# Patient Record
Sex: MALE | Race: WHITE | NOT HISPANIC OR LATINO | Employment: UNEMPLOYED | URBAN - NONMETROPOLITAN AREA
[De-identification: names, ages, dates, MRNs, and addresses within clinical notes are randomized per-mention and may not be internally consistent; named-entity substitution may affect disease eponyms.]

---

## 2017-03-16 ENCOUNTER — TRANSCRIBE ORDERS (OUTPATIENT)
Dept: ADMINISTRATIVE | Facility: HOSPITAL | Age: 62
End: 2017-03-16

## 2017-03-16 DIAGNOSIS — S46.002A INJURY OF LEFT ROTATOR CUFF, INITIAL ENCOUNTER: Primary | ICD-10-CM

## 2017-03-18 ENCOUNTER — HOSPITAL ENCOUNTER (OUTPATIENT)
Dept: MRI IMAGING | Facility: HOSPITAL | Age: 62
Discharge: HOME | End: 2017-03-18
Attending: FAMILY MEDICINE | Admitting: FAMILY MEDICINE

## 2017-03-18 DIAGNOSIS — S46.002A INJURY OF LEFT ROTATOR CUFF, INITIAL ENCOUNTER: ICD-10-CM

## 2017-03-18 PROCEDURE — 73221 MRI JOINT UPR EXTREM W/O DYE: CPT | Performed by: RADIOLOGY

## 2017-03-18 PROCEDURE — 73221 MRI JOINT UPR EXTREM W/O DYE: CPT

## 2017-04-20 ENCOUNTER — HOSPITAL ENCOUNTER (OUTPATIENT)
Dept: GENERAL RADIOLOGY | Facility: HOSPITAL | Age: 62
Discharge: HOME | End: 2017-04-20
Attending: ORTHOPAEDIC SURGERY | Admitting: ORTHOPAEDIC SURGERY

## 2017-04-20 ENCOUNTER — OFFICE VISIT (OUTPATIENT)
Dept: ORTHOPEDIC SURGERY | Facility: CLINIC | Age: 62
End: 2017-04-20

## 2017-04-20 VITALS
HEART RATE: 68 BPM | SYSTOLIC BLOOD PRESSURE: 155 MMHG | WEIGHT: 183 LBS | BODY MASS INDEX: 28.72 KG/M2 | HEIGHT: 67 IN | DIASTOLIC BLOOD PRESSURE: 93 MMHG

## 2017-04-20 DIAGNOSIS — M25.512 LEFT SHOULDER PAIN, UNSPECIFIED CHRONICITY: Primary | ICD-10-CM

## 2017-04-20 DIAGNOSIS — M75.122 COMPLETE TEAR OF LEFT ROTATOR CUFF: Primary | ICD-10-CM

## 2017-04-20 PROBLEM — S46.219A BICEPS TENDON RUPTURE: Status: ACTIVE | Noted: 2017-04-20

## 2017-04-20 PROBLEM — M10.9 GOUT: Status: ACTIVE | Noted: 2017-04-20

## 2017-04-20 PROBLEM — J45.909 AIRWAY HYPERREACTIVITY: Status: ACTIVE | Noted: 2017-04-20

## 2017-04-20 PROBLEM — M19.90 ARTHRITIS: Status: ACTIVE | Noted: 2017-04-20

## 2017-04-20 PROBLEM — K21.9 ACID REFLUX: Status: ACTIVE | Noted: 2017-04-20

## 2017-04-20 PROCEDURE — 20610 DRAIN/INJ JOINT/BURSA W/O US: CPT | Performed by: ORTHOPAEDIC SURGERY

## 2017-04-20 PROCEDURE — 73030 X-RAY EXAM OF SHOULDER: CPT

## 2017-04-20 PROCEDURE — 73030 X-RAY EXAM OF SHOULDER: CPT | Performed by: RADIOLOGY

## 2017-04-20 PROCEDURE — 99213 OFFICE O/P EST LOW 20 MIN: CPT | Performed by: ORTHOPAEDIC SURGERY

## 2017-04-20 RX ORDER — CETIRIZINE HYDROCHLORIDE 10 MG/1
TABLET ORAL
Refills: 3 | Status: ON HOLD | COMMUNITY
Start: 2017-03-13 | End: 2019-11-24

## 2017-04-20 RX ORDER — AMLODIPINE BESYLATE 5 MG/1
TABLET ORAL
Status: ON HOLD | COMMUNITY
Start: 2014-09-02 | End: 2019-11-24

## 2017-04-20 RX ORDER — LOSARTAN POTASSIUM AND HYDROCHLOROTHIAZIDE 25; 100 MG/1; MG/1
TABLET ORAL
Status: ON HOLD | COMMUNITY
Start: 2017-04-17 | End: 2019-11-24

## 2017-04-20 RX ORDER — ATENOLOL 25 MG/1
TABLET ORAL
Refills: 5 | Status: ON HOLD | COMMUNITY
Start: 2017-03-13 | End: 2019-11-24

## 2017-04-20 RX ORDER — ONDANSETRON 4 MG/1
TABLET, FILM COATED ORAL
Refills: 0 | Status: ON HOLD | COMMUNITY
Start: 2017-01-12 | End: 2019-11-24

## 2017-04-20 RX ORDER — HYDROCODONE BITARTRATE AND ACETAMINOPHEN 10; 325 MG/1; MG/1
TABLET ORAL
Refills: 0 | Status: ON HOLD | COMMUNITY
Start: 2017-04-04 | End: 2019-11-24

## 2017-04-20 RX ORDER — METHYLPREDNISOLONE ACETATE 40 MG/ML
80 INJECTION, SUSPENSION INTRA-ARTICULAR; INTRALESIONAL; INTRAMUSCULAR; SOFT TISSUE
Status: COMPLETED | OUTPATIENT
Start: 2017-04-20 | End: 2017-04-20

## 2017-04-20 RX ORDER — IBUPROFEN 800 MG/1
TABLET ORAL
Status: ON HOLD | COMMUNITY
Start: 2017-04-17 | End: 2019-11-24

## 2017-04-20 RX ORDER — GABAPENTIN 300 MG/1
CAPSULE ORAL
Refills: 5 | Status: ON HOLD | COMMUNITY
Start: 2017-04-13 | End: 2019-11-24

## 2017-04-20 RX ORDER — CARISOPRODOL 350 MG/1
TABLET ORAL
Refills: 1 | Status: ON HOLD | COMMUNITY
Start: 2017-04-03 | End: 2019-11-24

## 2017-04-20 RX ORDER — ALPRAZOLAM 1 MG/1
TABLET ORAL
Refills: 1 | Status: ON HOLD | COMMUNITY
Start: 2017-04-03 | End: 2019-11-24 | Stop reason: DRUGHIGH

## 2017-04-20 RX ORDER — AMLODIPINE BESYLATE 10 MG/1
TABLET ORAL
Refills: 5 | Status: ON HOLD | COMMUNITY
Start: 2017-02-10 | End: 2019-11-24

## 2017-04-20 RX ORDER — ZOLPIDEM TARTRATE 10 MG/1
TABLET ORAL
Refills: 1 | Status: ON HOLD | COMMUNITY
Start: 2017-04-13 | End: 2019-11-24

## 2017-04-20 RX ORDER — BUPIVACAINE HYDROCHLORIDE 5 MG/ML
5 INJECTION, SOLUTION EPIDURAL; INTRACAUDAL
Status: COMPLETED | OUTPATIENT
Start: 2017-04-20 | End: 2017-04-20

## 2017-04-20 RX ORDER — ATORVASTATIN CALCIUM 10 MG/1
TABLET, FILM COATED ORAL
Refills: 5 | Status: ON HOLD | COMMUNITY
Start: 2017-04-13 | End: 2019-11-24

## 2017-04-20 RX ADMIN — BUPIVACAINE HYDROCHLORIDE 5 ML: 5 INJECTION, SOLUTION EPIDURAL; INTRACAUDAL at 10:43

## 2017-04-20 RX ADMIN — METHYLPREDNISOLONE ACETATE 80 MG: 40 INJECTION, SUSPENSION INTRA-ARTICULAR; INTRALESIONAL; INTRAMUSCULAR; SOFT TISSUE at 10:43

## 2017-04-20 NOTE — PROGRESS NOTES
Patient: Valerie Moore  YOB: 1955  Date of Encounter: 04/20/2017        History of Present Illness:     62-year-old white male seen today for follow-up with new complaint three-month history left shoulder pain gradual onset with no notable injury. Patient reports progressive worsening anterior lateral shoulder pain with weakness upon attempted overhead range of motion and lifting. Patient has no prior history of an acute injury. Denies any paresthesias. He reports that he is beginning to lose function in regards to his activities of daily living secondary to the weakness. No prior medication or treatment thus far.    Physical Exam: 62 y.o. male .  General Appearance:    Well appearing, cooperative, in no acute distress.       Patient is alert and oriented x 3.          Musculoskeletal: Left Shoulder Exam     Tenderness   The patient is experiencing tenderness in the AC joint.    Range of Motion   Internal Rotation 0 degrees:      Lumbar    Muscle Strength   Abduction:            4/5  Internal Rotation:  5/5  External Rotation: 4/5  Supraspinatus:     3/5  Subscapularis:     4/5  Biceps:                 5/5    Tests   Impingement:   Positive  [place]:          Negative  Cross Arm:      Negative  Drop Arm:        Positive  Apprehension: Negative  Sulcus:            Negative      Radiology:       EXAMINATION: MRI SHOULDER LEFT WITHOUT CONTRAST-         FINDINGS: Today's study shows a large amount of joint fluid.      There is a tear of the supraspinatus tendon with retraction of the  tendon.      Tear of the superior labrum as well.      There are no adjacent soft tissue masses.      IMPRESSION:  1. Supraspinatus tendon tear with retraction of the tendon.  2. Large amount of joint fluid.  3. Labral tear.       This report was finalized on 3/19/2017 10:52 AM by Dr. Marlo Kang MD.        X-rays: AP, lateral and scapular Y view reveals patient has mild degenerative changes with early glenohumeral joint  and acromioclavicular degeneration. No acute fractures or dislocations.      Procedure:  Left subacromial depo-medrol injection  Date/Time: 4/20/2017 10:43 AM  Consent given by: patient  Site marked: site marked  Supporting Documentation  Indications: pain and diagnostic evaluation   Procedure Details  Location: shoulder - L subacromial bursa  Needle size: 25 G  Approach: lateral  Medications administered: 80 mg methylPREDNISolone acetate 40 MG/ML; 5 mL bupivacaine (PF) 0.5 %  Patient tolerance: patient tolerated the procedure well with no immediate complications          Assessment:    ICD-10-CM ICD-9-CM   1. Complete tear of left rotator cuff M75.122 727.61       Plan:    Patient has a notable MRI confirmed rotator cuff tear with retraction as well as chronic fatty infiltration. At this point given the patient's relative nontreatment he received and tolerated a subacromial left shoulder Depo-Medrol injection well. He is also given a formal referral physical therapy begin working on stabilization as well as generalized strengthening over the next 6 weeks. He reported that time for further evaluation. Being on his response may discuss repeat injection versus discussion of surgical options.        Discussion and Summary:    Written by Ramón Mo PA-C, acting as scribe for Dr. Bobby GUILLAUME, Moncho Rosales MD, personally performed the services described in this documentation as scribed by the above named individual in my presence, and it is both accurate and complete.  4/20/2017  10:56 AM    This document was signed by Ramón Mo PA-C April 20, 2017

## 2019-11-23 ENCOUNTER — HOSPITAL ENCOUNTER (EMERGENCY)
Facility: HOSPITAL | Age: 64
Discharge: HOME | End: 2019-11-23
Attending: EMERGENCY MEDICINE | Admitting: EMERGENCY MEDICINE

## 2019-11-23 ENCOUNTER — APPOINTMENT (OUTPATIENT)
Dept: GENERAL RADIOLOGY | Facility: HOSPITAL | Age: 64
End: 2019-11-23

## 2019-11-23 ENCOUNTER — APPOINTMENT (OUTPATIENT)
Dept: CT IMAGING | Facility: HOSPITAL | Age: 64
End: 2019-11-23

## 2019-11-23 ENCOUNTER — HOSPITAL ENCOUNTER (OUTPATIENT)
Facility: HOSPITAL | Age: 64
Setting detail: OBSERVATION
Discharge: HOME | End: 2019-11-25
Attending: EMERGENCY MEDICINE | Admitting: INTERNAL MEDICINE

## 2019-11-23 VITALS
HEIGHT: 67 IN | HEART RATE: 107 BPM | SYSTOLIC BLOOD PRESSURE: 177 MMHG | BODY MASS INDEX: 27.15 KG/M2 | RESPIRATION RATE: 18 BRPM | WEIGHT: 173 LBS | OXYGEN SATURATION: 96 % | DIASTOLIC BLOOD PRESSURE: 103 MMHG | TEMPERATURE: 98.7 F

## 2019-11-23 DIAGNOSIS — R53.83 FATIGUE, UNSPECIFIED TYPE: ICD-10-CM

## 2019-11-23 DIAGNOSIS — J18.9 COMMUNITY ACQUIRED PNEUMONIA OF LEFT LOWER LOBE OF LUNG: ICD-10-CM

## 2019-11-23 DIAGNOSIS — J18.9 PNEUMONIA OF LEFT LOWER LOBE DUE TO INFECTIOUS ORGANISM: Primary | ICD-10-CM

## 2019-11-23 DIAGNOSIS — R53.1 WEAKNESS: Primary | ICD-10-CM

## 2019-11-23 LAB
ALBUMIN SERPL-MCNC: 4.35 G/DL (ref 3.5–5.2)
ALBUMIN/GLOB SERPL: 1.2 G/DL
ALP SERPL-CCNC: 102 U/L (ref 39–117)
ALT SERPL W P-5'-P-CCNC: 16 U/L (ref 1–41)
ANION GAP SERPL CALCULATED.3IONS-SCNC: 12.3 MMOL/L (ref 5–15)
APTT PPP: 31.8 SECONDS (ref 23.8–36.1)
AST SERPL-CCNC: 22 U/L (ref 1–40)
BASOPHILS # BLD AUTO: 0 10*3/MM3 (ref 0–0.2)
BASOPHILS # BLD AUTO: 0.03 10*3/MM3 (ref 0–0.2)
BASOPHILS NFR BLD AUTO: 0 % (ref 0–1.5)
BASOPHILS NFR BLD AUTO: 0.3 % (ref 0–1.5)
BILIRUB SERPL-MCNC: 0.2 MG/DL (ref 0.2–1.2)
BILIRUB UR QL STRIP: NEGATIVE
BUN BLD-MCNC: 18 MG/DL (ref 8–23)
BUN/CREAT SERPL: 15.8 (ref 7–25)
CALCIUM SPEC-SCNC: 9.1 MG/DL (ref 8.6–10.5)
CHLORIDE SERPL-SCNC: 100 MMOL/L (ref 98–107)
CLARITY UR: CLEAR
CO2 SERPL-SCNC: 24.7 MMOL/L (ref 22–29)
COLOR UR: YELLOW
CREAT BLD-MCNC: 1.14 MG/DL (ref 0.76–1.27)
D-LACTATE SERPL-SCNC: 1.1 MMOL/L (ref 0.5–2)
DEPRECATED RDW RBC AUTO: 47 FL (ref 37–54)
DEPRECATED RDW RBC AUTO: 47.4 FL (ref 37–54)
EOSINOPHIL # BLD AUTO: 0 10*3/MM3 (ref 0–0.4)
EOSINOPHIL # BLD AUTO: 0.12 10*3/MM3 (ref 0–0.4)
EOSINOPHIL NFR BLD AUTO: 0 % (ref 0.3–6.2)
EOSINOPHIL NFR BLD AUTO: 1.2 % (ref 0.3–6.2)
ERYTHROCYTE [DISTWIDTH] IN BLOOD BY AUTOMATED COUNT: 15 % (ref 12.3–15.4)
ERYTHROCYTE [DISTWIDTH] IN BLOOD BY AUTOMATED COUNT: 15.1 % (ref 12.3–15.4)
FLUAV AG NPH QL: NEGATIVE
FLUBV AG NPH QL IA: NEGATIVE
GFR SERPL CREATININE-BSD FRML MDRD: 64 ML/MIN/1.73
GLOBULIN UR ELPH-MCNC: 3.6 GM/DL
GLUCOSE BLD-MCNC: 116 MG/DL (ref 65–99)
GLUCOSE UR STRIP-MCNC: NEGATIVE MG/DL
HCT VFR BLD AUTO: 32.2 % (ref 37.5–51)
HCT VFR BLD AUTO: 36.2 % (ref 37.5–51)
HGB BLD-MCNC: 11.1 G/DL (ref 13–17.7)
HGB BLD-MCNC: 12.1 G/DL (ref 13–17.7)
HGB UR QL STRIP.AUTO: NEGATIVE
IMM GRANULOCYTES # BLD AUTO: 0.04 10*3/MM3 (ref 0–0.05)
IMM GRANULOCYTES # BLD AUTO: 0.06 10*3/MM3 (ref 0–0.05)
IMM GRANULOCYTES NFR BLD AUTO: 0.4 % (ref 0–0.5)
IMM GRANULOCYTES NFR BLD AUTO: 0.6 % (ref 0–0.5)
INR PPP: 0.94 (ref 0.9–1.1)
KETONES UR QL STRIP: NEGATIVE
LEUKOCYTE ESTERASE UR QL STRIP.AUTO: NEGATIVE
LYMPHOCYTES # BLD AUTO: 0.58 10*3/MM3 (ref 0.7–3.1)
LYMPHOCYTES # BLD AUTO: 1.1 10*3/MM3 (ref 0.7–3.1)
LYMPHOCYTES NFR BLD AUTO: 10.6 % (ref 19.6–45.3)
LYMPHOCYTES NFR BLD AUTO: 5.8 % (ref 19.6–45.3)
MCH RBC QN AUTO: 28.8 PG (ref 26.6–33)
MCH RBC QN AUTO: 29.4 PG (ref 26.6–33)
MCHC RBC AUTO-ENTMCNC: 33.4 G/DL (ref 31.5–35.7)
MCHC RBC AUTO-ENTMCNC: 34.5 G/DL (ref 31.5–35.7)
MCV RBC AUTO: 85.2 FL (ref 79–97)
MCV RBC AUTO: 86.2 FL (ref 79–97)
MONOCYTES # BLD AUTO: 0.24 10*3/MM3 (ref 0.1–0.9)
MONOCYTES # BLD AUTO: 0.59 10*3/MM3 (ref 0.1–0.9)
MONOCYTES NFR BLD AUTO: 2.4 % (ref 5–12)
MONOCYTES NFR BLD AUTO: 5.7 % (ref 5–12)
NEUTROPHILS # BLD AUTO: 8.45 10*3/MM3 (ref 1.7–7)
NEUTROPHILS # BLD AUTO: 9.1 10*3/MM3 (ref 1.7–7)
NEUTROPHILS NFR BLD AUTO: 81.8 % (ref 42.7–76)
NEUTROPHILS NFR BLD AUTO: 91.2 % (ref 42.7–76)
NITRITE UR QL STRIP: NEGATIVE
NRBC BLD AUTO-RTO: 0 /100 WBC (ref 0–0.2)
NRBC BLD AUTO-RTO: 0 /100 WBC (ref 0–0.2)
NT-PROBNP SERPL-MCNC: 298.4 PG/ML (ref 5–900)
PH UR STRIP.AUTO: 7 [PH] (ref 5–8)
PLATELET # BLD AUTO: 203 10*3/MM3 (ref 140–450)
PLATELET # BLD AUTO: 216 10*3/MM3 (ref 140–450)
PMV BLD AUTO: 8.4 FL (ref 6–12)
PMV BLD AUTO: 8.7 FL (ref 6–12)
POTASSIUM BLD-SCNC: 3.6 MMOL/L (ref 3.5–5.2)
PROT SERPL-MCNC: 7.9 G/DL (ref 6–8.5)
PROT UR QL STRIP: NEGATIVE
PROTHROMBIN TIME: 13.1 SECONDS (ref 11–15.4)
RBC # BLD AUTO: 3.78 10*6/MM3 (ref 4.14–5.8)
RBC # BLD AUTO: 4.2 10*6/MM3 (ref 4.14–5.8)
SODIUM BLD-SCNC: 137 MMOL/L (ref 136–145)
SP GR UR STRIP: 1.02 (ref 1–1.03)
TROPONIN T SERPL-MCNC: <0.01 NG/ML (ref 0–0.03)
UROBILINOGEN UR QL STRIP: NORMAL
WBC NRBC COR # BLD: 10.33 10*3/MM3 (ref 3.4–10.8)
WBC NRBC COR # BLD: 9.98 10*3/MM3 (ref 3.4–10.8)

## 2019-11-23 PROCEDURE — 85610 PROTHROMBIN TIME: CPT | Performed by: EMERGENCY MEDICINE

## 2019-11-23 PROCEDURE — 83880 ASSAY OF NATRIURETIC PEPTIDE: CPT | Performed by: EMERGENCY MEDICINE

## 2019-11-23 PROCEDURE — 81003 URINALYSIS AUTO W/O SCOPE: CPT | Performed by: EMERGENCY MEDICINE

## 2019-11-23 PROCEDURE — 71275 CT ANGIOGRAPHY CHEST: CPT

## 2019-11-23 PROCEDURE — 25010000002 METHYLPREDNISOLONE PER 125 MG: Performed by: EMERGENCY MEDICINE

## 2019-11-23 PROCEDURE — 85025 COMPLETE CBC W/AUTO DIFF WBC: CPT | Performed by: EMERGENCY MEDICINE

## 2019-11-23 PROCEDURE — 71046 X-RAY EXAM CHEST 2 VIEWS: CPT

## 2019-11-23 PROCEDURE — 84484 ASSAY OF TROPONIN QUANT: CPT | Performed by: EMERGENCY MEDICINE

## 2019-11-23 PROCEDURE — 83605 ASSAY OF LACTIC ACID: CPT | Performed by: EMERGENCY MEDICINE

## 2019-11-23 PROCEDURE — 93010 ELECTROCARDIOGRAM REPORT: CPT | Performed by: INTERNAL MEDICINE

## 2019-11-23 PROCEDURE — 99284 EMERGENCY DEPT VISIT MOD MDM: CPT

## 2019-11-23 PROCEDURE — 94640 AIRWAY INHALATION TREATMENT: CPT

## 2019-11-23 PROCEDURE — 25010000002 CEFTRIAXONE: Performed by: EMERGENCY MEDICINE

## 2019-11-23 PROCEDURE — 93005 ELECTROCARDIOGRAM TRACING: CPT | Performed by: EMERGENCY MEDICINE

## 2019-11-23 PROCEDURE — 0 IOVERSOL 74 % SOLUTION: Performed by: EMERGENCY MEDICINE

## 2019-11-23 PROCEDURE — 96376 TX/PRO/DX INJ SAME DRUG ADON: CPT

## 2019-11-23 PROCEDURE — 96375 TX/PRO/DX INJ NEW DRUG ADDON: CPT

## 2019-11-23 PROCEDURE — 25010000002 ONDANSETRON PER 1 MG: Performed by: EMERGENCY MEDICINE

## 2019-11-23 PROCEDURE — 87040 BLOOD CULTURE FOR BACTERIA: CPT | Performed by: EMERGENCY MEDICINE

## 2019-11-23 PROCEDURE — 96365 THER/PROPH/DIAG IV INF INIT: CPT

## 2019-11-23 PROCEDURE — 87804 INFLUENZA ASSAY W/OPTIC: CPT | Performed by: EMERGENCY MEDICINE

## 2019-11-23 PROCEDURE — 80053 COMPREHEN METABOLIC PANEL: CPT | Performed by: EMERGENCY MEDICINE

## 2019-11-23 PROCEDURE — 86738 MYCOPLASMA ANTIBODY: CPT | Performed by: PHYSICIAN ASSISTANT

## 2019-11-23 PROCEDURE — 71045 X-RAY EXAM CHEST 1 VIEW: CPT

## 2019-11-23 PROCEDURE — 25010000002 MORPHINE PER 10 MG: Performed by: EMERGENCY MEDICINE

## 2019-11-23 PROCEDURE — 36415 COLL VENOUS BLD VENIPUNCTURE: CPT

## 2019-11-23 PROCEDURE — 85730 THROMBOPLASTIN TIME PARTIAL: CPT | Performed by: EMERGENCY MEDICINE

## 2019-11-23 RX ORDER — SODIUM CHLORIDE 0.9 % (FLUSH) 0.9 %
10 SYRINGE (ML) INJECTION AS NEEDED
Status: DISCONTINUED | OUTPATIENT
Start: 2019-11-23 | End: 2019-11-23 | Stop reason: HOSPADM

## 2019-11-23 RX ORDER — IPRATROPIUM BROMIDE AND ALBUTEROL SULFATE 2.5; .5 MG/3ML; MG/3ML
3 SOLUTION RESPIRATORY (INHALATION) ONCE
Status: COMPLETED | OUTPATIENT
Start: 2019-11-23 | End: 2019-11-23

## 2019-11-23 RX ORDER — ASPIRIN 325 MG
325 TABLET ORAL ONCE
Status: COMPLETED | OUTPATIENT
Start: 2019-11-23 | End: 2019-11-23

## 2019-11-23 RX ORDER — ONDANSETRON 2 MG/ML
4 INJECTION INTRAMUSCULAR; INTRAVENOUS ONCE
Status: COMPLETED | OUTPATIENT
Start: 2019-11-23 | End: 2019-11-23

## 2019-11-23 RX ORDER — LEVOFLOXACIN 750 MG/1
750 TABLET ORAL DAILY
Qty: 10 TABLET | Refills: 0 | Status: ON HOLD | OUTPATIENT
Start: 2019-11-23 | End: 2019-11-24 | Stop reason: DRUGHIGH

## 2019-11-23 RX ORDER — SODIUM CHLORIDE 0.9 % (FLUSH) 0.9 %
10 SYRINGE (ML) INJECTION AS NEEDED
Status: DISCONTINUED | OUTPATIENT
Start: 2019-11-23 | End: 2019-11-25 | Stop reason: HOSPADM

## 2019-11-23 RX ORDER — PREDNISONE 20 MG/1
20 TABLET ORAL
Qty: 15 TABLET | Refills: 0 | Status: ON HOLD | OUTPATIENT
Start: 2019-11-23 | End: 2019-11-24 | Stop reason: DRUGHIGH

## 2019-11-23 RX ORDER — METHYLPREDNISOLONE SODIUM SUCCINATE 125 MG/2ML
125 INJECTION, POWDER, LYOPHILIZED, FOR SOLUTION INTRAMUSCULAR; INTRAVENOUS ONCE
Status: COMPLETED | OUTPATIENT
Start: 2019-11-23 | End: 2019-11-23

## 2019-11-23 RX ADMIN — ASPIRIN 325 MG: 325 TABLET ORAL at 23:45

## 2019-11-23 RX ADMIN — SODIUM CHLORIDE 1000 ML: 9 INJECTION, SOLUTION INTRAVENOUS at 03:15

## 2019-11-23 RX ADMIN — IPRATROPIUM BROMIDE AND ALBUTEROL SULFATE 3 ML: .5; 3 SOLUTION RESPIRATORY (INHALATION) at 01:28

## 2019-11-23 RX ADMIN — MORPHINE SULFATE 4 MG: 4 INJECTION, SOLUTION INTRAMUSCULAR; INTRAVENOUS at 03:35

## 2019-11-23 RX ADMIN — ONDANSETRON 4 MG: 2 INJECTION, SOLUTION INTRAMUSCULAR; INTRAVENOUS at 23:45

## 2019-11-23 RX ADMIN — CEFTRIAXONE 1 G: 1 INJECTION, POWDER, FOR SOLUTION INTRAMUSCULAR; INTRAVENOUS at 03:16

## 2019-11-23 RX ADMIN — MORPHINE SULFATE 4 MG: 4 INJECTION, SOLUTION INTRAMUSCULAR; INTRAVENOUS at 05:07

## 2019-11-23 RX ADMIN — METHYLPREDNISOLONE SODIUM SUCCINATE 125 MG: 125 INJECTION, POWDER, FOR SOLUTION INTRAMUSCULAR; INTRAVENOUS at 01:14

## 2019-11-23 RX ADMIN — IOVERSOL 70 ML: 741 INJECTION INTRA-ARTERIAL; INTRAVENOUS at 04:16

## 2019-11-24 PROBLEM — M10.9 GOUT: Chronic | Status: ACTIVE | Noted: 2017-04-20

## 2019-11-24 PROBLEM — J18.9 CAP (COMMUNITY ACQUIRED PNEUMONIA): Status: ACTIVE | Noted: 2019-11-24

## 2019-11-24 PROBLEM — E87.6 HYPOKALEMIA: Status: ACTIVE | Noted: 2019-11-24

## 2019-11-24 PROBLEM — K21.9 ACID REFLUX: Chronic | Status: ACTIVE | Noted: 2017-04-20

## 2019-11-24 PROBLEM — D64.9 NORMOCYTIC ANEMIA: Status: ACTIVE | Noted: 2019-11-24

## 2019-11-24 PROBLEM — I71.40 ABDOMINAL AORTIC ANEURYSM (AAA) 3.0 CM TO 5.5 CM IN DIAMETER IN MALE (HCC): Status: ACTIVE | Noted: 2019-11-24

## 2019-11-24 PROBLEM — R53.1 WEAKNESS: Status: ACTIVE | Noted: 2019-11-24

## 2019-11-24 PROBLEM — E87.1 HYPONATREMIA: Status: ACTIVE | Noted: 2019-11-24

## 2019-11-24 PROBLEM — R73.9 HYPERGLYCEMIA: Status: ACTIVE | Noted: 2019-11-24

## 2019-11-24 PROBLEM — S46.219A BICEPS TENDON RUPTURE: Status: RESOLVED | Noted: 2017-04-20 | Resolved: 2019-11-24

## 2019-11-24 PROBLEM — R07.89 CHEST PAIN, ATYPICAL: Status: ACTIVE | Noted: 2019-11-24

## 2019-11-24 PROBLEM — Z72.0 TOBACCO ABUSE: Status: ACTIVE | Noted: 2019-11-24

## 2019-11-24 LAB
25(OH)D3 SERPL-MCNC: 40.2 NG/ML (ref 30–100)
6-ACETYL MORPHINE: NEGATIVE
ALBUMIN SERPL-MCNC: 4.11 G/DL (ref 3.5–5.2)
ALBUMIN SERPL-MCNC: 4.19 G/DL (ref 3.5–5.2)
ALBUMIN/GLOB SERPL: 1.1 G/DL
ALBUMIN/GLOB SERPL: 1.2 G/DL
ALP SERPL-CCNC: 88 U/L (ref 39–117)
ALP SERPL-CCNC: 89 U/L (ref 39–117)
ALT SERPL W P-5'-P-CCNC: 16 U/L (ref 1–41)
ALT SERPL W P-5'-P-CCNC: 17 U/L (ref 1–41)
AMPHET+METHAMPHET UR QL: NEGATIVE
ANION GAP SERPL CALCULATED.3IONS-SCNC: 15.6 MMOL/L (ref 5–15)
ANION GAP SERPL CALCULATED.3IONS-SCNC: 16.7 MMOL/L (ref 5–15)
AST SERPL-CCNC: 22 U/L (ref 1–40)
AST SERPL-CCNC: 22 U/L (ref 1–40)
B PERT DNA SPEC QL NAA+PROBE: NOT DETECTED
BARBITURATES UR QL SCN: NEGATIVE
BASOPHILS # BLD AUTO: 0.01 10*3/MM3 (ref 0–0.2)
BASOPHILS NFR BLD AUTO: 0.1 % (ref 0–1.5)
BENZODIAZ UR QL SCN: NEGATIVE
BILIRUB SERPL-MCNC: 0.2 MG/DL (ref 0.2–1.2)
BILIRUB SERPL-MCNC: 0.2 MG/DL (ref 0.2–1.2)
BUN BLD-MCNC: 20 MG/DL (ref 8–23)
BUN BLD-MCNC: 21 MG/DL (ref 8–23)
BUN/CREAT SERPL: 19 (ref 7–25)
BUN/CREAT SERPL: 21 (ref 7–25)
BUPRENORPHINE SERPL-MCNC: NEGATIVE NG/ML
C PNEUM DNA NPH QL NAA+NON-PROBE: NOT DETECTED
CALCIUM SPEC-SCNC: 9.4 MG/DL (ref 8.6–10.5)
CALCIUM SPEC-SCNC: 9.5 MG/DL (ref 8.6–10.5)
CANNABINOIDS SERPL QL: NEGATIVE
CHLORIDE SERPL-SCNC: 98 MMOL/L (ref 98–107)
CHLORIDE SERPL-SCNC: 98 MMOL/L (ref 98–107)
CO2 SERPL-SCNC: 18.3 MMOL/L (ref 22–29)
CO2 SERPL-SCNC: 19.4 MMOL/L (ref 22–29)
COCAINE UR QL: NEGATIVE
CREAT BLD-MCNC: 1 MG/DL (ref 0.76–1.27)
CREAT BLD-MCNC: 1.05 MG/DL (ref 0.76–1.27)
DEPRECATED RDW RBC AUTO: 47.4 FL (ref 37–54)
EOSINOPHIL # BLD AUTO: 0 10*3/MM3 (ref 0–0.4)
EOSINOPHIL NFR BLD AUTO: 0 % (ref 0.3–6.2)
ERYTHROCYTE [DISTWIDTH] IN BLOOD BY AUTOMATED COUNT: 15.2 % (ref 12.3–15.4)
FERRITIN SERPL-MCNC: 52.08 NG/ML (ref 30–400)
FLUAV H1 2009 PAND RNA NPH QL NAA+PROBE: NOT DETECTED
FLUAV H1 HA GENE NPH QL NAA+PROBE: NOT DETECTED
FLUAV H3 RNA NPH QL NAA+PROBE: NOT DETECTED
FLUAV SUBTYP SPEC NAA+PROBE: NOT DETECTED
FLUBV RNA ISLT QL NAA+PROBE: NOT DETECTED
GFR SERPL CREATININE-BSD FRML MDRD: 71 ML/MIN/1.73
GFR SERPL CREATININE-BSD FRML MDRD: 75 ML/MIN/1.73
GLOBULIN UR ELPH-MCNC: 3.6 GM/DL
GLOBULIN UR ELPH-MCNC: 3.6 GM/DL
GLUCOSE BLD-MCNC: 133 MG/DL (ref 65–99)
GLUCOSE BLD-MCNC: 162 MG/DL (ref 65–99)
HADV DNA SPEC NAA+PROBE: NOT DETECTED
HBA1C MFR BLD: 5.4 % (ref 4.8–5.6)
HCOV 229E RNA SPEC QL NAA+PROBE: NOT DETECTED
HCOV HKU1 RNA SPEC QL NAA+PROBE: NOT DETECTED
HCOV NL63 RNA SPEC QL NAA+PROBE: NOT DETECTED
HCOV OC43 RNA SPEC QL NAA+PROBE: NOT DETECTED
HCT VFR BLD AUTO: 33 % (ref 37.5–51)
HGB BLD-MCNC: 11.1 G/DL (ref 13–17.7)
HMPV RNA NPH QL NAA+NON-PROBE: NOT DETECTED
HOLD SPECIMEN: NORMAL
HOLD SPECIMEN: NORMAL
HPIV1 RNA SPEC QL NAA+PROBE: DETECTED
HPIV2 RNA SPEC QL NAA+PROBE: NOT DETECTED
HPIV3 RNA NPH QL NAA+PROBE: NOT DETECTED
HPIV4 P GENE NPH QL NAA+PROBE: NOT DETECTED
IMM GRANULOCYTES # BLD AUTO: 0.08 10*3/MM3 (ref 0–0.05)
IMM GRANULOCYTES NFR BLD AUTO: 0.8 % (ref 0–0.5)
IRON 24H UR-MRATE: 21 MCG/DL (ref 59–158)
IRON SATN MFR SERPL: 5 % (ref 20–50)
L PNEUMO1 AG UR QL IA: NEGATIVE
LYMPHOCYTES # BLD AUTO: 0.77 10*3/MM3 (ref 0.7–3.1)
LYMPHOCYTES NFR BLD AUTO: 7.5 % (ref 19.6–45.3)
M PNEUMO IGG SER IA-ACNC: NOT DETECTED
M PNEUMO IGM SER QL: NEGATIVE
MAGNESIUM SERPL-MCNC: 2.1 MG/DL (ref 1.6–2.4)
MCH RBC QN AUTO: 28.8 PG (ref 26.6–33)
MCHC RBC AUTO-ENTMCNC: 33.6 G/DL (ref 31.5–35.7)
MCV RBC AUTO: 85.5 FL (ref 79–97)
METHADONE UR QL SCN: NEGATIVE
MONOCYTES # BLD AUTO: 0.4 10*3/MM3 (ref 0.1–0.9)
MONOCYTES NFR BLD AUTO: 3.9 % (ref 5–12)
NEUTROPHILS # BLD AUTO: 9 10*3/MM3 (ref 1.7–7)
NEUTROPHILS NFR BLD AUTO: 87.7 % (ref 42.7–76)
NRBC BLD AUTO-RTO: 0 /100 WBC (ref 0–0.2)
OPIATES UR QL: POSITIVE
OXYCODONE UR QL SCN: NEGATIVE
PCP UR QL SCN: NEGATIVE
PLATELET # BLD AUTO: 205 10*3/MM3 (ref 140–450)
PMV BLD AUTO: 8.8 FL (ref 6–12)
POTASSIUM BLD-SCNC: 3 MMOL/L (ref 3.5–5.2)
POTASSIUM BLD-SCNC: 3.1 MMOL/L (ref 3.5–5.2)
POTASSIUM BLD-SCNC: 4.3 MMOL/L (ref 3.5–5.2)
PROCALCITONIN SERPL-MCNC: 0.04 NG/ML (ref 0.1–0.25)
PROT SERPL-MCNC: 7.7 G/DL (ref 6–8.5)
PROT SERPL-MCNC: 7.8 G/DL (ref 6–8.5)
RBC # BLD AUTO: 3.86 10*6/MM3 (ref 4.14–5.8)
RHINOVIRUS RNA SPEC NAA+PROBE: NOT DETECTED
RSV RNA NPH QL NAA+NON-PROBE: NOT DETECTED
SODIUM BLD-SCNC: 133 MMOL/L (ref 136–145)
SODIUM BLD-SCNC: 133 MMOL/L (ref 136–145)
TIBC SERPL-MCNC: 407 MCG/DL (ref 298–536)
TRANSFERRIN SERPL-MCNC: 273 MG/DL (ref 200–360)
TROPONIN T SERPL-MCNC: <0.01 NG/ML (ref 0–0.03)
TSH SERPL DL<=0.05 MIU/L-ACNC: 0.34 UIU/ML (ref 0.27–4.2)
WBC NRBC COR # BLD: 10.26 10*3/MM3 (ref 3.4–10.8)
WHOLE BLOOD HOLD SPECIMEN: NORMAL
WHOLE BLOOD HOLD SPECIMEN: NORMAL

## 2019-11-24 PROCEDURE — 99406 BEHAV CHNG SMOKING 3-10 MIN: CPT | Performed by: PHYSICIAN ASSISTANT

## 2019-11-24 PROCEDURE — 94640 AIRWAY INHALATION TREATMENT: CPT

## 2019-11-24 PROCEDURE — 84145 PROCALCITONIN (PCT): CPT | Performed by: INTERNAL MEDICINE

## 2019-11-24 PROCEDURE — 80307 DRUG TEST PRSMV CHEM ANLYZR: CPT | Performed by: INTERNAL MEDICINE

## 2019-11-24 PROCEDURE — 25010000002 HEPARIN (PORCINE) PER 1000 UNITS: Performed by: INTERNAL MEDICINE

## 2019-11-24 PROCEDURE — 87070 CULTURE OTHR SPECIMN AEROBIC: CPT | Performed by: PHYSICIAN ASSISTANT

## 2019-11-24 PROCEDURE — G0378 HOSPITAL OBSERVATION PER HR: HCPCS

## 2019-11-24 PROCEDURE — 96365 THER/PROPH/DIAG IV INF INIT: CPT

## 2019-11-24 PROCEDURE — 93010 ELECTROCARDIOGRAM REPORT: CPT | Performed by: INTERNAL MEDICINE

## 2019-11-24 PROCEDURE — 63710000001 PREDNISONE PER 5 MG: Performed by: PHYSICIAN ASSISTANT

## 2019-11-24 PROCEDURE — 87899 AGENT NOS ASSAY W/OPTIC: CPT | Performed by: PHYSICIAN ASSISTANT

## 2019-11-24 PROCEDURE — 84484 ASSAY OF TROPONIN QUANT: CPT | Performed by: EMERGENCY MEDICINE

## 2019-11-24 PROCEDURE — 83735 ASSAY OF MAGNESIUM: CPT | Performed by: INTERNAL MEDICINE

## 2019-11-24 PROCEDURE — 83036 HEMOGLOBIN GLYCOSYLATED A1C: CPT | Performed by: INTERNAL MEDICINE

## 2019-11-24 PROCEDURE — 83540 ASSAY OF IRON: CPT | Performed by: PHYSICIAN ASSISTANT

## 2019-11-24 PROCEDURE — 84484 ASSAY OF TROPONIN QUANT: CPT | Performed by: INTERNAL MEDICINE

## 2019-11-24 PROCEDURE — 25010000002 CEFTRIAXONE: Performed by: PHYSICIAN ASSISTANT

## 2019-11-24 PROCEDURE — 96361 HYDRATE IV INFUSION ADD-ON: CPT

## 2019-11-24 PROCEDURE — 85025 COMPLETE CBC W/AUTO DIFF WBC: CPT | Performed by: INTERNAL MEDICINE

## 2019-11-24 PROCEDURE — 96372 THER/PROPH/DIAG INJ SC/IM: CPT

## 2019-11-24 PROCEDURE — 87899 AGENT NOS ASSAY W/OPTIC: CPT | Performed by: INTERNAL MEDICINE

## 2019-11-24 PROCEDURE — 84132 ASSAY OF SERUM POTASSIUM: CPT | Performed by: INTERNAL MEDICINE

## 2019-11-24 PROCEDURE — 87040 BLOOD CULTURE FOR BACTERIA: CPT | Performed by: PHYSICIAN ASSISTANT

## 2019-11-24 PROCEDURE — 99220 PR INITIAL OBSERVATION CARE/DAY 70 MINUTES: CPT | Performed by: PHYSICIAN ASSISTANT

## 2019-11-24 PROCEDURE — 25010000002 MORPHINE PER 10 MG: Performed by: EMERGENCY MEDICINE

## 2019-11-24 PROCEDURE — 96375 TX/PRO/DX INJ NEW DRUG ADDON: CPT

## 2019-11-24 PROCEDURE — 87205 SMEAR GRAM STAIN: CPT | Performed by: PHYSICIAN ASSISTANT

## 2019-11-24 PROCEDURE — 94799 UNLISTED PULMONARY SVC/PX: CPT

## 2019-11-24 PROCEDURE — 82306 VITAMIN D 25 HYDROXY: CPT | Performed by: PHYSICIAN ASSISTANT

## 2019-11-24 PROCEDURE — 93005 ELECTROCARDIOGRAM TRACING: CPT | Performed by: INTERNAL MEDICINE

## 2019-11-24 PROCEDURE — 0099U HC BIOFIRE FILMARRAY RESP PANEL 1: CPT | Performed by: PHYSICIAN ASSISTANT

## 2019-11-24 PROCEDURE — 84443 ASSAY THYROID STIM HORMONE: CPT | Performed by: INTERNAL MEDICINE

## 2019-11-24 PROCEDURE — 80053 COMPREHEN METABOLIC PANEL: CPT | Performed by: INTERNAL MEDICINE

## 2019-11-24 PROCEDURE — 82728 ASSAY OF FERRITIN: CPT | Performed by: INTERNAL MEDICINE

## 2019-11-24 PROCEDURE — 84466 ASSAY OF TRANSFERRIN: CPT | Performed by: PHYSICIAN ASSISTANT

## 2019-11-24 RX ORDER — HYDROCHLOROTHIAZIDE 12.5 MG/1
12.5 TABLET ORAL DAILY
Status: ON HOLD | COMMUNITY
End: 2021-02-17

## 2019-11-24 RX ORDER — HEPARIN SODIUM 5000 [USP'U]/ML
5000 INJECTION, SOLUTION INTRAVENOUS; SUBCUTANEOUS EVERY 12 HOURS SCHEDULED
Status: DISCONTINUED | OUTPATIENT
Start: 2019-11-24 | End: 2019-11-25 | Stop reason: HOSPADM

## 2019-11-24 RX ORDER — CARISOPRODOL 350 MG/1
350 TABLET ORAL 3 TIMES DAILY PRN
Status: DISCONTINUED | OUTPATIENT
Start: 2019-11-24 | End: 2019-11-25 | Stop reason: HOSPADM

## 2019-11-24 RX ORDER — KETOROLAC TROMETHAMINE 30 MG/ML
15 INJECTION, SOLUTION INTRAMUSCULAR; INTRAVENOUS EVERY 6 HOURS PRN
Status: DISCONTINUED | OUTPATIENT
Start: 2019-11-24 | End: 2019-11-25 | Stop reason: HOSPADM

## 2019-11-24 RX ORDER — CETIRIZINE HYDROCHLORIDE 10 MG/1
5 TABLET ORAL DAILY
Status: DISCONTINUED | OUTPATIENT
Start: 2019-11-24 | End: 2019-11-25 | Stop reason: HOSPADM

## 2019-11-24 RX ORDER — NITROGLYCERIN 0.4 MG/1
0.4 TABLET SUBLINGUAL
Status: DISCONTINUED | OUTPATIENT
Start: 2019-11-24 | End: 2019-11-25 | Stop reason: HOSPADM

## 2019-11-24 RX ORDER — ZOLPIDEM TARTRATE 10 MG/1
10 TABLET ORAL NIGHTLY PRN
Status: ON HOLD | COMMUNITY
End: 2021-02-22

## 2019-11-24 RX ORDER — IPRATROPIUM BROMIDE AND ALBUTEROL SULFATE 2.5; .5 MG/3ML; MG/3ML
3 SOLUTION RESPIRATORY (INHALATION)
Status: DISCONTINUED | OUTPATIENT
Start: 2019-11-24 | End: 2019-11-25 | Stop reason: HOSPADM

## 2019-11-24 RX ORDER — HYDROCODONE BITARTRATE AND ACETAMINOPHEN 10; 325 MG/1; MG/1
1 TABLET ORAL EVERY 8 HOURS PRN
Status: DISCONTINUED | OUTPATIENT
Start: 2019-11-24 | End: 2019-11-25 | Stop reason: HOSPADM

## 2019-11-24 RX ORDER — LEVOFLOXACIN 750 MG/1
750 TABLET ORAL DAILY
COMMUNITY
Start: 2019-11-23 | End: 2019-11-25 | Stop reason: HOSPADM

## 2019-11-24 RX ORDER — CETIRIZINE HYDROCHLORIDE 10 MG/1
10 TABLET ORAL DAILY
Status: ON HOLD | COMMUNITY
End: 2021-02-17

## 2019-11-24 RX ORDER — PREDNISONE 20 MG/1
20 TABLET ORAL 3 TIMES DAILY
COMMUNITY
Start: 2019-11-23 | End: 2019-11-28

## 2019-11-24 RX ORDER — ALPRAZOLAM 1 MG/1
1 TABLET ORAL 3 TIMES DAILY PRN
Status: DISCONTINUED | OUTPATIENT
Start: 2019-11-24 | End: 2019-11-25 | Stop reason: HOSPADM

## 2019-11-24 RX ORDER — ACETAMINOPHEN 325 MG/1
650 TABLET ORAL EVERY 6 HOURS PRN
Status: DISCONTINUED | OUTPATIENT
Start: 2019-11-24 | End: 2019-11-25 | Stop reason: HOSPADM

## 2019-11-24 RX ORDER — HYDROCODONE BITARTRATE AND ACETAMINOPHEN 10; 325 MG/1; MG/1
1 TABLET ORAL EVERY 8 HOURS PRN
Status: ON HOLD | COMMUNITY
End: 2021-02-22

## 2019-11-24 RX ORDER — DOXYCYCLINE 100 MG/1
100 CAPSULE ORAL EVERY 12 HOURS SCHEDULED
Status: DISCONTINUED | OUTPATIENT
Start: 2019-11-24 | End: 2019-11-24

## 2019-11-24 RX ORDER — LEVOFLOXACIN 750 MG/1
750 TABLET ORAL DAILY
Status: CANCELLED | OUTPATIENT
Start: 2019-11-24 | End: 2019-12-02

## 2019-11-24 RX ORDER — POTASSIUM CHLORIDE 20 MEQ/1
40 TABLET, EXTENDED RELEASE ORAL EVERY 4 HOURS
Status: COMPLETED | OUTPATIENT
Start: 2019-11-24 | End: 2019-11-24

## 2019-11-24 RX ORDER — CARISOPRODOL 350 MG/1
350 TABLET ORAL 2 TIMES DAILY PRN
Status: ON HOLD | COMMUNITY
End: 2021-02-22

## 2019-11-24 RX ORDER — POTASSIUM CHLORIDE 20 MEQ/1
40 TABLET, EXTENDED RELEASE ORAL AS NEEDED
Status: DISCONTINUED | OUTPATIENT
Start: 2019-11-24 | End: 2019-11-25 | Stop reason: HOSPADM

## 2019-11-24 RX ORDER — ATENOLOL 25 MG/1
25 TABLET ORAL 2 TIMES DAILY
Status: DISCONTINUED | OUTPATIENT
Start: 2019-11-24 | End: 2019-11-25 | Stop reason: HOSPADM

## 2019-11-24 RX ORDER — AMLODIPINE BESYLATE 10 MG/1
10 TABLET ORAL DAILY
Status: DISCONTINUED | OUTPATIENT
Start: 2019-11-24 | End: 2019-11-25 | Stop reason: HOSPADM

## 2019-11-24 RX ORDER — SODIUM CHLORIDE 9 MG/ML
75 INJECTION, SOLUTION INTRAVENOUS CONTINUOUS
Status: DISCONTINUED | OUTPATIENT
Start: 2019-11-24 | End: 2019-11-25

## 2019-11-24 RX ORDER — ATENOLOL 25 MG/1
25 TABLET ORAL 2 TIMES DAILY
Status: ON HOLD | COMMUNITY
End: 2021-02-22

## 2019-11-24 RX ORDER — PREDNISONE 10 MG/1
20 TABLET ORAL 3 TIMES DAILY
Status: DISCONTINUED | OUTPATIENT
Start: 2019-11-24 | End: 2019-11-25 | Stop reason: HOSPADM

## 2019-11-24 RX ORDER — LOSARTAN POTASSIUM 50 MG/1
100 TABLET ORAL DAILY
Status: DISCONTINUED | OUTPATIENT
Start: 2019-11-24 | End: 2019-11-25 | Stop reason: HOSPADM

## 2019-11-24 RX ORDER — AMLODIPINE BESYLATE 10 MG/1
10 TABLET ORAL DAILY
Status: ON HOLD | COMMUNITY
End: 2021-02-22

## 2019-11-24 RX ORDER — SODIUM CHLORIDE 0.9 % (FLUSH) 0.9 %
10 SYRINGE (ML) INJECTION AS NEEDED
Status: DISCONTINUED | OUTPATIENT
Start: 2019-11-24 | End: 2019-11-25 | Stop reason: HOSPADM

## 2019-11-24 RX ORDER — ONDANSETRON 2 MG/ML
4 INJECTION INTRAMUSCULAR; INTRAVENOUS EVERY 6 HOURS PRN
Status: DISCONTINUED | OUTPATIENT
Start: 2019-11-24 | End: 2019-11-24

## 2019-11-24 RX ORDER — MULTIVITAMIN
1 TABLET ORAL DAILY
Status: DISCONTINUED | OUTPATIENT
Start: 2019-11-24 | End: 2019-11-25 | Stop reason: HOSPADM

## 2019-11-24 RX ORDER — HYDROCHLOROTHIAZIDE 12.5 MG/1
12.5 TABLET ORAL DAILY
Status: DISCONTINUED | OUTPATIENT
Start: 2019-11-24 | End: 2019-11-25 | Stop reason: HOSPADM

## 2019-11-24 RX ORDER — ALPRAZOLAM 1 MG/1
1 TABLET ORAL 3 TIMES DAILY PRN
Status: ON HOLD | COMMUNITY
End: 2021-02-22

## 2019-11-24 RX ORDER — POTASSIUM CHLORIDE 1.5 G/1.77G
40 POWDER, FOR SOLUTION ORAL AS NEEDED
Status: DISCONTINUED | OUTPATIENT
Start: 2019-11-24 | End: 2019-11-25 | Stop reason: HOSPADM

## 2019-11-24 RX ORDER — LOSARTAN POTASSIUM 100 MG/1
100 TABLET ORAL DAILY
Status: ON HOLD | COMMUNITY
End: 2021-02-17

## 2019-11-24 RX ORDER — ASPIRIN 81 MG/1
81 TABLET, CHEWABLE ORAL DAILY
Status: DISCONTINUED | OUTPATIENT
Start: 2019-11-24 | End: 2019-11-25 | Stop reason: HOSPADM

## 2019-11-24 RX ORDER — ZOLPIDEM TARTRATE 5 MG/1
10 TABLET ORAL NIGHTLY PRN
Status: DISCONTINUED | OUTPATIENT
Start: 2019-11-24 | End: 2019-11-25 | Stop reason: HOSPADM

## 2019-11-24 RX ORDER — SODIUM CHLORIDE 0.9 % (FLUSH) 0.9 %
10 SYRINGE (ML) INJECTION EVERY 12 HOURS SCHEDULED
Status: DISCONTINUED | OUTPATIENT
Start: 2019-11-24 | End: 2019-11-25 | Stop reason: HOSPADM

## 2019-11-24 RX ORDER — PANTOPRAZOLE SODIUM 40 MG/1
40 TABLET, DELAYED RELEASE ORAL
Status: DISCONTINUED | OUTPATIENT
Start: 2019-11-24 | End: 2019-11-25 | Stop reason: HOSPADM

## 2019-11-24 RX ORDER — CEFDINIR 300 MG/1
300 CAPSULE ORAL EVERY 12 HOURS SCHEDULED
Status: DISCONTINUED | OUTPATIENT
Start: 2019-11-24 | End: 2019-11-24

## 2019-11-24 RX ADMIN — HEPARIN SODIUM 5000 UNITS: 5000 INJECTION INTRAVENOUS; SUBCUTANEOUS at 15:58

## 2019-11-24 RX ADMIN — SODIUM CHLORIDE, PRESERVATIVE FREE 10 ML: 5 INJECTION INTRAVENOUS at 02:48

## 2019-11-24 RX ADMIN — ATENOLOL 25 MG: 25 TABLET ORAL at 08:27

## 2019-11-24 RX ADMIN — Medication 1 TABLET: at 08:27

## 2019-11-24 RX ADMIN — HEPARIN SODIUM 5000 UNITS: 5000 INJECTION INTRAVENOUS; SUBCUTANEOUS at 22:11

## 2019-11-24 RX ADMIN — HEPARIN SODIUM 5000 UNITS: 5000 INJECTION INTRAVENOUS; SUBCUTANEOUS at 02:48

## 2019-11-24 RX ADMIN — POTASSIUM CHLORIDE 40 MEQ: 1500 TABLET, EXTENDED RELEASE ORAL at 05:50

## 2019-11-24 RX ADMIN — CETIRIZINE HYDROCHLORIDE 5 MG: 10 TABLET, FILM COATED ORAL at 08:27

## 2019-11-24 RX ADMIN — HYDROCODONE BITARTRATE AND ACETAMINOPHEN 1 TABLET: 10; 325 TABLET ORAL at 17:07

## 2019-11-24 RX ADMIN — DOXYCYCLINE 100 MG: 100 CAPSULE ORAL at 02:47

## 2019-11-24 RX ADMIN — LOSARTAN POTASSIUM 100 MG: 50 TABLET, FILM COATED ORAL at 10:44

## 2019-11-24 RX ADMIN — SODIUM CHLORIDE 75 ML/HR: 9 INJECTION, SOLUTION INTRAVENOUS at 02:48

## 2019-11-24 RX ADMIN — PREDNISONE 20 MG: 10 TABLET ORAL at 22:12

## 2019-11-24 RX ADMIN — AMLODIPINE BESYLATE 10 MG: 10 TABLET ORAL at 10:44

## 2019-11-24 RX ADMIN — ATENOLOL 25 MG: 25 TABLET ORAL at 22:08

## 2019-11-24 RX ADMIN — CEFDINIR 300 MG: 300 CAPSULE ORAL at 02:47

## 2019-11-24 RX ADMIN — SODIUM CHLORIDE, PRESERVATIVE FREE 10 ML: 5 INJECTION INTRAVENOUS at 08:28

## 2019-11-24 RX ADMIN — DOXYCYCLINE 100 MG: 100 INJECTION, POWDER, LYOPHILIZED, FOR SOLUTION INTRAVENOUS at 17:07

## 2019-11-24 RX ADMIN — IPRATROPIUM BROMIDE AND ALBUTEROL SULFATE 3 ML: .5; 3 SOLUTION RESPIRATORY (INHALATION) at 07:33

## 2019-11-24 RX ADMIN — IPRATROPIUM BROMIDE AND ALBUTEROL SULFATE 3 ML: .5; 3 SOLUTION RESPIRATORY (INHALATION) at 13:22

## 2019-11-24 RX ADMIN — POTASSIUM CHLORIDE 40 MEQ: 1500 TABLET, EXTENDED RELEASE ORAL at 10:44

## 2019-11-24 RX ADMIN — ALPRAZOLAM 1 MG: 1 TABLET ORAL at 08:36

## 2019-11-24 RX ADMIN — HYDROCHLOROTHIAZIDE 12.5 MG: 12.5 CAPSULE, GELATIN COATED ORAL at 10:44

## 2019-11-24 RX ADMIN — DOXYCYCLINE 100 MG: 100 INJECTION, POWDER, LYOPHILIZED, FOR SOLUTION INTRAVENOUS at 05:49

## 2019-11-24 RX ADMIN — ALPRAZOLAM 1 MG: 1 TABLET ORAL at 17:07

## 2019-11-24 RX ADMIN — IPRATROPIUM BROMIDE AND ALBUTEROL SULFATE 3 ML: .5; 3 SOLUTION RESPIRATORY (INHALATION) at 19:12

## 2019-11-24 RX ADMIN — PANTOPRAZOLE SODIUM 40 MG: 40 TABLET, DELAYED RELEASE ORAL at 05:50

## 2019-11-24 RX ADMIN — ZOLPIDEM TARTRATE 10 MG: 5 TABLET ORAL at 22:08

## 2019-11-24 RX ADMIN — POTASSIUM CHLORIDE 40 MEQ: 1500 TABLET, EXTENDED RELEASE ORAL at 02:47

## 2019-11-24 RX ADMIN — CEFTRIAXONE 1 G: 1 INJECTION, POWDER, FOR SOLUTION INTRAMUSCULAR; INTRAVENOUS at 05:49

## 2019-11-24 RX ADMIN — HYDROCODONE BITARTRATE AND ACETAMINOPHEN 1 TABLET: 10; 325 TABLET ORAL at 08:36

## 2019-11-24 RX ADMIN — SODIUM CHLORIDE 75 ML/HR: 9 INJECTION, SOLUTION INTRAVENOUS at 17:07

## 2019-11-24 RX ADMIN — CARISOPRODOL 350 MG: 350 TABLET ORAL at 15:58

## 2019-11-24 RX ADMIN — PREDNISONE 20 MG: 10 TABLET ORAL at 08:27

## 2019-11-24 RX ADMIN — ASPIRIN 81 MG: 81 TABLET, CHEWABLE ORAL at 08:37

## 2019-11-24 RX ADMIN — MORPHINE SULFATE 4 MG: 4 INJECTION, SOLUTION INTRAMUSCULAR; INTRAVENOUS at 00:25

## 2019-11-24 RX ADMIN — PREDNISONE 20 MG: 10 TABLET ORAL at 15:58

## 2019-11-25 VITALS
WEIGHT: 181.31 LBS | TEMPERATURE: 97.4 F | OXYGEN SATURATION: 96 % | SYSTOLIC BLOOD PRESSURE: 172 MMHG | HEIGHT: 67 IN | HEART RATE: 66 BPM | BODY MASS INDEX: 28.46 KG/M2 | DIASTOLIC BLOOD PRESSURE: 75 MMHG | RESPIRATION RATE: 18 BRPM

## 2019-11-25 LAB
ANION GAP SERPL CALCULATED.3IONS-SCNC: 11.7 MMOL/L (ref 5–15)
BASOPHILS # BLD AUTO: 0.01 10*3/MM3 (ref 0–0.2)
BASOPHILS NFR BLD AUTO: 0.1 % (ref 0–1.5)
BUN BLD-MCNC: 19 MG/DL (ref 8–23)
BUN/CREAT SERPL: 22.1 (ref 7–25)
CALCIUM SPEC-SCNC: 9.1 MG/DL (ref 8.6–10.5)
CHLORIDE SERPL-SCNC: 104 MMOL/L (ref 98–107)
CO2 SERPL-SCNC: 19.3 MMOL/L (ref 22–29)
CREAT BLD-MCNC: 0.86 MG/DL (ref 0.76–1.27)
DEPRECATED RDW RBC AUTO: 50.4 FL (ref 37–54)
EOSINOPHIL # BLD AUTO: 0 10*3/MM3 (ref 0–0.4)
EOSINOPHIL NFR BLD AUTO: 0 % (ref 0.3–6.2)
ERYTHROCYTE [DISTWIDTH] IN BLOOD BY AUTOMATED COUNT: 15.7 % (ref 12.3–15.4)
GFR SERPL CREATININE-BSD FRML MDRD: 89 ML/MIN/1.73
GLUCOSE BLD-MCNC: 159 MG/DL (ref 65–99)
HCT VFR BLD AUTO: 33.3 % (ref 37.5–51)
HGB BLD-MCNC: 10.9 G/DL (ref 13–17.7)
IMM GRANULOCYTES # BLD AUTO: 0.08 10*3/MM3 (ref 0–0.05)
IMM GRANULOCYTES NFR BLD AUTO: 0.9 % (ref 0–0.5)
LYMPHOCYTES # BLD AUTO: 0.98 10*3/MM3 (ref 0.7–3.1)
LYMPHOCYTES NFR BLD AUTO: 10.8 % (ref 19.6–45.3)
MCH RBC QN AUTO: 28.8 PG (ref 26.6–33)
MCHC RBC AUTO-ENTMCNC: 32.7 G/DL (ref 31.5–35.7)
MCV RBC AUTO: 87.9 FL (ref 79–97)
MONOCYTES # BLD AUTO: 0.44 10*3/MM3 (ref 0.1–0.9)
MONOCYTES NFR BLD AUTO: 4.9 % (ref 5–12)
NEUTROPHILS # BLD AUTO: 7.56 10*3/MM3 (ref 1.7–7)
NEUTROPHILS NFR BLD AUTO: 83.3 % (ref 42.7–76)
NRBC BLD AUTO-RTO: 0 /100 WBC (ref 0–0.2)
PLATELET # BLD AUTO: 237 10*3/MM3 (ref 140–450)
PMV BLD AUTO: 9.4 FL (ref 6–12)
POTASSIUM BLD-SCNC: 3.9 MMOL/L (ref 3.5–5.2)
RBC # BLD AUTO: 3.79 10*6/MM3 (ref 4.14–5.8)
S PNEUM AG SPEC QL LA: NEGATIVE
SODIUM BLD-SCNC: 135 MMOL/L (ref 136–145)
WBC NRBC COR # BLD: 9.07 10*3/MM3 (ref 3.4–10.8)

## 2019-11-25 PROCEDURE — 80048 BASIC METABOLIC PNL TOTAL CA: CPT | Performed by: INTERNAL MEDICINE

## 2019-11-25 PROCEDURE — 96361 HYDRATE IV INFUSION ADD-ON: CPT

## 2019-11-25 PROCEDURE — 94799 UNLISTED PULMONARY SVC/PX: CPT

## 2019-11-25 PROCEDURE — 96366 THER/PROPH/DIAG IV INF ADDON: CPT

## 2019-11-25 PROCEDURE — G0378 HOSPITAL OBSERVATION PER HR: HCPCS

## 2019-11-25 PROCEDURE — 63710000001 PREDNISONE PER 5 MG: Performed by: PHYSICIAN ASSISTANT

## 2019-11-25 PROCEDURE — 85025 COMPLETE CBC W/AUTO DIFF WBC: CPT | Performed by: INTERNAL MEDICINE

## 2019-11-25 PROCEDURE — 99217 PR OBSERVATION CARE DISCHARGE MANAGEMENT: CPT | Performed by: PHYSICIAN ASSISTANT

## 2019-11-25 PROCEDURE — 96367 TX/PROPH/DG ADDL SEQ IV INF: CPT

## 2019-11-25 PROCEDURE — 25010000002 CEFTRIAXONE: Performed by: PHYSICIAN ASSISTANT

## 2019-11-25 RX ORDER — CEFDINIR 300 MG/1
300 CAPSULE ORAL 2 TIMES DAILY
Qty: 10 CAPSULE | Refills: 0 | Status: SHIPPED | OUTPATIENT
Start: 2019-11-26 | End: 2019-12-01

## 2019-11-25 RX ORDER — HYDRALAZINE HYDROCHLORIDE 20 MG/ML
10 INJECTION INTRAMUSCULAR; INTRAVENOUS EVERY 6 HOURS PRN
Status: DISCONTINUED | OUTPATIENT
Start: 2019-11-25 | End: 2019-11-25

## 2019-11-25 RX ORDER — HYDRALAZINE HYDROCHLORIDE 25 MG/1
25 TABLET, FILM COATED ORAL EVERY 8 HOURS PRN
Status: DISCONTINUED | OUTPATIENT
Start: 2019-11-25 | End: 2019-11-25 | Stop reason: HOSPADM

## 2019-11-25 RX ORDER — ADHESIVE BANDAGE 3/4"
BANDAGE TOPICAL
Qty: 1 EACH | Refills: 0 | Status: ON HOLD | OUTPATIENT
Start: 2019-11-25 | End: 2021-02-17

## 2019-11-25 RX ORDER — MULTIVITAMIN
1 TABLET ORAL DAILY
Qty: 30 TABLET | Refills: 0 | Status: SHIPPED | OUTPATIENT
Start: 2019-11-26 | End: 2019-12-26

## 2019-11-25 RX ORDER — HYDRALAZINE HYDROCHLORIDE 25 MG/1
25 TABLET, FILM COATED ORAL 3 TIMES DAILY PRN
Qty: 90 TABLET | Refills: 0 | Status: SHIPPED | OUTPATIENT
Start: 2019-11-25 | End: 2019-12-25

## 2019-11-25 RX ORDER — FERROUS SULFATE 325(65) MG
325 TABLET ORAL
Qty: 30 TABLET | Refills: 0 | Status: SHIPPED | OUTPATIENT
Start: 2019-11-25 | End: 2019-12-25

## 2019-11-25 RX ORDER — FERROUS SULFATE 325(65) MG
325 TABLET ORAL
Qty: 30 TABLET | Refills: 0 | Status: SHIPPED | OUTPATIENT
Start: 2019-11-25 | End: 2019-11-25

## 2019-11-25 RX ADMIN — PREDNISONE 20 MG: 10 TABLET ORAL at 08:47

## 2019-11-25 RX ADMIN — ALPRAZOLAM 1 MG: 1 TABLET ORAL at 01:50

## 2019-11-25 RX ADMIN — CETIRIZINE HYDROCHLORIDE 5 MG: 10 TABLET, FILM COATED ORAL at 08:47

## 2019-11-25 RX ADMIN — CEFTRIAXONE 1 G: 1 INJECTION, POWDER, FOR SOLUTION INTRAMUSCULAR; INTRAVENOUS at 06:08

## 2019-11-25 RX ADMIN — Medication 1 TABLET: at 08:47

## 2019-11-25 RX ADMIN — HYDROCHLOROTHIAZIDE 12.5 MG: 12.5 CAPSULE, GELATIN COATED ORAL at 11:18

## 2019-11-25 RX ADMIN — AMLODIPINE BESYLATE 10 MG: 10 TABLET ORAL at 08:47

## 2019-11-25 RX ADMIN — CARISOPRODOL 350 MG: 350 TABLET ORAL at 00:26

## 2019-11-25 RX ADMIN — IPRATROPIUM BROMIDE AND ALBUTEROL SULFATE 3 ML: .5; 3 SOLUTION RESPIRATORY (INHALATION) at 01:07

## 2019-11-25 RX ADMIN — DOXYCYCLINE 100 MG: 100 INJECTION, POWDER, LYOPHILIZED, FOR SOLUTION INTRAVENOUS at 08:47

## 2019-11-25 RX ADMIN — SODIUM CHLORIDE, PRESERVATIVE FREE 10 ML: 5 INJECTION INTRAVENOUS at 08:48

## 2019-11-25 RX ADMIN — HYDROCODONE BITARTRATE AND ACETAMINOPHEN 1 TABLET: 10; 325 TABLET ORAL at 01:50

## 2019-11-25 RX ADMIN — HYDROCODONE BITARTRATE AND ACETAMINOPHEN 1 TABLET: 10; 325 TABLET ORAL at 12:27

## 2019-11-25 RX ADMIN — CARISOPRODOL 350 MG: 350 TABLET ORAL at 08:49

## 2019-11-25 RX ADMIN — IPRATROPIUM BROMIDE AND ALBUTEROL SULFATE 3 ML: .5; 3 SOLUTION RESPIRATORY (INHALATION) at 07:51

## 2019-11-25 RX ADMIN — LOSARTAN POTASSIUM 100 MG: 50 TABLET, FILM COATED ORAL at 08:47

## 2019-11-25 RX ADMIN — ASPIRIN 81 MG: 81 TABLET, CHEWABLE ORAL at 08:47

## 2019-11-25 RX ADMIN — HYDRALAZINE HYDROCHLORIDE 25 MG: 25 TABLET ORAL at 13:41

## 2019-11-25 RX ADMIN — ATENOLOL 25 MG: 25 TABLET ORAL at 08:47

## 2019-11-26 LAB
BACTERIA SPEC RESP CULT: NORMAL
GRAM STN SPEC: NORMAL

## 2019-11-28 LAB
BACTERIA SPEC AEROBE CULT: NORMAL
BACTERIA SPEC AEROBE CULT: NORMAL

## 2019-11-29 LAB — BACTERIA SPEC AEROBE CULT: NORMAL

## 2021-02-16 ENCOUNTER — APPOINTMENT (OUTPATIENT)
Dept: MRI IMAGING | Facility: HOSPITAL | Age: 66
End: 2021-02-16

## 2021-02-16 ENCOUNTER — APPOINTMENT (OUTPATIENT)
Dept: GENERAL RADIOLOGY | Facility: HOSPITAL | Age: 66
End: 2021-02-16

## 2021-02-16 ENCOUNTER — APPOINTMENT (OUTPATIENT)
Dept: CT IMAGING | Facility: HOSPITAL | Age: 66
End: 2021-02-16

## 2021-02-16 ENCOUNTER — HOSPITAL ENCOUNTER (INPATIENT)
Facility: HOSPITAL | Age: 66
LOS: 6 days | Discharge: NURSING, REHAB OR LONG-TERM CARE | End: 2021-02-22
Attending: FAMILY MEDICINE | Admitting: HOSPITALIST

## 2021-02-16 DIAGNOSIS — I63.9 CEREBROVASCULAR ACCIDENT (CVA), UNSPECIFIED MECHANISM (HCC): Primary | ICD-10-CM

## 2021-02-16 LAB
6-ACETYL MORPHINE: NEGATIVE
ALBUMIN SERPL-MCNC: 4.35 G/DL (ref 3.5–5.2)
ALBUMIN/GLOB SERPL: 1.1 G/DL
ALP SERPL-CCNC: 112 U/L (ref 39–117)
ALT SERPL W P-5'-P-CCNC: 20 U/L (ref 1–41)
AMPHET+METHAMPHET UR QL: NEGATIVE
ANION GAP SERPL CALCULATED.3IONS-SCNC: 13.4 MMOL/L (ref 5–15)
AST SERPL-CCNC: 39 U/L (ref 1–40)
BARBITURATES UR QL SCN: NEGATIVE
BASOPHILS # BLD AUTO: 0.03 10*3/MM3 (ref 0–0.2)
BASOPHILS NFR BLD AUTO: 0.4 % (ref 0–1.5)
BENZODIAZ UR QL SCN: NEGATIVE
BILIRUB SERPL-MCNC: 0.3 MG/DL (ref 0–1.2)
BILIRUB UR QL STRIP: NEGATIVE
BUN SERPL-MCNC: 12 MG/DL (ref 8–23)
BUN/CREAT SERPL: 13.6 (ref 7–25)
BUPRENORPHINE SERPL-MCNC: NEGATIVE NG/ML
CALCIUM SPEC-SCNC: 10 MG/DL (ref 8.6–10.5)
CANNABINOIDS SERPL QL: NEGATIVE
CHLORIDE SERPL-SCNC: 98 MMOL/L (ref 98–107)
CK SERPL-CCNC: 648 U/L (ref 20–200)
CLARITY UR: CLEAR
CO2 SERPL-SCNC: 20.6 MMOL/L (ref 22–29)
COCAINE UR QL: NEGATIVE
COLOR UR: YELLOW
CREAT BLDA-MCNC: 0.9 MG/DL (ref 0.6–1.3)
CREAT SERPL-MCNC: 0.88 MG/DL (ref 0.76–1.27)
CRP SERPL-MCNC: 1.93 MG/DL (ref 0–0.5)
DEPRECATED RDW RBC AUTO: 49 FL (ref 37–54)
EOSINOPHIL # BLD AUTO: 0.11 10*3/MM3 (ref 0–0.4)
EOSINOPHIL NFR BLD AUTO: 1.4 % (ref 0.3–6.2)
ERYTHROCYTE [DISTWIDTH] IN BLOOD BY AUTOMATED COUNT: 15.4 % (ref 12.3–15.4)
ETHANOL BLD-MCNC: <10 MG/DL (ref 0–10)
ETHANOL UR QL: <0.01 %
FLUAV RNA RESP QL NAA+PROBE: NOT DETECTED
FLUBV RNA RESP QL NAA+PROBE: NOT DETECTED
GFR SERPL CREATININE-BSD FRML MDRD: 86 ML/MIN/1.73
GLOBULIN UR ELPH-MCNC: 4 GM/DL
GLUCOSE BLDC GLUCOMTR-MCNC: 102 MG/DL (ref 70–130)
GLUCOSE SERPL-MCNC: 101 MG/DL (ref 65–99)
GLUCOSE UR STRIP-MCNC: NEGATIVE MG/DL
HCT VFR BLD AUTO: 42.5 % (ref 37.5–51)
HGB BLD-MCNC: 14 G/DL (ref 13–17.7)
HGB UR QL STRIP.AUTO: NEGATIVE
HOLD SPECIMEN: NORMAL
HOLD SPECIMEN: NORMAL
IMM GRANULOCYTES # BLD AUTO: 0.02 10*3/MM3 (ref 0–0.05)
IMM GRANULOCYTES NFR BLD AUTO: 0.3 % (ref 0–0.5)
KETONES UR QL STRIP: ABNORMAL
LEUKOCYTE ESTERASE UR QL STRIP.AUTO: NEGATIVE
LYMPHOCYTES # BLD AUTO: 1.35 10*3/MM3 (ref 0.7–3.1)
LYMPHOCYTES NFR BLD AUTO: 17.8 % (ref 19.6–45.3)
MAGNESIUM SERPL-MCNC: 2 MG/DL (ref 1.6–2.4)
MCH RBC QN AUTO: 28.7 PG (ref 26.6–33)
MCHC RBC AUTO-ENTMCNC: 32.9 G/DL (ref 31.5–35.7)
MCV RBC AUTO: 87.1 FL (ref 79–97)
METHADONE UR QL SCN: NEGATIVE
MONOCYTES # BLD AUTO: 0.32 10*3/MM3 (ref 0.1–0.9)
MONOCYTES NFR BLD AUTO: 4.2 % (ref 5–12)
NEUTROPHILS NFR BLD AUTO: 5.76 10*3/MM3 (ref 1.7–7)
NEUTROPHILS NFR BLD AUTO: 75.9 % (ref 42.7–76)
NITRITE UR QL STRIP: NEGATIVE
NRBC BLD AUTO-RTO: 0 /100 WBC (ref 0–0.2)
NT-PROBNP SERPL-MCNC: 175.8 PG/ML (ref 0–900)
OPIATES UR QL: NEGATIVE
OXYCODONE UR QL SCN: NEGATIVE
PCP UR QL SCN: NEGATIVE
PH UR STRIP.AUTO: 6.5 [PH] (ref 5–8)
PLATELET # BLD AUTO: 328 10*3/MM3 (ref 140–450)
PMV BLD AUTO: 8.2 FL (ref 6–12)
POTASSIUM SERPL-SCNC: 4 MMOL/L (ref 3.5–5.2)
PROT SERPL-MCNC: 8.3 G/DL (ref 6–8.5)
PROT UR QL STRIP: NEGATIVE
RBC # BLD AUTO: 4.88 10*6/MM3 (ref 4.14–5.8)
SARS-COV-2 RNA RESP QL NAA+PROBE: NOT DETECTED
SODIUM SERPL-SCNC: 132 MMOL/L (ref 136–145)
SP GR UR STRIP: >1.03 (ref 1–1.03)
T4 FREE SERPL-MCNC: 1.4 NG/DL (ref 0.93–1.7)
TROPONIN T SERPL-MCNC: <0.01 NG/ML (ref 0–0.03)
TSH SERPL DL<=0.05 MIU/L-ACNC: 0.7 UIU/ML (ref 0.27–4.2)
UROBILINOGEN UR QL STRIP: ABNORMAL
WBC # BLD AUTO: 7.59 10*3/MM3 (ref 3.4–10.8)
WHOLE BLOOD HOLD SPECIMEN: NORMAL
WHOLE BLOOD HOLD SPECIMEN: NORMAL

## 2021-02-16 PROCEDURE — 80307 DRUG TEST PRSMV CHEM ANLYZR: CPT | Performed by: FAMILY MEDICINE

## 2021-02-16 PROCEDURE — 82962 GLUCOSE BLOOD TEST: CPT

## 2021-02-16 PROCEDURE — 71045 X-RAY EXAM CHEST 1 VIEW: CPT

## 2021-02-16 PROCEDURE — 0 IOPAMIDOL PER 1 ML: Performed by: FAMILY MEDICINE

## 2021-02-16 PROCEDURE — 87040 BLOOD CULTURE FOR BACTERIA: CPT | Performed by: FAMILY MEDICINE

## 2021-02-16 PROCEDURE — 87636 SARSCOV2 & INF A&B AMP PRB: CPT | Performed by: FAMILY MEDICINE

## 2021-02-16 PROCEDURE — 70551 MRI BRAIN STEM W/O DYE: CPT

## 2021-02-16 PROCEDURE — 72141 MRI NECK SPINE W/O DYE: CPT | Performed by: RADIOLOGY

## 2021-02-16 PROCEDURE — 93005 ELECTROCARDIOGRAM TRACING: CPT | Performed by: FAMILY MEDICINE

## 2021-02-16 PROCEDURE — 0042T HC CT CEREBRAL PERFUSION W/WO CONTRAST: CPT

## 2021-02-16 PROCEDURE — 81003 URINALYSIS AUTO W/O SCOPE: CPT | Performed by: FAMILY MEDICINE

## 2021-02-16 PROCEDURE — 84439 ASSAY OF FREE THYROXINE: CPT | Performed by: FAMILY MEDICINE

## 2021-02-16 PROCEDURE — 80053 COMPREHEN METABOLIC PANEL: CPT | Performed by: FAMILY MEDICINE

## 2021-02-16 PROCEDURE — 86140 C-REACTIVE PROTEIN: CPT | Performed by: FAMILY MEDICINE

## 2021-02-16 PROCEDURE — 84484 ASSAY OF TROPONIN QUANT: CPT | Performed by: FAMILY MEDICINE

## 2021-02-16 PROCEDURE — 83880 ASSAY OF NATRIURETIC PEPTIDE: CPT | Performed by: FAMILY MEDICINE

## 2021-02-16 PROCEDURE — G0427 INPT/ED TELECONSULT70: HCPCS | Performed by: PSYCHIATRY & NEUROLOGY

## 2021-02-16 PROCEDURE — 72125 CT NECK SPINE W/O DYE: CPT | Performed by: RADIOLOGY

## 2021-02-16 PROCEDURE — 99285 EMERGENCY DEPT VISIT HI MDM: CPT

## 2021-02-16 PROCEDURE — 85025 COMPLETE CBC W/AUTO DIFF WBC: CPT | Performed by: FAMILY MEDICINE

## 2021-02-16 PROCEDURE — 70498 CT ANGIOGRAPHY NECK: CPT

## 2021-02-16 PROCEDURE — 83735 ASSAY OF MAGNESIUM: CPT | Performed by: FAMILY MEDICINE

## 2021-02-16 PROCEDURE — 70450 CT HEAD/BRAIN W/O DYE: CPT

## 2021-02-16 PROCEDURE — 99223 1ST HOSP IP/OBS HIGH 75: CPT | Performed by: HOSPITALIST

## 2021-02-16 PROCEDURE — 70496 CT ANGIOGRAPHY HEAD: CPT

## 2021-02-16 PROCEDURE — 82565 ASSAY OF CREATININE: CPT

## 2021-02-16 PROCEDURE — 70450 CT HEAD/BRAIN W/O DYE: CPT | Performed by: RADIOLOGY

## 2021-02-16 PROCEDURE — 70551 MRI BRAIN STEM W/O DYE: CPT | Performed by: RADIOLOGY

## 2021-02-16 PROCEDURE — 0042T CT CEREBRAL PERFUSION W WO CONTRAST W AI ANALYSIS OF LVO: CPT | Performed by: RADIOLOGY

## 2021-02-16 PROCEDURE — 70498 CT ANGIOGRAPHY NECK: CPT | Performed by: RADIOLOGY

## 2021-02-16 PROCEDURE — 70496 CT ANGIOGRAPHY HEAD: CPT | Performed by: RADIOLOGY

## 2021-02-16 PROCEDURE — 71045 X-RAY EXAM CHEST 1 VIEW: CPT | Performed by: RADIOLOGY

## 2021-02-16 PROCEDURE — 82550 ASSAY OF CK (CPK): CPT | Performed by: FAMILY MEDICINE

## 2021-02-16 PROCEDURE — 84443 ASSAY THYROID STIM HORMONE: CPT | Performed by: FAMILY MEDICINE

## 2021-02-16 PROCEDURE — 72125 CT NECK SPINE W/O DYE: CPT

## 2021-02-16 PROCEDURE — 25010000002 DEXAMETHASONE PER 1 MG: Performed by: EMERGENCY MEDICINE

## 2021-02-16 PROCEDURE — 93010 ELECTROCARDIOGRAM REPORT: CPT | Performed by: INTERNAL MEDICINE

## 2021-02-16 PROCEDURE — 72141 MRI NECK SPINE W/O DYE: CPT

## 2021-02-16 PROCEDURE — 82077 ASSAY SPEC XCP UR&BREATH IA: CPT | Performed by: FAMILY MEDICINE

## 2021-02-16 RX ORDER — CLOPIDOGREL BISULFATE 75 MG/1
300 TABLET ORAL ONCE
Status: COMPLETED | OUTPATIENT
Start: 2021-02-16 | End: 2021-02-16

## 2021-02-16 RX ORDER — ASPIRIN 81 MG/1
81 TABLET ORAL DAILY
Status: DISCONTINUED | OUTPATIENT
Start: 2021-02-17 | End: 2021-02-18

## 2021-02-16 RX ORDER — NITROGLYCERIN 0.4 MG/1
0.4 TABLET SUBLINGUAL
Status: DISCONTINUED | OUTPATIENT
Start: 2021-02-16 | End: 2021-02-22 | Stop reason: HOSPADM

## 2021-02-16 RX ORDER — CLOPIDOGREL BISULFATE 75 MG/1
75 TABLET ORAL DAILY
Status: DISCONTINUED | OUTPATIENT
Start: 2021-02-17 | End: 2021-02-22 | Stop reason: HOSPADM

## 2021-02-16 RX ORDER — ALPRAZOLAM 0.5 MG/1
1 TABLET ORAL ONCE
Status: COMPLETED | OUTPATIENT
Start: 2021-02-16 | End: 2021-02-16

## 2021-02-16 RX ORDER — HYDROCODONE BITARTRATE AND ACETAMINOPHEN 10; 325 MG/1; MG/1
1 TABLET ORAL ONCE
Status: COMPLETED | OUTPATIENT
Start: 2021-02-16 | End: 2021-02-16

## 2021-02-16 RX ORDER — SODIUM CHLORIDE 9 MG/ML
100 INJECTION, SOLUTION INTRAVENOUS CONTINUOUS
Status: DISCONTINUED | OUTPATIENT
Start: 2021-02-16 | End: 2021-02-16

## 2021-02-16 RX ORDER — SODIUM CHLORIDE 9 MG/ML
125 INJECTION, SOLUTION INTRAVENOUS CONTINUOUS
Status: DISCONTINUED | OUTPATIENT
Start: 2021-02-16 | End: 2021-02-16

## 2021-02-16 RX ORDER — DEXAMETHASONE SODIUM PHOSPHATE 10 MG/ML
10 INJECTION INTRAMUSCULAR; INTRAVENOUS ONCE
Status: COMPLETED | OUTPATIENT
Start: 2021-02-16 | End: 2021-02-16

## 2021-02-16 RX ORDER — ASPIRIN 81 MG/1
324 TABLET, CHEWABLE ORAL ONCE
Status: COMPLETED | OUTPATIENT
Start: 2021-02-16 | End: 2021-02-16

## 2021-02-16 RX ORDER — HEPARIN SODIUM 5000 [USP'U]/ML
5000 INJECTION, SOLUTION INTRAVENOUS; SUBCUTANEOUS EVERY 12 HOURS SCHEDULED
Status: DISCONTINUED | OUTPATIENT
Start: 2021-02-17 | End: 2021-02-22 | Stop reason: HOSPADM

## 2021-02-16 RX ORDER — SODIUM CHLORIDE 0.9 % (FLUSH) 0.9 %
10 SYRINGE (ML) INJECTION EVERY 12 HOURS SCHEDULED
Status: DISCONTINUED | OUTPATIENT
Start: 2021-02-17 | End: 2021-02-22 | Stop reason: HOSPADM

## 2021-02-16 RX ORDER — SODIUM CHLORIDE 9 MG/ML
75 INJECTION, SOLUTION INTRAVENOUS CONTINUOUS
Status: DISCONTINUED | OUTPATIENT
Start: 2021-02-17 | End: 2021-02-18

## 2021-02-16 RX ORDER — SODIUM CHLORIDE 0.9 % (FLUSH) 0.9 %
10 SYRINGE (ML) INJECTION AS NEEDED
Status: DISCONTINUED | OUTPATIENT
Start: 2021-02-16 | End: 2021-02-22 | Stop reason: HOSPADM

## 2021-02-16 RX ORDER — ATORVASTATIN CALCIUM 40 MG/1
80 TABLET, FILM COATED ORAL NIGHTLY
Status: DISCONTINUED | OUTPATIENT
Start: 2021-02-16 | End: 2021-02-22 | Stop reason: HOSPADM

## 2021-02-16 RX ADMIN — DEXAMETHASONE SODIUM PHOSPHATE 10 MG: 10 INJECTION INTRAMUSCULAR; INTRAVENOUS at 19:19

## 2021-02-16 RX ADMIN — ALPRAZOLAM 1 MG: 0.5 TABLET ORAL at 22:01

## 2021-02-16 RX ADMIN — SODIUM CHLORIDE 100 ML/HR: 9 INJECTION, SOLUTION INTRAVENOUS at 22:02

## 2021-02-16 RX ADMIN — IOPAMIDOL 70 ML: 755 INJECTION, SOLUTION INTRAVENOUS at 18:24

## 2021-02-16 RX ADMIN — ATORVASTATIN CALCIUM 80 MG: 40 TABLET, FILM COATED ORAL at 22:29

## 2021-02-16 RX ADMIN — ASPIRIN 324 MG: 81 TABLET, CHEWABLE ORAL at 21:58

## 2021-02-16 RX ADMIN — CLOPIDOGREL 300 MG: 75 TABLET, FILM COATED ORAL at 21:59

## 2021-02-16 RX ADMIN — HYDROCODONE BITARTRATE AND ACETAMINOPHEN 1 TABLET: 10; 325 TABLET ORAL at 22:00

## 2021-02-16 NOTE — CONSULTS
Stroke Consult Note    Patient Name: Valerie Moore   MRN: [id number]  Age: 65 y.o.  Sex: male  : 1955    Primary Care Physician: Darwin Alvarez MD  Referring Physician:  No ref. provider found    TIME STROKE TEAM CALLED: 5:48 PM EST     TIME PATIENT SEEN: 6 PM EST    Handedness: Right  Race: White    Chief Complaint/Reason for Consultation: Right upper and lower extremity weakness    Subjective .  HPI: 65-year-old right-handed white male with known diagnosis of hypertension, right hip injury status post replacement, smoking greater than 30 pack years, arthritis, cervical degenerative disc disease who had a fall on 2021, when he fell on his hip with no neck/head injury, following which he was doing okay, but again he had another fall yesterday 2/15/2021, again without any head or neck injury, but since he has been having right upper and lower extremity weakness, for which he came to the hospital today.  Patient denied having any trouble with vision, trouble talking, drooling, any headache, any neck pain.  I asked him repeatedly about any pain, to which patient denies any kind of pain.  Denies any history of stroke/MI.    Last Known Normal Date/Time: 2/15/2021, approximately 6 PM EST     Review of Systems   HENT: Negative.    Eyes: Negative.    Respiratory: Negative.    Cardiovascular: Negative.    Gastrointestinal: Negative.    Endocrine: Negative.    Genitourinary: Negative.    Musculoskeletal: Negative.    Skin: Negative.    Allergic/Immunologic: Negative.    Neurological: Positive for weakness.   Hematological: Negative.    Psychiatric/Behavioral: Negative.       Past Medical History:   Diagnosis Date   • Anxiety    • GERD (gastroesophageal reflux disease) 2017   • Hypercholesteremia    • Hypertension    • Seasonal allergies    • Sleep disorder      Past Surgical History:   Procedure Laterality Date   • HERNIA REPAIR     • HIP SURGERY Right     plate and 8 screws in right hip      Family  History   Problem Relation Age of Onset   • Cancer Mother    • Heart failure Father    • No Known Problems Sister    • No Known Problems Brother    • No Known Problems Son    • No Known Problems Daughter    • No Known Problems Maternal Grandmother    • No Known Problems Maternal Grandfather    • No Known Problems Paternal Grandmother    • No Known Problems Paternal Grandfather    • No Known Problems Cousin    • No Known Problems Other    • Rheum arthritis Neg Hx    • Osteoarthritis Neg Hx    • Asthma Neg Hx    • Diabetes Neg Hx    • Hyperlipidemia Neg Hx    • Hypertension Neg Hx    • Migraines Neg Hx    • Rashes / Skin problems Neg Hx    • Seizures Neg Hx    • Stroke Neg Hx    • Thyroid disease Neg Hx      Social History     Socioeconomic History   • Marital status:      Spouse name: Not on file   • Number of children: Not on file   • Years of education: Not on file   • Highest education level: Not on file   Tobacco Use   • Smoking status: Current Every Day Smoker     Packs/day: 0.50     Years: 20.00     Pack years: 10.00     Types: Cigarettes   • Smokeless tobacco: Former User     Types: Chew   Substance and Sexual Activity   • Alcohol use: No   • Drug use: No   • Sexual activity: Defer     No Known Allergies  Prior to Admission medications    Medication Sig Start Date End Date Taking? Authorizing Provider   ALPRAZolam (XANAX) 1 MG tablet Take 1 mg by mouth 3 (Three) Times a Day As Needed for Anxiety or Sleep.    Provider, MD Antoni   amLODIPine (NORVASC) 10 MG tablet Take 10 mg by mouth Daily.    ProviderAntoni MD   atenolol (TENORMIN) 25 MG tablet Take 25 mg by mouth 2 (Two) Times a Day.    Provider, MD Antoni   Blood Pressure Monitoring (BLOOD PRESSURE CUFF) misc Monitor BP twice daily 11/25/19   Barbara Orona PA-C   carisoprodol (SOMA) 350 MG tablet Take 350 mg by mouth 3 (Three) Times a Day As Needed for Muscle Spasms.    ProviderAntoni MD   cetirizine (zyrTEC) 10 MG  tablet Take 10 mg by mouth Daily.    Antoni Alexander MD   hydroCHLOROthiazide (HYDRODIURIL) 12.5 MG tablet Take 12.5 mg by mouth Daily.    Antoni Alexander MD   HYDROcodone-acetaminophen (NORCO)  MG per tablet Take 1 tablet by mouth Every 8 (Eight) Hours As Needed for Moderate Pain .    Antoni Alexander MD   losartan (COZAAR) 100 MG tablet Take 100 mg by mouth Daily.    Antoni Alexander MD   zolpidem (AMBIEN) 10 MG tablet Take 10 mg by mouth At Night As Needed for Sleep.    Antoni Alexander MD             Objective     Temp:  [98 °F (36.7 °C)] 98 °F (36.7 °C)  Heart Rate:  [56] 56  Resp:  [16] 16  BP: (190)/(87) 190/87  Neurological Exam  Mental Status  Awake, alert and oriented to person, place and time.Alert. Speech is normal. Language is fluent with no aphasia. Attention and concentration are normal. Fund of knowledge is appropriate for level of education.    Cranial Nerves  CN II: Visual fields full to confrontation.  CN III, IV, VI: Extraocular movements intact bilaterally. Pupils equal round and reactive to light bilaterally.  CN V: Facial sensation is normal.  CN VII: Full and symmetric facial movement. Questionable very subtle right facial asymmetry.  CN VIII: Equal hearing bilaterally.  CN IX, X: Palate elevates symmetrically  CN XI: Shoulder shrug strength is normal.  CN XII: Tongue midline without atrophy or fasciculations.    Motor  Normal muscle bulk throughout. No fasciculations present.  Right upper extremity is 3/5, slight strength against gravity.  Right lower extremity no strength against gravity 2/5   left upper and lower extremities moving spontaneously  .    Sensory  Light touch is normal in upper and lower extremities.     Reflexes  Not assessed.    Coordination  No dysmetria on left side.    Gait  Not assessed.      Physical Exam  Vitals signs and nursing note reviewed.   Constitutional:       Appearance: Normal appearance.   HENT:      Head: Normocephalic and  atraumatic.   Eyes:      Extraocular Movements: Extraocular movements intact.      Conjunctiva/sclera: Conjunctivae normal.      Pupils: Pupils are equal, round, and reactive to light.   Neck:      Musculoskeletal: Normal range of motion and neck supple. No neck rigidity or muscular tenderness.      Comments: Patient denies any neck tenderness, his neck movement seems supple  Cardiovascular:      Rate and Rhythm: Normal rate and regular rhythm.   Pulmonary:      Effort: Pulmonary effort is normal. No respiratory distress.   Neurological:      Mental Status: He is alert and oriented to person, place, and time.      Cranial Nerves: No cranial nerve deficit.      Sensory: No sensory deficit.      Motor: Weakness present.      Coordination: Coordination normal.   Psychiatric:         Mood and Affect: Mood normal.         Speech: Speech normal.         Behavior: Behavior normal.         Acute Stroke Data    Alteplase (tPA) Inclusion / Exclusion Criteria    Time: 18:19 EST  Person Administering Scale: Saul Loza MD    Inclusion Criteria  [x]   18 years of age or greater   []   Onset of symptoms < 4.5 hours before beginning treatment (stroke onset = time patient was last seen well or without symptoms).   []   Diagnosis of acute ischemic stroke causing measurable disabling deficit (Complete Hemianopia, Any Aphasia, Visual or Sensory Extinction, Any weakness limiting sustained effort against gravity)   []   Any remaining deficit considered potentially disabling in view of patient and practitioner   Exclusion criteria (Do not proceed with Alteplase if any are checked under exclusion criteria)  [x]   Onset unknown or GREATER than 4.5 hours   []   ICH on CT/MRI   []   CT demonstrates hypodensity representing acute or subacute infarct   []   Significant head trauma or prior stroke in the previous 3 months   []   Symptoms suggestive of subarachnoid hemorrhage   []   History of un-ruptured intracranial aneurysm GREATER than  10 mm   []   Recent intracranial or intraspinal surgery within the last 3 months   []   Arterial puncture at a non-compressible site in the previous 7 days   []   Active internal bleeding   []   Acute bleeding tendency   []   Platelet count LESS than 100,000 for known hematological diseases such as leukemia, thrombocytopenia or chronic cirrhosis   []   Current use of anticoagulant with INR GREATER than 1.7 or PT GREATER than 15 seconds, aPTT GREATER than 40 seconds   []   Heparin received within 48 hours, resulting in abnormally elevated aPTT GREATER than upper limit of normal   []   Current use of direct thrombin inhibitors or direct factor Xa inhibitors in the past 48 hours   []   Elevated blood pressure refractory to treatment (systolic GREATER than 185 mm/Hg or diastolic  GREATER than 110 mm/Hg   []   Suspected infective endocarditis and aortic arch dissection   []   Current use of therapeutic treatment dose of low-molecular-weight heparin (LMWH) within the previous 24 hours   []   Structural GI malignancy or bleed   Relative exclusion for all patients  []   Only minor non-disabling symptoms   []   Pregnancy   []   Seizure at onset with postictal residual neurological impairments   []   Major surgery or previous trauma within past 14 days   []   History of previous spontaneous ICH, intracranial neoplasm, or AV malformation   []   Postpartum (within previous 14 days)   []   Recent GI or urinary tract hemorrhage (within previous 21 days)   []   Recent acute MI (within previous 3 months)   []   History of un-ruptured intracranial aneurysm LESS than 10 mm   []   History of ruptured intracranial aneurysm   []   Blood glucose LESS than 50 mg/dL (2.7 mmol/L)   []   Dural puncture within the last 7 days   []   Known GREATER than 10 cerebral microbleeds   Additional exclusions for patients with symptoms onset between 3 and 4.5 hours.  []   Age > 80.   []   On any anticoagulants regardless of INR  >>> Warfarin (Coumadin),  Heparin, Enoxaparin (Lovenox), fondaparinux (Arixtra), bivalirudin (Angiomax), Argatroban, dabigatran (Pradaxa), rivaroxaban (Xarelto), or apixaban (Eliquis)   []   Severe stroke (NIHSS > 25).   []   History of BOTH diabetes and previous ischemic stroke.   []   The risks and benefits have been discussed with the patient or family related to the administration of IV Alteplase for stroke symptoms.   []   I have discussed and reviewed the patient's case and imaging with the attending prior to IV Alteplase.    Time Alteplase administered       Hospital Meds:  Scheduled-   Infusions- No current facility-administered medications for this encounter.      PRNs-     Functional Status Prior to Current Stroke/[place] Score: 0    NIH Stroke Scale  Time: 18:19 EST  Person Administering Scale: Saul Loza MD    1a  Level of consciousness: 0=alert; keenly responsive   1b. LOC questions:  0=Performs both tasks correctly   1c. LOC commands: 0=Performs both tasks correctly   2.  Best Gaze: 0=normal   3.  Visual: 0=No visual loss   4. Facial Palsy: 0=Normal symmetric movement   5a.  Motor left arm: 0=No drift, limb holds 90 (or 45) degrees for full 10 seconds   5b.  Motor right arm: 2=Some effort against gravity, limb cannot get to or maintain (if cured) 90 (or 45) degrees, drifts down to bed, but has some effort against gravity   6a. motor left le=No drift, limb holds 90 (or 45) degrees for full 10 seconds   6b  Motor right leg:  3=No effort against gravity, limb falls   7. Limb Ataxia: 0=Absent   8.  Sensory: 0=Normal; no sensory loss   9. Best Language:  0=No aphasia, normal   10. Dysarthria: 0=Normal   11. Extinction and Inattention: 0=No abnormality    Total:   5       Results Reviewed:  I have personally reviewed current lab, radiology, and data  CT head shows no acute changes, chronic white matter disease, which seems stable compared to MRI brain done few years ago.  At the time patient was noted to have significant  white matter disease.  No hemorrhage.  CT angiogram of head and neck shows aortic arch atherosclerosis, bilateral ICA atherosclerosis, but no significant stenosis/occlusion.  Absent right vertebral artery.  CT perfusion shows no obvious perfusion deficit.  CT cervical spine shows no obvious fracture, does show degenerative changes.  Awaiting official results.  His labs were reviewed as well.             Assessment/Plan:  65-year-old right-handed white male with known diagnosis of hypertension, smoking greater than 30 pack years, cervical degenerative disc disease, who comes in with 2 falls, followed by right upper and lower extremity weakness, questionable very subtle right facial asymmetry, no obvious language deficits or speech trouble.  CT head negative for any acute findings.      1. Right-sided weakness.  This could be vascular versus spinal cord injury.  Patient could have a small subcortical stroke in the left basal ganglia region or left pontine region, but given the sequence of events, we need to look at his cervical spine.  Recommend to get MRI brain without contrast and MRI C-spine without contrast to look for any spinal cord injury.  If he does have any spinal cord compression, he will need to be transferred to a tertiary care center.  2. Essential hypertension.  His blood pressure is running in 190s.  Goal systolic blood pressure of 120s to 160s.  Can start him on low-dose blood pressure medications.  3. Cervical degenerative disc disease.  He does have history of cervical disease, which could have been exacerbated with secondary to fall.  We will follow-up with MRI C-spine.  4. Smoking greater than 30-pack-year history.  Encourage patient to quit smoking.  5. Keep him bedrest for now, recommend putting him on cervical collar.  6. Okay to do a bedside swallow evaluation.  No obvious cranial nerve deficits noted.    Patient was evaluated on an emergent basis, and further plan has been discussed with the  ER physician and the patient.  I spent about 35 minutes evaluating the patient and discussing the case with the team.  Thank you for the consult.    Addendum-9:15 PM  MRI brain and MRI C-spine reviewed.  MRI brain shows him to have a left anterior choroidal stroke, in the posterior limb of internal capsule in the basal ganglia region, patient also noted to have extensive white matter disease, which is much worse than his MRI few years ago.  MRI C-spine shows degenerative changes, but no significant spinal cord compression.    Recommend patient to get aspirin 325 mg, Plavix 300 mg (loading dose today, followed by 75 mg daily), Lipitor 80 mg daily for now.  Keep him bedrest for today  IV fluids normal saline at 100 cc an hour  2D echocardiogram    Case was discussed with ER physician Dr. Negro, and about recommendations were discussed.  Thank you for the consult.      Saul Loza MD  February 16, 2021  18:19 EST    Verbal consent taken.  Patient agreeable to be seen via telemedicine.    This was an audio and video enabled telemedicine encounter.

## 2021-02-16 NOTE — ED PROVIDER NOTES
Subjective   Patient is a 65-year-old male who presents emergency department for right-sided weakness in his upper and lower extremity that started 2 days ago, became much worse last night.  The patient states that 2 nights ago he started having some weakness in the right upper and lower extremity, but last night he became completely paralyzed on his right side.  He has never had a stroke and has never experienced symptoms like this in the past.  The patient states that he cannot move his right arm or his right leg whatsoever.  He denies any slurred speech, facial droop or changes in vision.  He also denies a headache.  The patient called EMS after the symptoms were not getting any better.  He denies any chest pain, shortness of breath or additional complaints at this time.  He arrived in the ER via EMS and code stroke was called upon arrival.          Review of Systems   Constitutional: Negative for activity change, appetite change, chills, diaphoresis, fatigue and fever.   HENT: Negative for congestion, postnasal drip, rhinorrhea, sinus pressure, sinus pain, sneezing, sore throat and tinnitus.    Eyes: Negative for discharge and redness.   Respiratory: Negative for apnea, cough, choking, chest tightness, shortness of breath and wheezing.    Cardiovascular: Negative for chest pain, palpitations and leg swelling.   Gastrointestinal: Negative for abdominal distention, abdominal pain, constipation, diarrhea and nausea.   Genitourinary: Negative for difficulty urinating and flank pain.   Musculoskeletal: Negative for arthralgias and back pain.   Neurological: Positive for weakness. Negative for dizziness, tremors, seizures, syncope, facial asymmetry, speech difficulty, light-headedness, numbness and headaches.   Psychiatric/Behavioral: Negative for agitation and confusion.       Past Medical History:   Diagnosis Date   • Anxiety    • GERD (gastroesophageal reflux disease) 4/20/2017   • Hypercholesteremia    •  Hypertension    • Seasonal allergies    • Sleep disorder        No Known Allergies    Past Surgical History:   Procedure Laterality Date   • HERNIA REPAIR     • HIP SURGERY Right     plate and 8 screws in right hip        Family History   Problem Relation Age of Onset   • Cancer Mother    • Heart failure Father    • No Known Problems Sister    • No Known Problems Brother    • No Known Problems Son    • No Known Problems Daughter    • No Known Problems Maternal Grandmother    • No Known Problems Maternal Grandfather    • No Known Problems Paternal Grandmother    • No Known Problems Paternal Grandfather    • No Known Problems Cousin    • No Known Problems Other    • Rheum arthritis Neg Hx    • Osteoarthritis Neg Hx    • Asthma Neg Hx    • Diabetes Neg Hx    • Hyperlipidemia Neg Hx    • Hypertension Neg Hx    • Migraines Neg Hx    • Rashes / Skin problems Neg Hx    • Seizures Neg Hx    • Stroke Neg Hx    • Thyroid disease Neg Hx        Social History     Socioeconomic History   • Marital status:      Spouse name: Not on file   • Number of children: Not on file   • Years of education: Not on file   • Highest education level: Not on file   Tobacco Use   • Smoking status: Current Every Day Smoker     Packs/day: 0.50     Years: 20.00     Pack years: 10.00     Types: Cigarettes   • Smokeless tobacco: Former User     Types: Chew   Substance and Sexual Activity   • Alcohol use: No   • Drug use: No   • Sexual activity: Defer           Objective   Physical Exam  Vitals signs and nursing note reviewed.   Constitutional:       General: He is not in acute distress.     Appearance: Normal appearance. He is normal weight. He is not ill-appearing, toxic-appearing or diaphoretic.   HENT:      Head: Normocephalic.      Right Ear: External ear normal. There is no impacted cerumen.      Left Ear: There is no impacted cerumen.      Nose: Nose normal. No congestion or rhinorrhea.      Mouth/Throat:      Mouth: Mucous membranes are  moist.      Pharynx: No oropharyngeal exudate or posterior oropharyngeal erythema.   Eyes:      General: No scleral icterus.        Right eye: No discharge.         Left eye: No discharge.      Pupils: Pupils are equal, round, and reactive to light.   Neck:      Musculoskeletal: Normal range of motion and neck supple. No neck rigidity or muscular tenderness.      Vascular: No carotid bruit.   Cardiovascular:      Rate and Rhythm: Normal rate and regular rhythm.      Pulses: Normal pulses.      Heart sounds: Normal heart sounds. No murmur. No friction rub. No gallop.    Pulmonary:      Effort: Pulmonary effort is normal. No respiratory distress.      Breath sounds: Normal breath sounds. No stridor. No wheezing, rhonchi or rales.   Chest:      Chest wall: No tenderness.   Abdominal:      General: Abdomen is flat. There is no distension.      Palpations: There is no mass.      Tenderness: There is no abdominal tenderness. There is no guarding or rebound.      Hernia: No hernia is present.   Musculoskeletal: Normal range of motion.         General: No swelling, tenderness, deformity or signs of injury.   Lymphadenopathy:      Cervical: No cervical adenopathy.   Skin:     General: Skin is warm and dry.      Capillary Refill: Capillary refill takes less than 2 seconds.   Neurological:      Mental Status: He is alert and oriented to person, place, and time.      Cranial Nerves: No cranial nerve deficit.      Sensory: No sensory deficit.      Motor: No weakness.      Coordination: Coordination normal.      Comments: Patient strength in his right upper and lower extremity is 0 out of 5.  Muscle strength in left upper and lower extremity is 4 out of 5.  The patient exhibits no cranial nerve deficits.  He does not have decreased sensation.  Heel-to-shin is normal from left to right and normal finger-to-nose from left to right but unable to do heel-to-shin or finger-to-nose on the right side at all.  Visual fields intact at 70  degrees, finger count is normal bilaterally.  Speech is clear.  Patient is able to follow directions.   Psychiatric:         Mood and Affect: Mood normal.         Behavior: Behavior normal.         Procedures           ED Course  ED Course as of Feb 16 2228 Tue Feb 16, 2021 2113 Dr krishnan consulted  Re acute stroke noted on MRI    [PAUL]   2139 Dr Krishnan consulted times two. Recommends admission here at [facility], with daily ASA, Plavix, Lipitor, and maintain systolic pressure between 140-200. States nothing to transfer to [facility]    [PAUL]      ED Course User Index  [PAUL] Duncan Negro MD                                           [facility]    Final diagnoses:   Cerebrovascular accident (CVA), unspecified mechanism (CMS/HCC)            Duncan Negro MD  02/16/21 2228

## 2021-02-17 ENCOUNTER — APPOINTMENT (OUTPATIENT)
Dept: CARDIOLOGY | Facility: HOSPITAL | Age: 66
End: 2021-02-17

## 2021-02-17 LAB
ALBUMIN SERPL-MCNC: 4.25 G/DL (ref 3.5–5.2)
ALBUMIN/GLOB SERPL: 1.1 G/DL
ALP SERPL-CCNC: 109 U/L (ref 39–117)
ALT SERPL W P-5'-P-CCNC: 20 U/L (ref 1–41)
ANION GAP SERPL CALCULATED.3IONS-SCNC: 15.3 MMOL/L (ref 5–15)
AST SERPL-CCNC: 35 U/L (ref 1–40)
BASOPHILS # BLD AUTO: 0.01 10*3/MM3 (ref 0–0.2)
BASOPHILS NFR BLD AUTO: 0.2 % (ref 0–1.5)
BH CV ECHO MEAS - % IVS THICK: -1.9 %
BH CV ECHO MEAS - % LVPW THICK: 18.7 %
BH CV ECHO MEAS - ACS: 2.2 CM
BH CV ECHO MEAS - AI DEC SLOPE: 208 CM/SEC^2
BH CV ECHO MEAS - AI MAX PG: 57.5 MMHG
BH CV ECHO MEAS - AI MAX VEL: 379 CM/SEC
BH CV ECHO MEAS - AI P1/2T: 533.7 MSEC
BH CV ECHO MEAS - AO MAX PG (FULL): 14.9 MMHG
BH CV ECHO MEAS - AO MAX PG: 19.2 MMHG
BH CV ECHO MEAS - AO MEAN PG (FULL): 7 MMHG
BH CV ECHO MEAS - AO MEAN PG: 9 MMHG
BH CV ECHO MEAS - AO ROOT AREA (BSA CORRECTED): 1.8
BH CV ECHO MEAS - AO ROOT AREA: 10.2 CM^2
BH CV ECHO MEAS - AO ROOT DIAM: 3.6 CM
BH CV ECHO MEAS - AO V2 MAX: 219 CM/SEC
BH CV ECHO MEAS - AO V2 MEAN: 141 CM/SEC
BH CV ECHO MEAS - AO V2 VTI: 51.6 CM
BH CV ECHO MEAS - AVA(I,A): 1.7 CM^2
BH CV ECHO MEAS - AVA(I,D): 1.7 CM^2
BH CV ECHO MEAS - AVA(V,A): 1.6 CM^2
BH CV ECHO MEAS - AVA(V,D): 1.6 CM^2
BH CV ECHO MEAS - BSA(HAYCOCK): 2 M^2
BH CV ECHO MEAS - BSA: 2 M^2
BH CV ECHO MEAS - BZI_BMI: 29.3 KILOGRAMS/M^2
BH CV ECHO MEAS - BZI_METRIC_HEIGHT: 170.2 CM
BH CV ECHO MEAS - BZI_METRIC_WEIGHT: 84.8 KG
BH CV ECHO MEAS - EDV(CUBED): 110.9 ML
BH CV ECHO MEAS - EDV(MOD-SP4): 114 ML
BH CV ECHO MEAS - EDV(TEICH): 107.8 ML
BH CV ECHO MEAS - EF(CUBED): 66.7 %
BH CV ECHO MEAS - EF(MOD-SP4): 60.9 %
BH CV ECHO MEAS - EF(TEICH): 58.1 %
BH CV ECHO MEAS - ESV(CUBED): 36.9 ML
BH CV ECHO MEAS - ESV(MOD-SP4): 44.6 ML
BH CV ECHO MEAS - ESV(TEICH): 45.1 ML
BH CV ECHO MEAS - FS: 30.7 %
BH CV ECHO MEAS - IVS/LVPW: 0.99
BH CV ECHO MEAS - IVSD: 1.1 CM
BH CV ECHO MEAS - IVSS: 1 CM
BH CV ECHO MEAS - LA DIMENSION: 3.2 CM
BH CV ECHO MEAS - LA/AO: 0.89
BH CV ECHO MEAS - LV DIASTOLIC VOL/BSA (35-75): 58 ML/M^2
BH CV ECHO MEAS - LV MASS(C)D: 185.8 GRAMS
BH CV ECHO MEAS - LV MASS(C)DI: 94.5 GRAMS/M^2
BH CV ECHO MEAS - LV MASS(C)S: 119.1 GRAMS
BH CV ECHO MEAS - LV MASS(C)SI: 60.6 GRAMS/M^2
BH CV ECHO MEAS - LV MAX PG: 4.2 MMHG
BH CV ECHO MEAS - LV MEAN PG: 2 MMHG
BH CV ECHO MEAS - LV SYSTOLIC VOL/BSA (12-30): 22.7 ML/M^2
BH CV ECHO MEAS - LV V1 MAX: 103 CM/SEC
BH CV ECHO MEAS - LV V1 MEAN: 66.3 CM/SEC
BH CV ECHO MEAS - LV V1 VTI: 25.7 CM
BH CV ECHO MEAS - LVIDD: 4.8 CM
BH CV ECHO MEAS - LVIDS: 3.3 CM
BH CV ECHO MEAS - LVLD AP4: 8 CM
BH CV ECHO MEAS - LVLS AP4: 6.7 CM
BH CV ECHO MEAS - LVOT AREA (M): 3.5 CM^2
BH CV ECHO MEAS - LVOT AREA: 3.5 CM^2
BH CV ECHO MEAS - LVOT DIAM: 2.1 CM
BH CV ECHO MEAS - LVPWD: 1.1 CM
BH CV ECHO MEAS - LVPWS: 1.3 CM
BH CV ECHO MEAS - MV A MAX VEL: 87.8 CM/SEC
BH CV ECHO MEAS - MV E MAX VEL: 74.1 CM/SEC
BH CV ECHO MEAS - MV E/A: 0.84
BH CV ECHO MEAS - PA ACC TIME: 0.07 SEC
BH CV ECHO MEAS - PA PR(ACCEL): 48.9 MMHG
BH CV ECHO MEAS - RAP SYSTOLE: 10 MMHG
BH CV ECHO MEAS - RVSP: 24.9 MMHG
BH CV ECHO MEAS - SI(AO): 267.3 ML/M^2
BH CV ECHO MEAS - SI(CUBED): 37.7 ML/M^2
BH CV ECHO MEAS - SI(LVOT): 45.3 ML/M^2
BH CV ECHO MEAS - SI(MOD-SP4): 35.3 ML/M^2
BH CV ECHO MEAS - SI(TEICH): 31.9 ML/M^2
BH CV ECHO MEAS - SV(AO): 525.2 ML
BH CV ECHO MEAS - SV(CUBED): 74 ML
BH CV ECHO MEAS - SV(LVOT): 89 ML
BH CV ECHO MEAS - SV(MOD-SP4): 69.4 ML
BH CV ECHO MEAS - SV(TEICH): 62.7 ML
BH CV ECHO MEAS - TR MAX VEL: 193 CM/SEC
BILIRUB SERPL-MCNC: 0.2 MG/DL (ref 0–1.2)
BUN SERPL-MCNC: 15 MG/DL (ref 8–23)
BUN/CREAT SERPL: 16.1 (ref 7–25)
CALCIUM SPEC-SCNC: 9.9 MG/DL (ref 8.6–10.5)
CHLORIDE SERPL-SCNC: 101 MMOL/L (ref 98–107)
CHOLEST SERPL-MCNC: 165 MG/DL (ref 0–200)
CO2 SERPL-SCNC: 18.7 MMOL/L (ref 22–29)
CREAT SERPL-MCNC: 0.93 MG/DL (ref 0.76–1.27)
DEPRECATED RDW RBC AUTO: 48.1 FL (ref 37–54)
EOSINOPHIL # BLD AUTO: 0 10*3/MM3 (ref 0–0.4)
EOSINOPHIL NFR BLD AUTO: 0 % (ref 0.3–6.2)
ERYTHROCYTE [DISTWIDTH] IN BLOOD BY AUTOMATED COUNT: 15.2 % (ref 12.3–15.4)
GFR SERPL CREATININE-BSD FRML MDRD: 81 ML/MIN/1.73
GLOBULIN UR ELPH-MCNC: 3.9 GM/DL
GLUCOSE SERPL-MCNC: 169 MG/DL (ref 65–99)
HCT VFR BLD AUTO: 43.5 % (ref 37.5–51)
HDLC SERPL-MCNC: 53 MG/DL (ref 40–60)
HGB BLD-MCNC: 14.2 G/DL (ref 13–17.7)
IMM GRANULOCYTES # BLD AUTO: 0.01 10*3/MM3 (ref 0–0.05)
IMM GRANULOCYTES NFR BLD AUTO: 0.2 % (ref 0–0.5)
LDLC SERPL CALC-MCNC: 100 MG/DL (ref 0–100)
LDLC/HDLC SERPL: 1.89 {RATIO}
LV EF 2D ECHO EST: 65 %
LYMPHOCYTES # BLD AUTO: 0.75 10*3/MM3 (ref 0.7–3.1)
LYMPHOCYTES NFR BLD AUTO: 12.8 % (ref 19.6–45.3)
MAXIMAL PREDICTED HEART RATE: 153 BPM
MCH RBC QN AUTO: 28.4 PG (ref 26.6–33)
MCHC RBC AUTO-ENTMCNC: 32.6 G/DL (ref 31.5–35.7)
MCV RBC AUTO: 87 FL (ref 79–97)
MONOCYTES # BLD AUTO: 0.04 10*3/MM3 (ref 0.1–0.9)
MONOCYTES NFR BLD AUTO: 0.7 % (ref 5–12)
NEUTROPHILS NFR BLD AUTO: 5.06 10*3/MM3 (ref 1.7–7)
NEUTROPHILS NFR BLD AUTO: 86.1 % (ref 42.7–76)
NRBC BLD AUTO-RTO: 0 /100 WBC (ref 0–0.2)
PLATELET # BLD AUTO: 247 10*3/MM3 (ref 140–450)
PMV BLD AUTO: 8.8 FL (ref 6–12)
POTASSIUM SERPL-SCNC: 3.9 MMOL/L (ref 3.5–5.2)
PROT SERPL-MCNC: 8.1 G/DL (ref 6–8.5)
QT INTERVAL: 448 MS
QTC INTERVAL: 424 MS
RBC # BLD AUTO: 5 10*6/MM3 (ref 4.14–5.8)
RBC MORPH BLD: NORMAL
SMALL PLATELETS BLD QL SMEAR: ADEQUATE
SODIUM SERPL-SCNC: 135 MMOL/L (ref 136–145)
STRESS TARGET HR: 130 BPM
TRIGL SERPL-MCNC: 58 MG/DL (ref 0–150)
VLDLC SERPL-MCNC: 12 MG/DL (ref 5–40)
WBC # BLD AUTO: 5.87 10*3/MM3 (ref 3.4–10.8)

## 2021-02-17 PROCEDURE — 85025 COMPLETE CBC W/AUTO DIFF WBC: CPT | Performed by: HOSPITALIST

## 2021-02-17 PROCEDURE — G0408 INPT/TELE FOLLOW UP 35: HCPCS | Performed by: PSYCHIATRY & NEUROLOGY

## 2021-02-17 PROCEDURE — 25010000002 HEPARIN (PORCINE) PER 1000 UNITS: Performed by: HOSPITALIST

## 2021-02-17 PROCEDURE — 92610 EVALUATE SWALLOWING FUNCTION: CPT

## 2021-02-17 PROCEDURE — 92523 SPEECH SOUND LANG COMPREHEN: CPT

## 2021-02-17 PROCEDURE — 80053 COMPREHEN METABOLIC PANEL: CPT | Performed by: HOSPITALIST

## 2021-02-17 PROCEDURE — 94799 UNLISTED PULMONARY SVC/PX: CPT

## 2021-02-17 PROCEDURE — 97162 PT EVAL MOD COMPLEX 30 MIN: CPT

## 2021-02-17 PROCEDURE — 93306 TTE W/DOPPLER COMPLETE: CPT | Performed by: INTERNAL MEDICINE

## 2021-02-17 PROCEDURE — 94640 AIRWAY INHALATION TREATMENT: CPT

## 2021-02-17 PROCEDURE — 80061 LIPID PANEL: CPT | Performed by: HOSPITALIST

## 2021-02-17 PROCEDURE — 97166 OT EVAL MOD COMPLEX 45 MIN: CPT

## 2021-02-17 PROCEDURE — 85007 BL SMEAR W/DIFF WBC COUNT: CPT | Performed by: HOSPITALIST

## 2021-02-17 PROCEDURE — 93306 TTE W/DOPPLER COMPLETE: CPT

## 2021-02-17 RX ORDER — LOSARTAN POTASSIUM 50 MG/1
100 TABLET ORAL
Status: DISCONTINUED | OUTPATIENT
Start: 2021-02-17 | End: 2021-02-22 | Stop reason: HOSPADM

## 2021-02-17 RX ORDER — IPRATROPIUM BROMIDE AND ALBUTEROL SULFATE 2.5; .5 MG/3ML; MG/3ML
3 SOLUTION RESPIRATORY (INHALATION) EVERY 8 HOURS PRN
Status: DISCONTINUED | OUTPATIENT
Start: 2021-02-17 | End: 2021-02-22 | Stop reason: HOSPADM

## 2021-02-17 RX ORDER — DIPHENOXYLATE HYDROCHLORIDE AND ATROPINE SULFATE 2.5; .025 MG/1; MG/1
1 TABLET ORAL DAILY
Status: ON HOLD | COMMUNITY
End: 2021-02-22

## 2021-02-17 RX ORDER — CARISOPRODOL 350 MG/1
350 TABLET ORAL 2 TIMES DAILY PRN
Status: DISCONTINUED | OUTPATIENT
Start: 2021-02-17 | End: 2021-02-22 | Stop reason: HOSPADM

## 2021-02-17 RX ORDER — ATENOLOL 25 MG/1
25 TABLET ORAL 2 TIMES DAILY
Status: CANCELLED | OUTPATIENT
Start: 2021-02-17

## 2021-02-17 RX ORDER — NICOTINE 21 MG/24HR
1 PATCH, TRANSDERMAL 24 HOURS TRANSDERMAL
Status: DISCONTINUED | OUTPATIENT
Start: 2021-02-17 | End: 2021-02-22 | Stop reason: HOSPADM

## 2021-02-17 RX ORDER — ALPRAZOLAM 0.5 MG/1
0.5 TABLET ORAL 2 TIMES DAILY PRN
Status: DISCONTINUED | OUTPATIENT
Start: 2021-02-17 | End: 2021-02-22 | Stop reason: HOSPADM

## 2021-02-17 RX ORDER — AMLODIPINE BESYLATE 10 MG/1
10 TABLET ORAL
Status: DISCONTINUED | OUTPATIENT
Start: 2021-02-17 | End: 2021-02-22 | Stop reason: HOSPADM

## 2021-02-17 RX ORDER — LOSARTAN POTASSIUM AND HYDROCHLOROTHIAZIDE 25; 100 MG/1; MG/1
1 TABLET ORAL DAILY
Status: ON HOLD | COMMUNITY
End: 2021-02-22

## 2021-02-17 RX ORDER — HYDROCODONE BITARTRATE AND ACETAMINOPHEN 10; 325 MG/1; MG/1
1 TABLET ORAL EVERY 8 HOURS PRN
Status: DISCONTINUED | OUTPATIENT
Start: 2021-02-17 | End: 2021-02-17 | Stop reason: SDUPTHER

## 2021-02-17 RX ORDER — GABAPENTIN 100 MG/1
200 CAPSULE ORAL 3 TIMES DAILY PRN
Status: DISCONTINUED | OUTPATIENT
Start: 2021-02-17 | End: 2021-02-22 | Stop reason: HOSPADM

## 2021-02-17 RX ORDER — CARISOPRODOL 350 MG/1
350 TABLET ORAL 2 TIMES DAILY PRN
Status: CANCELLED | OUTPATIENT
Start: 2021-02-17

## 2021-02-17 RX ORDER — AMLODIPINE BESYLATE 10 MG/1
10 TABLET ORAL DAILY
Status: CANCELLED | OUTPATIENT
Start: 2021-02-17

## 2021-02-17 RX ORDER — ATORVASTATIN CALCIUM 10 MG/1
10 TABLET, FILM COATED ORAL DAILY
Status: ON HOLD | COMMUNITY
End: 2021-02-22

## 2021-02-17 RX ORDER — ALPRAZOLAM 1 MG/1
1 TABLET ORAL 3 TIMES DAILY PRN
Status: CANCELLED | OUTPATIENT
Start: 2021-02-17

## 2021-02-17 RX ORDER — HYDROCODONE BITARTRATE AND ACETAMINOPHEN 7.5; 325 MG/1; MG/1
1 TABLET ORAL EVERY 8 HOURS PRN
Status: DISCONTINUED | OUTPATIENT
Start: 2021-02-17 | End: 2021-02-22 | Stop reason: HOSPADM

## 2021-02-17 RX ORDER — IBUPROFEN 800 MG/1
800 TABLET ORAL EVERY 8 HOURS PRN
Status: ON HOLD | COMMUNITY
End: 2021-02-22

## 2021-02-17 RX ORDER — HYDROCODONE BITARTRATE AND ACETAMINOPHEN 10; 325 MG/1; MG/1
1 TABLET ORAL EVERY 8 HOURS PRN
Status: CANCELLED | OUTPATIENT
Start: 2021-02-17

## 2021-02-17 RX ORDER — ZOLPIDEM TARTRATE 5 MG/1
10 TABLET ORAL NIGHTLY PRN
Status: CANCELLED | OUTPATIENT
Start: 2021-02-17

## 2021-02-17 RX ORDER — DIPHENOXYLATE HYDROCHLORIDE AND ATROPINE SULFATE 2.5; .025 MG/1; MG/1
1 TABLET ORAL DAILY
Status: DISCONTINUED | OUTPATIENT
Start: 2021-02-17 | End: 2021-02-22 | Stop reason: HOSPADM

## 2021-02-17 RX ORDER — ALPRAZOLAM 1 MG/1
1 TABLET ORAL 3 TIMES DAILY PRN
Status: DISCONTINUED | OUTPATIENT
Start: 2021-02-17 | End: 2021-02-17 | Stop reason: ALTCHOICE

## 2021-02-17 RX ORDER — ATORVASTATIN CALCIUM 10 MG/1
10 TABLET, FILM COATED ORAL DAILY
Status: CANCELLED | OUTPATIENT
Start: 2021-02-17

## 2021-02-17 RX ORDER — GABAPENTIN 300 MG/1
300 CAPSULE ORAL 4 TIMES DAILY PRN
Status: ON HOLD | COMMUNITY
End: 2021-02-22

## 2021-02-17 RX ORDER — IBUPROFEN 800 MG/1
800 TABLET ORAL EVERY 8 HOURS PRN
Status: CANCELLED | OUTPATIENT
Start: 2021-02-17

## 2021-02-17 RX ORDER — HYDRALAZINE HYDROCHLORIDE 20 MG/ML
10 INJECTION INTRAMUSCULAR; INTRAVENOUS EVERY 6 HOURS PRN
Status: DISCONTINUED | OUTPATIENT
Start: 2021-02-17 | End: 2021-02-22 | Stop reason: HOSPADM

## 2021-02-17 RX ADMIN — ASPIRIN 81 MG: 81 TABLET, COATED ORAL at 08:26

## 2021-02-17 RX ADMIN — SODIUM CHLORIDE 75 ML/HR: 9 INJECTION, SOLUTION INTRAVENOUS at 12:56

## 2021-02-17 RX ADMIN — HYDROCODONE BITARTRATE AND ACETAMINOPHEN 1 TABLET: 7.5; 325 TABLET ORAL at 23:57

## 2021-02-17 RX ADMIN — GABAPENTIN 200 MG: 100 CAPSULE ORAL at 12:54

## 2021-02-17 RX ADMIN — CARISOPRODOL 350 MG: 350 TABLET ORAL at 12:54

## 2021-02-17 RX ADMIN — ALPRAZOLAM 1 MG: 1 TABLET ORAL at 08:29

## 2021-02-17 RX ADMIN — HYDROCODONE BITARTRATE AND ACETAMINOPHEN 1 TABLET: 10; 325 TABLET ORAL at 07:25

## 2021-02-17 RX ADMIN — Medication 1 TABLET: at 15:19

## 2021-02-17 RX ADMIN — HEPARIN SODIUM 5000 UNITS: 5000 INJECTION INTRAVENOUS; SUBCUTANEOUS at 08:25

## 2021-02-17 RX ADMIN — SODIUM CHLORIDE, PRESERVATIVE FREE 10 ML: 5 INJECTION INTRAVENOUS at 08:27

## 2021-02-17 RX ADMIN — HEPARIN SODIUM 5000 UNITS: 5000 INJECTION INTRAVENOUS; SUBCUTANEOUS at 21:03

## 2021-02-17 RX ADMIN — CLOPIDOGREL 75 MG: 75 TABLET, FILM COATED ORAL at 08:25

## 2021-02-17 RX ADMIN — SODIUM CHLORIDE, PRESERVATIVE FREE 10 ML: 5 INJECTION INTRAVENOUS at 21:05

## 2021-02-17 RX ADMIN — ALPRAZOLAM 0.5 MG: 0.5 TABLET ORAL at 18:52

## 2021-02-17 RX ADMIN — ATORVASTATIN CALCIUM 80 MG: 40 TABLET, FILM COATED ORAL at 21:03

## 2021-02-17 RX ADMIN — AMLODIPINE BESYLATE 10 MG: 10 TABLET ORAL at 08:26

## 2021-02-17 RX ADMIN — LOSARTAN POTASSIUM 100 MG: 50 TABLET, FILM COATED ORAL at 08:26

## 2021-02-17 RX ADMIN — IPRATROPIUM BROMIDE AND ALBUTEROL SULFATE 3 ML: .5; 3 SOLUTION RESPIRATORY (INHALATION) at 18:14

## 2021-02-17 RX ADMIN — NICOTINE 1 PATCH: 14 PATCH, EXTENDED RELEASE TRANSDERMAL at 01:05

## 2021-02-17 RX ADMIN — CARISOPRODOL 350 MG: 350 TABLET ORAL at 21:18

## 2021-02-17 RX ADMIN — SODIUM CHLORIDE, PRESERVATIVE FREE 10 ML: 5 INJECTION INTRAVENOUS at 00:22

## 2021-02-17 RX ADMIN — HYDROCODONE BITARTRATE AND ACETAMINOPHEN 1 TABLET: 7.5; 325 TABLET ORAL at 15:18

## 2021-02-17 RX ADMIN — HEPARIN SODIUM 5000 UNITS: 5000 INJECTION INTRAVENOUS; SUBCUTANEOUS at 00:23

## 2021-02-17 NOTE — PROGRESS NOTES
Patient seen and examined, MRI, CT angio of the brain, carotids angiogram, CT scan of the cervical spine as well as MRI noted.    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  To prevent withdrawal, we will restart his chronic benzodiazepines and opiates medication at a lower dose.

## 2021-02-17 NOTE — PLAN OF CARE
Problem: Adult Inpatient Plan of Care  Goal: Plan of Care Review  Outcome: Ongoing, Progressing  Flowsheets (Taken 2/17/2021 1412)  Progress: improving  Plan of Care Reviewed With: patient  Goal: Patient-Specific Goal (Individualized)  Outcome: Ongoing, Progressing  Goal: Absence of Hospital-Acquired Illness or Injury  Outcome: Ongoing, Progressing  Intervention: Identify and Manage Fall Risk  Recent Flowsheet Documentation  Taken 2/17/2021 1400 by Richmond Scott RN  Safety Promotion/Fall Prevention: safety round/check completed  Taken 2/17/2021 1300 by Richmond Scott RN  Safety Promotion/Fall Prevention: safety round/check completed  Taken 2/17/2021 1200 by Richmond Scott RN  Safety Promotion/Fall Prevention: safety round/check completed  Taken 2/17/2021 1100 by Richmond Scott RN  Safety Promotion/Fall Prevention: safety round/check completed  Taken 2/17/2021 1000 by Richmond Scott RN  Safety Promotion/Fall Prevention: safety round/check completed  Taken 2/17/2021 0900 by Richmond Scott RN  Safety Promotion/Fall Prevention: safety round/check completed  Taken 2/17/2021 0800 by Richmond Scott RN  Safety Promotion/Fall Prevention: safety round/check completed  Taken 2/17/2021 0700 by Richmond Scott RN  Safety Promotion/Fall Prevention: safety round/check completed  Intervention: Prevent Skin Injury  Recent Flowsheet Documentation  Taken 2/17/2021 1400 by Richmond Scott RN  Body Position: side-lying, left  Taken 2/17/2021 1200 by Richmond Scott RN  Body Position: side-lying, right  Taken 2/17/2021 1000 by Richmond Scott RN  Body Position: side-lying, left  Taken 2/17/2021 0800 by Richmond Scott RN  Body Position: side-lying, right  Intervention: Prevent and Manage VTE (venous thromboembolism) Risk  Recent Flowsheet Documentation  Taken 2/17/2021 0800 by Richmond Scott RN  VTE Prevention/Management: (see mar) other (see  comments)  Intervention: Prevent Infection  Recent Flowsheet Documentation  Taken 2/17/2021 1400 by Richmond Scott RN  Infection Prevention: environmental surveillance performed  Taken 2/17/2021 0800 by Richmond Scott RN  Infection Prevention: environmental surveillance performed  Goal: Optimal Comfort and Wellbeing  Outcome: Ongoing, Progressing  Intervention: Provide Person-Centered Care  Recent Flowsheet Documentation  Taken 2/17/2021 1400 by Richmond Scott RN  Trust Relationship/Rapport: care explained  Taken 2/17/2021 0800 by Richmond Scott RN  Trust Relationship/Rapport:   care explained   questions answered   thoughts/feelings acknowledged  Goal: Readiness for Transition of Care  Outcome: Ongoing, Progressing   Goal Outcome Evaluation:  Plan of Care Reviewed With: patient  Progress: improving

## 2021-02-17 NOTE — PLAN OF CARE
Problem: Adult Inpatient Plan of Care  Goal: Absence of Hospital-Acquired Illness or Injury  Intervention: Identify and Manage Fall Risk  Recent Flowsheet Documentation  Taken 2/17/2021 0300 by Teresa Worley RN  Safety Promotion/Fall Prevention:   safety round/check completed   fall prevention program maintained  Taken 2/17/2021 0100 by Teresa Worley RN  Safety Promotion/Fall Prevention:   safety round/check completed   fall prevention program maintained  Taken 2/17/2021 0000 by Teresa Worley RN  Safety Promotion/Fall Prevention:   safety round/check completed   fall prevention program maintained  Taken 2/16/2021 2330 by Teresa Worley RN  Safety Promotion/Fall Prevention:   safety round/check completed   fall prevention program maintained     Problem: Adult Inpatient Plan of Care  Goal: Absence of Hospital-Acquired Illness or Injury  Intervention: Prevent Skin Injury  Recent Flowsheet Documentation  Taken 2/17/2021 0200 by Teresa Worley RN  Body Position:   side-lying, left   turned  Taken 2/17/2021 0000 by Teresa Worley RN  Body Position:   turned   side-lying, right  Taken 2/16/2021 2330 by Teresa Worley RN  Body Position: turned     Problem: Adult Inpatient Plan of Care  Goal: Absence of Hospital-Acquired Illness or Injury  Intervention: Prevent and Manage VTE (venous thromboembolism) Risk  Recent Flowsheet Documentation  Taken 2/17/2021 0200 by Teresa Worley RN  VTE Prevention/Management: (heparin subcu) other (see comments)  Taken 2/16/2021 2330 by Teresa Worley RN  VTE Prevention/Management: (heparin ) other (see comments)     Problem: Adult Inpatient Plan of Care  Goal: Absence of Hospital-Acquired Illness or Injury  Intervention: Prevent Infection  Recent Flowsheet Documentation  Taken 2/17/2021 0200 by Teresa Worley RN  Infection Prevention:   visitors restricted/screened   single patient room provided   rest/sleep promoted   environmental surveillance  performed  Taken 2/17/2021 0000 by Teresa Worley RN  Infection Prevention:   visitors restricted/screened   single patient room provided   rest/sleep promoted   environmental surveillance performed  Taken 2/16/2021 2330 by Teresa Worley RN  Infection Prevention:   visitors restricted/screened   single patient room provided   rest/sleep promoted   environmental surveillance performed     Problem: Adult Inpatient Plan of Care  Goal: Optimal Comfort and Wellbeing  Intervention: Provide Person-Centered Care  Recent Flowsheet Documentation  Taken 2/17/2021 0200 by Teresa Worley RN  Trust Relationship/Rapport:   care explained   thoughts/feelings acknowledged   reassurance provided   questions encouraged   questions answered   emotional support provided   choices provided  Taken 2/16/2021 2330 by Teresa Worley RN  Trust Relationship/Rapport:   care explained   thoughts/feelings acknowledged   questions encouraged   reassurance provided   questions answered   emotional support provided   choices provided     Problem: Fall Injury Risk  Goal: Absence of Fall and Fall-Related Injury  Intervention: Identify and Manage Contributors to Fall Injury Risk  Recent Flowsheet Documentation  Taken 2/17/2021 0200 by Teresa Worley RN  Self-Care Promotion:   independence encouraged   BADL personal objects within reach  Taken 2/17/2021 0000 by Teresa Worley RN  Medication Review/Management: medications reviewed  Taken 2/16/2021 2330 by Teresa Worley RN  Medication Review/Management: medications reviewed  Self-Care Promotion:   independence encouraged   BADL personal objects within reach   Goal Outcome Evaluation:  Plan of Care Reviewed With: patient  Progress: no change  Outcome Summary: Patient admitted from ED Dx CVA. Patient deficit on right upper and lower extremities, and slight mouth droop on right side as well. Patient recently had 2 falls at home. Passed swallow evaluation early on, and was given regular  cardiac diet. Nicotine patch applied to left arm. UOP adequate. Blood pressure elevated at this time with goal parameters of 140-200 SBP. WCTM for changes in NIHSS scale.

## 2021-02-17 NOTE — PAYOR COMM NOTE
"[facility]  NPI:[id number]    Utilization Review  Contact: Breanna Hankins RN  Phone: [phone]  Fax:[phone]    CLINICAL UPDATE       ADDITIONAL CLINICAL FOR RECONSIDERATION OF DENIAL    Valerie Ott (65 y.o. Male)     Date of Birth Social Security Number Address Home Phone MRN    1955  [address]   [facility] [address] [phone] [id number]    [employer] Marital Status          None        Admission Date Admission Type Admitting Provider Attending Provider Department, Room/Bed    2/16/21 Emergency James Bailey MD [place], Claire Chin, MD [facility] CRITICAL CARE, [facility]/    Discharge Date Discharge Disposition Discharge Destination                       Attending Provider: Karely Edwards MD    Allergies: No Known Allergies    Isolation: None   Infection: COVID (rule out) (02/16/21)   Code Status: CPR    Ht: 170.2 cm (67\")   Wt: 84.8 kg (187 lb)    Admission Cmt: None   Principal Problem: None                Active Insurance as of 2/16/2021     Primary Coverage     Payor Plan Insurance Group Employer/Plan Group    [facility] MEDICARE REPLACEMENT [employer] MEDICARE REPLACEMENT      Payor Plan Address Payor Plan Phone Number Payor Plan Fax Number Effective Dates    [address] [address] [phone]  2/16/2021 - None Entered    [facility] [id number]       Subscriber Name Subscriber Birth Date Member ID       VALERIE OTT 1955 [id number]                 Emergency Contacts      (Rel.) Home Phone Work Phone Mobile Phone    Jerry Leos (Significant Other) -- -- [phone]        Karely Edwards MD   Physician   Medicine   Progress Notes   Signed   Date of Service:  02/17/21 1152   Creation Time:  02/17/21 1152            Signed            Show:Clear all  [x]Manual[]Template[]Copied    Added by:  [x]Karely Edwards MD    []Marlon for details  Patient seen and examined, MRI, CT angio of the brain, carotids angiogram, CT scan " of the cervical spine as well as MRI noted.     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  To prevent withdrawal, we will restart his chronic benzodiazepines and opiates medication at a lower dose.              Saul Loza MD   Physician   Neurology   Progress Notes   Addendum   Date of Service:  02/17/21 0936   Creation Time:  02/17/21 0936            Expand All Collapse All []Expand All by Default    Show:Clear all  [x]Manual[x]Template[x]Copied    Added by:  [x]Saul Loza MD    []Hover for details  Stroke Progress Note         Chief Complaint: Right sided weakness        Subjective       Subjective      Subjective:  No acute issues overnight.  Patient continues to have right-sided weakness, no significant improvement or worsening.  Denies any new symptoms.     Review of Systems   HENT: Negative.    Eyes: Negative.    Respiratory: Negative.    Cardiovascular: Negative.    Gastrointestinal: Negative.    Endocrine: Negative.    Genitourinary: Negative.    Musculoskeletal: Negative.    Skin: Negative.    Allergic/Immunologic: Negative.    Neurological: Positive for weakness.   Psychiatric/Behavioral: Negative.                Objective       Objective          Temp:  [98 °F (36.7 °C)-98.4 °F (36.9 °C)] 98.2 °F (36.8 °C)  Heart Rate:  [56-86] 60  Resp:  [14-20] 14  BP: (150-198)/() 169/99           Neurological Exam   Mental Status  Awake, alert and oriented to person,  place and time.Alert. Speech is normal. Language is fluent with no aphasia. Attention and concentration are normal. Fund of knowledge is appropriate for level of education.     Cranial Nerves  CN II: Visual fields full to confrontation.  CN III, IV, VI: Extraocular movements intact bilaterally. Pupils equal round and reactive to light bilaterally.  CN V: Facial sensation is normal.  CN VII:  Subtle right facial asymmetry.  CN VIII: Equal hearing bilaterally.  CN IX, X: Palate elevates symmetrically  CN XI: Shoulder shrug strength is normal.  CN XII: Tongue midline without atrophy or fasciculations.     Motor  Normal muscle bulk throughout. No fasciculations present.  Right upper extremity is 3/5, slight strength against gravity.  Right lower extremity proximal 4/5, distal 2/5  left upper and lower extremities moving spontaneously     Sensory  Light touch is normal in upper and lower extremities.      Reflexes  Not assessed.     Coordination  No dysmetria on left side.     Gait  Not assessed.        Physical Exam  Vitals signs and nursing note reviewed.   Constitutional:       Appearance: Normal appearance.   HENT:      Head: Normocephalic and atraumatic.   Eyes:      Extraocular Movements: Extraocular movements intact.      Conjunctiva/sclera: Conjunctivae normal.      Pupils: Pupils are equal, round, and reactive to light.   Neck:      Musculoskeletal: Normal range of motion and neck supple. No neck rigidity or muscular tenderness.      Comments: Patient denies any neck tenderness, his neck movement seems supple  Cardiovascular:      Rate and Rhythm: Normal rate and regular rhythm.   Pulmonary:      Effort: Pulmonary effort is normal. No respiratory distress.   Neurological:      Mental Status: He is alert and oriented to person, place, and time.      Cranial Nerves: No cranial nerve deficit.      Sensory: No sensory deficit.      Motor: Weakness present.      Coordination: Coordination normal.   Psychiatric:          Mood and Affect: Mood normal.         Speech: Speech normal.         Behavior: Behavior normal.      Results Review:    I reviewed the patient's new clinical results.  MRI brain shows left anterior choroidal stroke, moderate to severe white matter disease, no hemorrhage  MRI C-spine shows degenerative changes at multiple levels, but no spinal cord injury  His LDL was 100, total cholesterol of 165 and triglyceride of 58, his CBC and BMP were okay, awaiting A1c.                 Assessment/Plan      Assessment/Plan:  65-year-old right-handed white male with known diagnosis of hypertension, smoking greater than 30 pack years, cervical degenerative disc disease, who comes in with 2 falls, followed by right upper and lower extremity weakness, questionable very subtle right facial asymmetry, no obvious language deficits or speech trouble.  CT head negative for any acute findings.  RI shows him to have a left anterior choroidal stroke.      1.   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  Pure motor hemiparesis.  Patient has been started on aspirin 325 mg, Plavix 75 mg and Lipitor 80 mg daily for secondary stroke prevention.  Start Protonix 40 mg once daily while on dual antiplatelets.  Continue the same, probably will need lifelong.  2D echocardiogram today.   His MRI C-spine was also done, which showed degenerative changes, but no spinal cord injury.  2. Essential hypertension.  His blood pressure has been 160s to 200s.  Goal systolic blood pressure of 140s to 180s for today.    He has been started on amlodipine 10 mg and Cozaar 100 mg.  If blood pressure drops less than 140s, recommend holding one of the blood pressure medication.  3. White matter disease.  Patient has significant white matter disease, which increases his long-term chances of vascular dementia.  He will need good close follow-up with PCP, and continued aggressive vascular risk factor control.  4. Cervical degenerative disc disease.     MRI C-spine shows degenerative changes, but no spinal cord injury.  Recommend physical therapy evaluation.  5. Mixed hyperlipidemia.  His LDL is 100, total cholesterol 165 and triglyceride of 58.  Recommend full dose of statins for secondary stroke prevention.  6. Smoking greater than 30-pack-year history.  Encouraged patient to quit smoking.  7. PT/OT can start working with him today.  He will need acute rehabilitation.  8. Swallow evaluation if not done, healthy heart diet once cleared.     Case was discussed with patient, nursing and will call the hospitalist.  Thank you for the consult.                          Saul Loza MD  02/17/21  09:37 EST     This was an audio and video enabled telemedicine encounter.         Revision History

## 2021-02-17 NOTE — H&P
Hospitalist History and Physical        Patient Identification  Name: Valerie Moore  Age/Sex: 65 y.o. male  :  1955        MRN: [id number]  Visit Number: [id number]  Admit Date: 2021   PCP: Darwin Alvarez MD          Chief complaint recent falls, right sided weakness    History of Present Illness:  Patient is a 65 y.o. male with history of HTN, HLD, anxiety, seasonal allergies, sleep disorder and longstanding tobacco abuse, who presents with complaints of falls on both  and 2/15, with residual right upper and lower extremity weakness. He says on both occasions, his right leg gave out on him, but he never noticed any actual weakness on his right side until after the fall on 2/15. The weakness has persisted since onset, which ultimately prompted him to come to the ED this evening. He denies any headache, asphasia, dysphagia or numbness. He denies any history of strokes. He does smoke nearly a pack of cigarettes per day and has done so for many years.     In the ED, vitals showed significant hypertension with /87 that has since improved to 161/97. He has been mildly bradycardic (HR 56-61) but other vitals have been unremarkable per documentation. Labs showed elevated , mildly low sodium 132, bicarb 20, CRP marginally elevated at 1.93 but with normal WBC and neutrophils. UA and UDS were unremarkable. Chest XR was clear. CT head was unremarkable; CT angiogram head and neck showed occluded right vertebral artery with collaterals, along with mild-moderate plaque right > left carotid systems without hemodynamically significant stenosis. Neurology recommended MRI brain along with MRI cervical spine to rule out spinal cord injury that could be contributing to his symptoms. MRI cervical spine showed no acute injury, only chronic changes, while MRI brain showed focal acute infarcts in the left hemisphere as detailed in the radiology section below. Patient has been admitted to the hospitalist  service for further workup and management.     Review of Systems  Review of Systems   Constitutional: Negative for activity change, appetite change, chills, diaphoresis, fatigue, fever and unexpected weight change.   HENT: Negative for congestion, postnasal drip, rhinorrhea, sinus pressure, sinus pain and sore throat.    Eyes: Negative for photophobia, pain, discharge, redness, itching and visual disturbance.   Respiratory: Negative for cough, shortness of breath and wheezing.    Cardiovascular: Negative for chest pain, palpitations and leg swelling.   Gastrointestinal: Negative for abdominal distention, abdominal pain, constipation, diarrhea, nausea and vomiting.   Endocrine: Negative for cold intolerance, heat intolerance, polydipsia, polyphagia and polyuria.   Genitourinary: Negative for difficulty urinating, dysuria, flank pain, frequency and hematuria.   Musculoskeletal: Positive for arthralgias (chronic) and back pain (chronic). Negative for joint swelling, myalgias, neck pain and neck stiffness.   Skin: Negative for color change, pallor, rash and wound.   Neurological: Positive for weakness (right sided). Negative for dizziness, tremors, seizures, syncope, light-headedness, numbness and headaches.   Hematological: Negative for adenopathy. Does not bruise/bleed easily.   Psychiatric/Behavioral: Negative for agitation, behavioral problems and confusion.       History  Past Medical History:   Diagnosis Date   • Anxiety    • GERD (gastroesophageal reflux disease) 4/20/2017   • Hypercholesteremia    • Hypertension    • Seasonal allergies    • Sleep disorder      Past Surgical History:   Procedure Laterality Date   • HERNIA REPAIR     • HIP SURGERY Right     plate and 8 screws in right hip      Family History   Problem Relation Age of Onset   • Cancer Mother    • Heart failure Father    • No Known Problems Sister    • No Known Problems Brother    • No Known Problems Son    • No Known Problems Daughter    • No Known  Problems Maternal Grandmother    • No Known Problems Maternal Grandfather    • No Known Problems Paternal Grandmother    • No Known Problems Paternal Grandfather    • No Known Problems Cousin    • No Known Problems Other    • Rheum arthritis Neg Hx    • Osteoarthritis Neg Hx    • Asthma Neg Hx    • Diabetes Neg Hx    • Hyperlipidemia Neg Hx    • Hypertension Neg Hx    • Migraines Neg Hx    • Rashes / Skin problems Neg Hx    • Seizures Neg Hx    • Stroke Neg Hx    • Thyroid disease Neg Hx      Social History     Tobacco Use   • Smoking status: Current Every Day Smoker     Packs/day: 0.75 to 1 pack/day     Years: 20.00     Pack years: 10.00     Types: Cigarettes   • Smokeless tobacco: Former User     Types: Chew   Substance Use Topics   • Alcohol use: No   • Drug use: No     (Not in a hospital admission)    Allergies:  Patient has no known allergies.    Objective     Vital Signs  Temp:  [98 °F (36.7 °C)] 98 °F (36.7 °C)  Heart Rate:  [56-61] 61  Resp:  [16] 16  BP: (161-190)/(87-97) 161/97  Body mass index is 28.19 kg/m².    Physical Exam:  Physical Exam  Constitutional:       General: He is not in acute distress.     Appearance: Normal appearance. He is normal weight. He is not ill-appearing or toxic-appearing.   HENT:      Head: Normocephalic and atraumatic.      Right Ear: External ear normal.      Left Ear: External ear normal.      Nose: Nose normal.      Mouth/Throat:      Mouth: Mucous membranes are moist.      Pharynx: Oropharynx is clear.      Comments: Right facial droop noted  Eyes:      Extraocular Movements: Extraocular movements intact.      Conjunctiva/sclera: Conjunctivae normal.      Pupils: Pupils are equal, round, and reactive to light.   Neck:      Musculoskeletal: Normal range of motion and neck supple. No neck rigidity or muscular tenderness.   Cardiovascular:      Rate and Rhythm: Normal rate and regular rhythm.      Pulses: Normal pulses.      Heart sounds: Normal heart sounds. No murmur.    Pulmonary:      Effort: Pulmonary effort is normal. No respiratory distress.      Breath sounds: Normal breath sounds. No wheezing or rales.   Abdominal:      General: Abdomen is flat. Bowel sounds are normal. There is no distension.      Palpations: Abdomen is soft.      Tenderness: There is no abdominal tenderness.   Musculoskeletal: Normal range of motion.         General: No swelling.      Right lower leg: No edema.      Left lower leg: No edema.   Lymphadenopathy:      Cervical: No cervical adenopathy.   Skin:     General: Skin is warm and dry.      Capillary Refill: Capillary refill takes less than 2 seconds.      Coloration: Skin is not jaundiced or pale.      Findings: No bruising or erythema.   Neurological:      Mental Status: He is alert and oriented to person, place, and time.      Comments: Mild right facial droop. Significant right upper and lower extremity weakness, 0-1/5. 5/5 strength left upper and lower extremities. No aphasia or dysarthria appreciated. Toes down-going on babinski maneuver on left, equivocal on right   Psychiatric:         Mood and Affect: Mood normal.         Behavior: Behavior normal.         Thought Content: Thought content normal.         Judgment: Judgment normal.           Results Review:       Lab Results:  Results from last 7 days   Lab Units 02/16/21  1721   WBC 10*3/mm3 7.59   HEMOGLOBIN g/dL 14.0   PLATELETS 10*3/mm3 328     Results from last 7 days   Lab Units 02/16/21  1721   CRP mg/dL 1.93*     Results from last 7 days   Lab Units 02/16/21  1723 02/16/21  1721   SODIUM mmol/L  --  132*   POTASSIUM mmol/L  --  4.0   CHLORIDE mmol/L  --  98   CO2 mmol/L  --  20.6*   BUN mg/dL  --  12   CREATININE mg/dL 0.90 0.88   CALCIUM mg/dL  --  10.0   GLUCOSE mg/dL  --  101*     Results from last 7 days   Lab Units 02/16/21  1721   MAGNESIUM mg/dL 2.0     No results found for: HGBA1C  Results from last 7 days   Lab Units 02/16/21  1721   BILIRUBIN mg/dL 0.3   ALK PHOS U/L 112    AST (SGOT) U/L 39   ALT (SGPT) U/L 20     Results from last 7 days   Lab Units 02/16/21  1721   CK TOTAL U/L 648*   TROPONIN T ng/mL <0.010                   I have reviewed the patient's laboratory results.    Imaging:  Imaging Results (Last 72 Hours)     Procedure Component Value Units Date/Time    MRI Cervical Spine Without Contrast [[id number]] Collected: 02/16/21 2056     Updated: 02/16/21 2101    Narrative:      EXAMINATION: MRI CERVICAL SPINE WO CONTRAST-      CLINICAL INDICATION:  Poss spinal cord injury      TECHNIQUE: Sagittal spin echo scans were obtained from the posterior  fossa through the upper thoracic spine and axial scans were performed  through selected cervical disc space levels, utilizing both spin echo  and gradient echo scans technique without contrast administration.      COMPARISON: CT same day         FINDINGS:     HARDWARE:  No hardware.     ALIGNMENT:  Degenerative listhesis posteriorly of C3 on C4 in the setting of facet  arthropathy. Remaining vertebral bodies and a straight preserved  alignment.     SPINAL CORD:?   Motion artifact limits assessment of the cord but no obvious cord  lesions identified.      BONES:   No fracture. No marrow signal abnormality.     POSTERIOR ELEMENTS:  The posterior elements appear intact. No spondylolysis or congenital  fusion identified.     POSTERIOR FOSSA:  The partially imaged posterior fossa is unremarkable.     SOFT TISSUES:   The visualized paraspinal soft tissues are unremarkable.       C2/3 level:  No disk bulge or protrusion.  No central canal or neuroforaminal  stenosis.     C3/4 level:  Broad-based posterior disc osteophyte complex. Bilateral facet  arthropathy. Mild central canal stenosis. Severe left greater than right  neural foraminal stenosis.     C4/5 level:  Diffuse disc bulge and small superimposed central posterior disc  protrusion. Mild central canal stenosis. Bilateral facet arthropathy.  There is moderate bilateral neural foraminal  stenosis.     C5/6 level:  Broad-based posterior disc osteophyte complex. Left greater than right  hypertrophic facet arthropathy. Moderate central canal stenosis.  Moderate left greater than right neural foraminal stenosis.     C6/7 level:  Broad-based posterior disc osteophyte complex. Left greater than right  hypertrophic facet arthropathy. Mild central canal stenosis. Moderate  left greater than right neural foraminal stenosis.     C7/T1 level:  Diffuse disc bulge. Mild facet arthropathy. No significant stenosis.     OTHER:   No additional remarkable findings.       Impression:      1. No acute fracture or traumatic malalignment.  2. Advanced multilevel degenerative disc disease with facet arthropathy  with variable central canal and neural foraminal stenosis detailed  above. Stenosis most severe at the C3/4 level.  3. Degenerative listhesis posteriorly of C3 on C4.     This report was finalized on 2/16/2021 8:59 PM by Dr. Johnathon Clark MD.       MRI Brain Without Contrast [[id number]] Collected: 02/16/21 2049     Updated: 02/16/21 2059    Narrative:      EXAM:    MR Head Without Intravenous Contrast     EXAM DATE:    2/16/2021 8:07 PM     CLINICAL HISTORY:    Poss CVA     TECHNIQUE:    Magnetic resonance images of the head/brain without intravenous  contrast in multiple planes.     COMPARISON:    No relevant prior studies available.     FINDINGS:    Brain:  Focal acute infarct involves the posterior margin of the left  putamen nucleus with infarction extending to involve portions of the  left pericallosal white matter and external capsule.  Advanced chronic  small vessel ischemic disease is noted.  Diffuse cerebral atrophy.  No  additional acute infarcts identified.  No hemorrhage.    Ventricles:  Unremarkable.  No ventriculomegaly.    Bones/joints:  Unremarkable.    Sinuses:  Unremarkable as visualized.  No acute sinusitis.    Mastoid air cells:  Unremarkable as visualized.  No mastoid effusion.    Orbits:   Unremarkable as visualized.    Other findings:  No MRI evidence of hemorrhage.       Impression:      1.  Focal acute infarcts in the left basal ganglionic, left external  capsule, and left pericallosal regions which follow along the  distribution of the lenticulostriate perforating arteries of the left  MCA and the anterior choroidal artery distribution.  2.  No MRI evidence of hemorrhage.  3.  No midline shift, focal mass effect, or hydrocephalus.  4.  Advanced cerebral atrophy and chronic small vessel ischemic disease.     This report was finalized on 2/16/2021 8:56 PM by Dr. Johnathon Clark MD.       XR Chest 1 View [[id number]] Collected: 02/16/21 1835     Updated: 02/16/21 1838    Narrative:      EXAM:    XR Chest, 1 View     EXAM DATE:    2/16/2021 5:50 PM     CLINICAL HISTORY:    cva     TECHNIQUE:    Frontal view of the chest.     COMPARISON:    11/23/2019     FINDINGS:    Lungs:  Unremarkable.  No consolidation.    Pleural space:  Unremarkable.  No pneumothorax.    Heart:  Stable cardiomegaly.    Mediastinum:  Unremarkable.    Bones/joints:  Unremarkable.       Impression:        Stable chest. No acute changes identified.     This report was finalized on 2/16/2021 6:36 PM by Dr. Johnathon Clark MD.       CT CEREBRAL PERFUSION W WO CONTRAST W AI ANALYSIS OF LVO [[id number]] Collected: 02/16/21 1814     Updated: 02/16/21 1824    Narrative:      EXAM:    CT Brain Perfusion Without and With Intravenous Contrast, [email or web address]  Protocol     EXAM DATE:    2/16/2021 5:19 PM     CLINICAL HISTORY:    cva     TECHNIQUE:    Axial computed tomography images of the brain without and with  intravenous contrast using cerebral perfusion protocol.  Post-processing  parametric maps were created using [email or web address] software and reviewed.   Sagittal and coronal reformatted images were created and reviewed.  This  CT exam was performed using one or more of the following dose reduction  techniques:  automated exposure control, adjustment of the  mA and/or kV  according to patient size, and/or use of iterative reconstruction  technique.     COMPARISON:  CT same day     FINDINGS:    Arterial input function:  Optimal.    Estimated infarct core, CBF < 30%: 0 cc    Perfusion, Tmax > 6s: 5 cc    Mismatch volume: 5 cc    Mismatch ratio: N/A    Perfusion, Tmax > 10s: 1 cc       Brain: Delayed transit and diminished perfusion in the left frontal  lobe region may represent area of ischemia without evidence of focal  infarct. This may represent area of chronic ischemia as well.       Impression:         1. No core infarct identified.  2. Some diminished perfusion of the left frontal region compatible with  variable ischemia which may be chronic in nature.     This report was finalized on 2/16/2021 6:17 PM by Dr. Johnathon Clark MD.       CT Angiogram Head [[id number]] Collected: 02/16/21 1810     Updated: 02/16/21 1823    Narrative:      EXAM:    CT Angiography Head With Intravenous Contrast     EXAM DATE:    2/16/2021 5:20 PM     CLINICAL HISTORY:    cva     TECHNIQUE:    Axial computed tomographic angiography images of the head with  intravenous contrast.  This CT exam was performed using one or more of  the following dose reduction techniques:  automated exposure control,  adjustment of the mA and/or kV according to patient size, and/or use of  iterative reconstruction technique.    MIP reconstructed images were created and reviewed.     COMPARISON:    CT same day     FINDINGS:    Right internal carotid artery:  The right ICA origin is unremarkable.   There is mild calcified plaque of the clinoid segments of the right ICA  and of the cavernous segments of the right ICA.  Intracranial segment is  patent with no significant stenosis.  No aneurysm.    Right anterior cerebral artery:  The right A1 and A2 segments are  within normal limits.  No occlusion or significant stenosis.  No  aneurysm.    Right middle cerebral artery:  The right MCA M1 and portions of M2  segments  appear within normal limits.  No occlusion or significant  stenosis.  No aneurysm.    Right posterior cerebral artery:  Visualized right P1 and P2 segments  are within normal limits.  No occlusion or significant stenosis.  No  aneurysm.    Right vertebral artery:  Occlusion of the right vertebral artery with  reconstitution of flow prior to the basilar artery confluence.       Left internal carotid artery:  The left ICA origin is unremarkable.   There is mild to moderate calcified plaque involving the cavernous  segment of the left ICA.  Intracranial segment is patent with no  significant stenosis.  No aneurysm.    Left anterior cerebral artery:  The left A1 and A2 segments are within  normal limits.  No occlusion or significant stenosis.  No aneurysm.    Left middle cerebral artery:  The visualized left MCA M1 and portions  of the M2 segments appear within normal limits.  No occlusion or  significant stenosis.  No aneurysm.    Left posterior cerebral artery:  The visualized left P1 and P2  segments are unremarkable.  No occlusion or significant stenosis.  No  aneurysm.    Left vertebral artery:  Unremarkable as visualized.       Basilar artery:  The basilar artery appears within normal limits with  no significant stenosis or occlusion.    Other vasculature:  The anterior to indicating artery appears patent.    Brain:  No enhancing brain parenchymal lesions identified.  Diffuse  cerebral atrophy and chronic small vessel ischemic disease is noted.    Ventricles:  No midline shift or mass effect. No hydrocephalus.    Other findings:  The basilar apex is unremarkable with no evidence of  aneurysm.       Impression:      1.  No large vessel occlusion identified involving the major  intracranial arterial segments.  2.  Occlusion of the right vertebral artery with reconstitution of flow  just prior to basilar artery confluence likely due to collateralization.  3.  Diffuse cerebral atrophy and advanced chronic small vessel  ischemic  disease.  4.  Other nonacute findings above.     This report was finalized on 2/16/2021 6:14 PM by Dr. Johnathon Clark MD.       CT Angiogram Neck [[id number]] Collected: 02/16/21 1807     Updated: 02/16/21 1822    Narrative:      EXAM:    CT Angiography Neck With Intravenous Contrast     EXAM DATE:    2/16/2021 5:20 PM     CLINICAL HISTORY:    cva     TECHNIQUE:    Axial computed tomographic angiography images of the neck with  intravenous contrast.  This CT exam was performed using one or more of  the following dose reduction techniques:  automated exposure control,  adjustment of the mA and/or kV according to patient size, and/or use of  iterative reconstruction technique.    MIP reconstructed images were created and reviewed.     COMPARISON:    CT same day     FINDINGS:      VASCULATURE:    Right common carotid artery:  Unremarkable.  No significant stenosis.   No dissection or occlusion.    Right internal carotid artery:  There is moderate calcified and  noncalcified plaque of the right carotid bulb.  There is mild to  moderate calcified and noncalcified plaque of the proximal right ICA  without evidence of occlusion or hemodynamically significant stenosis.   Tortuosity of the right ICA noted.    Right external carotid artery:  Unremarkable.  No occlusion.    Right vertebral artery:  There is complete occlusion of the right  vertebral artery with reconstitution of flow just proximal to the  basilar artery confluence likely due to collateralization through the  anterior inferior cerebellar artery vascular system.  No significant  stenosis.       Left common carotid artery:  Unremarkable.  No significant stenosis.   No dissection or occlusion.    Left internal carotid artery:  There is marked tortuosity of the left  ICA.  There is mild calcified and noncalcified plaque left carotid bulb.   Mild calcified plaque proximal left ICA without evidence of occlusion  or hemodynamically significant stenosis.     Left external carotid artery:  Unremarkable.  No occlusion.    Left vertebral artery:  Unremarkable.  No significant stenosis.  No  dissection or occlusion.    Other vasculature:  Three-vessel arch configuration.  Moderate  atherosclerosis with calcified and noncalcified plaque of the partially  imaged aortic arch.  Tortuosity of the right subclavian artery.      NECK:    Bones/joints:  Degenerative changes cervical spine noted with  multilevel stenosis.  No acute fracture.  No dislocation.    Soft tissues:  Unremarkable as visualized.  No mass.    Lung apices:  Emphysema noted of the partially imaged upper lungs.      CAROTID STENOSIS REFERENCE USING NASCET CRITERIA:    % ICA stenosis = (1 - narrowest ICA diameter/diameter of distal  cervical ICA) x 100.    Mild - <50% stenosis.    Moderate - 50-69% stenosis.    Severe - 70-94% stenosis.    Near occlusion - 95-99% stenosis.    Occluded - 100% stenosis.       Impression:      1.  Occlusion of the right vertebral artery.  2.  Mild to moderate plaque right greater than left carotid systems but  without hemodynamically significant stenosis or occlusion identified.  3.  Tortuosity of the bilateral extracranial ICA segments.  4.  No evidence of dissection.  5.  Degenerative changes cervical spine.  6.  Emphysema of the partially imaged upper lungs.  7.  Other nonacute findings above.     This report was finalized on 2/16/2021 6:10 PM by Dr. Johnathon Clark MD.       CT Cervical Spine Without Contrast [[id number]] Collected: 02/16/21 1759     Updated: 02/16/21 1817    Narrative:      EXAM:    CT Cervical Spine Without Intravenous Contrast     EXAM DATE:    2/16/2021 5:35 PM     CLINICAL HISTORY:    right sided weakness     TECHNIQUE:    Axial computed tomography images of the cervical spine without  intravenous contrast.  Sagittal and coronal reformatted images were  created and reviewed.  This CT exam was performed using one or more of  the following dose reduction  techniques:  automated exposure control,  adjustment of the mA and/or kV according to patient size, and/or use of  iterative reconstruction technique.     COMPARISON:    No relevant prior studies available.     FINDINGS:    Limitations:  Moderate limitation is noted secondary to patient  positioning and partial rotation of the cervical spine.    Vertebrae:  No discrete fracture lines are identified.    Discs/spinal canal/neural foramina:  Advanced multilevel degenerative  disc disease C3/4 through C6/7 with mild central canal and mild/moderate  neural foraminal stenosis noted.    Soft tissues:  Unremarkable.    Mastoid air cells:  Small right mastoid effusion.       Impression:      1.  Limited exam due to patient positioning and cervical spine rotation  without convincing evidence of acute fracture.  2.  No traumatic malalignment is identified.  3.  Small right mastoid effusion.  4.  Advanced degenerative changes with multilevel stenosis.  5.  If persistent concern for cervical spine injury, repeat exam may be  indicated.     This report was finalized on 2/16/2021 6:01 PM by Dr. Johnathon Clark MD.       CT Head Without Contrast Stroke Protocol [[id number]] Collected: 02/16/21 1718     Updated: 02/16/21 1722    Narrative:      EXAM:    CT Head Without Intravenous Contrast     EXAM DATE:    2/16/2021 5:09 PM     CLINICAL HISTORY:    Neuro deficit, acute, stroke suspected     TECHNIQUE:    Axial computed tomography images of the head/brain without intravenous  contrast.  Sagittal and coronal reformatted images were created and  reviewed.  This CT exam was performed using one or more of the following  dose reduction techniques:  automated exposure control, adjustment of  the mA and/or kV according to patient size, and/or use of iterative  reconstruction technique.     COMPARISON:    No relevant prior studies available.     FINDINGS:    Brain:  Generalized cerebral atrophy is noted.  Advanced chronic small  vessel  ischemic disease.  No hemorrhage.    Ventricles:  Unremarkable.  No ventriculomegaly.    Bones/joints:  Unremarkable.  No acute fracture.    Soft tissues:  Unremarkable.    Sinuses:  Unremarkable as visualized.  No acute sinusitis.    Mastoid air cells:  Unremarkable as visualized.  No mastoid effusion.       Impression:      1.  No CT evidence of acute intracranial abnormality.  2.  Generalized atrophy and advanced chronic small vessel ischemic  disease noted.     This report was finalized on 2/16/2021 5:20 PM by Dr. Johnathon Clark MD.             I have personally reviewed the patient's radiologic imaging.        EKG: Mild sinus bradycardia with occasional PAC's, HR 54, QTc 424. Moderate voltage criteria for LVH, may be normal variant. No overt ST changes to suggest acute ischemia appreciated.     I have personally reviewed the patient's EKG.        Assessment/Plan     - Cerebrovascular accident (CVA) (CMS/HCC): right sided weakness correlates with MRI brain findings of focal acute infarcts in the left basal ganglion, left external capsule, and left pericallosal regions which follow along the distribution of the lenticulostriate perforating arteries of the left MCA and anterior choroidal artery distribution. Neurology recommends ASA, plavix and statin treatment. Will admit to CCU as PCU overflow. Allow for permissive HTN, SBP goal 160-200. Will make IV hydralazine available PRN. Monitor neuro checks q4h. Evaluate further with PT/OT/speech therapy consultations in the morning. If patient passes bedside nursing swallow evaluation, can eat until formal speech therapy evaluation tomorrow. Await further recommendations from neurology.  - Hypertension, uncontrolled at times: again, allow permissive hypertension in light of acute CVA. Review home meds once reconciled by pharmacy but will likely either hold or place high hold parameters in attempt to not drop BP too low. IV hydralazine will be available PRN as noted  above.  - Hyperlipidemia: continue statin. Check lipid panel in the morning.   - Tobacco abuse: counseled on cessation, especially in light of acute stroke. Nicotine patch offered which patient accepted.  - Mild hyponatremia: monitor response to gentle IV fluid hydration. Preliminary home med list includes HCTZ which if confirmed will be held for now.     DVT/GI Prophylaxis: SQ heparin    Estimated Length of Stay >2 midnights    I discussed the patient's findings, assessment and plan with the patient and his RN Teresa who was present during my interview and exam in the CCU.    * patient is high risk due to CVA with right sided weakness, HTN uncontrolled at times    James Bailey MD  02/16/21  23:25 EST

## 2021-02-17 NOTE — THERAPY TREATMENT NOTE
Acute Care - Occupational Therapy Initial Evaluation   [facility]     Patient Name: Valerie Moore  : 1955  MRN: [id number]  Today's Date: 2021  Onset of Illness/Injury or Date of Surgery: 21          Admit Date: 2021       ICD-10-CM ICD-9-CM   1. Cerebrovascular accident (CVA), unspecified mechanism (CMS/[facility])  I63.9 434.91     Patient Active Problem List   Diagnosis   • Anxiety   • Hypertension   • Hypercholesteremia   • Seasonal allergies   • Sleep disorder   • GERD (gastroesophageal reflux disease)   • Arthritis   • Airway hyperreactivity   • Gout   • Weakness   • CAP (community acquired pneumonia)   • Hypokalemia   • Hyponatremia   • Hyperglycemia   • Normocytic anemia   • Abdominal aortic aneurysm (AAA) 3.0 cm to 5.5 cm in diameter in male (CMS/[facility])   • Tobacco abuse   • Chest pain, atypical   • Cerebrovascular accident (CVA) (CMS/[facility])     Past Medical History:   Diagnosis Date   • Anxiety    • GERD (gastroesophageal reflux disease) 2017   • Hypercholesteremia    • Hypertension    • Seasonal allergies    • Sleep disorder      Past Surgical History:   Procedure Laterality Date   • HERNIA REPAIR     • HIP SURGERY Right     plate and 8 screws in right hip             OT ASSESSMENT FLOWSHEET (last 12 hours)      OT Evaluation and Treatment     Row Name 21 1352                   OT Time and Intention    Subjective Information  complains of;weakness  -LM        Document Type  evaluation  -LM        Mode of Treatment  occupational therapy  -LM        Patient Effort  good  -LM           General Information    Patient Profile Reviewed  yes  -LM        Onset of Illness/Injury or Date of Surgery  21  -LM        Prior Level of Function  independent:  -LM        Equipment Currently Used at Home  cane, straight  -LM        Pertinent History of Current Functional Problem  Admitted with cva.  PMH:  htn, anxiety, falls.    -LM        Existing Precautions/Restrictions  fall  -LM         Benefits Reviewed  patient:;improve function;increase independence;increase strength;increase balance  -LM           Previous Level of Function/Home Environm    BADLs, Premorbid Functional Level  independent  -LM           Living Environment    Current Living Arrangements  home/apartment/condo  -LM        Lives With  friend(s)  -LM           Sensory    Additional Documentation  Sensory Assessment (Somatosensory) (Group)  -LM           Sensory Assessment (Somatosensory)    Sensory Assessment (Somatosensory)  right-sided sensation intact  -LM           Cognition    Affect/Mental Status (Cognitive)  WFL  -LM        Orientation Status (Cognition)  oriented x 3  -LM        Follows Commands (Cognition)  WFL  -LM           Range of Motion (ROM)    Range of Motion  right upper extremity 1/4  -LM           Strength (Manual Muscle Testing)    Strength (Manual Muscle Testing)  right lower extremity 1/5-2/5  -LM           Activities of Daily Living    BADL Assessment/Intervention  bathing;upper body dressing;lower body dressing;grooming;feeding;toileting  -LM           Bathing Assessment/Intervention    [place] Level (Bathing)  maximum assist (25% patient effort)  -LM           Upper Body Dressing Assessment/Training    [place] Level (Upper Body Dressing)  moderate assist (50% patient effort);maximum assist (25% patient effort)  -LM           Lower Body Dressing Assessment/Training    [place] Level (Lower Body Dressing)  maximum assist (25% patient effort);dependent (less than 25% patient effort)  -LM           Grooming Assessment/Training    [place] Level (Grooming)  moderate assist (50% patient effort);minimum assist (75% patient effort)  -LM           Self-Feeding Assessment/Training    [place] Level (Feeding)  minimum assist (75% patient effort);moderate assist (50% patient effort)  -LM           Toileting Assessment/Training    [place] Level (Toileting)  dependent (less than 25% patient  effort);maximum assist (25% patient effort)  -LM           Plan of Care Review    Plan of Care Reviewed With  patient  -LM           OT Goals    Transfer Goal Selection (OT)  transfer, OT goal 1  -LM        Bathing Goal Selection (OT)  bathing, OT goal 1  -LM           Transfer Goal 1 (OT)    Activity/Assistive Device (Transfer Goal 1, OT)  commode, 3-in-1;transfers, all  -LM        [place] Level/Cues Needed (Transfer Goal 1, OT)  minimum assist (75% or more patient effort);contact guard assist  -LM        Time Frame (Transfer Goal 1, OT)  by discharge  -LM           Bathing Goal 1 (OT)    Activity/Device (Bathing Goal 1, OT)  bathing skills, all  -LM        [place] Level/Cues Needed (Bathing Goal 1, OT)  minimum assist (75% or more patient effort);moderate assist (50-74% patient effort)  -LM        Time Frame (Bathing Goal 1, OT)  by discharge  -LM           Positioning and Restraints    Post Treatment Position  bed  -LM        In Bed  call light within reach;encouraged to call for assist  -LM           Therapy Assessment/Plan (OT)    Patient/Family Therapy Goal Statement (OT)  return home to plof  -LM        OT Diagnosis  cva  -LM        Rehab Potential (OT)  good, to achieve stated therapy goals  -LM        Criteria for Skilled Therapeutic Interventions Met (OT)  meets criteria;yes;skilled treatment is necessary  -LM        Problem List (OT)  problems related to;coordination;mobility;balance;strength;other (see comments);range of motion (ROM) badl and fxl activity tolerance  -        Activity Limitations Related to Problem List (OT)  unable to transfer safely;BADLs not performed adequately or safely  -LM        Planned Therapy Interventions (OT)  BADL retraining;activity tolerance training;neuromuscular control/coordination retraining;patient/caregiver education/training;ROM/therapeutic exercise;strengthening exercise;transfer/mobility retraining;adaptive equipment training  -LM          User Key  (r)  = Recorded By, (t) = Taken By, (c) = Cosigned By    Initials Name Effective Dates    LM Katey Lund OT 03/14/16 -                OT Recommendation and Plan  Planned Therapy Interventions (OT): BADL retraining, activity tolerance training, neuromuscular control/coordination retraining, patient/caregiver education/training, ROM/therapeutic exercise, strengthening exercise, transfer/mobility retraining, adaptive equipment training  Plan of Care Review  Plan of Care Reviewed With: patient  Plan of Care Reviewed With: patient        Time Calculation:     Therapy Charges for Today     Code Description Service Date Service Provider Modifiers Qty    07791602126  OT EVAL MOD COMPLEXITY 4 2/17/2021 Katey Lund OT GO 1               Katey Lund OT  2/17/2021

## 2021-02-17 NOTE — PROGRESS NOTES
Discharge Planning Assessment   [facility]     Patient Name: Valerie Moore  MRN: [id number]  Today's Date: 2/17/2021    Admit Date: 2/16/2021    Discharge Needs Assessment     Row Name 02/17/21 1236       Living Environment    Lives With  friend(s)    Name(s) of Who Lives With Patient  Pt lives at home with his friend, Jerry.    Current Living Arrangements  home/apartment/condo    Primary Care Provided by  self    Provides Primary Care For  no one    Family Caregiver if Needed  friend(s)    Quality of Family Relationships  involved    Able to Return to Prior Arrangements  yes       Resource/Environmental Concerns    Transportation Concerns  car, none       Transition Planning    Patient/Family Anticipates Transition to  inpatient rehabilitation facility    Transportation Anticipated  family or friend will provide       Discharge Needs Assessment    Equipment Currently Used at Home  cane, straight;walker, rolling    Equipment Needed After Discharge  commode    Discharge Facility/Level of Care Needs  rehabilitation facility        Discharge Plan     Row Name 02/17/21 4513       Plan    Plan  SS spoke with pt on this day. Pt lives at home with his friend, Jerry. He does not receive  services. He has a walker and cane. He does not have a POA or a living will. His PCP is Darwin Alvarez. Pt states that he would like to go to inpatient rehab before discahrge, and that PT and OT had already been in to work with him. SS asked physician to place Inpatient Rehab Consult. SS will follow and assist as needed.    15:54 SS spoke with Lexi in rehab who states that she will follow pt and submit to insurance once he is able to move out to the floor. They can not accept patients from CCU. SS will follow.     Patient/Family in Agreement with Plan  yes            Demographic Summary     Row Name 02/17/21 1236       General Information    Admission Type  inpatient    Referral Source  nursing    Reason for Consult  discharge planning     Preferred Language  English     Used During This Interaction  no            RUSTY ThompsonW

## 2021-02-17 NOTE — THERAPY EVALUATION
Acute Care - Speech Language Pathology   Swallow Initial Evaluation [facility]   CLINICAL DYSPHAGIA ASSESSMENT     Patient Name: Valerie Moore  : 1955  MRN: [id number]  Today's Date: 2021             Admit Date: 2021     Valerie Moore  is seen at bedside this am on CCU to assess safety/efficacy of swallowing fnx, determine safest/least restrictive diet tolerance.     Mr. Moore was admitted to [facility] 21 2/ new onset R side weakness w/ recent falls. MRI brain revealed acute infarcts of L basal ganglia. He is being followed by neurology. He did pass a nursing dysphagia screening upon admission and is currently on a regular consistency po diet w/ thin liquids. He is assessed to r/o dysphagia.     Social History     Socioeconomic History   • Marital status:      Spouse name: Not on file   • Number of children: Not on file   • Years of education: Not on file   • Highest education level: Not on file   Tobacco Use   • Smoking status: Current Every Day Smoker     Packs/day: 0.50     Years: 20.00     Pack years: 10.00     Types: Cigarettes   • Smokeless tobacco: Former User     Types: Chew   Substance and Sexual Activity   • Alcohol use: No   • Drug use: No   • Sexual activity: Defer      Imaging;   EXAM:    MR Head Without Intravenous Contrast     EXAM DATE:    2021 8:07 PM     CLINICAL HISTORY:    Poss CVA     TECHNIQUE:    Magnetic resonance images of the head/brain without intravenous  contrast in multiple planes.     COMPARISON:    No relevant prior studies available.     FINDINGS:    Brain:  Focal acute infarct involves the posterior margin of the left  putamen nucleus with infarction extending to involve portions of the  left pericallosal white matter and external capsule.  Advanced chronic  small vessel ischemic disease is noted.  Diffuse cerebral atrophy.  No  additional acute infarcts identified.  No hemorrhage.    Ventricles:  Unremarkable.  No ventriculomegaly.     Bones/joints:  Unremarkable.    Sinuses:  Unremarkable as visualized.  No acute sinusitis.    Mastoid air cells:  Unremarkable as visualized.  No mastoid effusion.    Orbits:  Unremarkable as visualized.    Other findings:  No MRI evidence of hemorrhage.     IMPRESSION:  1.  Focal acute infarcts in the left basal ganglionic, left external  capsule, and left pericallosal regions which follow along the  distribution of the lenticulostriate perforating arteries of the left  MCA and the anterior choroidal artery distribution.  2.  No MRI evidence of hemorrhage.  3.  No midline shift, focal mass effect, or hydrocephalus.  4.  Advanced cerebral atrophy and chronic small vessel ischemic disease.     This report was finalized on 2/16/2021 8:56 PM by Dr. Johnathon Clark MD.    Labs:  02/17/21 0401  Comprehensive Metabolic Panel   Collected: 02/17/21 0323  Final result  Specimen: Blood    Glucose 169High  mg/dL ALT (SGPT) 20 U/L   BUN 15 mg/dL AST (SGOT) 35 U/L   Creatinine 0.93 mg/dL Alkaline Phosphatase 109 U/L   Sodium 135Low  mmol/L Total Bilirubin 0.2 mg/dL   Potassium 3.9 mmol/L  eGFR Non African Am 81 mL/min/1.73   Chloride 101 mmol/L Globulin 3.9 gm/dL   CO2 18.7Low  mmol/L A/G Ratio 1.1 g/dL   Calcium 9.9 mg/dL BUN/Creatinine Ratio 16.1   Total Protein 8.1 g/dL Anion Gap 15.3High  mmol/L   Albumin 4.25 g/dL            02/17/21 0359  CBC Auto Differential   Collected: 02/17/21 0320  Final result  Specimen: Blood    WBC 5.87 10*3/mm3 Lymphocyte Rel % 12.8Low  %   RBC 5.00 10*6/mm3 Monocyte Rel % 0.7Low  %   Hemoglobin 14.2 g/dL Eosinophil Rel % 0.0Low  %   Hematocrit 43.5 % Basophil Rel % 0.2 %   MCV 87.0 fL Immature Granulocyte Rel % 0.2 %   MCH 28.4 pg Neutrophils Absolute 5.06 10*3/mm3   MCHC 32.6 g/dL Lymphocytes Absolute 0.75 10*3/mm3   RDW 15.2 % Monocytes Absolute 0.04Low  10*3/mm3   RDW-SD 48.1 fl Eosinophils Absolute 0.00 10*3/mm3   MPV 8.8 fL Basophils Absolute 0.01 10*3/mm3   Platelets 247 10*3/mm3  Immature Grans, Absolute 0.01 10*3/mm3   Neutrophil Rel % 86.1High  % nRBC 0.0 /100 WBC          02/17/21 0359  Scan Slide   Collected: 02/17/21 0320  Final result  Specimen: Blood    RBC morphology Normal Platelet Estimate Adequate          02/17/21 0341  Lipid Panel   Collected: 02/17/21 0323  Final result  Specimen: Blood    Total Cholesterol 165 mg/dL LDL Cholesterol  100 mg/dL   Triglycerides 58 mg/dL VLDL Cholesterol 12 mg/dL   HDL Cholesterol 53 mg/dL LDL/HDL            Diet Orders (active) (From admission, onward)     Start     Ordered    02/17/21 0021  Diet Regular; Cardiac  Diet Effective Now      02/17/21 0020              Observed on ra w/o complications.     Mr. Moore is positioned upright and centered in bed to accept multiple po presentations of ice chips, solid cracker, puree and thin liquids via spoon, cup and straw.  He is able to self feed.     Facial/oral structures reveal a trace-mild R facial dysarthria w/ intact sensation. ROM is weak w/ cued orom, however, he is able to imitate. Lingual protrusion reveals trace deviation to R. Oral mucosa are moist, pink, and clean. Secretions are clear, thin, and well controlled. OROM/TASHA is generallly weak on R  to imitate oral postures. Gag is not assessed. Volitional cough is intact w/ adequate  intensity, clear in quality, non-productive. Voice is adequate in intensity, clear in quality w/ intelligible speech, slgihtly labored 2/2 trace-mild R oral dysarhtria.     Upon po presentations, adequate bolus anticipation and acceptance w/ good labial seal for bolus clearance via spoon bowl, cup rim stability and suction via straw. Bolus formation, manipulation and control are wfl w/ rotary mastication pattern. A-p transit is timely w/o oral residue. No overt s/s aspiration before the swallow.     Pharyngeal swallow is timely w/ adequate hyolaryngeal elevation per palpation. No overt s/s aspiration evidenced across this evaluation. No silent aspiration  suspected. He denies odynophagia.    Visit Dx:     ICD-10-CM ICD-9-CM   1. Cerebrovascular accident (CVA), unspecified mechanism (CMS/[facility])  I63.9 434.91     Patient Active Problem List   Diagnosis   • Anxiety   • Hypertension   • Hypercholesteremia   • Seasonal allergies   • Sleep disorder   • GERD (gastroesophageal reflux disease)   • Arthritis   • Airway hyperreactivity   • Gout   • Weakness   • CAP (community acquired pneumonia)   • Hypokalemia   • Hyponatremia   • Hyperglycemia   • Normocytic anemia   • Abdominal aortic aneurysm (AAA) 3.0 cm to 5.5 cm in diameter in male (CMS/[facility])   • Tobacco abuse   • Chest pain, atypical   • Cerebrovascular accident (CVA) (CMS/[facility])     Past Medical History:   Diagnosis Date   • Anxiety    • GERD (gastroesophageal reflux disease) 4/20/2017   • Hypercholesteremia    • Hypertension    • Seasonal allergies    • Sleep disorder      Past Surgical History:   Procedure Laterality Date   • HERNIA REPAIR     • HIP SURGERY Right     plate and 8 screws in right hip      EDUCATION  The patient has been educated in the following areas:   Dysphagia (Swallowing Impairment) Oral Care/Hydration.    Impression: Mr. Moore presents w/ wfl oropharyngeal swallow w/o s/s aspiration.No s/s indicative of silent aspiration.  No odynophagia reported.      He is observed w/ trace-mild R orofacial dysarthria w/ intact sensation. This does not negatively impact his oral phase of swallowing per this assessment.     He is felt to most benefit from continued least restrictive regular consistency po diet, thin liquids. Medications whole, single pill presentations only, please.     SLP Recommendation and Plan    1. Regular consistency po diet. Thin liquids.    2. Medications whole in puree/thins.   3. Upright and centered for all po intake  4. CAROLYN precautions.  5. Oral care protocol.  No further formal SLP f/u warranted at this time.    D/w Mr. Moore results and recommendations w/ verbal agreement.    Patient  was not wearing a face mask during this therapy encounter. Therapist used appropriate personal protective equipment including mask, eye protection and gloves.  Mask used was standard procedure mask. Appropriate PPE was worn during the entire therapy session. The patient coughed during this evaluation. Therapist was within 6 feet for 15 minutes or more during the evaluation. Hand hygiene was completed before and after therapy session. Patient is not in enhanced droplet precautions.     Thank you for allowing me to participate in the care of your patient-  Ondina Nair M.S., FAUSTO/SLP                                                                        Time Calculation:   Time Calculation- SLP     Time Calculation- SLP     Row Name 02/17/21 1122      SLP Received On  02/17/21  -Recorded by: ARTHUR LUCERO - Next Appointment  02/18/21  -Recorded by: ARTHUR             User Key     Initials Name Provider Type    Ondina Shane MS CCC-SLP Speech and Language Pathologist          Therapy Charges for Today     Code Description Service Date Service Provider Modifiers Qty    96440956126 HC ST EVAL SPEECH AND PROD W LANG  3 2/17/2021 Ondina Nair MS CCC-SLP GN 1    57051762602 HC ST EVAL ORAL PHARYNG SWALLOW 3 2/17/2021 Ondina Nair MS CCC-SLP GN 1               Ondina Nair MS CCC-SLP  2/17/2021

## 2021-02-17 NOTE — NURSING NOTE
Pt arrived to  at this time, placed on bedside monitor, and oriented to room. Pt is A&Ox4, call light and urinal are within reach, and VSS. No further issues at this time.

## 2021-02-17 NOTE — PAYOR COMM NOTE
"[facility]  NPI:[id number]    Utilization Review  Contact: Breanna Hankins RN  Phone: [phone]  Fax:[phone]    INITIATE INPATIENT AUTHORIZATION   ICD: I63.9      Valerie Ott (65 y.o. Male)     Date of Birth Social Security Number Address Home Phone MRN    1955  [address]   [facility] [address] [phone] [id number]    [employer] Marital Status          None        Admission Date Admission Type Admitting Provider Attending Provider Department, Room/Bed    21 Emergency James Bailey MD O[place], Claire Chin, MD [facility] CRITICAL [facility], [facility]/    Discharge Date Discharge Disposition Discharge Destination                       Attending Provider: Karely Edwards MD    Allergies: No Known Allergies    Isolation: None   Infection: COVID (rule out) (21)   Code Status: CPR    Ht: 170.2 cm (67\")   Wt: 84.8 kg (187 lb)    Admission Cmt: None   Principal Problem: None                Active Insurance as of 2021     Primary Coverage     Payor Plan Insurance Group Employer/Plan Group    [facility] [facility] MEDICARE REPLACEMENT [employer] MEDICARE REPLACEMENT      Payor Plan Address Payor Plan Phone Number Payor Plan Fax Number Effective Dates    [address] [address] [phone]  2021 - None Entered    [facility] [id number]       Subscriber Name Subscriber Birth Date Member ID       VALERIE OTT 1955 [id number]                 Emergency Contacts      (Rel.) Home Phone Work Phone Mobile Phone    Jerry Leos (Significant Other) -- -- [phone]               History & Physical      James Bailey MD at 21 0017          Hospitalist History and Physical        Patient Identification  Name: Valerie Ott  Age/Sex: 65 y.o. male  :  1955        MRN: [id number]  Visit Number: [id number]  Admit Date: 2021   PCP: Darwin Alvarez MD          Chief complaint recent falls, right sided weakness    History of " Present Illness:  Patient is a 65 y.o. male with history of HTN, HLD, anxiety, seasonal allergies, sleep disorder and longstanding tobacco abuse, who presents with complaints of falls on both 2/14 and 2/15, with residual right upper and lower extremity weakness. He says on both occasions, his right leg gave out on him, but he never noticed any actual weakness on his right side until after the fall on 2/15. The weakness has persisted since onset, which ultimately prompted him to come to the ED this evening. He denies any headache, asphasia, dysphagia or numbness. He denies any history of strokes. He does smoke nearly a pack of cigarettes per day and has done so for many years.     In the ED, vitals showed significant hypertension with /87 that has since improved to 161/97. He has been mildly bradycardic (HR 56-61) but other vitals have been unremarkable per documentation. Labs showed elevated , mildly low sodium 132, bicarb 20, CRP marginally elevated at 1.93 but with normal WBC and neutrophils. UA and UDS were unremarkable. Chest XR was clear. CT head was unremarkable; CT angiogram head and neck showed occluded right vertebral artery with collaterals, along with mild-moderate plaque right > left carotid systems without hemodynamically significant stenosis. Neurology recommended MRI brain along with MRI cervical spine to rule out spinal cord injury that could be contributing to his symptoms. MRI cervical spine showed no acute injury, only chronic changes, while MRI brain showed focal acute infarcts in the left hemisphere as detailed in the radiology section below. Patient has been admitted to the hospitalist service for further workup and management.     Review of Systems  Review of Systems   Constitutional: Negative for activity change, appetite change, chills, diaphoresis, fatigue, fever and unexpected weight change.   HENT: Negative for congestion, postnasal drip, rhinorrhea, sinus pressure, sinus pain  and sore throat.    Eyes: Negative for photophobia, pain, discharge, redness, itching and visual disturbance.   Respiratory: Negative for cough, shortness of breath and wheezing.    Cardiovascular: Negative for chest pain, palpitations and leg swelling.   Gastrointestinal: Negative for abdominal distention, abdominal pain, constipation, diarrhea, nausea and vomiting.   Endocrine: Negative for cold intolerance, heat intolerance, polydipsia, polyphagia and polyuria.   Genitourinary: Negative for difficulty urinating, dysuria, flank pain, frequency and hematuria.   Musculoskeletal: Positive for arthralgias (chronic) and back pain (chronic). Negative for joint swelling, myalgias, neck pain and neck stiffness.   Skin: Negative for color change, pallor, rash and wound.   Neurological: Positive for weakness (right sided). Negative for dizziness, tremors, seizures, syncope, light-headedness, numbness and headaches.   Hematological: Negative for adenopathy. Does not bruise/bleed easily.   Psychiatric/Behavioral: Negative for agitation, behavioral problems and confusion.       History  Past Medical History:   Diagnosis Date   • Anxiety    • GERD (gastroesophageal reflux disease) 4/20/2017   • Hypercholesteremia    • Hypertension    • Seasonal allergies    • Sleep disorder      Past Surgical History:   Procedure Laterality Date   • HERNIA REPAIR     • HIP SURGERY Right     plate and 8 screws in right hip      Family History   Problem Relation Age of Onset   • Cancer Mother    • Heart failure Father    • No Known Problems Sister    • No Known Problems Brother    • No Known Problems Son    • No Known Problems Daughter    • No Known Problems Maternal Grandmother    • No Known Problems Maternal Grandfather    • No Known Problems Paternal Grandmother    • No Known Problems Paternal Grandfather    • No Known Problems Cousin    • No Known Problems Other    • Rheum arthritis Neg Hx    • Osteoarthritis Neg Hx    • Asthma Neg Hx    •  Diabetes Neg Hx    • Hyperlipidemia Neg Hx    • Hypertension Neg Hx    • Migraines Neg Hx    • Rashes / Skin problems Neg Hx    • Seizures Neg Hx    • Stroke Neg Hx    • Thyroid disease Neg Hx      Social History     Tobacco Use   • Smoking status: Current Every Day Smoker     Packs/day: 0.75 to 1 pack/day     Years: 20.00     Pack years: 10.00     Types: Cigarettes   • Smokeless tobacco: Former User     Types: Chew   Substance Use Topics   • Alcohol use: No   • Drug use: No     (Not in a hospital admission)    Allergies:  Patient has no known allergies.    Objective     Vital Signs  Temp:  [98 °F (36.7 °C)] 98 °F (36.7 °C)  Heart Rate:  [56-61] 61  Resp:  [16] 16  BP: (161-190)/(87-97) 161/97  Body mass index is 28.19 kg/m².    Physical Exam:  Physical Exam  Constitutional:       General: He is not in acute distress.     Appearance: Normal appearance. He is normal weight. He is not ill-appearing or toxic-appearing.   HENT:      Head: Normocephalic and atraumatic.      Right Ear: External ear normal.      Left Ear: External ear normal.      Nose: Nose normal.      Mouth/Throat:      Mouth: Mucous membranes are moist.      Pharynx: Oropharynx is clear.      Comments: Right facial droop noted  Eyes:      Extraocular Movements: Extraocular movements intact.      Conjunctiva/sclera: Conjunctivae normal.      Pupils: Pupils are equal, round, and reactive to light.   Neck:      Musculoskeletal: Normal range of motion and neck supple. No neck rigidity or muscular tenderness.   Cardiovascular:      Rate and Rhythm: Normal rate and regular rhythm.      Pulses: Normal pulses.      Heart sounds: Normal heart sounds. No murmur.   Pulmonary:      Effort: Pulmonary effort is normal. No respiratory distress.      Breath sounds: Normal breath sounds. No wheezing or rales.   Abdominal:      General: Abdomen is flat. Bowel sounds are normal. There is no distension.      Palpations: Abdomen is soft.      Tenderness: There is no  abdominal tenderness.   Musculoskeletal: Normal range of motion.         General: No swelling.      Right lower leg: No edema.      Left lower leg: No edema.   Lymphadenopathy:      Cervical: No cervical adenopathy.   Skin:     General: Skin is warm and dry.      Capillary Refill: Capillary refill takes less than 2 seconds.      Coloration: Skin is not jaundiced or pale.      Findings: No bruising or erythema.   Neurological:      Mental Status: He is alert and oriented to person, place, and time.      Comments: Mild right facial droop. Significant right upper and lower extremity weakness, 0-1/5. 5/5 strength left upper and lower extremities. No aphasia or dysarthria appreciated. Toes down-going on babinski maneuver on left, equivocal on right   Psychiatric:         Mood and Affect: Mood normal.         Behavior: Behavior normal.         Thought Content: Thought content normal.         Judgment: Judgment normal.           Results Review:       Lab Results:  Results from last 7 days   Lab Units 02/16/21  1721   WBC 10*3/mm3 7.59   HEMOGLOBIN g/dL 14.0   PLATELETS 10*3/mm3 328     Results from last 7 days   Lab Units 02/16/21  1721   CRP mg/dL 1.93*     Results from last 7 days   Lab Units 02/16/21  1723 02/16/21  1721   SODIUM mmol/L  --  132*   POTASSIUM mmol/L  --  4.0   CHLORIDE mmol/L  --  98   CO2 mmol/L  --  20.6*   BUN mg/dL  --  12   CREATININE mg/dL 0.90 0.88   CALCIUM mg/dL  --  10.0   GLUCOSE mg/dL  --  101*     Results from last 7 days   Lab Units 02/16/21  1721   MAGNESIUM mg/dL 2.0     No results found for: HGBA1C  Results from last 7 days   Lab Units 02/16/21  1721   BILIRUBIN mg/dL 0.3   ALK PHOS U/L 112   AST (SGOT) U/L 39   ALT (SGPT) U/L 20     Results from last 7 days   Lab Units 02/16/21  1721   CK TOTAL U/L 648*   TROPONIN T ng/mL <0.010                   I have reviewed the patient's laboratory results.    Imaging:  Imaging Results (Last 72 Hours)     Procedure Component Value Units Date/Time     MRI Cervical Spine Without Contrast [[id number]] Collected: 02/16/21 2056     Updated: 02/16/21 2101    Narrative:      EXAMINATION: MRI CERVICAL SPINE WO CONTRAST-      CLINICAL INDICATION:  Poss spinal cord injury      TECHNIQUE: Sagittal spin echo scans were obtained from the posterior  fossa through the upper thoracic spine and axial scans were performed  through selected cervical disc space levels, utilizing both spin echo  and gradient echo scans technique without contrast administration.      COMPARISON: CT same day         FINDINGS:     HARDWARE:  No hardware.     ALIGNMENT:  Degenerative listhesis posteriorly of C3 on C4 in the setting of facet  arthropathy. Remaining vertebral bodies and a straight preserved  alignment.     SPINAL CORD:?   Motion artifact limits assessment of the cord but no obvious cord  lesions identified.      BONES:   No fracture. No marrow signal abnormality.     POSTERIOR ELEMENTS:  The posterior elements appear intact. No spondylolysis or congenital  fusion identified.     POSTERIOR FOSSA:  The partially imaged posterior fossa is unremarkable.     SOFT TISSUES:   The visualized paraspinal soft tissues are unremarkable.       C2/3 level:  No disk bulge or protrusion.  No central canal or neuroforaminal  stenosis.     C3/4 level:  Broad-based posterior disc osteophyte complex. Bilateral facet  arthropathy. Mild central canal stenosis. Severe left greater than right  neural foraminal stenosis.     C4/5 level:  Diffuse disc bulge and small superimposed central posterior disc  protrusion. Mild central canal stenosis. Bilateral facet arthropathy.  There is moderate bilateral neural foraminal stenosis.     C5/6 level:  Broad-based posterior disc osteophyte complex. Left greater than right  hypertrophic facet arthropathy. Moderate central canal stenosis.  Moderate left greater than right neural foraminal stenosis.     C6/7 level:  Broad-based posterior disc osteophyte complex. Left  greater than right  hypertrophic facet arthropathy. Mild central canal stenosis. Moderate  left greater than right neural foraminal stenosis.     C7/T1 level:  Diffuse disc bulge. Mild facet arthropathy. No significant stenosis.     OTHER:   No additional remarkable findings.       Impression:      1. No acute fracture or traumatic malalignment.  2. Advanced multilevel degenerative disc disease with facet arthropathy  with variable central canal and neural foraminal stenosis detailed  above. Stenosis most severe at the C3/4 level.  3. Degenerative listhesis posteriorly of C3 on C4.     This report was finalized on 2/16/2021 8:59 PM by Dr. Johnathon Clark MD.       MRI Brain Without Contrast [[id number]] Collected: 02/16/21 2049     Updated: 02/16/21 2059    Narrative:      EXAM:    MR Head Without Intravenous Contrast     EXAM DATE:    2/16/2021 8:07 PM     CLINICAL HISTORY:    Poss CVA     TECHNIQUE:    Magnetic resonance images of the head/brain without intravenous  contrast in multiple planes.     COMPARISON:    No relevant prior studies available.     FINDINGS:    Brain:  Focal acute infarct involves the posterior margin of the left  putamen nucleus with infarction extending to involve portions of the  left pericallosal white matter and external capsule.  Advanced chronic  small vessel ischemic disease is noted.  Diffuse cerebral atrophy.  No  additional acute infarcts identified.  No hemorrhage.    Ventricles:  Unremarkable.  No ventriculomegaly.    Bones/joints:  Unremarkable.    Sinuses:  Unremarkable as visualized.  No acute sinusitis.    Mastoid air cells:  Unremarkable as visualized.  No mastoid effusion.    Orbits:  Unremarkable as visualized.    Other findings:  No MRI evidence of hemorrhage.       Impression:      1.  Focal acute infarcts in the left basal ganglionic, left external  capsule, and left pericallosal regions which follow along the  distribution of the lenticulostriate perforating arteries of  the left  MCA and the anterior choroidal artery distribution.  2.  No MRI evidence of hemorrhage.  3.  No midline shift, focal mass effect, or hydrocephalus.  4.  Advanced cerebral atrophy and chronic small vessel ischemic disease.     This report was finalized on 2/16/2021 8:56 PM by Dr. Johnathon Clark MD.       XR Chest 1 View [[id number]] Collected: 02/16/21 1835     Updated: 02/16/21 1838    Narrative:      EXAM:    XR Chest, 1 View     EXAM DATE:    2/16/2021 5:50 PM     CLINICAL HISTORY:    cva     TECHNIQUE:    Frontal view of the chest.     COMPARISON:    11/23/2019     FINDINGS:    Lungs:  Unremarkable.  No consolidation.    Pleural space:  Unremarkable.  No pneumothorax.    Heart:  Stable cardiomegaly.    Mediastinum:  Unremarkable.    Bones/joints:  Unremarkable.       Impression:        Stable chest. No acute changes identified.     This report was finalized on 2/16/2021 6:36 PM by Dr. Johnathon Clark MD.       CT CEREBRAL PERFUSION W WO CONTRAST W AI ANALYSIS OF LVO [[id number]] Collected: 02/16/21 1814     Updated: 02/16/21 1824    Narrative:      EXAM:    CT Brain Perfusion Without and With Intravenous Contrast, [email or web address]  Protocol     EXAM DATE:    2/16/2021 5:19 PM     CLINICAL HISTORY:    cva     TECHNIQUE:    Axial computed tomography images of the brain without and with  intravenous contrast using cerebral perfusion protocol.  Post-processing  parametric maps were created using VIZ.[email or web address] software and reviewed.   Sagittal and coronal reformatted images were created and reviewed.  This  CT exam was performed using one or more of the following dose reduction  techniques:  automated exposure control, adjustment of the mA and/or kV  according to patient size, and/or use of iterative reconstruction  technique.     COMPARISON:  CT same day     FINDINGS:    Arterial input function:  Optimal.    Estimated infarct core, CBF < 30%: 0 cc    Perfusion, Tmax > 6s: 5 cc    Mismatch volume: 5 cc    Mismatch ratio: N/A     Perfusion, Tmax > 10s: 1 cc       Brain: Delayed transit and diminished perfusion in the left frontal  lobe region may represent area of ischemia without evidence of focal  infarct. This may represent area of chronic ischemia as well.       Impression:         1. No core infarct identified.  2. Some diminished perfusion of the left frontal region compatible with  variable ischemia which may be chronic in nature.     This report was finalized on 2/16/2021 6:17 PM by Dr. Johnathon Clark MD.       CT Angiogram Head [[id number]] Collected: 02/16/21 1810     Updated: 02/16/21 1823    Narrative:      EXAM:    CT Angiography Head With Intravenous Contrast     EXAM DATE:    2/16/2021 5:20 PM     CLINICAL HISTORY:    cva     TECHNIQUE:    Axial computed tomographic angiography images of the head with  intravenous contrast.  This CT exam was performed using one or more of  the following dose reduction techniques:  automated exposure control,  adjustment of the mA and/or kV according to patient size, and/or use of  iterative reconstruction technique.    MIP reconstructed images were created and reviewed.     COMPARISON:    CT same day     FINDINGS:    Right internal carotid artery:  The right ICA origin is unremarkable.   There is mild calcified plaque of the clinoid segments of the right ICA  and of the cavernous segments of the right ICA.  Intracranial segment is  patent with no significant stenosis.  No aneurysm.    Right anterior cerebral artery:  The right A1 and A2 segments are  within normal limits.  No occlusion or significant stenosis.  No  aneurysm.    Right middle cerebral artery:  The right MCA M1 and portions of M2  segments appear within normal limits.  No occlusion or significant  stenosis.  No aneurysm.    Right posterior cerebral artery:  Visualized right P1 and P2 segments  are within normal limits.  No occlusion or significant stenosis.  No  aneurysm.    Right vertebral artery:  Occlusion of the right  vertebral artery with  reconstitution of flow prior to the basilar artery confluence.       Left internal carotid artery:  The left ICA origin is unremarkable.   There is mild to moderate calcified plaque involving the cavernous  segment of the left ICA.  Intracranial segment is patent with no  significant stenosis.  No aneurysm.    Left anterior cerebral artery:  The left A1 and A2 segments are within  normal limits.  No occlusion or significant stenosis.  No aneurysm.    Left middle cerebral artery:  The visualized left MCA M1 and portions  of the M2 segments appear within normal limits.  No occlusion or  significant stenosis.  No aneurysm.    Left posterior cerebral artery:  The visualized left P1 and P2  segments are unremarkable.  No occlusion or significant stenosis.  No  aneurysm.    Left vertebral artery:  Unremarkable as visualized.       Basilar artery:  The basilar artery appears within normal limits with  no significant stenosis or occlusion.    Other vasculature:  The anterior to indicating artery appears patent.    Brain:  No enhancing brain parenchymal lesions identified.  Diffuse  cerebral atrophy and chronic small vessel ischemic disease is noted.    Ventricles:  No midline shift or mass effect. No hydrocephalus.    Other findings:  The basilar apex is unremarkable with no evidence of  aneurysm.       Impression:      1.  No large vessel occlusion identified involving the major  intracranial arterial segments.  2.  Occlusion of the right vertebral artery with reconstitution of flow  just prior to basilar artery confluence likely due to collateralization.  3.  Diffuse cerebral atrophy and advanced chronic small vessel ischemic  disease.  4.  Other nonacute findings above.     This report was finalized on 2/16/2021 6:14 PM by Dr. Johnathon Clark MD.       CT Angiogram Neck [[id number]] Collected: 02/16/21 1807     Updated: 02/16/21 1822    Narrative:      EXAM:    CT Angiography Neck With Intravenous  Contrast     EXAM DATE:    2/16/2021 5:20 PM     CLINICAL HISTORY:    cva     TECHNIQUE:    Axial computed tomographic angiography images of the neck with  intravenous contrast.  This CT exam was performed using one or more of  the following dose reduction techniques:  automated exposure control,  adjustment of the mA and/or kV according to patient size, and/or use of  iterative reconstruction technique.    MIP reconstructed images were created and reviewed.     COMPARISON:    CT same day     FINDINGS:      VASCULATURE:    Right common carotid artery:  Unremarkable.  No significant stenosis.   No dissection or occlusion.    Right internal carotid artery:  There is moderate calcified and  noncalcified plaque of the right carotid bulb.  There is mild to  moderate calcified and noncalcified plaque of the proximal right ICA  without evidence of occlusion or hemodynamically significant stenosis.   Tortuosity of the right ICA noted.    Right external carotid artery:  Unremarkable.  No occlusion.    Right vertebral artery:  There is complete occlusion of the right  vertebral artery with reconstitution of flow just proximal to the  basilar artery confluence likely due to collateralization through the  anterior inferior cerebellar artery vascular system.  No significant  stenosis.       Left common carotid artery:  Unremarkable.  No significant stenosis.   No dissection or occlusion.    Left internal carotid artery:  There is marked tortuosity of the left  ICA.  There is mild calcified and noncalcified plaque left carotid bulb.   Mild calcified plaque proximal left ICA without evidence of occlusion  or hemodynamically significant stenosis.    Left external carotid artery:  Unremarkable.  No occlusion.    Left vertebral artery:  Unremarkable.  No significant stenosis.  No  dissection or occlusion.    Other vasculature:  Three-vessel arch configuration.  Moderate  atherosclerosis with calcified and noncalcified plaque of the  partially  imaged aortic arch.  Tortuosity of the right subclavian artery.      NECK:    Bones/joints:  Degenerative changes cervical spine noted with  multilevel stenosis.  No acute fracture.  No dislocation.    Soft tissues:  Unremarkable as visualized.  No mass.    Lung apices:  Emphysema noted of the partially imaged upper lungs.      CAROTID STENOSIS REFERENCE USING NASCET CRITERIA:    % ICA stenosis = (1 - narrowest ICA diameter/diameter of distal  cervical ICA) x 100.    Mild - <50% stenosis.    Moderate - 50-69% stenosis.    Severe - 70-94% stenosis.    Near occlusion - 95-99% stenosis.    Occluded - 100% stenosis.       Impression:      1.  Occlusion of the right vertebral artery.  2.  Mild to moderate plaque right greater than left carotid systems but  without hemodynamically significant stenosis or occlusion identified.  3.  Tortuosity of the bilateral extracranial ICA segments.  4.  No evidence of dissection.  5.  Degenerative changes cervical spine.  6.  Emphysema of the partially imaged upper lungs.  7.  Other nonacute findings above.     This report was finalized on 2/16/2021 6:10 PM by Dr. Johnathon Clark MD.       CT Cervical Spine Without Contrast [[id number]] Collected: 02/16/21 1759     Updated: 02/16/21 1817    Narrative:      EXAM:    CT Cervical Spine Without Intravenous Contrast     EXAM DATE:    2/16/2021 5:35 PM     CLINICAL HISTORY:    right sided weakness     TECHNIQUE:    Axial computed tomography images of the cervical spine without  intravenous contrast.  Sagittal and coronal reformatted images were  created and reviewed.  This CT exam was performed using one or more of  the following dose reduction techniques:  automated exposure control,  adjustment of the mA and/or kV according to patient size, and/or use of  iterative reconstruction technique.     COMPARISON:    No relevant prior studies available.     FINDINGS:    Limitations:  Moderate limitation is noted secondary to  patient  positioning and partial rotation of the cervical spine.    Vertebrae:  No discrete fracture lines are identified.    Discs/spinal canal/neural foramina:  Advanced multilevel degenerative  disc disease C3/4 through C6/7 with mild central canal and mild/moderate  neural foraminal stenosis noted.    Soft tissues:  Unremarkable.    Mastoid air cells:  Small right mastoid effusion.       Impression:      1.  Limited exam due to patient positioning and cervical spine rotation  without convincing evidence of acute fracture.  2.  No traumatic malalignment is identified.  3.  Small right mastoid effusion.  4.  Advanced degenerative changes with multilevel stenosis.  5.  If persistent concern for cervical spine injury, repeat exam may be  indicated.     This report was finalized on 2/16/2021 6:01 PM by Dr. Johnathon Clark MD.       CT Head Without Contrast Stroke Protocol [[id number]] Collected: 02/16/21 1718     Updated: 02/16/21 1722    Narrative:      EXAM:    CT Head Without Intravenous Contrast     EXAM DATE:    2/16/2021 5:09 PM     CLINICAL HISTORY:    Neuro deficit, acute, stroke suspected     TECHNIQUE:    Axial computed tomography images of the head/brain without intravenous  contrast.  Sagittal and coronal reformatted images were created and  reviewed.  This CT exam was performed using one or more of the following  dose reduction techniques:  automated exposure control, adjustment of  the mA and/or kV according to patient size, and/or use of iterative  reconstruction technique.     COMPARISON:    No relevant prior studies available.     FINDINGS:    Brain:  Generalized cerebral atrophy is noted.  Advanced chronic small  vessel ischemic disease.  No hemorrhage.    Ventricles:  Unremarkable.  No ventriculomegaly.    Bones/joints:  Unremarkable.  No acute fracture.    Soft tissues:  Unremarkable.    Sinuses:  Unremarkable as visualized.  No acute sinusitis.    Mastoid air cells:  Unremarkable as visualized.  No  mastoid effusion.       Impression:      1.  No CT evidence of acute intracranial abnormality.  2.  Generalized atrophy and advanced chronic small vessel ischemic  disease noted.     This report was finalized on 2/16/2021 5:20 PM by Dr. Johnathon Clark MD.             I have personally reviewed the patient's radiologic imaging.        EKG: Mild sinus bradycardia with occasional PAC's, HR 54, QTc 424. Moderate voltage criteria for LVH, may be normal variant. No overt ST changes to suggest acute ischemia appreciated.     I have personally reviewed the patient's EKG.        Assessment/Plan     - Cerebrovascular accident (CVA) (CMS/Formerly [facility]): right sided weakness correlates with MRI brain findings of focal acute infarcts in the left basal ganglion, left external capsule, and left pericallosal regions which follow along the distribution of the lenticulostriate perforating arteries of the left MCA and anterior choroidal artery distribution. Neurology recommends ASA, plavix and statin treatment. Will admit to CCU as PCU overflow. Allow for permissive HTN, SBP goal 160-200. Will make IV hydralazine available PRN. Monitor neuro checks q4h. Evaluate further with PT/OT/speech therapy consultations in the morning. If patient passes bedside nursing swallow evaluation, can eat until formal speech therapy evaluation tomorrow. Await further recommendations from neurology.  - Hypertension, uncontrolled at times: again, allow permissive hypertension in light of acute CVA. Review home meds once reconciled by pharmacy but will likely either hold or place high hold parameters in attempt to not drop BP too low. IV hydralazine will be available PRN as noted above.  - Hyperlipidemia: continue statin. Check lipid panel in the morning.   - Tobacco abuse: counseled on cessation, especially in light of acute stroke. Nicotine patch offered which patient accepted.  - Mild hyponatremia: monitor response to gentle IV fluid hydration. Preliminary home  med list includes HCTZ which if confirmed will be held for now.     DVT/GI Prophylaxis: SQ heparin    Estimated Length of Stay >2 midnights    I discussed the patient's findings, assessment and plan with the patient and his RN Teresa who was present during my interview and exam in the CCU.    * patient is high risk due to CVA with right sided weakness, HTN uncontrolled at times    James Bailey MD  02/16/21  23:25 EST      Electronically signed by James Bailey MD at 02/17/21 0026          Emergency Department Notes      Karson Morse RN at 02/16/21 1707        Pt arrived to ED, transported to CT by self and EMS crew.      Karson Morse RN  02/16/21 1729      Electronically signed by Karson Morse RN at 02/16/21 1729     Duncan Negro MD at 02/16/21 1819          Subjective   Patient is a 65-year-old male who presents emergency department for right-sided weakness in his upper and lower extremity that started 2 days ago, became much worse last night.  The patient states that 2 nights ago he started having some weakness in the right upper and lower extremity, but last night he became completely paralyzed on his right side.  He has never had a stroke and has never experienced symptoms like this in the past.  The patient states that he cannot move his right arm or his right leg whatsoever.  He denies any slurred speech, facial droop or changes in vision.  He also denies a headache.  The patient called EMS after the symptoms were not getting any better.  He denies any chest pain, shortness of breath or additional complaints at this time.  He arrived in the ER via EMS and code stroke was called upon arrival.          Review of Systems   Constitutional: Negative for activity change, appetite change, chills, diaphoresis, fatigue and fever.   HENT: Negative for congestion, postnasal drip, rhinorrhea, sinus pressure, sinus pain, sneezing, sore throat and tinnitus.    Eyes: Negative for discharge and  redness.   Respiratory: Negative for apnea, cough, choking, chest tightness, shortness of breath and wheezing.    Cardiovascular: Negative for chest pain, palpitations and leg swelling.   Gastrointestinal: Negative for abdominal distention, abdominal pain, constipation, diarrhea and nausea.   Genitourinary: Negative for difficulty urinating and flank pain.   Musculoskeletal: Negative for arthralgias and back pain.   Neurological: Positive for weakness. Negative for dizziness, tremors, seizures, syncope, facial asymmetry, speech difficulty, light-headedness, numbness and headaches.   Psychiatric/Behavioral: Negative for agitation and confusion.       Past Medical History:   Diagnosis Date   • Anxiety    • GERD (gastroesophageal reflux disease) 4/20/2017   • Hypercholesteremia    • Hypertension    • Seasonal allergies    • Sleep disorder        No Known Allergies    Past Surgical History:   Procedure Laterality Date   • HERNIA REPAIR     • HIP SURGERY Right     plate and 8 screws in right hip        Family History   Problem Relation Age of Onset   • Cancer Mother    • Heart failure Father    • No Known Problems Sister    • No Known Problems Brother    • No Known Problems Son    • No Known Problems Daughter    • No Known Problems Maternal Grandmother    • No Known Problems Maternal Grandfather    • No Known Problems Paternal Grandmother    • No Known Problems Paternal Grandfather    • No Known Problems Cousin    • No Known Problems Other    • Rheum arthritis Neg Hx    • Osteoarthritis Neg Hx    • Asthma Neg Hx    • Diabetes Neg Hx    • Hyperlipidemia Neg Hx    • Hypertension Neg Hx    • Migraines Neg Hx    • Rashes / Skin problems Neg Hx    • Seizures Neg Hx    • Stroke Neg Hx    • Thyroid disease Neg Hx        Social History     Socioeconomic History   • Marital status:      Spouse name: Not on file   • Number of children: Not on file   • Years of education: Not on file   • Highest education level: Not on file    Tobacco Use   • Smoking status: Current Every Day Smoker     Packs/day: 0.50     Years: 20.00     Pack years: 10.00     Types: Cigarettes   • Smokeless tobacco: Former User     Types: Chew   Substance and Sexual Activity   • Alcohol use: No   • Drug use: No   • Sexual activity: Defer           Objective   Physical Exam  Vitals signs and nursing note reviewed.   Constitutional:       General: He is not in acute distress.     Appearance: Normal appearance. He is normal weight. He is not ill-appearing, toxic-appearing or diaphoretic.   HENT:      Head: Normocephalic.      Right Ear: External ear normal. There is no impacted cerumen.      Left Ear: There is no impacted cerumen.      Nose: Nose normal. No congestion or rhinorrhea.      Mouth/Throat:      Mouth: Mucous membranes are moist.      Pharynx: No oropharyngeal exudate or posterior oropharyngeal erythema.   Eyes:      General: No scleral icterus.        Right eye: No discharge.         Left eye: No discharge.      Pupils: Pupils are equal, round, and reactive to light.   Neck:      Musculoskeletal: Normal range of motion and neck supple. No neck rigidity or muscular tenderness.      Vascular: No carotid bruit.   Cardiovascular:      Rate and Rhythm: Normal rate and regular rhythm.      Pulses: Normal pulses.      Heart sounds: Normal heart sounds. No murmur. No friction rub. No gallop.    Pulmonary:      Effort: Pulmonary effort is normal. No respiratory distress.      Breath sounds: Normal breath sounds. No stridor. No wheezing, rhonchi or rales.   Chest:      Chest wall: No tenderness.   Abdominal:      General: Abdomen is flat. There is no distension.      Palpations: There is no mass.      Tenderness: There is no abdominal tenderness. There is no guarding or rebound.      Hernia: No hernia is present.   Musculoskeletal: Normal range of motion.         General: No swelling, tenderness, deformity or signs of injury.   Lymphadenopathy:      Cervical: No  cervical adenopathy.   Skin:     General: Skin is warm and dry.      Capillary Refill: Capillary refill takes less than 2 seconds.   Neurological:      Mental Status: He is alert and oriented to person, place, and time.      Cranial Nerves: No cranial nerve deficit.      Sensory: No sensory deficit.      Motor: No weakness.      Coordination: Coordination normal.      Comments: Patient strength in his right upper and lower extremity is 0 out of 5.  Muscle strength in left upper and lower extremity is 4 out of 5.  The patient exhibits no cranial nerve deficits.  He does not have decreased sensation.  Heel-to-shin is normal from left to right and normal finger-to-nose from left to right but unable to do heel-to-shin or finger-to-nose on the right side at all.  Visual fields intact at 70 degrees, finger count is normal bilaterally.  Speech is clear.  Patient is able to follow directions.   Psychiatric:         Mood and Affect: Mood normal.         Behavior: Behavior normal.         Procedures          ED Course  ED Course as of Feb 16 2228   Tue Feb 16, 2021 2113 Dr graves consulted  Re acute stroke noted on MRI    [PAUL]   2139 Dr Graves consulted times two. Recommends admission here at [facility], with daily ASA, Plavix, Lipitor, and maintain systolic pressure between 140-200. States nothing to transfer to [facility]    [PAUL]      ED Course User Index  [PAUL] Duncan Negro MD                                           [facility]    Final diagnoses:   Cerebrovascular accident (CVA), unspecified mechanism (CMS/HCC)            Duncan Negro MD  02/16/21 2228      Electronically signed by Duncan Negro MD at 02/16/21 2228     Genesis Muller at 02/16/21 2124        Paged Dr Bailey for Dr Negro.     Genesis Muller  02/16/21 2125      Electronically signed by Genesis Muller at 02/16/21 2125         Facility-Administered Medications as of 2/17/2021   Medication Dose Route Frequency Provider Last Rate Last Admin   •  [COMPLETED] ALPRAZolam (XANAX) tablet 1 mg  1 mg Oral Once Duncan Negro MD   1 mg at 02/16/21 2201   • ALPRAZolam (XANAX) tablet 1 mg  1 mg Oral TID PRN James Bailey MD   1 mg at 02/17/21 0829   • amLODIPine (NORVASC) tablet 10 mg  10 mg Oral Q24H James Bailey MD   10 mg at 02/17/21 0826   • [COMPLETED] aspirin chewable tablet 324 mg  324 mg Oral Once Duncan Negro MD   324 mg at 02/16/21 2158   • aspirin EC tablet 81 mg  81 mg Oral Daily James Bailey MD   81 mg at 02/17/21 0826   • atorvastatin (LIPITOR) tablet 80 mg  80 mg Oral Nightly James Bailey MD   80 mg at 02/16/21 2229   • [COMPLETED] clopidogrel (PLAVIX) tablet 300 mg  300 mg Oral Once Duncan Negro MD   300 mg at 02/16/21 2159   • clopidogrel (PLAVIX) tablet 75 mg  75 mg Oral Daily James Bailey MD   75 mg at 02/17/21 0825   • [COMPLETED] dexamethasone (DECADRON) injection 10 mg  10 mg Intravenous Once Duncan Negro MD   10 mg at 02/16/21 1919   • heparin (porcine) 5000 UNIT/ML injection 5,000 Units  5,000 Units Subcutaneous Q12H James Bailey MD   5,000 Units at 02/17/21 0825   • hydrALAZINE (APRESOLINE) injection 10 mg  10 mg Intravenous Q6H PRN James Bailey MD       • [COMPLETED] HYDROcodone-acetaminophen (NORCO)  MG per tablet 1 tablet  1 tablet Oral Once Duncan Negro MD   1 tablet at 02/16/21 2200   • HYDROcodone-acetaminophen (NORCO)  MG per tablet 1 tablet  1 tablet Oral Q8H PRN James Bailey MD   1 tablet at 02/17/21 0725   • [COMPLETED] iopamidol (ISOVUE-370) 76 % injection 100 mL  100 mL Intravenous Once in imaging Emerita Benavidez DO   70 mL at 02/16/21 1824   • losartan (COZAAR) tablet 100 mg  100 mg Oral Q24H James Bailey MD   100 mg at 02/17/21 0826   • nicotine (NICODERM CQ) 14 MG/24HR patch 1 patch  1 patch Transdermal Q24H James Bailey MD   1 patch at 02/17/21 0105   • nitroglycerin (NITROSTAT) SL tablet 0.4  mg  0.4 mg Sublingual Q5 Min PRN James Bailey MD       • sodium chloride 0.9 % flush 10 mL  10 mL Intravenous Q12H James Bailey MD   10 mL at 02/17/21 0827   • sodium chloride 0.9 % flush 10 mL  10 mL Intravenous PRN James Bailey MD       • sodium chloride 0.9 % infusion  75 mL/hr Intravenous Continuous James Bailey MD 75 mL/hr at 02/16/21 2350 75 mL/hr at 02/16/21 2350        Physician Progress Notes (all)      Progress Notes signed by Lisa Banerjee MD at 02/17/21 0156         Nurse Practitioner - Brief Progress Note  PERMANENT  02/17/2021 01:09    [facility] - CCU - 10 - [place], [place] (S)    NADEEM OTT    Date of Service 02/17/2021 01:09    HPI/Events of Note [facility] Provider Assessment Note    Tele ICU Focused Assessment Note: Information obtained from patient's chart     64 y/o male with PMH of HTN, HLD, and anxiety presents to the ED with complaints of fall on 2/14 and 2/15 with residual right upper and lower extremity weakness. Upon arrival to the ED BP was 190/87. Head CT negative for acute injury.CT angiogram head   and neck showed occluded right vertebral artery with collaterals, along with mild-moderate plaque right > left carotid systems without hemodynamically significant stenosis. Neurology consulted. MRI brain showed focal acute infarcts in the left   hemisphere. MRI cervical spine showed no acute injury. Transferred to ICU for management.    Pertinent labs: Sodium 132, BUN/ creatine 12/ 0.88, , WBC 7.5, H&H 14/ 42.5, ethanol < 10, TSH 0.698, and UDS negative.  CXR per RAD: Stable chest. No acute changes identified.  EKG: Sinus bradycardia rate of 54, no ST elevation, QTc 424    HR 72, /102, RR 20, temperature 36.7C, and oxygen saturations are 99% on room air.     Assessment and Plan:  CVA  -Neurology following   -Neuro checks per protocol   -Aspirin/ Statin/ plavix   -PT,OT ,Speech evals  -Fall  and aspiration precautions  -Permissive HTN -SBP goal 160-200  -IV hydralazine PRN  -Lipid panel in the AM  -Avoid Hyper/Hypoglycemia  -Avoid Hyperthermia-Tylenol as needed      __y___   Video Assessment performed  __y___   Most recent labs reviewed  __y___   Vital Signs reviewed  __y___   Best Practices addressed:                 VTE prophylaxis: Heparin sq                  SUP (when indicated): N/A                  Glycemic control: Blood sugar 101                      Please notify bedside physician when present or [facility] if glc > 180 X 2                 Sepsis guidelines: N/A                  Lung protective strategy: N/A                  Targeted Temperature Management: N/A     __n___     Spoke with bedside RN  __n___     Orders written      Contact [facility] for any needs if bedside physician is not present.      Interventions Major-Other: CVA  Intermediate-Diagnostic test evaluation        Electronically Signed by: Chika Burnett (NP) on 2021 01:27    Annotated By: Steven Banerjee)    Date: 21 01:56  SANDRA note reviewed. Agree with note and plan.     Electronically signed by Lisa Banerjee MD at 21 0156          Consult Notes (all)      Saul Loza MD at 21 1818      Consult Orders    1. Inpatient Neurology Consult Stroke [[id number]] ordered by Emerita Benavidez DO at 21 1756               Stroke Consult Note    Patient Name: Valerie Moore   MRN: [id number]  Age: 65 y.o.  Sex: male  : 1955    Primary Care Physician: Darwin Alvarez MD  Referring Physician:  No ref. provider found    TIME STROKE TEAM CALLED: 5:48 PM EST     TIME PATIENT SEEN: 6 PM EST    Handedness: Right  Race: White    Chief Complaint/Reason for Consultation: Right upper and lower extremity weakness    Subjective .  HPI: 65-year-old right-handed white male with known diagnosis of hypertension, right hip injury status post replacement, smoking greater than 30 pack  years, arthritis, cervical degenerative disc disease who had a fall on 2/14/2021, when he fell on his hip with no neck/head injury, following which he was doing okay, but again he had another fall yesterday 2/15/2021, again without any head or neck injury, but since he has been having right upper and lower extremity weakness, for which he came to the hospital today.  Patient denied having any trouble with vision, trouble talking, drooling, any headache, any neck pain.  I asked him repeatedly about any pain, to which patient denies any kind of pain.  Denies any history of stroke/MI.    Last Known Normal Date/Time: 2/15/2021, approximately 6 PM EST     Review of Systems   HENT: Negative.    Eyes: Negative.    Respiratory: Negative.    Cardiovascular: Negative.    Gastrointestinal: Negative.    Endocrine: Negative.    Genitourinary: Negative.    Musculoskeletal: Negative.    Skin: Negative.    Allergic/Immunologic: Negative.    Neurological: Positive for weakness.   Hematological: Negative.    Psychiatric/Behavioral: Negative.       Past Medical History:   Diagnosis Date   • Anxiety    • GERD (gastroesophageal reflux disease) 4/20/2017   • Hypercholesteremia    • Hypertension    • Seasonal allergies    • Sleep disorder      Past Surgical History:   Procedure Laterality Date   • HERNIA REPAIR     • HIP SURGERY Right     plate and 8 screws in right hip      Family History   Problem Relation Age of Onset   • Cancer Mother    • Heart failure Father    • No Known Problems Sister    • No Known Problems Brother    • No Known Problems Son    • No Known Problems Daughter    • No Known Problems Maternal Grandmother    • No Known Problems Maternal Grandfather    • No Known Problems Paternal Grandmother    • No Known Problems Paternal Grandfather    • No Known Problems Cousin    • No Known Problems Other    • Rheum arthritis Neg Hx    • Osteoarthritis Neg Hx    • Asthma Neg Hx    • Diabetes Neg Hx    • Hyperlipidemia Neg Hx    •  Hypertension Neg Hx    • Migraines Neg Hx    • Rashes / Skin problems Neg Hx    • Seizures Neg Hx    • Stroke Neg Hx    • Thyroid disease Neg Hx      Social History     Socioeconomic History   • Marital status:      Spouse name: Not on file   • Number of children: Not on file   • Years of education: Not on file   • Highest education level: Not on file   Tobacco Use   • Smoking status: Current Every Day Smoker     Packs/day: 0.50     Years: 20.00     Pack years: 10.00     Types: Cigarettes   • Smokeless tobacco: Former User     Types: Chew   Substance and Sexual Activity   • Alcohol use: No   • Drug use: No   • Sexual activity: Defer     No Known Allergies  Prior to Admission medications    Medication Sig Start Date End Date Taking? Authorizing Provider   ALPRAZolam (XANAX) 1 MG tablet Take 1 mg by mouth 3 (Three) Times a Day As Needed for Anxiety or Sleep.    Antoni Alexander MD   amLODIPine (NORVASC) 10 MG tablet Take 10 mg by mouth Daily.    Antoni Alexander MD   atenolol (TENORMIN) 25 MG tablet Take 25 mg by mouth 2 (Two) Times a Day.    Antoni Alexander MD   Blood Pressure Monitoring (BLOOD PRESSURE CUFF) misc Monitor BP twice daily 11/25/19   Babrara Orona PA-C   carisoprodol (SOMA) 350 MG tablet Take 350 mg by mouth 3 (Three) Times a Day As Needed for Muscle Spasms.    Antoni Alexander MD   cetirizine (zyrTEC) 10 MG tablet Take 10 mg by mouth Daily.    Antoni Alexander MD   hydroCHLOROthiazide (HYDRODIURIL) 12.5 MG tablet Take 12.5 mg by mouth Daily.    Antoni Alexander MD   HYDROcodone-acetaminophen (NORCO)  MG per tablet Take 1 tablet by mouth Every 8 (Eight) Hours As Needed for Moderate Pain .    Antoni Alexander MD   losartan (COZAAR) 100 MG tablet Take 100 mg by mouth Daily.    Antoni Alexander MD   zolpidem (AMBIEN) 10 MG tablet Take 10 mg by mouth At Night As Needed for Sleep.    Antoni Alexander MD            Objective     Temp:  [98  °F (36.7 °C)] 98 °F (36.7 °C)  Heart Rate:  [56] 56  Resp:  [16] 16  BP: (190)/(87) 190/87  Neurological Exam  Mental Status  Awake, alert and oriented to person, place and time.Alert. Speech is normal. Language is fluent with no aphasia. Attention and concentration are normal. Fund of knowledge is appropriate for level of education.    Cranial Nerves  CN II: Visual fields full to confrontation.  CN III, IV, VI: Extraocular movements intact bilaterally. Pupils equal round and reactive to light bilaterally.  CN V: Facial sensation is normal.  CN VII: Full and symmetric facial movement. Questionable very subtle right facial asymmetry.  CN VIII: Equal hearing bilaterally.  CN IX, X: Palate elevates symmetrically  CN XI: Shoulder shrug strength is normal.  CN XII: Tongue midline without atrophy or fasciculations.    Motor  Normal muscle bulk throughout. No fasciculations present.  Right upper extremity is 3/5, slight strength against gravity.  Right lower extremity no strength against gravity 2/5   left upper and lower extremities moving spontaneously  .    Sensory  Light touch is normal in upper and lower extremities.     Reflexes  Not assessed.    Coordination  No dysmetria on left side.    Gait  Not assessed.      Physical Exam  Vitals signs and nursing note reviewed.   Constitutional:       Appearance: Normal appearance.   HENT:      Head: Normocephalic and atraumatic.   Eyes:      Extraocular Movements: Extraocular movements intact.      Conjunctiva/sclera: Conjunctivae normal.      Pupils: Pupils are equal, round, and reactive to light.   Neck:      Musculoskeletal: Normal range of motion and neck supple. No neck rigidity or muscular tenderness.      Comments: Patient denies any neck tenderness, his neck movement seems supple  Cardiovascular:      Rate and Rhythm: Normal rate and regular rhythm.   Pulmonary:      Effort: Pulmonary effort is normal. No respiratory distress.   Neurological:      Mental Status: He is  alert and oriented to person, place, and time.      Cranial Nerves: No cranial nerve deficit.      Sensory: No sensory deficit.      Motor: Weakness present.      Coordination: Coordination normal.   Psychiatric:         Mood and Affect: Mood normal.         Speech: Speech normal.         Behavior: Behavior normal.         Acute Stroke Data    Alteplase (tPA) Inclusion / Exclusion Criteria    Time: 18:19 EST  Person Administering Scale: Saul Loza MD    Inclusion Criteria  [x]   18 years of age or greater   []   Onset of symptoms < 4.5 hours before beginning treatment (stroke onset = time patient was last seen well or without symptoms).   []   Diagnosis of acute ischemic stroke causing measurable disabling deficit (Complete Hemianopia, Any Aphasia, Visual or Sensory Extinction, Any weakness limiting sustained effort against gravity)   []   Any remaining deficit considered potentially disabling in view of patient and practitioner   Exclusion criteria (Do not proceed with Alteplase if any are checked under exclusion criteria)  [x]   Onset unknown or GREATER than 4.5 hours   []   ICH on CT/MRI   []   CT demonstrates hypodensity representing acute or subacute infarct   []   Significant head trauma or prior stroke in the previous 3 months   []   Symptoms suggestive of subarachnoid hemorrhage   []   History of un-ruptured intracranial aneurysm GREATER than 10 mm   []   Recent intracranial or intraspinal surgery within the last 3 months   []   Arterial puncture at a non-compressible site in the previous 7 days   []   Active internal bleeding   []   Acute bleeding tendency   []   Platelet count LESS than 100,000 for known hematological diseases such as leukemia, thrombocytopenia or chronic cirrhosis   []   Current use of anticoagulant with INR GREATER than 1.7 or PT GREATER than 15 seconds, aPTT GREATER than 40 seconds   []   Heparin received within 48 hours, resulting in abnormally elevated aPTT GREATER than upper  limit of normal   []   Current use of direct thrombin inhibitors or direct factor Xa inhibitors in the past 48 hours   []   Elevated blood pressure refractory to treatment (systolic GREATER than 185 mm/Hg or diastolic  GREATER than 110 mm/Hg   []   Suspected infective endocarditis and aortic arch dissection   []   Current use of therapeutic treatment dose of low-molecular-weight heparin (LMWH) within the previous 24 hours   []   Structural GI malignancy or bleed   Relative exclusion for all patients  []   Only minor non-disabling symptoms   []   Pregnancy   []   Seizure at onset with postictal residual neurological impairments   []   Major surgery or previous trauma within past 14 days   []   History of previous spontaneous ICH, intracranial neoplasm, or AV malformation   []   Postpartum (within previous 14 days)   []   Recent GI or urinary tract hemorrhage (within previous 21 days)   []   Recent acute MI (within previous 3 months)   []   History of un-ruptured intracranial aneurysm LESS than 10 mm   []   History of ruptured intracranial aneurysm   []   Blood glucose LESS than 50 mg/dL (2.7 mmol/L)   []   Dural puncture within the last 7 days   []   Known GREATER than 10 cerebral microbleeds   Additional exclusions for patients with symptoms onset between 3 and 4.5 hours.  []   Age > 80.   []   On any anticoagulants regardless of INR  >>> Warfarin (Coumadin), Heparin, Enoxaparin (Lovenox), fondaparinux (Arixtra), bivalirudin (Angiomax), Argatroban, dabigatran (Pradaxa), rivaroxaban (Xarelto), or apixaban (Eliquis)   []   Severe stroke (NIHSS > 25).   []   History of BOTH diabetes and previous ischemic stroke.   []   The risks and benefits have been discussed with the patient or family related to the administration of IV Alteplase for stroke symptoms.   []   I have discussed and reviewed the patient's case and imaging with the attending prior to IV Alteplase.    Time Alteplase administered       Hospital  Meds:  Scheduled-   Infusions- No current facility-administered medications for this encounter.      PRNs-     Functional Status Prior to Current Stroke/Ogden Score: 0    NIH Stroke Scale  Time: 18:19 EST  Person Administering Scale: Saul Loza MD    1a  Level of consciousness: 0=alert; keenly responsive   1b. LOC questions:  0=Performs both tasks correctly   1c. LOC commands: 0=Performs both tasks correctly   2.  Best Gaze: 0=normal   3.  Visual: 0=No visual loss   4. Facial Palsy: 0=Normal symmetric movement   5a.  Motor left arm: 0=No drift, limb holds 90 (or 45) degrees for full 10 seconds   5b.  Motor right arm: 2=Some effort against gravity, limb cannot get to or maintain (if cured) 90 (or 45) degrees, drifts down to bed, but has some effort against gravity   6a. motor left le=No drift, limb holds 90 (or 45) degrees for full 10 seconds   6b  Motor right leg:  3=No effort against gravity, limb falls   7. Limb Ataxia: 0=Absent   8.  Sensory: 0=Normal; no sensory loss   9. Best Language:  0=No aphasia, normal   10. Dysarthria: 0=Normal   11. Extinction and Inattention: 0=No abnormality    Total:   5       Results Reviewed:  I have personally reviewed current lab, radiology, and data  CT head shows no acute changes, chronic white matter disease, which seems stable compared to MRI brain done few years ago.  At the time patient was noted to have significant white matter disease.  No hemorrhage.  CT angiogram of head and neck shows aortic arch atherosclerosis, bilateral ICA atherosclerosis, but no significant stenosis/occlusion.  Absent right vertebral artery.  CT perfusion shows no obvious perfusion deficit.  CT cervical spine shows no obvious fracture, does show degenerative changes.  Awaiting official results.  His labs were reviewed as well.            Assessment/Plan:  65-year-old right-handed white male with known diagnosis of hypertension, smoking greater than 30 pack years, cervical degenerative  disc disease, who comes in with 2 falls, followed by right upper and lower extremity weakness, questionable very subtle right facial asymmetry, no obvious language deficits or speech trouble.  CT head negative for any acute findings.      1. Right-sided weakness.  This could be vascular versus spinal cord injury.  Patient could have a small subcortical stroke in the left basal ganglia region or left pontine region, but given the sequence of events, we need to look at his cervical spine.  Recommend to get MRI brain without contrast and MRI C-spine without contrast to look for any spinal cord injury.  If he does have any spinal cord compression, he will need to be transferred to a tertiary care center.  2. Essential hypertension.  His blood pressure is running in 190s.  Goal systolic blood pressure of 120s to 160s.  Can start him on low-dose blood pressure medications.  3. Cervical degenerative disc disease.  He does have history of cervical disease, which could have been exacerbated with secondary to fall.  We will follow-up with MRI C-spine.  4. Smoking greater than 30-pack-year history.  Encourage patient to quit smoking.  5. Keep him bedrest for now, recommend putting him on cervical collar.  6. Okay to do a bedside swallow evaluation.  No obvious cranial nerve deficits noted.    Patient was evaluated on an emergent basis, and further plan has been discussed with the ER physician and the patient.  I spent about 35 minutes evaluating the patient and discussing the case with the team.  Thank you for the consult.    Addendum-9:15 PM  MRI brain and MRI C-spine reviewed.  MRI brain shows him to have a left anterior choroidal stroke, in the posterior limb of internal capsule in the basal ganglia region, patient also noted to have extensive white matter disease, which is much worse than his MRI few years ago.  MRI C-spine shows degenerative changes, but no significant spinal cord compression.    Recommend patient to get  aspirin 325 mg, Plavix 300 mg (loading dose today, followed by 75 mg daily), Lipitor 80 mg daily for now.  Keep him bedrest for today  IV fluids normal saline at 100 cc an hour  2D echocardiogram    Case was discussed with ER physician Dr. Negro, and about recommendations were discussed.  Thank you for the consult.      Saul Loza MD  February 16, 2021  18:19 EST    Verbal consent taken.  Patient agreeable to be seen via telemedicine.    This was an audio and video enabled telemedicine encounter.              Electronically signed by Saul Loza MD at 02/16/21 2122

## 2021-02-17 NOTE — PROGRESS NOTES
Nurse Practitioner - Brief Progress Note  PERMANENT  02/17/2021 01:09    [facility] [place] [place] [facility] - [facility] [place] ([facility])    NADEEM OTT ALTON    Date of Service 02/17/2021 01:09    HPI/Events of Note [facility] Provider Assessment Note    Tele ICU Focused Assessment Note: Information obtained from patient's chart     64 y/o male with PMH of HTN, HLD, and anxiety presents to the ED with complaints of fall on 2/14 and 2/15 with residual right upper and lower extremity weakness. Upon arrival to the ED BP was 190/87. Head CT negative for acute injury.CT angiogram head   and neck showed occluded right vertebral artery with collaterals, along with mild-moderate plaque right > left carotid systems without hemodynamically significant stenosis. Neurology consulted. MRI brain showed focal acute infarcts in the left   hemisphere. MRI cervical spine showed no acute injury. Transferred to ICU for management.    Pertinent labs: Sodium 132, BUN/ creatine 12/ 0.88, , WBC 7.5, H&H 14/ 42.5, ethanol < 10, TSH 0.698, and UDS negative.  CXR per RAD: Stable chest. No acute changes identified.  EKG: Sinus bradycardia rate of 54, no ST elevation, QTc 424    HR 72, /102, RR 20, temperature 36.7C, and oxygen saturations are 99% on room air.     Assessment and Plan:  CVA  -Neurology following   -Neuro checks per protocol   -Aspirin/ Statin/ plavix   -PT,OT ,Speech evals  -Fall and aspiration precautions  -Permissive HTN -SBP goal 160-200  -IV hydralazine PRN  -Lipid panel in the AM  -Avoid Hyper/Hypoglycemia  -Avoid Hyperthermia-Tylenol as needed      __y___   Video Assessment performed  __y___   Most recent labs reviewed  __y___   Vital Signs reviewed  __y___   Best Practices addressed:                 VTE prophylaxis: Heparin sq                  SUP (when indicated): N/A                  Glycemic control: Blood sugar 101                      Please notify bedside physician when present or [facility] if glc > 180 X 2                 Sepsis guidelines: N/A                  Lung protective strategy: N/A                  Targeted Temperature Management: N/A     __n___     Spoke with bedside RN  __n___     Orders written      Contact [facility] for any needs if bedside physician is not present.      Interventions Major-Other: CVA  Intermediate-Diagnostic test evaluation        Electronically Signed by: Chika Burnett (NP) on 02/17/2021 01:27    Annotated By: Steven Banerjee)    Date: 02/17/21 01:56  SANDRA note reviewed. Agree with note and plan.
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
## 2021-02-17 NOTE — PROGRESS NOTES
Stroke Progress Note       Chief Complaint: Right sided weakness    Subjective    Subjective     Subjective:  No acute issues overnight.  Patient continues to have right-sided weakness, no significant improvement or worsening.  Denies any new symptoms.    Review of Systems   HENT: Negative.    Eyes: Negative.    Respiratory: Negative.    Cardiovascular: Negative.    Gastrointestinal: Negative.    Endocrine: Negative.    Genitourinary: Negative.    Musculoskeletal: Negative.    Skin: Negative.    Allergic/Immunologic: Negative.    Neurological: Positive for weakness.   Psychiatric/Behavioral: Negative.             Objective    Objective      Temp:  [98 °F (36.7 °C)-98.4 °F (36.9 °C)] 98.2 °F (36.8 °C)  Heart Rate:  [56-86] 60  Resp:  [14-20] 14  BP: (150-198)/() 169/99        Neurological Exam   Mental Status  Awake, alert and oriented to person, place and time.Alert. Speech is normal. Language is fluent with no aphasia. Attention and concentration are normal. Fund of knowledge is appropriate for level of education.     Cranial Nerves  CN II: Visual fields full to confrontation.  CN III, IV, VI: Extraocular movements intact bilaterally. Pupils equal round and reactive to light bilaterally.  CN V: Facial sensation is normal.  CN VII:  Subtle right facial asymmetry.  CN VIII: Equal hearing bilaterally.  CN IX, X: Palate elevates symmetrically  CN XI: Shoulder shrug strength is normal.  CN XII: Tongue midline without atrophy or fasciculations.     Motor  Normal muscle bulk throughout. No fasciculations present.  Right upper extremity is 3/5, slight strength against gravity.  Right lower extremity proximal 4/5, distal 2/5  left upper and lower extremities moving spontaneously     Sensory  Light touch is normal in upper and lower extremities.      Reflexes  Not assessed.     Coordination  No dysmetria on left side.     Gait  Not assessed.        Physical Exam  Vitals signs and nursing note reviewed.    Constitutional:       Appearance: Normal appearance.   HENT:      Head: Normocephalic and atraumatic.   Eyes:      Extraocular Movements: Extraocular movements intact.      Conjunctiva/sclera: Conjunctivae normal.      Pupils: Pupils are equal, round, and reactive to light.   Neck:      Musculoskeletal: Normal range of motion and neck supple. No neck rigidity or muscular tenderness.      Comments: Patient denies any neck tenderness, his neck movement seems supple  Cardiovascular:      Rate and Rhythm: Normal rate and regular rhythm.   Pulmonary:      Effort: Pulmonary effort is normal. No respiratory distress.   Neurological:      Mental Status: He is alert and oriented to person, place, and time.      Cranial Nerves: No cranial nerve deficit.      Sensory: No sensory deficit.      Motor: Weakness present.      Coordination: Coordination normal.   Psychiatric:         Mood and Affect: Mood normal.         Speech: Speech normal.         Behavior: Behavior normal.     Results Review:    I reviewed the patient's new clinical results.  MRI brain shows left anterior choroidal stroke, moderate to severe white matter disease, no hemorrhage  MRI C-spine shows degenerative changes at multiple levels, but no spinal cord injury  His LDL was 100, total cholesterol of 165 and triglyceride of 58, his CBC and BMP were okay, awaiting A1c.               Assessment/Plan     Assessment/Plan:  65-year-old right-handed white male with known diagnosis of hypertension, smoking greater than 30 pack years, cervical degenerative disc disease, who comes in with 2 falls, followed by right upper and lower extremity weakness, questionable very subtle right facial asymmetry, no obvious language deficits or speech trouble.  CT head negative for any acute findings.  RI shows him to have a left anterior choroidal stroke.        1. 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  Pure motor hemiparesis.  Patient has been started on  aspirin 325 mg, Plavix 75 mg and Lipitor 80 mg daily for secondary stroke prevention.  Start Protonix 40 mg once daily while on dual antiplatelets.  Continue the same, probably will need lifelong.  2D echocardiogram today.   His MRI C-spine was also done, which showed degenerative changes, but no spinal cord injury.  2. Essential hypertension.  His blood pressure has been 160s to 200s.  Goal systolic blood pressure of 140s to 180s for today.    He has been started on amlodipine 10 mg and Cozaar 100 mg.  If blood pressure drops less than 140s, recommend holding one of the blood pressure medication.  3. White matter disease.  Patient has significant white matter disease, which increases his long-term chances of vascular dementia.  He will need good close follow-up with PCP, and continued aggressive vascular risk factor control.  4. Cervical degenerative disc disease.    MRI C-spine shows degenerative changes, but no spinal cord injury.  Recommend physical therapy evaluation.  5. Mixed hyperlipidemia.  His LDL is 100, total cholesterol 165 and triglyceride of 58.  Recommend full dose of statins for secondary stroke prevention.  6. Smoking greater than 30-pack-year history.  Encouraged patient to quit smoking.  7. PT/OT can start working with him today.  He will need acute rehabilitation.  8. Swallow evaluation if not done, healthy heart diet once cleared.    Case was discussed with patient, nursing and will call the hospitalist.  Thank you for the consult.                  Saul Loza MD  02/17/21  09:37 EST    This was an audio and video enabled telemedicine encounter.

## 2021-02-17 NOTE — THERAPY EVALUATION
"Acute Care - Speech Language Pathology Initial Evaluation  [facility]   SPEECH/LANGUAGE/COGNITIVE THERAPY     Patient Name: Valerie Moore  : 1955  MRN: [id number]  Today's Date: 2021             Admit Date: 2021     Valerie Moore  is seen at bedside this am on  CC05/1C to participate in a formal s/l and cognitive evaluation to assess safety/function in adls. He is positioned upright and centered in bed  to cooperatively participate. All results and observations of this evaluation are felt to be representative of his current functional status.    Mr. Moore was admitted to [facility] 21 2/2 new onset R side weakness w/ recent falls. MRI brain revealed acute infarcts of L basal ganglia. He is being followed by neurology.     Social History     Socioeconomic History   • Marital status:      Spouse name: Not on file   • Number of children: Not on file   • Years of education: Not on file   • Highest education level: Not on file   Tobacco Use   • Smoking status: Current Every Day Smoker     Packs/day: 0.50     Years: 20.00     Pack years: 10.00     Types: Cigarettes   • Smokeless tobacco: Former User     Types: Chew   Substance and Sexual Activity   • Alcohol use: No   • Drug use: No   • Sexual activity: Defer      Diet Orders (active) (From admission, onward)     Start     Ordered    21  Diet Regular; Cardiac  Diet Effective Now      21              Observed on ra w/o complications.     Receptive language skills are intact. He  is able to id common objects and personal body parts, follow simple and multi-step directives, understand complex \"wh\" questions and participate in conversational exchange w/o difficulties.    Expressive language skills are intact. He is able to complete automatic speech tasks, confrontation naming tasks, complete complex oe sentences, respond to complet oe \"wh\" questions, repeat at word/sentence and multiple digit levels, as well as participate in " complex conversational exchange. No aphasia, anomia or verbal apraxia evidenced.    He is independently oriented to person, place and time. Immediate, STM and LTM are wfl w/ Mr. Moore recalling recent adls and providing personal information w/o delays. Thought organization, processing and problem solving are wfl w/ pt demonstrating appropriate comparing/contrasting, understanding of idiomatic language, convergent and divergent thinking skills.    Facial/oral structures reveal a trace-mild R orofacial dysarthria w/ intact sensation. Oral mucosa are moist, pink and clean. Secretions are clear, thin and controlled. OROM/TASHA is mildly weak on R side wfl w/o lingual deviation upon protrusion. Speech intensity, clarity and intelligibility are wfl , however, Mr. Moore is aware of his facial dysarthria, concerned w/ improved facial symmetry. He does report speech clarity decreased w/ fatigue effects.     Graphic and reading skills are intact, premorbid baseline status. He is able to id isolated letters, simple and moderate words, as well as sentences and small paragraphs. Signature is legible.    Pragmatic skills are wfl w/ appropriate eye gaze patterns and visual tracking. Language is appropriate in context w/ topic initiation and maintenance independently. No left neglect evidenced. Humor response is intact w/ appropriate affect.    Visit Dx:    ICD-10-CM ICD-9-CM   1. Cerebrovascular accident (CVA), unspecified mechanism (CMS/[facility])  I63.9 434.91     Patient Active Problem List   Diagnosis   • Anxiety   • Hypertension   • Hypercholesteremia   • Seasonal allergies   • Sleep disorder   • GERD (gastroesophageal reflux disease)   • Arthritis   • Airway hyperreactivity   • Gout   • Weakness   • CAP (community acquired pneumonia)   • Hypokalemia   • Hyponatremia   • Hyperglycemia   • Normocytic anemia   • Abdominal aortic aneurysm (AAA) 3.0 cm to 5.5 cm in diameter in male (CMS/HCC)   • Tobacco abuse   • Chest pain, atypical    • Cerebrovascular accident (CVA) (CMS/[facility])     Past Medical History:   Diagnosis Date   • Anxiety    • GERD (gastroesophageal reflux disease) 4/20/2017   • Hypercholesteremia    • Hypertension    • Seasonal allergies    • Sleep disorder      Past Surgical History:   Procedure Laterality Date   • HERNIA REPAIR     • HIP SURGERY Right     plate and 8 screws in right hip       EDUCATION  The patient has been educated in the following areas:     Cognitive Impairment Communication Impairment.    Impression: Mr. Moore presents w/ WFL speech, language and cognitive skills. He is noted w/a R orofacial dysarthria, non impacting on speech intelligibility, however, negatively effecting his confidence aesthetically. He is stimulable for increaed surface blood flow w/ manual manipulation.     SLP Recommendation and Plan     SLP to f/u for mechanical and manual R facial massage for increased ROM/TASHA and symmetry. Mr. Moore does appear to be an excellent candidate for inpatient physical rehab for R flaccidity.     D/w Mr. Moore results and recommendations w/ verbal understanding and agreement.    Patient was not wearing a face mask during this therapy encounter. Therapist used appropriate personal protective equipment including mask, eye protection and gloves.  Mask used was standard procedure mask. Appropriate PPE was worn during the entire therapy session. The patient did not cough during this evaluation. Therapist was within 6 feet for 15 minutes or more during the evaluation. Hand hygiene was completed before and after therapy session. Patient is not in enhanced droplet precautions.     Thank you for allowing me to participate in the care of your patient-  Ondina Nair M.S., [facility]/SLP                                                             Time Calculation:     Time Calculation- SLP     Time Calculation- SLP     Row Name 02/17/21 1122      SLP Received On  02/17/21  -Recorded by: ARTHUR     SLP - Next Appointment   02/18/21  -Recorded by: ARTHUR             User Key     Initials Name Provider Type    Ondina Shane, MS CCC-SLP Speech and Language Pathologist          Therapy Charges for Today     Code Description Service Date Service Provider Modifiers Qty    60677244131 HC ST EVAL SPEECH AND PROD W LANG  3 2/17/2021 Ondina Nair, MS CCC-SLP GN 1    27378583447 HC ST EVAL ORAL PHARYNG SWALLOW 3 2/17/2021 Ondina Nair, MS CCC-SLP GN 1                     Ondina Nair, MS CCC-SLP  2/17/2021

## 2021-02-18 PROCEDURE — 94799 UNLISTED PULMONARY SVC/PX: CPT

## 2021-02-18 PROCEDURE — 92526 ORAL FUNCTION THERAPY: CPT

## 2021-02-18 PROCEDURE — 25010000002 HYDRALAZINE PER 20 MG: Performed by: HOSPITALIST

## 2021-02-18 PROCEDURE — 25010000002 HEPARIN (PORCINE) PER 1000 UNITS: Performed by: HOSPITALIST

## 2021-02-18 PROCEDURE — G0408 INPT/TELE FOLLOW UP 35: HCPCS | Performed by: PSYCHIATRY & NEUROLOGY

## 2021-02-18 PROCEDURE — 97530 THERAPEUTIC ACTIVITIES: CPT

## 2021-02-18 PROCEDURE — 99232 SBSQ HOSP IP/OBS MODERATE 35: CPT | Performed by: HOSPITALIST

## 2021-02-18 PROCEDURE — 97110 THERAPEUTIC EXERCISES: CPT

## 2021-02-18 RX ORDER — ASPIRIN 325 MG
325 TABLET ORAL DAILY
Status: DISCONTINUED | OUTPATIENT
Start: 2021-02-19 | End: 2021-02-22 | Stop reason: HOSPADM

## 2021-02-18 RX ORDER — PANTOPRAZOLE SODIUM 40 MG/1
40 TABLET, DELAYED RELEASE ORAL
Status: DISCONTINUED | OUTPATIENT
Start: 2021-02-19 | End: 2021-02-22 | Stop reason: HOSPADM

## 2021-02-18 RX ADMIN — ALPRAZOLAM 0.5 MG: 0.5 TABLET ORAL at 21:45

## 2021-02-18 RX ADMIN — METOPROLOL TARTRATE 25 MG: 25 TABLET, FILM COATED ORAL at 16:22

## 2021-02-18 RX ADMIN — LOSARTAN POTASSIUM 100 MG: 50 TABLET, FILM COATED ORAL at 08:16

## 2021-02-18 RX ADMIN — ASPIRIN 81 MG: 81 TABLET, COATED ORAL at 08:16

## 2021-02-18 RX ADMIN — HYDROCODONE BITARTRATE AND ACETAMINOPHEN 1 TABLET: 7.5; 325 TABLET ORAL at 08:17

## 2021-02-18 RX ADMIN — IPRATROPIUM BROMIDE AND ALBUTEROL SULFATE 3 ML: .5; 3 SOLUTION RESPIRATORY (INHALATION) at 07:02

## 2021-02-18 RX ADMIN — HYDRALAZINE HYDROCHLORIDE 10 MG: 20 INJECTION INTRAMUSCULAR; INTRAVENOUS at 07:07

## 2021-02-18 RX ADMIN — NICOTINE 1 PATCH: 14 PATCH, EXTENDED RELEASE TRANSDERMAL at 08:16

## 2021-02-18 RX ADMIN — CLOPIDOGREL 75 MG: 75 TABLET, FILM COATED ORAL at 08:16

## 2021-02-18 RX ADMIN — Medication 1 TABLET: at 08:16

## 2021-02-18 RX ADMIN — IPRATROPIUM BROMIDE AND ALBUTEROL SULFATE 3 ML: .5; 3 SOLUTION RESPIRATORY (INHALATION) at 19:07

## 2021-02-18 RX ADMIN — AMLODIPINE BESYLATE 10 MG: 10 TABLET ORAL at 08:16

## 2021-02-18 RX ADMIN — SODIUM CHLORIDE, PRESERVATIVE FREE 10 ML: 5 INJECTION INTRAVENOUS at 08:17

## 2021-02-18 RX ADMIN — METOPROLOL TARTRATE 25 MG: 25 TABLET, FILM COATED ORAL at 21:43

## 2021-02-18 RX ADMIN — HEPARIN SODIUM 5000 UNITS: 5000 INJECTION INTRAVENOUS; SUBCUTANEOUS at 08:17

## 2021-02-18 RX ADMIN — HEPARIN SODIUM 5000 UNITS: 5000 INJECTION INTRAVENOUS; SUBCUTANEOUS at 21:44

## 2021-02-18 RX ADMIN — CARISOPRODOL 350 MG: 350 TABLET ORAL at 14:26

## 2021-02-18 RX ADMIN — SODIUM CHLORIDE 75 ML/HR: 9 INJECTION, SOLUTION INTRAVENOUS at 03:17

## 2021-02-18 RX ADMIN — HYDROCODONE BITARTRATE AND ACETAMINOPHEN 1 TABLET: 7.5; 325 TABLET ORAL at 16:22

## 2021-02-18 RX ADMIN — ATORVASTATIN CALCIUM 80 MG: 40 TABLET, FILM COATED ORAL at 21:43

## 2021-02-18 RX ADMIN — ALPRAZOLAM 0.5 MG: 0.5 TABLET ORAL at 08:16

## 2021-02-18 RX ADMIN — GABAPENTIN 200 MG: 100 CAPSULE ORAL at 14:26

## 2021-02-18 RX ADMIN — SODIUM CHLORIDE, PRESERVATIVE FREE 10 ML: 5 INJECTION INTRAVENOUS at 23:34

## 2021-02-18 NOTE — PROGRESS NOTES
[facility] PROGRESS NOTE     Patient Identification:  Name:  Valerie Moore  Age:  65 y.o.  Sex:  male  :  1955  MRN:  [id number]  Visit Number:  [id number]  Primary Care Provider:  Darwin Alvarez MD    Length of stay:  2    Subjective:   Examined assisted by Julianne Denis RN.  Patient is followed by neurology, help appreciated, recommendation noted appreciated.  Patient has no significant improvement of his right-sided weakness, except for very mild movement on his fingers.  Blood pressure is elevated but improved.  2D echo was unremarkable.    Chief Complaint: Right-sided weakness  ----------------------------------------------------------------------------------------------------------------------  \[facility] [place] [facility]\"" Meds:  amLODIPine, 10 mg, Oral, Q24H  [START ON 2021] aspirin, 325 mg, Oral, Daily  atorvastatin, 80 mg, Oral, Nightly  clopidogrel, 75 mg, Oral, Daily  heparin (porcine), 5,000 Units, Subcutaneous, Q12H  losartan, 100 mg, Oral, Q24H  metoprolol tartrate, 25 mg, Oral, Q12H  multivitamin, 1 tablet, Oral, Daily  nicotine, 1 patch, Transdermal, Q24H  [START ON 2021] pantoprazole, 40 mg, Oral, Q AM  sodium chloride, 10 mL, Intravenous, Q12H         ----------------------------------------------------------------------------------------------------------------------  Vital Signs:  Temp:  [97.1 °F (36.2 °C)-98.6 °F (37 °C)] 97.7 °F (36.5 °C)  Heart Rate:  [] 125  Resp:  [14-26] 26  BP: (140-201)/() 159/111       Tele: Sinus rhythm ranges from  with activity      21  1838 21  0000 21  0500   Weight: 81.6 kg (180 lb) 84.8 kg (187 lb) 83.3 kg (183 lb 9.6 oz)     Body mass index is 28.76 kg/m².    Intake/Output Summary (Last 24 hours) at 2021 1456  Last data filed at 2021 0833  Gross per 24 hour   Intake 1400 ml   Output 3055 ml   Net -1655 ml     Diet Regular;  Cardiac  ----------------------------------------------------------------------------------------------------------------------  Physical exam:  General: Comfortable,awake, alert, oriented to self, place, and time, well-developed and well-nourished.  No respiratory distress.    Skin:  Skin is warm and dry. No rash noted. No pallor.    HENT:  Head:  Normocephalic and atraumatic.  Mouth:  Moist mucous membranes.  Slight right nasolabial flatteningEyes:  Conjunctivae and EOM are normal.  Pupils are equal, round, and reactive to light.  No scleral icterus.    Neck:  Neck supple.  No JVD present.    Pulmonary/Chest:  No respiratory distress, no wheezes, no crackles, with normal breath sounds and good air movement.  Cardiovascular:  Normal rate, regular rhythm and normal heart sounds with no murmur.  Abdominal:  Soft.  Bowel sounds are normal.  No distension and no tenderness.   Extremities:  No edema, no tenderness, and no deformity.  No red or swollen joints anywhere.  Strong pulses in all 4 extremities with no clubbing, no cyanosis, no edema.  Neurological: Complete paralysis and weakness of right lower extremity sensory is intact.  Right sided mild facial flattening of nasolabial fold   genitourinary: No Erickson catheter  Back:  ----------------------------------------------------------------------------------------------------------------------  ----------------------------------------------------------------------------------------------------------------------  Results from last 7 days   Lab Units 02/16/21 1721   CK TOTAL U/L 648*   TROPONIN T ng/mL <0.010     Results from last 7 days   Lab Units 02/17/21 0320 02/16/21 1721   CRP mg/dL  --  1.93*   WBC 10*3/mm3 5.87 7.59   HEMOGLOBIN g/dL 14.2 14.0   HEMATOCRIT % 43.5 42.5   MCV fL 87.0 87.1   MCHC g/dL 32.6 32.9   PLATELETS 10*3/mm3 247 328         Results from last 7 days   Lab Units 02/17/21 0323 02/16/21 1723 02/16/21  1721   SODIUM mmol/L 135*  --  132*    POTASSIUM mmol/L 3.9  --  4.0   MAGNESIUM mg/dL  --   --  2.0   CHLORIDE mmol/L 101  --  98   CO2 mmol/L 18.7*  --  20.6*   BUN mg/dL 15  --  12   CREATININE mg/dL 0.93 0.90 0.88   EGFR IF NONAFRICN AM mL/min/1.73 81  --  86   CALCIUM mg/dL 9.9  --  10.0   GLUCOSE mg/dL 169*  --  101*   ALBUMIN g/dL 4.25  --  4.35   BILIRUBIN mg/dL 0.2  --  0.3   ALK PHOS U/L 109  --  112   AST (SGOT) U/L 35  --  39   ALT (SGPT) U/L 20  --  20   Estimated Creatinine Clearance: 79.6 mL/min (by C-G formula based on SCr of 0.93 mg/dL).    No results found for: AMMONIA  Results from last 7 days   Lab Units 02/17/21  0323   CHOLESTEROL mg/dL 165   TRIGLYCERIDES mg/dL 58   HDL CHOL mg/dL 53   LDL CHOL mg/dL 100     Blood Culture   Date Value Ref Range Status   02/16/2021 No growth at 24 hours  Preliminary   02/16/2021 No growth at 24 hours  Preliminary     No results found for: URINECX  No results found for: WOUNDCX  No results found for: STOOLCX    I have personally looked at the labs and they are summarized above.  ----------------------------------------------------------------------------------------------------------------------  Imaging Results (Last 24 Hours)     ** No results found for the last 24 hours. 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    Karely Edwards MD  02/18/21  14:56 EST

## 2021-02-18 NOTE — PLAN OF CARE
Goal Outcome Evaluation:  Plan of Care Reviewed With: patient  Progress: improving  Outcome Summary: Pt has had stable stroke scores of 9-10. RUE moving minimally, pt unable to move RLE throughout the shift. Pt worked with PT/OT today. Spoke with Dr. Loza regarding pt's need for IP rehab. Pt's BPs slightly improved this afternoon. Pt has had some nausea this afternoon. Pt denies any chest pain, or shortness of breath. Pt has had some pain in the right hip, which he reports as his baseline. Pt denies any difficulties voiding. No further issues at this time.

## 2021-02-18 NOTE — PROGRESS NOTES
Discharge Planning Assessment  CHEYENNE No     Patient Name: Valerie Moore  MRN: [id number]  Today's Date: 2/18/2021    Admit Date: 2/16/2021      Discharge Plan     Row Name 02/18/21 0921       Plan    Plan  SS spoke with Lexi in Inpatient Rehab. SS informed Lexi that Pt has been transferred to PCU. Lexi reported that she was submitting the Pt to insurance for approval for Inpatient Rehab. SS will continue to follow and assist with discharge planning.    Patient/Family in Agreement with Plan  yes          Caryl Michaels

## 2021-02-18 NOTE — THERAPY TREATMENT NOTE
Acute Care - Speech Language Pathology Treatment Note  [facility]     Patient Name: Valerie Moore  : 1955  MRN: [id number]  Today's Date: 2021  Onset of Illness/Injury or Date of Surgery: 21     Referring Physician: Dr. Bailey      Admit Date: 2021     Mr. Moore was seen at bedside this am for continued dysarthria tx to R orofacial surfaces. He is now transferred to PCU from CCU. He is a/a and interactive, however, he is notably more fatigued today. He reports poor sleep overnight. He is agreeable to participate in tasks. He reports good po intake and acceptance, denying and dysphagia.     Mr. Moore perform OROM/TASHA ex. x3 sets x5 reps w/ cues. He tolerates manual massage to R buccal surfaces for 3 minutes w/ increased surface blood flow observed.     Visit Dx:    ICD-10-CM ICD-9-CM   1. Cerebrovascular accident (CVA), unspecified mechanism (CMS/[facility])  I63.9 434.91     Patient Active Problem List   Diagnosis   • Anxiety   • Hypertension   • Hypercholesteremia   • Seasonal allergies   • Sleep disorder   • GERD (gastroesophageal reflux disease)   • Arthritis   • Airway hyperreactivity   • Gout   • Weakness   • CAP (community acquired pneumonia)   • Hypokalemia   • Hyponatremia   • Hyperglycemia   • Normocytic anemia   • Abdominal aortic aneurysm (AAA) 3.0 cm to 5.5 cm in diameter in male (CMS/[facility])   • Tobacco abuse   • Chest pain, atypical   • Cerebrovascular accident (CVA) (CMS/[facility])     Past Medical History:   Diagnosis Date   • Anxiety    • GERD (gastroesophageal reflux disease) 2017   • Hypercholesteremia    • Hypertension    • Seasonal allergies    • Sleep disorder      Past Surgical History:   Procedure Laterality Date   • HERNIA REPAIR     • HIP SURGERY Right     plate and 8 screws in right hip       EDUCATION  The patient has been educated in the following areas:     Communication Impairment.    Impression: Mr. Moore actively particpates in tx tasks for improved R facial rom.      SLP Recommendation and Plan   Continue current poc for maximal opportunities for improvement.     Patient was not wearing a face mask during this therapy encounter. Therapist used appropriate personal protective equipment including mask, eye protection and gloves.  Mask used was standard procedure mask. Appropriate PPE was worn during the entire therapy session. The patient did not cough during this evaluation. Therapist was within 6 feet for 15 minutes or more during the evaluation. Hand hygiene was completed before and after therapy session. Patient is not in enhanced droplet precautions.     Thank you   Ondina Nair M.S, CCC/SLP                                                           Time Calculation:         Therapy Charges for Today     Code Description Service Date Service Provider Modifiers Qty    57565993943 HC ST EVAL SPEECH AND PROD W LANG  3 2/17/2021 Ondina Nair, MS CCC-SLP GN 1    [id number] HC ST EVAL ORAL PHARYNG SWALLOW 3 2/17/2021 Ondina Nair, MS CCC-SLP GN 1    [id number] HC ST TREATMENT SWALLOW 3 2/18/2021 Ondina Nair, MS CCC-SLP GN 1                     Ondina Nair, MS CCC-SLP  2/18/2021

## 2021-02-18 NOTE — PROGRESS NOTES
Stroke Progress Note       Chief Complaint: Right sided weakness    Subjective    Subjective     Subjective:  No acute issues overnight.  Patient continues to have right-sided weakness, no significant improvement or worsening.  Denies any new symptoms.    Review of Systems   HENT: Negative.    Eyes: Negative.    Respiratory: Negative.    Cardiovascular: Negative.    Gastrointestinal: Negative.    Endocrine: Negative.    Genitourinary: Negative.    Musculoskeletal: Negative.    Skin: Negative.    Allergic/Immunologic: Negative.    Neurological: Positive for weakness.   Psychiatric/Behavioral: Negative.             Objective    Objective      Temp:  [97.1 °F (36.2 °C)-98.6 °F (37 °C)] 97.7 °F (36.5 °C)  Heart Rate:  [] 98  Resp:  [14-23] 18  BP: (137-201)/() 170/118        Neurological Exam   Mental Status  Awake, alert and oriented to person, place and time.Alert. Speech is normal. Language is fluent with no aphasia. Attention and concentration are normal. Fund of knowledge is appropriate for level of education.     Cranial Nerves  CN II: Visual fields full to confrontation.  CN III, IV, VI: Extraocular movements intact bilaterally. Pupils equal round and reactive to light bilaterally.  CN V: Facial sensation is normal.  CN VII:  Subtle right facial asymmetry.  CN VIII: Equal hearing bilaterally.  CN IX, X: Palate elevates symmetrically  CN XI: Shoulder shrug strength is normal.  CN XII: Tongue midline without atrophy or fasciculations.     Motor  Normal muscle bulk throughout. No fasciculations present.  Right upper extremity is proximal is 3/5, distal is 1/5,   Right lower extremity proximal 4/5, distal 1/5  left upper and lower extremities moving spontaneously     Sensory  Light touch is normal in upper and lower extremities.      Reflexes  Not assessed.     Coordination  No dysmetria on left side.     Gait  Not assessed.        Physical Exam  Vitals signs and nursing note reviewed.   Constitutional:        Appearance: Normal appearance.   HENT:      Head: Normocephalic and atraumatic.   Eyes:      Extraocular Movements: Extraocular movements intact.      Conjunctiva/sclera: Conjunctivae normal.      Pupils: Pupils are equal, round, and reactive to light.   Neck:      Musculoskeletal: Normal range of motion and neck supple. No neck rigidity or muscular tenderness.      Comments: Patient denies any neck tenderness, his neck movement seems supple  Cardiovascular:      Rate and Rhythm: Normal rate and regular rhythm.   Pulmonary:      Effort: Pulmonary effort is normal. No respiratory distress.   Neurological:      Mental Status: He is alert and oriented to person, place, and time.      Cranial Nerves: No cranial nerve deficit.      Sensory: No sensory deficit.      Motor: Weakness present.      Coordination: Coordination normal.   Psychiatric:         Mood and Affect: Mood normal.         Speech: Speech normal.         Behavior: Behavior normal.     Results Review:    I reviewed the patient's new clinical results.  No new imaging or labs    Results for orders placed during the hospital encounter of 02/16/21   Adult Transthoracic Echo Complete w/ Color, Spectral and Contrast if necessary per protocol    Narrative · Estimated left ventricular EF = 65% Left ventricular systolic function   is normal.  · Mild aortic valve stenosis is present.  · Estimated right ventricular systolic pressure from tricuspid   regurgitation is normal (<35 mmHg).  · Mild Aortic regurgitation is present  · Trivial to mild MR is noted  · No previous echo                Assessment/Plan     Assessment/Plan:  65-year-old right-handed white male with known diagnosis of hypertension, smoking greater than 30 pack years, cervical degenerative disc disease, who comes in with 2 falls, followed by right upper and lower extremity weakness, questionable very subtle right facial asymmetry, no obvious language deficits or speech trouble.  CT head negative  for any acute findings. MRI shows him to have a left anterior choroidal stroke.        1. 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  Pure motor hemiparesis.  Patient has been started on aspirin 325 mg, Plavix 75 mg and Lipitor 80 mg daily for secondary stroke prevention.  Start Protonix 40 mg once daily while on dual antiplatelets.  Continue the same, probably will need lifelong.  2D echocardiogram today.   His MRI C-spine was also done, which showed degenerative changes, but no spinal cord injury.  2. Essential hypertension.  His blood pressure has been 160s to 200s.  Goal systolic blood pressure can be 120-160s.    He has been started on amlodipine 10 mg and Cozaar 100 mg.  3. White matter disease.  Patient has significant white matter disease, which increases his long-term chances of vascular dementia.  He will need good close follow-up with PCP, and continued aggressive vascular risk factor control.  4. Right hip pain.  Patient has chronic right hip pain for which he gets steroid injections once a month.  His stroke has worsened his right hip pain, he will need good physical therapy, and may be intra-articular steroid injection when he is at rehab.  5. Mixed hyperlipidemia.  His LDL is 100, total cholesterol 165 and triglyceride of 58.  Recommend full dose of statins for secondary stroke prevention.  6. Smoking greater than 30-pack-year history.  Encouraged patient to quit smoking.  7. PT/OT evaluation.  He will need acute rehabilitation.  8. Healthy heart diet    Case was discussed with patient, nursing and will call the hospitalist.  Thank you for the consult.                  Saul Loza MD  02/18/21  09:35 EST    This was an audio and video enabled telemedicine encounter.

## 2021-02-18 NOTE — NURSING NOTE
IRF consult received and patient information submitted to [employer] for [place]. Thank you for this referral.

## 2021-02-18 NOTE — THERAPY TREATMENT NOTE
Acute Care - Physical Therapy Treatment Note   [facility]     Patient Name: Valerie Moore  : 1955  MRN: [id number]  Today's Date: 2021   Onset of Illness/Injury or Date of Surgery: 21       PT Assessment (last 12 hours)      PT Evaluation and Treatment     Row Name 21 1529          Physical Therapy Time and Intention    Subjective Information  complains of;weakness  -AG     Document Type  therapy note (daily note)  -AG     Mode of Treatment  individual therapy;physical therapy  -AG     Patient Effort  good  -AG     Symptoms Noted During/After Treatment  fatigue  -AG     Comment  pt. states he feels his R side is weaker today; also reports not sleeping well last night and experiencing greater R hip pain today.  RN aware.   -AG     Row Name 21 1529          General Information    General Observations of Patient  patient supine in PCU bed; alert and cooperative for participation.   -AG     Existing Precautions/Restrictions  fall  -AG     Row Name 21 1529          Cognition    Affect/Mental Status (Cognitive)  WNL  -AG     Orientation Status (Cognition)  oriented x 3;time  -AG     Follows Commands (Cognition)  WFL  -AG     Personal Safety Interventions  fall prevention program maintained;gait belt;nonskid shoes/slippers when out of bed;supervised activity  -AG     Row Name 21 1529          Pain    Additional Documentation  Pain Scale: FACES Pre/Post-Treatment (Group)  -AG     Row Name 21 1529          Pain Scale: Numbers Pre/Post-Treatment    Pretreatment Pain Rating  3/10  -AG     Posttreatment Pain Rating  3/10  -AG     Pain Location - Side  Right  -AG     Pain Location - Orientation  generalized  -AG     Pain Location  hip  -AG     Row Name 21 1529          Mobility    Left Lower Extremity (Weight-bearing Status)  full weight-bearing (FWB)  -AG     Right Lower Extremity (Weight-bearing Status)  full weight-bearing (FWB)  -AG     Row Name 21 1529           Bed Mobility    Scooting/Bridging [place] (Bed Mobility)  verbal cues;nonverbal cues (demo/gesture);minimum assist (75% patient effort)  -     Supine-Sit [place] (Bed Mobility)  verbal cues;nonverbal cues (demo/gesture);minimum assist (75% patient effort);moderate assist (50% patient effort)  -     Bed Mobility, Safety Issues  decreased use of arms for pushing/pulling;decreased use of legs for bridging/pushing  -     Assistive Device (Bed Mobility)  bed rails  -     Row Name 02/18/21 1529          Transfers    Transfers  bed-chair transfer;sit-stand transfer;stand-sit transfer;stand pivot/stand step transfer  -     Bed-Chair [place] (Transfers)  verbal cues;nonverbal cues (demo/gesture);minimum assist (75% patient effort);moderate assist (50% patient effort)  -     Sit-Stand [place] (Transfers)  verbal cues;nonverbal cues (demo/gesture);minimum assist (75% patient effort);moderate assist (50% patient effort)  -     Stand-Sit [place] (Transfers)  verbal cues;nonverbal cues (demo/gesture);minimum assist (75% patient effort);moderate assist (50% patient effort)  -     Row Name 02/18/21 1529          Stand Pivot/Stand Step Transfer    Stand Pivot/Stand Step [place] (Transfers)  verbal cues;nonverbal cues (demo/gesture);minimum assist (75% patient effort);moderate assist (50% patient effort)  -     Row Name 02/18/21 1529          Gait/Stairs (Locomotion)    Comment (Gait/Stairs)  TBA  -     Row Name 02/18/21 1529          Safety Issues, Functional Mobility    Impairments Affecting Function (Mobility)  balance;coordination;endurance/activity tolerance;grasp;motor control;muscle tone abnormal;pain;range of motion (ROM);strength  -     Row Name 02/18/21 1529          Balance    Balance Assessment  sitting static balance;sitting dynamic balance;sit to stand dynamic balance;standing static balance;standing dynamic balance  -     Static Sitting Balance   WFL;unsupported;sitting in chair;sitting, edge of bed  -AG     Static Standing Balance  moderate impairment;supported;standing  -     Row Name 02/18/21 1529          Motor Skills    Therapeutic Exercise  hip;knee;ankle  -     Row Name 02/18/21 1529          Hip (Therapeutic Exercise)    Hip (Therapeutic Exercise)  AAROM (active assistive range of motion)  -AG     Hip AAROM (Therapeutic Exercise)  bilateral;aBduction;aDduction  -     Row Name 02/18/21 1529          Knee (Therapeutic Exercise)    Knee (Therapeutic Exercise)  AAROM (active assistive range of motion)  -AG     Knee AAROM (Therapeutic Exercise)  extension;sitting  -     Row Name 02/18/21 1529          Coping    Observed Emotional State  calm;cooperative  -     Verbalized Emotional State  acceptance  -     Trust Relationship/Rapport  care explained;choices provided  -     Row Name 02/18/21 1529          Plan of Care Review    Plan of Care Reviewed With  patient  -AG     Progress  improving  -AG     Outcome Summary  patient not feeling as well today and not moving R UE as well as previous day; however, tranfser training initiated.  PT again discussed need for intensive rehab and patient is agreeable and motivated.   -     Row Name 02/18/21 1529          Positioning and Restraints    Pre-Treatment Position  in bed  -AG     Post Treatment Position  chair  -AG     In Chair  notified nsg;sitting;call light within reach;encouraged to call for assist  -[facility] Name 02/18/21 1529          Therapy Assessment/Plan (PT)    Patient/Family Therapy Goals Statement (PT)  return to PLOF  -[facility] Name 02/18/21 1529          Therapy Plan Review/Discharge Plan (PT)    Therapy Plan Review (PT)  evaluation/treatment results reviewed;care plan/treatment goals reviewed;risks/benefits reviewed  -       User Key  (r) = Recorded By, (t) = Taken By, (c) = Cosigned By    Initials Name Provider Type    AG Zoila Morse, PT Physical Therapist        Physical  Therapy Education                 Title: PT OT SLP Therapies (Done)     Topic: Physical Therapy (Done)     Point: Mobility training (Done)     Learning Progress Summary           Patient Acceptance, E, VU by  at 2/18/2021 0900    Acceptance, E,D, VU,NR by  at 2/17/2021 1502                   Point: Home exercise program (Done)     Learning Progress Summary           Patient Acceptance, E, VU by  at 2/18/2021 0900    Acceptance, E,D, VU,NR by  at 2/17/2021 1502                   Point: Body mechanics (Done)     Learning Progress Summary           Patient Acceptance, E, VU by  at 2/18/2021 0900    Acceptance, E,D, VU,NR by  at 2/17/2021 1502                   Point: Precautions (Done)     Learning Progress Summary           Patient Acceptance, E, VU by  at 2/18/2021 0900    Acceptance, E,D, VU,NR by  at 2/17/2021 1502                               User Key     Initials Effective Dates Name Provider Type Discipline     04/03/18 -  Zoila Morse, PT Physical Therapist PT     07/25/18 -  Julianne Denis, RN Registered Nurse Nurse              PT Recommendation and Plan  Anticipated Discharge Disposition (PT): inpatient rehabilitation facility  Planned Therapy Interventions (PT): balance training, bed mobility training, gait training, home exercise program, neuromuscular re-education, patient/family education, postural re-education, ROM (range of motion), strengthening, transfer training  Therapy Frequency (PT): other (see comments)(3-5 times/ week per priority)  Plan of Care Reviewed With: patient  Progress: improving  Outcome Summary: patient not feeling as well today and not moving R UE as well as previous day; however, tranfser training initiated.  PT again discussed need for intensive rehab and patient is agreeable and motivated.        Time Calculation:   PT Charges     Row Name 02/18/21 1529             Time Calculation    PT Received On  02/18/21  -         Time Calculation- PT    Total Timed  Code Minutes- PT  40 minute(s)  -        User Key  (r) = Recorded By, (t) = Taken By, (c) = Cosigned By    Initials Name Provider Type    Zoila Weaver, PT Physical Therapist        Therapy Charges for Today     Code Description Service Date Service Provider Modifiers Qty    41361977185  PT EVAL MOD COMPLEXITY 4 2/17/2021 Zoila Morse, PT GP 1    54157141868  PT THER PROC EA 15 MIN 2/18/2021 Zoila Morse, PT GP 1    [id number]  PT THERAPEUTIC ACT EA 15 MIN 2/18/2021 Zoila Morse, PT GP 2               Zoila Morse, PT  2/18/2021

## 2021-02-18 NOTE — PLAN OF CARE
Goal Outcome Evaluation:    Pt resting in bed. VSS on room air. NS @ 75mL/hr. A&Ox4. No shortness of breath reported. Rt sided hemiparesis due to Lt CVA.  No distress noted. Call light in reach.

## 2021-02-19 ENCOUNTER — APPOINTMENT (OUTPATIENT)
Dept: GENERAL RADIOLOGY | Facility: HOSPITAL | Age: 66
End: 2021-02-19

## 2021-02-19 PROCEDURE — 73502 X-RAY EXAM HIP UNI 2-3 VIEWS: CPT | Performed by: RADIOLOGY

## 2021-02-19 PROCEDURE — 25010000002 HEPARIN (PORCINE) PER 1000 UNITS: Performed by: HOSPITALIST

## 2021-02-19 PROCEDURE — G0408 INPT/TELE FOLLOW UP 35: HCPCS | Performed by: PSYCHIATRY & NEUROLOGY

## 2021-02-19 PROCEDURE — 97110 THERAPEUTIC EXERCISES: CPT

## 2021-02-19 PROCEDURE — 97112 NEUROMUSCULAR REEDUCATION: CPT

## 2021-02-19 PROCEDURE — 94799 UNLISTED PULMONARY SVC/PX: CPT

## 2021-02-19 PROCEDURE — 92507 TX SP LANG VOICE COMM INDIV: CPT

## 2021-02-19 PROCEDURE — 73502 X-RAY EXAM HIP UNI 2-3 VIEWS: CPT

## 2021-02-19 PROCEDURE — 99232 SBSQ HOSP IP/OBS MODERATE 35: CPT | Performed by: HOSPITALIST

## 2021-02-19 RX ORDER — METOPROLOL TARTRATE 50 MG/1
50 TABLET, FILM COATED ORAL EVERY 12 HOURS SCHEDULED
Status: DISCONTINUED | OUTPATIENT
Start: 2021-02-19 | End: 2021-02-20

## 2021-02-19 RX ORDER — HYDROCHLOROTHIAZIDE 25 MG/1
25 TABLET ORAL DAILY
Status: DISCONTINUED | OUTPATIENT
Start: 2021-02-19 | End: 2021-02-22 | Stop reason: HOSPADM

## 2021-02-19 RX ADMIN — METOPROLOL TARTRATE 50 MG: 50 TABLET, FILM COATED ORAL at 20:07

## 2021-02-19 RX ADMIN — CARISOPRODOL 350 MG: 350 TABLET ORAL at 22:00

## 2021-02-19 RX ADMIN — CARISOPRODOL 350 MG: 350 TABLET ORAL at 09:45

## 2021-02-19 RX ADMIN — IPRATROPIUM BROMIDE AND ALBUTEROL SULFATE 3 ML: .5; 3 SOLUTION RESPIRATORY (INHALATION) at 18:39

## 2021-02-19 RX ADMIN — HYDROCHLOROTHIAZIDE 25 MG: 25 TABLET ORAL at 19:15

## 2021-02-19 RX ADMIN — HYDROCODONE BITARTRATE AND ACETAMINOPHEN 1 TABLET: 7.5; 325 TABLET ORAL at 09:45

## 2021-02-19 RX ADMIN — ALPRAZOLAM 0.5 MG: 0.5 TABLET ORAL at 09:45

## 2021-02-19 RX ADMIN — ALPRAZOLAM 0.5 MG: 0.5 TABLET ORAL at 22:00

## 2021-02-19 RX ADMIN — Medication 1 TABLET: at 09:31

## 2021-02-19 RX ADMIN — HEPARIN SODIUM 5000 UNITS: 5000 INJECTION INTRAVENOUS; SUBCUTANEOUS at 20:07

## 2021-02-19 RX ADMIN — AMLODIPINE BESYLATE 10 MG: 10 TABLET ORAL at 09:31

## 2021-02-19 RX ADMIN — ASPIRIN 325 MG: 325 TABLET ORAL at 09:31

## 2021-02-19 RX ADMIN — PANTOPRAZOLE SODIUM 40 MG: 40 TABLET, DELAYED RELEASE ORAL at 06:46

## 2021-02-19 RX ADMIN — HEPARIN SODIUM 5000 UNITS: 5000 INJECTION INTRAVENOUS; SUBCUTANEOUS at 09:31

## 2021-02-19 RX ADMIN — LOSARTAN POTASSIUM 100 MG: 50 TABLET, FILM COATED ORAL at 09:31

## 2021-02-19 RX ADMIN — NICOTINE 1 PATCH: 14 PATCH, EXTENDED RELEASE TRANSDERMAL at 09:29

## 2021-02-19 RX ADMIN — HYDROCODONE BITARTRATE AND ACETAMINOPHEN 1 TABLET: 7.5; 325 TABLET ORAL at 21:12

## 2021-02-19 RX ADMIN — SODIUM CHLORIDE, PRESERVATIVE FREE 10 ML: 5 INJECTION INTRAVENOUS at 20:07

## 2021-02-19 RX ADMIN — SODIUM CHLORIDE, PRESERVATIVE FREE 10 ML: 5 INJECTION INTRAVENOUS at 09:32

## 2021-02-19 RX ADMIN — ATORVASTATIN CALCIUM 80 MG: 40 TABLET, FILM COATED ORAL at 20:07

## 2021-02-19 RX ADMIN — METOPROLOL TARTRATE 50 MG: 50 TABLET, FILM COATED ORAL at 09:31

## 2021-02-19 RX ADMIN — CLOPIDOGREL 75 MG: 75 TABLET, FILM COATED ORAL at 09:31

## 2021-02-19 RX ADMIN — IPRATROPIUM BROMIDE AND ALBUTEROL SULFATE 3 ML: .5; 3 SOLUTION RESPIRATORY (INHALATION) at 06:55

## 2021-02-19 NOTE — PLAN OF CARE
Goal Outcome Evaluation:        Outcome Summary: VSS pt denies any chest pain, N/V/D at this time.  Pt continue to be unable to move his rt arm and leg.  Facial movement still symetrical.  PT worked with patient today with some movements.  Dr. Loza did video chat with patient.  Still waiting on rehab bed.  Pt continues to have some pain in his right hip.  Call light in reach.  Will continue to monitor.

## 2021-02-19 NOTE — THERAPY TREATMENT NOTE
Acute Care - Physical Therapy Treatment Note   [facility]     Patient Name: Valerie Moore  : 1955  MRN: [id number]  Today's Date: 2021   Onset of Illness/Injury or Date of Surgery: 21       PT Assessment (last 12 hours)      PT Evaluation and Treatment     Row Name 21 1414          Physical Therapy Time and Intention    Subjective Information  complains of;weakness;fatigue  -RG     Document Type  therapy note (daily note)  -RG     Mode of Treatment  individual therapy;physical therapy  -RG     Patient Effort  good  -RG     Symptoms Noted During/After Treatment  fatigue  -RG     Comment  Pt stated that he slept better last night and has more feeling in his face.  Pt had minimal movement of R UE today.  Pt sat EOB with supervision assist.  Pts O2 dropped with exercises but recovered quickly with pursed lip breathing.   Pt required several rest breaks secondary to fatigue and desat.  -RG     Row Name 21 1414          General Information    Existing Precautions/Restrictions  fall  -RG     Row Name 21 1414          Cognition    Affect/Mental Status (Cognitive)  WNL  -RG     Orientation Status (Cognition)  oriented x 3;time  -RG     Follows Commands (Cognition)  WFL  -RG     Personal Safety Interventions  gait belt;nonskid shoes/slippers when out of bed;fall prevention program maintained  -RG     Row Name 21 1414          Pain Scale: Numbers Pre/Post-Treatment    Pretreatment Pain Rating  3/10  -RG     Posttreatment Pain Rating  3/10  -RG     Row Name 21 1414          Mobility    Left Lower Extremity (Weight-bearing Status)  full weight-bearing (FWB)  -RG     Right Lower Extremity (Weight-bearing Status)  full weight-bearing (FWB)  -RG     Row Name 21 1414          Bed Mobility    Scooting/Bridging [place] (Bed Mobility)  verbal cues;nonverbal cues (demo/gesture);minimum assist (75% patient effort)  -RG     Supine-Sit [place] (Bed Mobility)  verbal  cues;nonverbal cues (demo/gesture);minimum assist (75% patient effort);moderate assist (50% patient effort)  -     Bed Mobility, Safety Issues  decreased use of arms for pushing/pulling;decreased use of legs for bridging/pushing  -     Assistive Device (Bed Mobility)  bed rails  -     Row Name 02/19/21 1414          Transfers    Transfers  bed-chair transfer;sit-stand transfer;stand-sit transfer;stand pivot/stand step transfer  -     Bed-Chair [place] (Transfers)  verbal cues;nonverbal cues (demo/gesture);minimum assist (75% patient effort);moderate assist (50% patient effort)  -     Sit-Stand [place] (Transfers)  verbal cues;nonverbal cues (demo/gesture);minimum assist (75% patient effort);moderate assist (50% patient effort)  -     Stand-Sit [place] (Transfers)  verbal cues;nonverbal cues (demo/gesture);minimum assist (75% patient effort);moderate assist (50% patient effort)  -     Row Name 02/19/21 1414          Stand Pivot/Stand Step Transfer    Stand Pivot/Stand Step [place] (Transfers)  verbal cues;nonverbal cues (demo/gesture);minimum assist (75% patient effort);moderate assist (50% patient effort)  -     Row Name 02/19/21 1414          Gait/Stairs (Locomotion)    Comment (Gait/Stairs)  TBA  -     Row Name 02/19/21 1414          Safety Issues, Functional Mobility    Impairments Affecting Function (Mobility)  balance;coordination;endurance/activity tolerance;grasp;motor control;muscle tone abnormal;pain;range of motion (ROM);strength  -     Row Name 02/19/21 1414          Balance    Static Sitting Balance  WFL;unsupported;sitting in chair  -     Dynamic Sitting Balance  mild impairment  -     Balance Interventions  sitting;minimal challenge  -     Comment, Balance  supervision sitting balance. Balance challanged in sitting positon EOB.   -     Row Name 02/19/21 1414          Hip (Therapeutic Exercise)    Hip AAROM (Therapeutic Exercise)   left;flexion;aBduction;aDduction;sitting;10 repetitions;2 sets  -RG     Row Name 02/19/21 1414          Knee (Therapeutic Exercise)    Knee AAROM (Therapeutic Exercise)  left;flexion;extension;sitting;10 repetitions;2 sets  -RG     Row Name 02/19/21 1414          Ankle (Therapeutic Exercise)    Ankle (Therapeutic Exercise)  strengthening exercise  -RG     Ankle Strengthening (Therapeutic Exercise)  left;dorsiflexion;plantarflexion;sitting;10 repetitions;2 sets  -RG     Row Name 02/19/21 1414          Coping    Observed Emotional State  calm;cooperative  -RG     Verbalized Emotional State  acceptance  -RG     Row Name 02/19/21 1414          Positioning and Restraints    Pre-Treatment Position  in bed  -RG     Post Treatment Position  bed  -RG     In Bed  notified nsg;supine;call light within reach;encouraged to call for assist;legs elevated  -RG       User Key  (r) = Recorded By, (t) = Taken By, (c) = Cosigned By    Initials Name Provider Type    Jevon Castañeda PTA Physical Therapy Assistant        Physical Therapy Education                 Title: PT OT SLP Therapies (Done)     Topic: Physical Therapy (Done)     Point: Mobility training (Done)     Learning Progress Summary           Patient Acceptance, E,D, VU,NR by GREGG at 2/19/2021 1427    Acceptance, E,TB, VU by HAILY at 2/19/2021 1323    Acceptance, E, VU by JOSEPHINE at 2/18/2021 0900    Acceptance, E,D, VU,NR by  at 2/17/2021 1502                   Point: Home exercise program (Done)     Learning Progress Summary           Patient Acceptance, E,D, VU,NR by GREGG at 2/19/2021 1427    Acceptance, E,TB, VU by HAILY at 2/19/2021 1323    Acceptance, E, VU by JOSEPHINE at 2/18/2021 0900    Acceptance, E,D, VU,NR by AG at 2/17/2021 1502                   Point: Body mechanics (Done)     Learning Progress Summary           Patient Acceptance, E,D, VU,NR by GREGG at 2/19/2021 1427    Acceptance, E,TB, VU by HAILY at 2/19/2021 1323    Acceptance, E, VU by JOSEPHINE at 2/18/2021 0900    Acceptance, E,D,  VU,NR by  at 2/17/2021 1502                   Point: Precautions (Done)     Learning Progress Summary           Patient Acceptance, E,D, VU,NR by GREGG at 2/19/2021 1427    Acceptance, E,TB, VU by  at 2/19/2021 1323    Acceptance, E, VU by  at 2/18/2021 0900    Acceptance, E,D, VU,NR by  at 2/17/2021 1502                               User Key     Initials Effective Dates Name Provider Type Replaced by [facility] 04/03/18 -  Zoila Morse, PT Physical Therapist PT    JOSEPHINE 07/25/18 -  Julianne Denis, RN Registered Nurse Nurse    HAILY 08/09/18 -  Renetta Ely RN Registered Nurse Nurse    GREGG 10/31/19 -  Jevon Silver PTA Physical Therapy Assistant PT              PT Recommendation and Plan  Anticipated Discharge Disposition (PT): inpatient rehabilitation facility          Time Calculation:   PT Charges     Row Name 02/19/21 1427             Time Calculation    PT Received On  02/19/21  -         Time Calculation- PT    Total Timed Code Minutes- PT  46 minute(s)  -        User Key  (r) = Recorded By, (t) = Taken By, (c) = Cosigned By    Initials Name Provider Type     Jevon Silver PTA Physical Therapy Assistant        Therapy Charges for Today     Code Description Service Date Service Provider Modifiers Qty    29145823519 HC PT THER PROC EA 15 MIN 2/19/2021 Jevon Silver PTA GP 1    94355897467 HC PT NEUROMUSC RE EDUCATION EA 15 MIN 2/19/2021 Jevon Silver PTA GP 2               Jevon Silver PTA  2/19/2021

## 2021-02-19 NOTE — THERAPY TREATMENT NOTE
Acute Care - Speech Language Pathology Treatment Note  [facility]     Patient Name: Valerie Moore  : 1955  MRN: [id number]  Today's Date: 2021  Onset of Illness/Injury or Date of Surgery: 21     Referring Physician: Dr. Bailey      Admit Date: 2021      Mr. Moore was seen at bedside this am for continued dysarthria tx to R orofacial surfaces. He is a/a and interactive, positioned upright and centered in bed. Noted tentative transfer to inpatient physical rehabilitation unit. He is a/a and interactive. He reports good po intake and acceptance, denying and dysphagia.      Mr. Moore performs OROM/TASHA ex. x3 sets x5 reps w/ cues. He tolerates manual massage to R buccal surfaces for 3 minutes w/ increased surface blood flow observed. He does report improvements in sensation. Symmetry is significantly improved today vs. Previous session. Speech clarity and intelligibility are clear at all levels. He denies any concerns.     Visit Dx:    ICD-10-CM ICD-9-CM   1. Cerebrovascular accident (CVA), unspecified mechanism (CMS/[facility])  I63.9 434.91     Patient Active Problem List   Diagnosis   • Anxiety   • Hypertension   • Hypercholesteremia   • Seasonal allergies   • Sleep disorder   • GERD (gastroesophageal reflux disease)   • Arthritis   • Airway hyperreactivity   • Gout   • Weakness   • CAP (community acquired pneumonia)   • Hypokalemia   • Hyponatremia   • Hyperglycemia   • Normocytic anemia   • Abdominal aortic aneurysm (AAA) 3.0 cm to 5.5 cm in diameter in male (CMS/[facility])   • Tobacco abuse   • Chest pain, atypical   • Cerebrovascular accident (CVA) (CMS/[facility])     Past Medical History:   Diagnosis Date   • Anxiety    • GERD (gastroesophageal reflux disease) 2017   • Hypercholesteremia    • Hypertension    • Seasonal allergies    • Sleep disorder      Past Surgical History:   Procedure Laterality Date   • HERNIA REPAIR     • HIP SURGERY Right     plate and 8 screws in right hip        EDUCATION  The patient has been educated in the following areas:     Communication Impairment.    Impression:  Mr. Moore's orofacial dysarthria has resolved. His verbal communication is 100% intelligible at all levels. He continues to safely accept his least restrictive regular consistency po diet. Per this status, no further formal SLP f/u recommended.     SLP Recommendation and Plan    No further formal SLP f/u recommended.     Patient was not wearing a face mask during this therapy encounter. Therapist used appropriate personal protective equipment including mask, eye protection and gloves.  Mask used was standard procedure mask. Appropriate PPE was worn during the entire therapy session. The patient did not cough during this evaluation. Therapist was within 6 feet for 15 minutes or more during the evaluation. Hand hygiene was completed before and after therapy session. Patient is not in enhanced droplet precautions.      Thank you   Ondina Nair M.S, CCC/SLP                                                              Time Calculation:     Time Calculation- SLP     Time Calculation- SLP     Row Name 02/19/21 1330      SLP - Next Appointment  02/22/21  -Recorded by: ARTHUR             User Key     Initials Name Provider Type    Ondina Shane, MS CCC-SLP Speech and Language Pathologist                   Ondina Nair, MS LAMBERT-SLP  2/19/2021

## 2021-02-19 NOTE — PROGRESS NOTES
Discharge Planning Assessment  [facility] No     Patient Name: Valerie Moore  MRN: [id number]  Today's Date: 2/19/2021    Admit Date: 2/16/2021        Discharge Plan     Row Name 02/19/21 1410       Plan    Plan  Pt was admitted on 2/16/21. Pt is currently awaiting approval from insurance for admission to Inpatient Rehab. Pt is agreeable to Inpatient Rehab. SS will continue to follow and assist with discharge planning.    Patient/Family in Agreement with Plan  yes              Caryl Michaels

## 2021-02-19 NOTE — PLAN OF CARE
Goal Outcome Evaluation:    Pt resting in bed. VSS on 2L NC. A&Ox4. Afebrile. Hemiparesis to right side. Stroke score 9. No shortness of breath or distress noted. Call light in reach.

## 2021-02-19 NOTE — PROGRESS NOTES
[facility]     Patient Identification:  Name:  Valerie Moore  Age:  65 y.o.  Sex:  male  :  1955  MRN:  [id number]  Visit Number:  [id number]  Primary Care Provider:  Darwin Alvarez MD    Length of stay:  3    Subjective: Patient seen and examined, assisted by Renetta Ely RN.  Patient is awake and alert, no complaints, no improvement of right-sided weakness, no headache, no nausea vomiting no visual disturbances.  Pressure was still elevated, low pressure has been increased.  Neurology evaluation noted appreciated.    Chief Complaint: Right-sided weakness  ----------------------------------------------------------------------------------------------------------------------  \[facility] [place] [facility]\"" Meds:  amLODIPine, 10 mg, Oral, Q24H  aspirin, 325 mg, Oral, Daily  atorvastatin, 80 mg, Oral, Nightly  clopidogrel, 75 mg, Oral, Daily  heparin (porcine), 5,000 Units, Subcutaneous, Q12H  losartan, 100 mg, Oral, Q24H  metoprolol tartrate, 50 mg, Oral, Q12H  multivitamin, 1 tablet, Oral, Daily  nicotine, 1 patch, Transdermal, Q24H  pantoprazole, 40 mg, Oral, Q AM  sodium chloride, 10 mL, Intravenous, Q12H         ----------------------------------------------------------------------------------------------------------------------  Vital Signs:  Temp:  [97.2 °F (36.2 °C)-98 °F (36.7 °C)] 98 °F (36.7 °C)  Heart Rate:  [] 73  Resp:  [10-] 10  BP: (127-182)/() 176/93       Tele: Sinus rhythm 72 bpm      21  0000 21  0500 21  0426   Weight: 84.8 kg (187 lb) 83.3 kg (183 lb 9.6 oz) 80.9 kg (178 lb 6.4 oz)     Body mass index is 27.94 kg/m².    Intake/Output Summary (Last 24 hours) at 2021 1149  Last data filed at 2021 0702  Gross per 24 hour   Intake 480 ml   Output 1425 ml   Net -945 ml     Diet Regular; Cardiac  ----------------------------------------------------------------------------------------------------------------------  Physical  exam:  General: Comfortable,awake, alert, oriented to self, place, and time, well-developed and well-nourished.  No respiratory distress.    Skin:  Skin is warm and dry. No rash noted. No pallor.    HENT:  Head:  Normocephalic and atraumatic.  Mouth:  Moist mucous membranes.  Slight right nasolabial flattening  Eyes:  Conjunctivae and EOM are normal.  Pupils are equal, round, and reactive to light.  No scleral icterus.    Neck:  Neck supple.  No JVD present.    Pulmonary/Chest:  No respiratory distress, no wheezes, no crackles, with normal breath sounds and good air movement.  Cardiovascular:  Normal rate, regular rhythm and normal heart sounds with no murmur.  Abdominal:  Soft.  Bowel sounds are normal.  No distension and no tenderness.   Extremities:  No edema, no tenderness, and no deformity.  No red or swollen joints anywhere.  Strong pulses in all 4 extremities with no clubbing, no cyanosis, no edema.  Neurological: Right nasolabial flattening mild, pure motor weakness right-sided upper and lower extremity.   No slurred speech.    Genitourinary: No Erickson catheter  Back:  ----------------------------------------------------------------------------------------------------------------------  ----------------------------------------------------------------------------------------------------------------------  Results from last 7 days   Lab Units 02/16/21  1721   CK TOTAL U/L 648*   TROPONIN T ng/mL <0.010     Results from last 7 days   Lab Units 02/17/21 0320 02/16/21  1721   CRP mg/dL  --  1.93*   WBC 10*3/mm3 5.87 7.59   HEMOGLOBIN g/dL 14.2 14.0   HEMATOCRIT % 43.5 42.5   MCV fL 87.0 87.1   MCHC g/dL 32.6 32.9   PLATELETS 10*3/mm3 247 328         Results from last 7 days   Lab Units 02/17/21 0323 02/16/21  1723 02/16/21  1721   SODIUM mmol/L 135*  --  132*   POTASSIUM mmol/L 3.9  --  4.0   MAGNESIUM mg/dL  --   --  2.0   CHLORIDE mmol/L 101  --  98   CO2 mmol/L 18.7*  --  20.6*   BUN mg/dL 15  --  12    CREATININE mg/dL 0.93 0.90 0.88   EGFR IF NONAFRICN AM mL/min/1.73 81  --  86   CALCIUM mg/dL 9.9  --  10.0   GLUCOSE mg/dL 169*  --  101*   ALBUMIN g/dL 4.25  --  4.35   BILIRUBIN mg/dL 0.2  --  0.3   ALK PHOS U/L 109  --  112   AST (SGOT) U/L 35  --  39   ALT (SGPT) U/L 20  --  20   Estimated Creatinine Clearance: 78.5 mL/min (by C-G formula based on SCr of 0.93 mg/dL).    No results found for: AMMONIA  Results from last 7 days   Lab Units 02/17/21  0323   CHOLESTEROL mg/dL 165   TRIGLYCERIDES mg/dL 58   HDL CHOL mg/dL 53   LDL CHOL mg/dL 100     Blood Culture   Date Value Ref Range Status   02/16/2021 No growth at 2 days  Preliminary   02/16/2021 No growth at 2 days  Preliminary     No results found for: URINECX  No results found for: WOUNDCX  No results found for: STOOLCX    I have personally looked at the labs and they are summarized above.  ----------------------------------------------------------------------------------------------------------------------  Imaging Results (Last 24 Hours)     ** No results found for the last 24 hours. 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  Patient awaiting for inpatient rehab.      Karely Edwards MD  02/19/21  11:49 EST

## 2021-02-19 NOTE — CONSULTS
"Neuro Progress Note    Patient Name: Valerie Moore   MRN: [id number]  Age: 65 y.o.  Sex: male  : 1955    Primary Care Physician: Darwin Alvarez MD  Referring Physician:  No ref. provider found    Subjective:  No new focal weakness, No new neurological complains, No seizures or auras or abnormal movements. No headache or neck stiffness.         Objective:  AAOX3 follow multi step command, Track objects all directions. No visual filed cut . Pupils equal and reactive. Right  facial droop. No nystagmus. Normal language fluency, repetition and comprehension. Power: Move left extremities without limitation. No upper or lower extremity movement  Did good finger nose finger in one side. Normal sensory examination. Didn't evaluate gait at this time.    /88   Pulse 74   Temp 97.7 °F (36.5 °C) (Oral)   Resp 14   Ht 170.2 cm (67\")   Wt 80.9 kg (178 lb 6.4 oz)   SpO2 100%   BMI 27.94 kg/m²     Hospital Meds:  Scheduled- amLODIPine, 10 mg, Oral, Q24H  aspirin, 325 mg, Oral, Daily  atorvastatin, 80 mg, Oral, Nightly  clopidogrel, 75 mg, Oral, Daily  heparin (porcine), 5,000 Units, Subcutaneous, Q12H  losartan, 100 mg, Oral, Q24H  metoprolol tartrate, 50 mg, Oral, Q12H  multivitamin, 1 tablet, Oral, Daily  nicotine, 1 patch, Transdermal, Q24H  pantoprazole, 40 mg, Oral, Q AM  sodium chloride, 10 mL, Intravenous, Q12H      Infusions-     PRNs- •  ALPRAZolam  •  carisoprodol  •  gabapentin  •  hydrALAZINE  •  HYDROcodone-acetaminophen  •  ipratropium-albuterol  •  nitroglycerin  •  sodium chloride    Results Reviewed:  I have personally reviewed current lab, radiology, and data and agree with results.    Radiology Results  Results for orders placed during the hospital encounter of 21   Adult Transthoracic Echo Complete w/ Color, Spectral and Contrast if necessary per protocol    Narrative · Estimated left ventricular EF = 65% Left ventricular systolic function   is normal.  · Mild aortic valve stenosis is " present.  · Estimated right ventricular systolic pressure from tricuspid   regurgitation is normal (<35 mmHg).  · Mild Aortic regurgitation is present  · Trivial to mild MR is noted  · No previous echo        Results for orders placed during the hospital encounter of 02/16/21   CT Cervical Spine Without Contrast    Narrative EXAM:    CT Cervical Spine Without Intravenous Contrast     EXAM DATE:    2/16/2021 5:35 PM     CLINICAL HISTORY:    right sided weakness     TECHNIQUE:    Axial computed tomography images of the cervical spine without  intravenous contrast.  Sagittal and coronal reformatted images were  created and reviewed.  This CT exam was performed using one or more of  the following dose reduction techniques:  automated exposure control,  adjustment of the mA and/or kV according to patient size, and/or use of  iterative reconstruction technique.     COMPARISON:    No relevant prior studies available.     FINDINGS:    Limitations:  Moderate limitation is noted secondary to patient  positioning and partial rotation of the cervical spine.    Vertebrae:  No discrete fracture lines are identified.    Discs/spinal canal/neural foramina:  Advanced multilevel degenerative  disc disease C3/4 through C6/7 with mild central canal and mild/moderate  neural foraminal stenosis noted.    Soft tissues:  Unremarkable.    Mastoid air cells:  Small right mastoid effusion.       Impression 1.  Limited exam due to patient positioning and cervical spine rotation  without convincing evidence of acute fracture.  2.  No traumatic malalignment is identified.  3.  Small right mastoid effusion.  4.  Advanced degenerative changes with multilevel stenosis.  5.  If persistent concern for cervical spine injury, repeat exam may be  indicated.     This report was finalized on 2/16/2021 6:01 PM by Dr. Johnathon Clark MD.        Results for orders placed during the hospital encounter of 02/16/21   MRI Cervical Spine Without Contrast     Narrative EXAMINATION: MRI CERVICAL SPINE WO CONTRAST-      CLINICAL INDICATION:  Poss spinal cord injury      TECHNIQUE: Sagittal spin echo scans were obtained from the posterior  fossa through the upper thoracic spine and axial scans were performed  through selected cervical disc space levels, utilizing both spin echo  and gradient echo scans technique without contrast administration.      COMPARISON: CT same day         FINDINGS:     HARDWARE:  No hardware.     ALIGNMENT:  Degenerative listhesis posteriorly of C3 on C4 in the setting of facet  arthropathy. Remaining vertebral bodies and a straight preserved  alignment.     SPINAL CORD:?   Motion artifact limits assessment of the cord but no obvious cord  lesions identified.      BONES:   No fracture. No marrow signal abnormality.     POSTERIOR ELEMENTS:  The posterior elements appear intact. No spondylolysis or congenital  fusion identified.     POSTERIOR FOSSA:  The partially imaged posterior fossa is unremarkable.     SOFT TISSUES:   The visualized paraspinal soft tissues are unremarkable.       C2/3 level:  No disk bulge or protrusion.  No central canal or neuroforaminal  stenosis.     C3/4 level:  Broad-based posterior disc osteophyte complex. Bilateral facet  arthropathy. Mild central canal stenosis. Severe left greater than right  neural foraminal stenosis.     C4/5 level:  Diffuse disc bulge and small superimposed central posterior disc  protrusion. Mild central canal stenosis. Bilateral facet arthropathy.  There is moderate bilateral neural foraminal stenosis.     C5/6 level:  Broad-based posterior disc osteophyte complex. Left greater than right  hypertrophic facet arthropathy. Moderate central canal stenosis.  Moderate left greater than right neural foraminal stenosis.     C6/7 level:  Broad-based posterior disc osteophyte complex. Left greater than right  hypertrophic facet arthropathy. Mild central canal stenosis. Moderate  left greater than right  neural foraminal stenosis.     C7/T1 level:  Diffuse disc bulge. Mild facet arthropathy. No significant stenosis.     OTHER:   No additional remarkable findings.       Impression 1. No acute fracture or traumatic malalignment.  2. Advanced multilevel degenerative disc disease with facet arthropathy  with variable central canal and neural foraminal stenosis detailed  above. Stenosis most severe at the C3/4 level.  3. Degenerative listhesis posteriorly of C3 on C4.     This report was finalized on 2/16/2021 8:59 PM by Dr. Johnathon Clark MD.            Lab Results  Lab Results   Lab Value Date/Time    CHOL 165 02/17/2021 0323    HDL 53 02/17/2021 0323     02/17/2021 0323    TRIG 58 02/17/2021 0323     Results from last 7 days   Lab Units 02/17/21  0320 02/16/21  1721   WBC 10*3/mm3 5.87 7.59   HEMOGLOBIN g/dL 14.2 14.0   MCV fL 87.0 87.1   PLATELETS 10*3/mm3 247 328   NEUTROPHIL % % 86.1* 75.9   LYMPHOCYTE % % 12.8* 17.8*   EOSINOPHIL % % 0.0* 1.4   IMM GRAN % % 0.2 0.3   NEUTROS ABS 10*3/mm3 5.06 5.76   LYMPHS ABS 10*3/mm3 0.75 1.35   EOS ABS 10*3/mm3 0.00 0.11   IMMATURE GRANS (ABS) 10*3/mm3 0.01 0.02     Results from last 7 days   Lab Units 02/17/21  0323   SODIUM mmol/L 135*   POTASSIUM mmol/L 3.9   CHLORIDE mmol/L 101   CO2 mmol/L 18.7*   BUN mg/dL 15   CREATININE mg/dL 0.93   GLUCOSE mg/dL 169*   CALCIUM mg/dL 9.9             Assessment:  Acute Ischemic affecting  left basal ganglionic, left external  capsule, and left pericallosal.    Plan:  MRI of the brain noted.  CT angiogram brain and neck noted.  Echocardiogram noted.  Dual antiplatelet therapy and high-dose Lipitor.    Physical therapy, occupational therapy.  Advance activity as tolerated.  No further recommendation needed this time.

## 2021-02-20 ENCOUNTER — APPOINTMENT (OUTPATIENT)
Dept: CT IMAGING | Facility: HOSPITAL | Age: 66
End: 2021-02-20

## 2021-02-20 PROCEDURE — 94799 UNLISTED PULMONARY SVC/PX: CPT

## 2021-02-20 PROCEDURE — 73700 CT LOWER EXTREMITY W/O DYE: CPT

## 2021-02-20 PROCEDURE — 25010000002 HYDRALAZINE PER 20 MG: Performed by: HOSPITALIST

## 2021-02-20 PROCEDURE — 99232 SBSQ HOSP IP/OBS MODERATE 35: CPT | Performed by: HOSPITALIST

## 2021-02-20 PROCEDURE — 25010000002 HEPARIN (PORCINE) PER 1000 UNITS: Performed by: HOSPITALIST

## 2021-02-20 RX ORDER — BISACODYL 10 MG
10 SUPPOSITORY, RECTAL RECTAL ONCE
Status: COMPLETED | OUTPATIENT
Start: 2021-02-20 | End: 2021-02-20

## 2021-02-20 RX ORDER — POLYETHYLENE GLYCOL 3350 17 G/17G
17 POWDER, FOR SOLUTION ORAL DAILY
Status: DISCONTINUED | OUTPATIENT
Start: 2021-02-20 | End: 2021-02-22 | Stop reason: HOSPADM

## 2021-02-20 RX ORDER — LACTULOSE 10 G/15ML
30 SOLUTION ORAL DAILY
Status: COMPLETED | OUTPATIENT
Start: 2021-02-20 | End: 2021-02-20

## 2021-02-20 RX ADMIN — LACTULOSE 30 G: 10 SOLUTION ORAL at 11:29

## 2021-02-20 RX ADMIN — CLOPIDOGREL 75 MG: 75 TABLET, FILM COATED ORAL at 08:50

## 2021-02-20 RX ADMIN — HYDROCHLOROTHIAZIDE 25 MG: 25 TABLET ORAL at 08:49

## 2021-02-20 RX ADMIN — IPRATROPIUM BROMIDE AND ALBUTEROL SULFATE 3 ML: .5; 3 SOLUTION RESPIRATORY (INHALATION) at 18:55

## 2021-02-20 RX ADMIN — ASPIRIN 325 MG: 325 TABLET ORAL at 08:50

## 2021-02-20 RX ADMIN — LOSARTAN POTASSIUM 100 MG: 50 TABLET, FILM COATED ORAL at 08:49

## 2021-02-20 RX ADMIN — ALPRAZOLAM 0.5 MG: 0.5 TABLET ORAL at 08:50

## 2021-02-20 RX ADMIN — HYDROCODONE BITARTRATE AND ACETAMINOPHEN 1 TABLET: 7.5; 325 TABLET ORAL at 08:50

## 2021-02-20 RX ADMIN — METOPROLOL TARTRATE 75 MG: 50 TABLET, FILM COATED ORAL at 21:27

## 2021-02-20 RX ADMIN — BISACODYL 10 MG: 10 SUPPOSITORY RECTAL at 11:32

## 2021-02-20 RX ADMIN — HYDROCODONE BITARTRATE AND ACETAMINOPHEN 1 TABLET: 7.5; 325 TABLET ORAL at 21:47

## 2021-02-20 RX ADMIN — SODIUM CHLORIDE, PRESERVATIVE FREE 10 ML: 5 INJECTION INTRAVENOUS at 08:50

## 2021-02-20 RX ADMIN — AMLODIPINE BESYLATE 10 MG: 10 TABLET ORAL at 08:49

## 2021-02-20 RX ADMIN — POLYETHYLENE GLYCOL (3350) 17 G: 17 POWDER, FOR SOLUTION ORAL at 11:31

## 2021-02-20 RX ADMIN — CARISOPRODOL 350 MG: 350 TABLET ORAL at 23:46

## 2021-02-20 RX ADMIN — HEPARIN SODIUM 5000 UNITS: 5000 INJECTION INTRAVENOUS; SUBCUTANEOUS at 08:49

## 2021-02-20 RX ADMIN — CARISOPRODOL 350 MG: 350 TABLET ORAL at 08:50

## 2021-02-20 RX ADMIN — HYDRALAZINE HYDROCHLORIDE 10 MG: 20 INJECTION INTRAMUSCULAR; INTRAVENOUS at 03:29

## 2021-02-20 RX ADMIN — Medication 1 TABLET: at 08:49

## 2021-02-20 RX ADMIN — PANTOPRAZOLE SODIUM 40 MG: 40 TABLET, DELAYED RELEASE ORAL at 06:13

## 2021-02-20 RX ADMIN — ALPRAZOLAM 0.5 MG: 0.5 TABLET ORAL at 23:46

## 2021-02-20 RX ADMIN — IPRATROPIUM BROMIDE AND ALBUTEROL SULFATE 3 ML: .5; 3 SOLUTION RESPIRATORY (INHALATION) at 07:10

## 2021-02-20 RX ADMIN — HYDRALAZINE HYDROCHLORIDE 10 MG: 20 INJECTION INTRAMUSCULAR; INTRAVENOUS at 15:21

## 2021-02-20 RX ADMIN — METOPROLOL TARTRATE 50 MG: 50 TABLET, FILM COATED ORAL at 08:49

## 2021-02-20 RX ADMIN — HEPARIN SODIUM 5000 UNITS: 5000 INJECTION INTRAVENOUS; SUBCUTANEOUS at 21:27

## 2021-02-20 RX ADMIN — ATORVASTATIN CALCIUM 80 MG: 40 TABLET, FILM COATED ORAL at 21:27

## 2021-02-20 RX ADMIN — SODIUM CHLORIDE, PRESERVATIVE FREE 10 ML: 5 INJECTION INTRAVENOUS at 21:27

## 2021-02-20 RX ADMIN — NICOTINE 1 PATCH: 14 PATCH, EXTENDED RELEASE TRANSDERMAL at 08:48

## 2021-02-20 NOTE — CONSULTS
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 techniques:  automated exposure control,  adjustment of the mA and/or kV according to patient size, and/or use of  iterative reconstruction technique.     COMPARISON:    No relevant prior studies available.     FINDINGS:    Limitations:  Moderate limitation is noted secondary to patient  positioning and partial rotation of the cervical spine.    Vertebrae:  No discrete fracture lines are identified.    Discs/spinal canal/neural foramina:  Advanced multilevel degenerative  disc disease C3/4 through C6/7 with mild central canal and mild/moderate  neural foraminal stenosis noted.    Soft tissues:  Unremarkable.    Mastoid air cells:  Small right mastoid effusion.       Impression:      1.  Limited exam due to patient positioning and cervical spine rotation  without convincing evidence of acute fracture.  2.  No traumatic malalignment is identified.  3.  Small right mastoid effusion.  4.  Advanced degenerative changes with multilevel stenosis.  5.  If persistent concern for cervical spine injury, repeat exam may be  indicated.     This report was finalized on 2/16/2021 6:01 PM by Dr. Johnathon Clark MD.       CT Head Without Contrast Stroke Protocol [[id number]] Collected: 02/16/21 1718     Updated: 02/16/21 1722    Narrative:      EXAM:     CT Head Without Intravenous Contrast     EXAM DATE:    2/16/2021 5:09 PM     CLINICAL HISTORY:    Neuro deficit, acute, stroke suspected     TECHNIQUE:    Axial computed tomography images of the head/brain without intravenous  contrast.  Sagittal and coronal reformatted images were created and  reviewed.  This CT exam was performed using one or more of the following  dose reduction techniques:  automated exposure control, adjustment of  the mA and/or kV according to patient size, and/or use of iterative  reconstruction technique.     COMPARISON:    No relevant prior studies available.     FINDINGS:    Brain:  Generalized cerebral atrophy is noted.  Advanced chronic small  vessel ischemic disease.  No hemorrhage.    Ventricles:  Unremarkable.  No ventriculomegaly.    Bones/joints:  Unremarkable.  No acute fracture.    Soft tissues:  Unremarkable.    Sinuses:  Unremarkable as visualized.  No acute sinusitis.    Mastoid air cells:  Unremarkable as visualized.  No mastoid effusion.       Impression:      1.  No CT evidence of acute intracranial abnormality.  2.  Generalized atrophy and advanced chronic small vessel ischemic  disease noted.     This report was finalized on 2/16/2021 5:20 PM by Dr. Johnathon Clark MD.           ----------------------------------------------------------------------------------------------------------------------  Assessment:   1.  Right hip pain status post previous surgical intervention          Plan:   The findings on the x-ray of the right hip appear to be chronic.  There appears to be no acute abnormality noted.  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 The patient does not recall exact surgical intervention performed in 1992.  At this time there appears to be no acute surgical intervention indicated for for the patient's right hip pain.  Would recommend obtaining CT of the pelvis for further imaging.  Orthopedic surgery will review CT of the pelvis  after imaging completed for further recommendations of care.  Orthopedic surgery will follow.    Thank you for the consult.    RENO Javier  02/20/21  15:26 EST

## 2021-02-20 NOTE — PLAN OF CARE
Goal Outcome Evaluation:  Plan of Care Reviewed With: patient  Progress: improving  Outcome Summary: Pt resting in bed. Pt still unable to move right arm and leg. Pt complained of some back and hip pain and PRN pain medication administered. Pt's BP has been running high. PRN hydralazine administered. Otherwise, VSS. No other needs at this time. Will continue to monitor.

## 2021-02-20 NOTE — PLAN OF CARE
Goal Outcome Evaluation:        Outcome Summary: VSS except blood pressure continues to run high.  Pt given meds for constipation.  Pt had moderate bowel movement.  Pt can move his rt arm and leg a little today but still cannot move his fingers.  Ortho has been consulted for rt hip abnormal xray.  Call light in reach.  Will continue to monitor.

## 2021-02-20 NOTE — PROGRESS NOTES
"    [facility] HOSPITALIST PROGRESS NOTE     Patient Identification:  Name:  Valerie Moore  Age:  65 y.o.  Sex:  male  :  1955  MRN:  [id number]  Visit Number:  [id number]  Primary Care Provider:  Darwin Alvarez MD    Length of stay:  4    Subjective: Patient seen and examined assisted by Renetta Ely RN.  Patient is now able to slightly move his right upper and lower extremity although the patient has chronic hip he reports more pain and more continuous than usual.  He reported he has fallen at least 8 times recently prior to admission.  I have ordered an x-ray of the hip and revealed previous surgery of the hip, possible superior migration of the femur.  I have asked orthopedics to review this.      Patient reports years ago after the surgery he was informed that \"one of the screw has moved \".  Again I will have the orthopedics review the x-ray.     His blood pressure still elevated, I have added hydrochlorothiazide yesterday afternoon.  Patient is also constipated    Chief Complaint: Hip pain  ----------------------------------------------------------------------------------------------------------------------  Current Hospital Meds:  amLODIPine, 10 mg, Oral, Q24H  aspirin, 325 mg, Oral, Daily  atorvastatin, 80 mg, Oral, Nightly  bisacodyl, 10 mg, Rectal, Once  clopidogrel, 75 mg, Oral, Daily  heparin (porcine), 5,000 Units, Subcutaneous, Q12H  hydroCHLOROthiazide, 25 mg, Oral, Daily  lactulose, 30 g, Oral, Daily  losartan, 100 mg, Oral, Q24H  metoprolol tartrate, 50 mg, Oral, Q12H  multivitamin, 1 tablet, Oral, Daily  nicotine, 1 patch, Transdermal, Q24H  pantoprazole, 40 mg, Oral, Q AM  polyethylene glycol, 17 g, Oral, Daily  sodium chloride, 10 mL, Intravenous, Q12H         ----------------------------------------------------------------------------------------------------------------------  Vital Signs:  Temp:  [96.5 °F (35.8 °C)-98 °F (36.7 °C)] 96.5 °F (35.8 °C)  Heart Rate:  [] " 112  Resp:  [12-22] 17  BP: (140-179)/() 153/98       Tele: Sinus rhythm 99 bpm      02/18/21  0500 02/19/21  0426 02/20/21  0424   Weight: 83.3 kg (183 lb 9.6 oz) 80.9 kg (178 lb 6.4 oz) 81 kg (178 lb 9.6 oz)     Body mass index is 27.97 kg/m².    Intake/Output Summary (Last 24 hours) at 2/20/2021 1112  Last data filed at 2/20/2021 1002  Gross per 24 hour   Intake 615 ml   Output 2650 ml   Net -2035 ml     Diet Regular; Cardiac  ----------------------------------------------------------------------------------------------------------------------  Physical exam:  General: Comfortable,awake, alert, oriented to self, place, and time, well-developed and well-nourished.  No respiratory distress.    Skin:  Skin is warm and dry. No rash noted. No pallor.    HENT:  Head:  Normocephalic and atraumatic.  Mouth:  Moist mucous membranes.  Slight right-sided nasolabial flattening  Eyes:  Conjunctivae and EOM are normal.  Pupils are equal, round, and reactive to light.  No scleral icterus.    Neck:  Neck supple.  No JVD present.    Pulmonary/Chest:  No respiratory distress, no wheezes, no crackles, with normal breath sounds and good air movement.  Cardiovascular:  Normal rate, regular rhythm and normal heart sounds with no murmur.  Abdominal:  Soft.  Bowel sounds are normal.  No distension and no tenderness.   Extremities:  No edema, no tenderness, and no deformity.  No red or swollen joints anywhere.  Strong pulses in all 4 extremities with no clubbing, no cyanosis, no edema.  Neurological: He is now able to slightly move at least 1 #5 motor strength both upper and lower extremity.  Hip area particular the right has no redness or bruises.    Genitourinary: No Erickson  catheter  Back:  ----------------------------------------------------------------------------------------------------------------------  ----------------------------------------------------------------------------------------------------------------------  Results from last 7 days   Lab Units 02/16/21 1721   CK TOTAL U/L 648*   TROPONIN T ng/mL <0.010     Results from last 7 days   Lab Units 02/17/21 0320 02/16/21 1721   CRP mg/dL  --  1.93*   WBC 10*3/mm3 5.87 7.59   HEMOGLOBIN g/dL 14.2 14.0   HEMATOCRIT % 43.5 42.5   MCV fL 87.0 87.1   MCHC g/dL 32.6 32.9   PLATELETS 10*3/mm3 247 328         Results from last 7 days   Lab Units 02/17/21 0323 02/16/21 1723 02/16/21 1721   SODIUM mmol/L 135*  --  132*   POTASSIUM mmol/L 3.9  --  4.0   MAGNESIUM mg/dL  --   --  2.0   CHLORIDE mmol/L 101  --  98   CO2 mmol/L 18.7*  --  20.6*   BUN mg/dL 15  --  12   CREATININE mg/dL 0.93 0.90 0.88   EGFR IF NONAFRICN AM mL/min/1.73 81  --  86   CALCIUM mg/dL 9.9  --  10.0   GLUCOSE mg/dL 169*  --  101*   ALBUMIN g/dL 4.25  --  4.35   BILIRUBIN mg/dL 0.2  --  0.3   ALK PHOS U/L 109  --  112   AST (SGOT) U/L 35  --  39   ALT (SGPT) U/L 20  --  20   Estimated Creatinine Clearance: 78.6 mL/min (by C-G formula based on SCr of 0.93 mg/dL).    No results found for: AMMONIA  Results from last 7 days   Lab Units 02/17/21  0323   CHOLESTEROL mg/dL 165   TRIGLYCERIDES mg/dL 58   HDL CHOL mg/dL 53   LDL CHOL mg/dL 100     Blood Culture   Date Value Ref Range Status   02/16/2021 No growth at 3 days  Preliminary   02/16/2021 No growth at 3 days  Preliminary     No results found for: URINECX  No results found for: WOUNDCX  No results found for: STOOLCX    I have personally looked at the labs and they are summarized above.  ----------------------------------------------------------------------------------------------------------------------  Imaging Results (Last 24 Hours)     Procedure Component Value Units Date/Time    XR Hip With or Without  Pelvis 2 - 3 View Right [[id number]] Collected: 02/19/21 1934     Updated: 02/19/21 1940    Narrative:      EXAM:    XR Right Hip With Pelvis When Performed, 2 or 3 Views     EXAM DATE:    2/19/2021 7:02 PM     CLINICAL HISTORY:    right hip pain; I63.9-Cerebral infarction, unspecified     TECHNIQUE:    Two or three views of the right hip with pelvis when performed.     COMPARISON:    10/27/2013     FINDINGS:    Bones/joints:  Probable changes of osteotomy are noted of the right  femoral head or there has been interval complete osteolysis noted.   There is superior migration of the right femoral neck from its expected  location within the acetabular fossa.  Probable joint effusion is noted.   Prior fixation of the right iliac bone.  No acute fracture.  No  dislocation.    Soft tissues:  Unremarkable.    Vasculature:  Advanced vascular calcifications.       Impression:      1.  Probable osteotomy changes of the right femoral head with superior  migration of the right femur.  2.  No acute appearing fracture.  3.  Right joint effusion.  4.  Otherwise stable exam.     This report was finalized on 2/19/2021 7:38 PM by Dr. Johnathon Clark MD.           ----------------------------------------------------------------------------------------------------------------------  Assessment and Plan:  -Acute CVA with few right-sided motor weakness  -Essential hypertension uncontrolled  -History of right hip surgery with?  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      Karely Edwards MD  02/20/21  11:12 EST

## 2021-02-21 LAB
BACTERIA SPEC AEROBE CULT: NORMAL
BACTERIA SPEC AEROBE CULT: NORMAL

## 2021-02-21 PROCEDURE — 99232 SBSQ HOSP IP/OBS MODERATE 35: CPT | Performed by: HOSPITALIST

## 2021-02-21 PROCEDURE — 25010000002 HEPARIN (PORCINE) PER 1000 UNITS: Performed by: HOSPITALIST

## 2021-02-21 PROCEDURE — 94799 UNLISTED PULMONARY SVC/PX: CPT

## 2021-02-21 RX ADMIN — HYDROCODONE BITARTRATE AND ACETAMINOPHEN 1 TABLET: 7.5; 325 TABLET ORAL at 08:22

## 2021-02-21 RX ADMIN — HYDROCODONE BITARTRATE AND ACETAMINOPHEN 1 TABLET: 7.5; 325 TABLET ORAL at 20:08

## 2021-02-21 RX ADMIN — NICOTINE 1 PATCH: 14 PATCH, EXTENDED RELEASE TRANSDERMAL at 08:20

## 2021-02-21 RX ADMIN — Medication 1 TABLET: at 08:19

## 2021-02-21 RX ADMIN — SODIUM CHLORIDE, PRESERVATIVE FREE 10 ML: 5 INJECTION INTRAVENOUS at 20:09

## 2021-02-21 RX ADMIN — ALPRAZOLAM 0.5 MG: 0.5 TABLET ORAL at 23:33

## 2021-02-21 RX ADMIN — AMLODIPINE BESYLATE 10 MG: 10 TABLET ORAL at 08:19

## 2021-02-21 RX ADMIN — SODIUM CHLORIDE, PRESERVATIVE FREE 10 ML: 5 INJECTION INTRAVENOUS at 08:23

## 2021-02-21 RX ADMIN — HEPARIN SODIUM 5000 UNITS: 5000 INJECTION INTRAVENOUS; SUBCUTANEOUS at 08:22

## 2021-02-21 RX ADMIN — HEPARIN SODIUM 5000 UNITS: 5000 INJECTION INTRAVENOUS; SUBCUTANEOUS at 20:06

## 2021-02-21 RX ADMIN — ATORVASTATIN CALCIUM 80 MG: 40 TABLET, FILM COATED ORAL at 20:06

## 2021-02-21 RX ADMIN — ASPIRIN 325 MG: 325 TABLET ORAL at 08:19

## 2021-02-21 RX ADMIN — ALPRAZOLAM 0.5 MG: 0.5 TABLET ORAL at 11:01

## 2021-02-21 RX ADMIN — CLOPIDOGREL 75 MG: 75 TABLET, FILM COATED ORAL at 08:19

## 2021-02-21 RX ADMIN — METOPROLOL TARTRATE 75 MG: 50 TABLET, FILM COATED ORAL at 20:06

## 2021-02-21 RX ADMIN — HYDROCHLOROTHIAZIDE 25 MG: 25 TABLET ORAL at 08:19

## 2021-02-21 RX ADMIN — PANTOPRAZOLE SODIUM 40 MG: 40 TABLET, DELAYED RELEASE ORAL at 05:19

## 2021-02-21 RX ADMIN — LOSARTAN POTASSIUM 100 MG: 50 TABLET, FILM COATED ORAL at 08:19

## 2021-02-21 RX ADMIN — CARISOPRODOL 350 MG: 350 TABLET ORAL at 11:01

## 2021-02-21 RX ADMIN — CARISOPRODOL 350 MG: 350 TABLET ORAL at 23:33

## 2021-02-21 RX ADMIN — METOPROLOL TARTRATE 75 MG: 50 TABLET, FILM COATED ORAL at 08:18

## 2021-02-21 NOTE — PROGRESS NOTES
[facility] HOSPITALIST PROGRESS NOTE     Patient Identification:  Name:  Valerie Moore  Age:  65 y.o.  Sex:  male  :  1955  MRN:  [id number]  Visit Number:  [id number]  Primary Care Provider:  Darwin Alvarez MD    Length of stay:  5    Subjective: Patient seen and examined assisted by Renetta Ely RN.  Patient finally had multiple large bowel movement, he feels much better, orthopedics help appreciated, CT scan of the hip ordered to evaluate.  Patient right sided weakness did not show further improvement like yesterday, in fact he is not able to move today.  Blood pressure improved.    Chief Complaint: Right-sided weakness  ----------------------------------------------------------------------------------------------------------------------  Current Hospital Meds:  amLODIPine, 10 mg, Oral, Q24H  aspirin, 325 mg, Oral, Daily  atorvastatin, 80 mg, Oral, Nightly  clopidogrel, 75 mg, Oral, Daily  heparin (porcine), 5,000 Units, Subcutaneous, Q12H  hydroCHLOROthiazide, 25 mg, Oral, Daily  losartan, 100 mg, Oral, Q24H  metoprolol tartrate, 75 mg, Oral, Q12H  multivitamin, 1 tablet, Oral, Daily  nicotine, 1 patch, Transdermal, Q24H  pantoprazole, 40 mg, Oral, Q AM  polyethylene glycol, 17 g, Oral, Daily  sodium chloride, 10 mL, Intravenous, Q12H         ----------------------------------------------------------------------------------------------------------------------  Vital Signs:  Temp:  [96.3 °F (35.7 °C)-97.5 °F (36.4 °C)] 96.5 °F (35.8 °C)  Heart Rate:  [] 77  Resp:  [11-22] 14  BP: (124-176)/() 150/83       Tele: Rhythm 57 bpm      21  0426 21  0424 21  0405   Weight: 80.9 kg (178 lb 6.4 oz) 81 kg (178 lb 9.6 oz) 79.8 kg (176 lb)     Body mass index is 27.57 kg/m².    Intake/Output Summary (Last 24 hours) at 2021 1020  Last data filed at 2021 0703  Gross per 24 hour   Intake 365 ml   Output 925 ml   Net -560 ml     Diet Regular;  Cardiac  ----------------------------------------------------------------------------------------------------------------------  Physical exam:  General: Comfortable,awake, alert, oriented to self, place, and time, well-developed and well-nourished.  No respiratory distress.    Skin:  Skin is warm and dry. No rash noted. No pallor.    HENT:  Head:  Normocephalic and atraumatic.  Mouth:  Moist mucous membranes.  Slight right nasolabial flattening  Eyes:  Conjunctivae and EOM are normal.  Pupils are equal, round, and reactive to light.  No scleral icterus.    Neck:  Neck supple.  No JVD present.  No bruit  Pulmonary/Chest:  No respiratory distress, no wheezes, no crackles, with normal breath sounds and good air movement.  Cardiovascular:  Normal rate, regular rhythm and normal heart sounds with no murmur.  Abdominal:  Soft.  Bowel sounds are normal.  No distension and no tenderness.   Extremities:  No edema, no tenderness, and no deformity.  No red or swollen joints anywhere.  Strong pulses in all 4 extremities with no clubbing, no cyanosis, no edema.  Right hip scar for previous surgery, right lower extremity shorter than the left  Neurological:  Motor strength equal no obvious deficit, sensory grossly intact.   No cranial nerve deficit.  No tongue deviation.  No facial droop.  No slurred speech.    Genitourinary: No Erickson catheter  Back:  ----------------------------------------------------------------------------------------------------------------------  ----------------------------------------------------------------------------------------------------------------------  Results from last 7 days   Lab Units 02/16/21  1721   CK TOTAL U/L 648*   TROPONIN T ng/mL <0.010     Results from last 7 days   Lab Units 02/17/21  0320 02/16/21  1721   CRP mg/dL  --  1.93*   WBC 10*3/mm3 5.87 7.59   HEMOGLOBIN g/dL 14.2 14.0   HEMATOCRIT % 43.5 42.5   MCV fL 87.0 87.1   MCHC g/dL 32.6 32.9   PLATELETS 10*3/mm3 769 522          Results from last 7 days   Lab Units 02/17/21  0323 02/16/21  1723 02/16/21  1721   SODIUM mmol/L 135*  --  132*   POTASSIUM mmol/L 3.9  --  4.0   MAGNESIUM mg/dL  --   --  2.0   CHLORIDE mmol/L 101  --  98   CO2 mmol/L 18.7*  --  20.6*   BUN mg/dL 15  --  12   CREATININE mg/dL 0.93 0.90 0.88   EGFR IF NONAFRICN AM mL/min/1.73 81  --  86   CALCIUM mg/dL 9.9  --  10.0   GLUCOSE mg/dL 169*  --  101*   ALBUMIN g/dL 4.25  --  4.35   BILIRUBIN mg/dL 0.2  --  0.3   ALK PHOS U/L 109  --  112   AST (SGOT) U/L 35  --  39   ALT (SGPT) U/L 20  --  20   Estimated Creatinine Clearance: 78.1 mL/min (by C-G formula based on SCr of 0.93 mg/dL).    No results found for: AMMONIA  Results from last 7 days   Lab Units 02/17/21 0323   CHOLESTEROL mg/dL 165   TRIGLYCERIDES mg/dL 58   HDL CHOL mg/dL 53   LDL CHOL mg/dL 100     Blood Culture   Date Value Ref Range Status   02/16/2021 No growth at 4 days  Preliminary   02/16/2021 No growth at 4 days  Preliminary     No results found for: URINECX  No results found for: WOUNDCX  No results found for: STOOLCX    I have personally looked at the labs and they are summarized above.  ----------------------------------------------------------------------------------------------------------------------  Imaging Results (Last 24 Hours)     Procedure Component Value Units Date/Time    CT Lower Extremity Right Without Contrast [[id number]] Collected: 02/20/21 2049     Updated: 02/20/21 2051    Narrative:      CT LE RT WO    INDICATION:    Worsening right hip pain, no definite injury, history of right hip surgery    TECHNIQUE:   CT of the Body part without IV contrast. Coronal and sagittal reconstructions were obtained.  Radiation dose reduction techniques included automated exposure control or exposure modulation based on body size. Count of known CT and cardiac nuc med studies  performed in previous 12 months: 0.     COMPARISON:    None available.    FINDINGS:    Markedly abnormal appearance of the  right hip with advanced degenerative change and postsurgical changes along the right pelvis and acetabulum posteriorly. Surgical hardware itself appears intact.    There is a very large right hip joint effusion measuring at least 5.6 x 7.8 cm greatest transaxial dimensions. This causes a marked widening of the hip joint articulation from that of socket with superior subluxation at the right hip from its expected  articulation site. Chronicity of this is unknown but when comparing to CT abdomen/pelvis from October 2013 there has been a marked worsening with the subluxation and joint effusion being a new.      Impression:      Markedly abnormal appearance of the right hip. There is no identifiable ball-and-socket joint identified due to the marked irregularity. The expected articulation is superiorly subluxed and there is a large joint effusion but sterility cannot be  determined by imaging. Orthopedic consultation is advised.    Signer Name: BROOKS ESQUIVEL MD   Signed: 2/20/2021 8:49 PM   Workstation Name: [email or web address]    [facility]        ----------------------------------------------------------------------------------------------------------------------  Assessment and Plan:    -Acute left choroidal CVA with pure right-sided motor weakness  -Hypertension controlled  -Tobacco use  -Dyslipidemia  -Constipation  -Chronic neck pain and back pain on chronic opiates and benzodiazepines use  -Chronic hip pain but more persistent lately, patient has fallen multiple times at home before coming to the hospital.  -Occlusion of the right vertebral artery with collateralization    Follow-up orthopedics recommendation and evaluation right hip, if cleared will continue pursuing inpatient rehab continue current blood pressure regimen and adjust in the next few days,.  PT OT.    Patient is agreeable for another hospitalist to start seeing him tomorrow on my departure.      Karely Edwards,  MD  02/21/21  10:20 EST

## 2021-02-21 NOTE — PROGRESS NOTES
Inpatient Progress Note  Valerie Moore  Date: 02/21/21  MRN: [id number]      Subjective:   Orthopedic surgery was asked to evaluate abnormal right hip x-ray.  The patient had a recent CVA on 2/16/2021.  Patient was noted to have postoperative changes of the right hip.  The patient noted that he had surgical intervention in 1992.  Full history  well dictated in prior orthopedic surgery HPI.  The patient's had longstanding history of limp to the right lower extremity.  Patient needed minimal assistance with a walker prior to CVA, but has always had significant limping of the right lower extremity.  He notes his right leg has been shorter in comparison to the left lower extremity.  CT scan of the pelvis was ordered yesterday for further evaluation.  Orthopedic surgery was consulted for recommendations on weightbearing status.  Patient continues to have flaccidity and weakness of the right side of his body.        Objective:    Vitals:    02/21/21 0902 02/21/21 1002 02/21/21 1102 02/21/21 1202   BP: 150/83 137/75 144/81 125/73   BP Location:       Patient Position:       Pulse: 77 68 61 61   Resp: 14 23 16 20   Temp:       TempSrc:       SpO2: 96% 97% 98% 99%   Weight:       Height:              Physical Exam:  Constitutional: Pleasant male.  No respiratory distress.        Musculoskeletal: Right-sided flaccidity noted.  Patient cannot dorsiflex or plantarflex the right ankle.  No open wounds right hip noted.  Patient is unable to move his right arm nor his right lower extremity.  Positive pedal pulses right lower extremity noted.      Labs:    Results from last 7 days   Lab Units 02/17/21  0320 02/16/21  1721   CRP mg/dL  --  1.93*   WBC 10*3/mm3 5.87 7.59   HEMOGLOBIN g/dL 14.2 14.0   HEMATOCRIT % 43.5 42.5   MCV fL 87.0 87.1   MCHC g/dL 32.6 32.9   PLATELETS 10*3/mm3 247 328         Results from last 7 days   Lab Units 02/17/21  0323 02/16/21  1723 02/16/21  1721   SODIUM mmol/L 135*  --  132*   POTASSIUM mmol/L  3.9  --  4.0   MAGNESIUM mg/dL  --   --  2.0   CHLORIDE mmol/L 101  --  98   CO2 mmol/L 18.7*  --  20.6*   BUN mg/dL 15  --  12   CREATININE mg/dL 0.93 0.90 0.88   EGFR IF NONAFRICN AM mL/min/1.73 81  --  86   CALCIUM mg/dL 9.9  --  10.0   GLUCOSE mg/dL 169*  --  101*   ALBUMIN g/dL 4.25  --  4.35   BILIRUBIN mg/dL 0.2  --  0.3   ALK PHOS U/L 109  --  112   AST (SGOT) U/L 35  --  39   ALT (SGPT) U/L 20  --  20   Estimated Creatinine Clearance: 78.1 mL/min (by C-G formula based on SCr of 0.93 mg/dL).  No results found for: AMMONIA  Results from last 7 days   Lab Units 02/16/21  1721   CK TOTAL U/L 648*   TROPONIN T ng/mL <0.010     Results from last 7 days   Lab Units 02/16/21  1721   PROBNP pg/mL 175.8     No results found for: HGBA1C  Lab Results   Component Value Date    TSH 0.698 02/16/2021    FREET4 1.40 02/16/2021         Pain Management Panel     Pain Management Panel Latest Ref Rng & Units 2/16/2021 11/24/2019    AMPHETAMINES SCREEN, URINE Negative Negative Negative    BARBITURATES SCREEN Negative Negative Negative    BENZODIAZEPINE SCREEN, URINE Negative Negative Negative    BUPRENORPHINEUR Negative Negative Negative    COCAINE SCREEN, URINE Negative Negative Negative    METHADONE SCREEN, URINE Negative Negative Negative          Blood Culture   Date Value Ref Range Status   02/16/2021 No growth at 4 days  Preliminary   02/16/2021 No growth at 4 days  Preliminary     No results found for: URINECX  No results found for: WOUNDCX  No results found for: STOOLCX    Results from last 7 days   Lab Units 02/17/21  0323   CHOLESTEROL mg/dL 165   TRIGLYCERIDES mg/dL 58   HDL CHOL mg/dL 53   LDL CHOL mg/dL 100           Radiology:  Imaging Results (Last 72 Hours)     Procedure Component Value Units Date/Time    CT Lower Extremity Right Without Contrast [[id number]] Collected: 02/20/21 2049     Updated: 02/20/21 2051    Narrative:      CT LE RT WO    INDICATION:    Worsening right hip pain, no definite injury, history of  right hip surgery    TECHNIQUE:   CT of the Body part without IV contrast. Coronal and sagittal reconstructions were obtained.  Radiation dose reduction techniques included automated exposure control or exposure modulation based on body size. Count of known CT and cardiac nuc med studies  performed in previous 12 months: 0.     COMPARISON:    None available.    FINDINGS:    Markedly abnormal appearance of the right hip with advanced degenerative change and postsurgical changes along the right pelvis and acetabulum posteriorly. Surgical hardware itself appears intact.    There is a very large right hip joint effusion measuring at least 5.6 x 7.8 cm greatest transaxial dimensions. This causes a marked widening of the hip joint articulation from that of socket with superior subluxation at the right hip from its expected  articulation site. Chronicity of this is unknown but when comparing to CT abdomen/pelvis from October 2013 there has been a marked worsening with the subluxation and joint effusion being a new.      Impression:      Markedly abnormal appearance of the right hip. There is no identifiable ball-and-socket joint identified due to the marked irregularity. The expected articulation is superiorly subluxed and there is a large joint effusion but sterility cannot be  determined by imaging. Orthopedic consultation is advised.    Signer Name: BROOKS ESQUIVEL MD   Signed: 2/20/2021 8:49 PM   Workstation [facility]: [facility]    [facility]    XR Hip With or Without Pelvis 2 - 3 View Right [[id number]] Collected: 02/19/21 1934     Updated: 02/19/21 1940    Narrative:      EXAM:    XR Right Hip With Pelvis When Performed, 2 or 3 Views     EXAM DATE:    2/19/2021 7:02 PM     CLINICAL HISTORY:    right hip pain; I63.9-Cerebral infarction, unspecified     TECHNIQUE:    Two or three views of the right hip with pelvis when performed.     COMPARISON:    10/27/2013     FINDINGS:    Bones/joints:  Probable  changes of osteotomy are noted of the right  femoral head or there has been interval complete osteolysis noted.   There is superior migration of the right femoral neck from its expected  location within the acetabular fossa.  Probable joint effusion is noted.   Prior fixation of the right iliac bone.  No acute fracture.  No  dislocation.    Soft tissues:  Unremarkable.    Vasculature:  Advanced vascular calcifications.       Impression:      1.  Probable osteotomy changes of the right femoral head with superior  migration of the right femur.  2.  No acute appearing fracture.  3.  Right joint effusion.  4.  Otherwise stable exam.     This report was finalized on 2/19/2021 7:38 PM by Dr. Johnathon Clark MD.               Assessment:   1.  Right hip pain status post surgical intervention          Plan:   Dr. Swann reviewed CT of the pelvis.  There appears to be no acute findings.  Findings are chronic in nature.  The patient's had longstanding history of limp to the right lower extremity.  He has had difficulty with ambulation for years.  The patient notes that his pain is no different than the pain that he has had in his right hip for many years.  There is surgical intervention possible to improve the patient's ambulation.  However with his recent stroke this of course would not be something  recommended to be performed in the near future or until cleared by medical service and neurologist at a later date.  The surgery would be reconstructive in nature.  Patient continues to have right-sided weakness as well as flaccidity.  Per Dr. Swann CT findings again does not appear to have any acute findings.  Hospitalist does not suspect any infectious process.  At this time once the patient is able from medical standpoint and neurology standpoint,  orthopedic surgery notes the patient can be weightbearing as tolerated to right lower extremity.  Can follow-up with orthopedic surgery on an outpatient basis in 3 months.          Madhu Greenfield, APRN February 21, 2021 12:31 EST

## 2021-02-21 NOTE — PLAN OF CARE
Goal Outcome Evaluation:  Plan of Care Reviewed With: patient  Progress: improving  Outcome Summary: Pt has been resting in bed. VSS but pt remains slightly hypertensive. Pt was able to move right arm this shift. Pt complains of right hip pain and PRN norco administered. No other needs at this time. Will continue to monitor.

## 2021-02-21 NOTE — PLAN OF CARE
Goal Outcome Evaluation:        Outcome Summary: VSS BP coming down almost to normal.  Pt continues to be on 2 liters NC.  Pt able to move rt arm some but not rt leg or fingers.  Denies any chest pain, N/V/D, or SOA at this time.  Call light in reach.  Will continue to monitor.

## 2021-02-22 ENCOUNTER — HOSPITAL ENCOUNTER (INPATIENT)
Facility: HOSPITAL | Age: 66
LOS: 22 days | Discharge: HOME HEALTH | End: 2021-03-16
Attending: FAMILY MEDICINE | Admitting: FAMILY MEDICINE

## 2021-02-22 VITALS
WEIGHT: 178.4 LBS | HEIGHT: 67 IN | TEMPERATURE: 98.4 F | SYSTOLIC BLOOD PRESSURE: 128 MMHG | DIASTOLIC BLOOD PRESSURE: 75 MMHG | RESPIRATION RATE: 18 BRPM | HEART RATE: 79 BPM | BODY MASS INDEX: 28 KG/M2 | OXYGEN SATURATION: 100 %

## 2021-02-22 DIAGNOSIS — J18.9 COMMUNITY ACQUIRED PNEUMONIA OF LEFT LOWER LOBE OF LUNG: ICD-10-CM

## 2021-02-22 DIAGNOSIS — Z72.0 TOBACCO ABUSE: ICD-10-CM

## 2021-02-22 DIAGNOSIS — F41.9 ANXIETY: ICD-10-CM

## 2021-02-22 DIAGNOSIS — I63.89 CEREBROVASCULAR ACCIDENT (CVA) DUE TO OTHER MECHANISM (HCC): Primary | ICD-10-CM

## 2021-02-22 DIAGNOSIS — R53.1 WEAKNESS: ICD-10-CM

## 2021-02-22 DIAGNOSIS — G47.9 SLEEP DISORDER: ICD-10-CM

## 2021-02-22 DIAGNOSIS — I71.40 ABDOMINAL AORTIC ANEURYSM (AAA) 3.0 CM TO 5.5 CM IN DIAMETER IN MALE (HCC): ICD-10-CM

## 2021-02-22 PROBLEM — I63.9 CVA (CEREBRAL VASCULAR ACCIDENT) (HCC): Status: ACTIVE | Noted: 2021-02-22

## 2021-02-22 PROCEDURE — 94799 UNLISTED PULMONARY SVC/PX: CPT

## 2021-02-22 PROCEDURE — 99223 1ST HOSP IP/OBS HIGH 75: CPT | Performed by: FAMILY MEDICINE

## 2021-02-22 PROCEDURE — 25010000002 HEPARIN (PORCINE) PER 1000 UNITS: Performed by: HOSPITALIST

## 2021-02-22 PROCEDURE — 25010000002 HEPARIN (PORCINE) PER 1000 UNITS: Performed by: FAMILY MEDICINE

## 2021-02-22 PROCEDURE — 94640 AIRWAY INHALATION TREATMENT: CPT

## 2021-02-22 PROCEDURE — 99239 HOSP IP/OBS DSCHRG MGMT >30: CPT | Performed by: INTERNAL MEDICINE

## 2021-02-22 RX ORDER — IPRATROPIUM BROMIDE AND ALBUTEROL SULFATE 2.5; .5 MG/3ML; MG/3ML
3 SOLUTION RESPIRATORY (INHALATION) EVERY 8 HOURS PRN
Status: DISCONTINUED | OUTPATIENT
Start: 2021-02-22 | End: 2021-03-16 | Stop reason: HOSPADM

## 2021-02-22 RX ORDER — CLOPIDOGREL BISULFATE 75 MG/1
75 TABLET ORAL DAILY
Status: CANCELLED | OUTPATIENT
Start: 2021-02-23

## 2021-02-22 RX ORDER — GABAPENTIN 100 MG/1
200 CAPSULE ORAL 3 TIMES DAILY PRN
Status: CANCELLED | OUTPATIENT
Start: 2021-02-22

## 2021-02-22 RX ORDER — CARISOPRODOL 350 MG/1
350 TABLET ORAL 2 TIMES DAILY PRN
Status: DISPENSED | OUTPATIENT
Start: 2021-02-22 | End: 2021-03-14

## 2021-02-22 RX ORDER — ATORVASTATIN CALCIUM 40 MG/1
80 TABLET, FILM COATED ORAL NIGHTLY
Status: DISCONTINUED | OUTPATIENT
Start: 2021-02-22 | End: 2021-03-16 | Stop reason: HOSPADM

## 2021-02-22 RX ORDER — NITROGLYCERIN 0.4 MG/1
0.4 TABLET SUBLINGUAL
Status: CANCELLED | OUTPATIENT
Start: 2021-02-22

## 2021-02-22 RX ORDER — SODIUM CHLORIDE 0.9 % (FLUSH) 0.9 %
10 SYRINGE (ML) INJECTION EVERY 12 HOURS SCHEDULED
Status: CANCELLED | OUTPATIENT
Start: 2021-02-22

## 2021-02-22 RX ORDER — ASPIRIN 81 MG/1
81 TABLET ORAL DAILY
Status: CANCELLED | OUTPATIENT
Start: 2021-02-22

## 2021-02-22 RX ORDER — CLOPIDOGREL BISULFATE 75 MG/1
75 TABLET ORAL DAILY
Status: DISCONTINUED | OUTPATIENT
Start: 2021-02-23 | End: 2021-03-16 | Stop reason: HOSPADM

## 2021-02-22 RX ORDER — SODIUM CHLORIDE 0.9 % (FLUSH) 0.9 %
10 SYRINGE (ML) INJECTION EVERY 12 HOURS SCHEDULED
Status: DISCONTINUED | OUTPATIENT
Start: 2021-02-22 | End: 2021-02-26

## 2021-02-22 RX ORDER — GABAPENTIN 100 MG/1
200 CAPSULE ORAL 3 TIMES DAILY PRN
Status: DISCONTINUED | OUTPATIENT
Start: 2021-02-22 | End: 2021-03-16 | Stop reason: HOSPADM

## 2021-02-22 RX ORDER — DIPHENOXYLATE HYDROCHLORIDE AND ATROPINE SULFATE 2.5; .025 MG/1; MG/1
1 TABLET ORAL DAILY
Status: DISCONTINUED | OUTPATIENT
Start: 2021-02-23 | End: 2021-03-16 | Stop reason: HOSPADM

## 2021-02-22 RX ORDER — POLYETHYLENE GLYCOL 3350 17 G/17G
17 POWDER, FOR SOLUTION ORAL DAILY
Status: DISCONTINUED | OUTPATIENT
Start: 2021-02-23 | End: 2021-03-16 | Stop reason: HOSPADM

## 2021-02-22 RX ORDER — HYDROCODONE BITARTRATE AND ACETAMINOPHEN 10; 325 MG/1; MG/1
1 TABLET ORAL EVERY 8 HOURS PRN
COMMUNITY

## 2021-02-22 RX ORDER — POLYETHYLENE GLYCOL 3350 17 G/17G
17 POWDER, FOR SOLUTION ORAL DAILY
Status: CANCELLED | OUTPATIENT
Start: 2021-02-23

## 2021-02-22 RX ORDER — ATENOLOL 25 MG/1
25 TABLET ORAL 2 TIMES DAILY
COMMUNITY
End: 2021-03-16 | Stop reason: HOSPADM

## 2021-02-22 RX ORDER — HEPARIN SODIUM 5000 [USP'U]/ML
5000 INJECTION, SOLUTION INTRAVENOUS; SUBCUTANEOUS EVERY 12 HOURS SCHEDULED
Status: CANCELLED | OUTPATIENT
Start: 2021-02-22

## 2021-02-22 RX ORDER — ALPRAZOLAM 1 MG/1
1 TABLET ORAL 3 TIMES DAILY PRN
COMMUNITY

## 2021-02-22 RX ORDER — LOSARTAN POTASSIUM 50 MG/1
100 TABLET ORAL
Status: CANCELLED | OUTPATIENT
Start: 2021-02-23

## 2021-02-22 RX ORDER — NITROGLYCERIN 0.4 MG/1
0.4 TABLET SUBLINGUAL
Status: DISCONTINUED | OUTPATIENT
Start: 2021-02-22 | End: 2021-03-16 | Stop reason: HOSPADM

## 2021-02-22 RX ORDER — NICOTINE 21 MG/24HR
1 PATCH, TRANSDERMAL 24 HOURS TRANSDERMAL
Status: CANCELLED | OUTPATIENT
Start: 2021-02-23

## 2021-02-22 RX ORDER — ZOLPIDEM TARTRATE 10 MG/1
10 TABLET ORAL NIGHTLY PRN
COMMUNITY
End: 2021-03-16 | Stop reason: HOSPADM

## 2021-02-22 RX ORDER — IBUPROFEN 800 MG/1
800 TABLET ORAL EVERY 8 HOURS PRN
COMMUNITY
End: 2021-03-16 | Stop reason: HOSPADM

## 2021-02-22 RX ORDER — DIPHENOXYLATE HYDROCHLORIDE AND ATROPINE SULFATE 2.5; .025 MG/1; MG/1
1 TABLET ORAL DAILY
COMMUNITY

## 2021-02-22 RX ORDER — HEPARIN SODIUM 5000 [USP'U]/ML
5000 INJECTION, SOLUTION INTRAVENOUS; SUBCUTANEOUS EVERY 12 HOURS SCHEDULED
Status: DISCONTINUED | OUTPATIENT
Start: 2021-02-22 | End: 2021-03-16 | Stop reason: HOSPADM

## 2021-02-22 RX ORDER — LOSARTAN POTASSIUM 50 MG/1
100 TABLET ORAL
Status: DISCONTINUED | OUTPATIENT
Start: 2021-02-23 | End: 2021-03-04

## 2021-02-22 RX ORDER — IPRATROPIUM BROMIDE AND ALBUTEROL SULFATE 2.5; .5 MG/3ML; MG/3ML
3 SOLUTION RESPIRATORY (INHALATION) EVERY 8 HOURS PRN
Status: CANCELLED | OUTPATIENT
Start: 2021-02-22

## 2021-02-22 RX ORDER — SODIUM CHLORIDE 0.9 % (FLUSH) 0.9 %
10 SYRINGE (ML) INJECTION AS NEEDED
Status: DISCONTINUED | OUTPATIENT
Start: 2021-02-22 | End: 2021-03-16 | Stop reason: HOSPADM

## 2021-02-22 RX ORDER — DIPHENOXYLATE HYDROCHLORIDE AND ATROPINE SULFATE 2.5; .025 MG/1; MG/1
1 TABLET ORAL DAILY
Status: CANCELLED | OUTPATIENT
Start: 2021-02-23

## 2021-02-22 RX ORDER — ALPRAZOLAM 0.5 MG/1
0.5 TABLET ORAL 2 TIMES DAILY PRN
Status: DISCONTINUED | OUTPATIENT
Start: 2021-02-22 | End: 2021-03-12

## 2021-02-22 RX ORDER — NICOTINE 21 MG/24HR
1 PATCH, TRANSDERMAL 24 HOURS TRANSDERMAL
Status: DISCONTINUED | OUTPATIENT
Start: 2021-02-23 | End: 2021-03-16 | Stop reason: HOSPADM

## 2021-02-22 RX ORDER — LOSARTAN POTASSIUM AND HYDROCHLOROTHIAZIDE 25; 100 MG/1; MG/1
1 TABLET ORAL DAILY
COMMUNITY
End: 2021-03-16 | Stop reason: HOSPADM

## 2021-02-22 RX ORDER — SODIUM CHLORIDE 0.9 % (FLUSH) 0.9 %
10 SYRINGE (ML) INJECTION AS NEEDED
Status: CANCELLED | OUTPATIENT
Start: 2021-02-22

## 2021-02-22 RX ORDER — HYDROCODONE BITARTRATE AND ACETAMINOPHEN 7.5; 325 MG/1; MG/1
1 TABLET ORAL EVERY 8 HOURS PRN
Status: DISCONTINUED | OUTPATIENT
Start: 2021-02-22 | End: 2021-02-27

## 2021-02-22 RX ORDER — AMLODIPINE BESYLATE 10 MG/1
10 TABLET ORAL DAILY
COMMUNITY
End: 2021-03-16 | Stop reason: HOSPADM

## 2021-02-22 RX ORDER — PANTOPRAZOLE SODIUM 40 MG/1
40 TABLET, DELAYED RELEASE ORAL
Status: DISCONTINUED | OUTPATIENT
Start: 2021-02-23 | End: 2021-03-16 | Stop reason: HOSPADM

## 2021-02-22 RX ORDER — CARISOPRODOL 350 MG/1
350 TABLET ORAL 2 TIMES DAILY PRN
COMMUNITY

## 2021-02-22 RX ORDER — ATORVASTATIN CALCIUM 40 MG/1
80 TABLET, FILM COATED ORAL NIGHTLY
Status: CANCELLED | OUTPATIENT
Start: 2021-02-22

## 2021-02-22 RX ORDER — ATORVASTATIN CALCIUM 10 MG/1
10 TABLET, FILM COATED ORAL DAILY
COMMUNITY
End: 2021-03-16 | Stop reason: HOSPADM

## 2021-02-22 RX ORDER — GABAPENTIN 300 MG/1
300 CAPSULE ORAL 4 TIMES DAILY PRN
COMMUNITY
End: 2021-03-16 | Stop reason: HOSPADM

## 2021-02-22 RX ORDER — ASPIRIN 81 MG/1
81 TABLET ORAL DAILY
Status: DISCONTINUED | OUTPATIENT
Start: 2021-02-22 | End: 2021-03-16 | Stop reason: HOSPADM

## 2021-02-22 RX ORDER — PANTOPRAZOLE SODIUM 40 MG/1
40 TABLET, DELAYED RELEASE ORAL
Status: CANCELLED | OUTPATIENT
Start: 2021-02-23

## 2021-02-22 RX ADMIN — CARISOPRODOL 350 MG: 350 TABLET ORAL at 22:56

## 2021-02-22 RX ADMIN — METOPROLOL TARTRATE 75 MG: 50 TABLET, FILM COATED ORAL at 20:56

## 2021-02-22 RX ADMIN — GABAPENTIN 200 MG: 100 CAPSULE ORAL at 08:38

## 2021-02-22 RX ADMIN — HEPARIN SODIUM 5000 UNITS: 5000 INJECTION INTRAVENOUS; SUBCUTANEOUS at 20:56

## 2021-02-22 RX ADMIN — SODIUM CHLORIDE, PRESERVATIVE FREE 10 ML: 5 INJECTION INTRAVENOUS at 08:39

## 2021-02-22 RX ADMIN — HEPARIN SODIUM 5000 UNITS: 5000 INJECTION INTRAVENOUS; SUBCUTANEOUS at 08:39

## 2021-02-22 RX ADMIN — METOPROLOL TARTRATE 75 MG: 50 TABLET, FILM COATED ORAL at 08:39

## 2021-02-22 RX ADMIN — CLOPIDOGREL 75 MG: 75 TABLET, FILM COATED ORAL at 08:38

## 2021-02-22 RX ADMIN — CARISOPRODOL 350 MG: 350 TABLET ORAL at 11:51

## 2021-02-22 RX ADMIN — HYDROCHLOROTHIAZIDE 25 MG: 25 TABLET ORAL at 08:39

## 2021-02-22 RX ADMIN — GABAPENTIN 200 MG: 100 CAPSULE ORAL at 20:55

## 2021-02-22 RX ADMIN — ALPRAZOLAM 0.5 MG: 0.5 TABLET ORAL at 20:55

## 2021-02-22 RX ADMIN — IPRATROPIUM BROMIDE AND ALBUTEROL SULFATE 3 ML: .5; 3 SOLUTION RESPIRATORY (INHALATION) at 13:29

## 2021-02-22 RX ADMIN — HYDROCODONE BITARTRATE AND ACETAMINOPHEN 1 TABLET: 7.5; 325 TABLET ORAL at 08:38

## 2021-02-22 RX ADMIN — NICOTINE 1 PATCH: 14 PATCH, EXTENDED RELEASE TRANSDERMAL at 08:41

## 2021-02-22 RX ADMIN — Medication 1 TABLET: at 08:39

## 2021-02-22 RX ADMIN — PANTOPRAZOLE SODIUM 40 MG: 40 TABLET, DELAYED RELEASE ORAL at 08:39

## 2021-02-22 RX ADMIN — HYDROCODONE BITARTRATE AND ACETAMINOPHEN 1 TABLET: 7.5; 325 TABLET ORAL at 20:56

## 2021-02-22 RX ADMIN — IPRATROPIUM BROMIDE AND ALBUTEROL SULFATE 3 ML: .5; 3 SOLUTION RESPIRATORY (INHALATION) at 19:18

## 2021-02-22 RX ADMIN — AMLODIPINE BESYLATE 10 MG: 10 TABLET ORAL at 08:38

## 2021-02-22 RX ADMIN — ASPIRIN 325 MG: 325 TABLET ORAL at 08:39

## 2021-02-22 RX ADMIN — LOSARTAN POTASSIUM 100 MG: 50 TABLET, FILM COATED ORAL at 08:38

## 2021-02-22 RX ADMIN — SODIUM CHLORIDE, PRESERVATIVE FREE 10 ML: 5 INJECTION INTRAVENOUS at 20:56

## 2021-02-22 RX ADMIN — ATORVASTATIN CALCIUM 80 MG: 40 TABLET, FILM COATED ORAL at 20:56

## 2021-02-22 RX ADMIN — ALPRAZOLAM 0.5 MG: 0.5 TABLET ORAL at 08:38

## 2021-02-22 NOTE — DISCHARGE SUMMARY
[facility] DISCHARGE SUMMARY    Patient Identification:  Name:  Valerie Moore  Age:  65 y.o.  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 male presented to [facility] complaining of rt sided weakness.  Please see the admitting history and physical for further details.  Admitted on  for frequent fall right-sided weakness, he has acute left anterior choroidal stroke with pure motor hemiparesis on the right side.  He has chronic pain syndrome from back pain cervical spine and lumbar spine hip pain.  Reports more pain in the right hip, x-ray shows superior migration of the femur, orthopedics is evaluating.  Patient did not have any acute bony pathology rather was found to have osteoarthritis findings which could be treated electively in the outpatient setting.  Patient assessed by PT OT and recommended for rehabilitation, his insurance finally came through for approval to [facility] rehab.      VITAL SIGNS:  Temp:  [95.4 °F (35.2 °C)-98.6 °F (37 °C)] 98.4 °F (36.9 °C)  Heart Rate:  [66-96] 66  Resp:  [16-23] 20  BP: ()/(61-98) 108/67  SpO2:  [95 %-100 %] 95 %  on  Flow (L/min):  [2] 2;   Device (Oxygen Therapy): nasal cannula    Body mass index is 27.94 kg/m².  Wt Readings from Last 3 Encounters:   21 80.9 kg (178 lb 6.4 oz)   19 82.2 kg (181 lb 5 oz)   19 78.5 kg (173 lb)        PHYSICAL EXAM:  General: Comfortable,awake, alert, oriented to self, place, and time, well-developed and well-nourished.  No respiratory distress.    Skin:  Skin is warm and dry. No rash noted. No pallor.    HENT:  Head:  Normocephalic and atraumatic.  Mouth:  Moist mucous membranes.  Slight right nasolabial flattening  Eyes:  Conjunctivae and EOM are normal.  Pupils are equal, round, and reactive to light.  No scleral icterus.    Neck:  Neck supple.  No JVD present.  No bruit  Pulmonary/Chest:  No respiratory distress, no wheezes, no crackles, with normal breath sounds and good air movement.  Cardiovascular:  Normal rate, regular rhythm and normal heart sounds with no murmur.  Abdominal:  Soft.  Bowel sounds are normal.  No distension and no tenderness.   Extremities:  No edema, no tenderness, and no deformity.  No red or swollen joints anywhere.  Strong pulses in all 4 extremities with no clubbing, no cyanosis, no edema.  Right hip scar for previous surgery, right lower extremity shorter than the left  Neurological:  Motor strength equal no obvious deficit, sensory grossly intact.   No cranial nerve deficit.  No tongue deviation.  No facial droop.  No slurred speech.    Genitourinary: No Erickson catheter      DISCHARGE DISPOSITION   Stable    DISCHARGE MEDICATIONS:     Discharge Medications      Patient Not Prescribed Medications Upon Discharge           No future appointments.    Follow-up Information     Page, Reginald Tinajero MD Follow up in 3 month(s).    Specialty: Orthopedic Surgery  Contact information:  [address] [place] [address] [phone]             Darwin Alvarez MD .    Specialty: Family Medicine  Contact information:  [address] [address]  [phone]                     CODE STATUS  Code Status and Medical Interventions:   Ordered at: 02/16/21 2233     Level Of Support Discussed With:    Patient     Code Status:    CPR     Medical Interventions (Level of Support  Prior to Arrest):    Full       Ashish Coyle MD  02/22/21  13:29 EST    Please note that this discharge summary required more than 30 minutes to complete.    Please send a copy of this dictation to the following providers:  Darwin Alvarez MD

## 2021-02-22 NOTE — PLAN OF CARE
Goal Outcome Evaluation:  Plan of Care Reviewed With: patient  Progress: improving  Outcome Summary: Pt has been resting in bed throughout shift. VSS. Pt's BP has improved since the previous night. Pt still has limited movement in right arm but continues to be unable to move fingers or right leg. Pt remains on 2L NC. No complains of pain or discomfort at this time. Will continue to monitor.

## 2021-02-22 NOTE — PLAN OF CARE
Goal Outcome Evaluation:  Pt resting in bed. VSS. No new complaints. To be admitted to rehab today.

## 2021-02-22 NOTE — PROGRESS NOTES
Discharge Planning Assessment  [facility]     Patient Name: Valerie Moore  MRN: [id number]  Today's Date: 2/22/2021    Admit Date: 2/16/2021      Discharge Plan     Row Name 02/22/21 1431       Plan    Final Discharge Disposition Code  62 - inpatient rehab facility    Final Note  Pt is being discharged to [facility] inpatient rehab on this date. Pt is aware and agreeable. SS provided RN with number to call report. No other needs identified.          Expected Discharge Date and Time     Expected Discharge Date Expected Discharge Time    Feb 22, 2021               Caryl Michaels

## 2021-02-23 LAB
ANION GAP SERPL CALCULATED.3IONS-SCNC: 8.9 MMOL/L (ref 5–15)
BASOPHILS # BLD AUTO: 0.07 10*3/MM3 (ref 0–0.2)
BASOPHILS NFR BLD AUTO: 0.7 % (ref 0–1.5)
BUN SERPL-MCNC: 30 MG/DL (ref 8–23)
BUN/CREAT SERPL: 32.3 (ref 7–25)
CALCIUM SPEC-SCNC: 9.3 MG/DL (ref 8.6–10.5)
CHLORIDE SERPL-SCNC: 99 MMOL/L (ref 98–107)
CO2 SERPL-SCNC: 22.1 MMOL/L (ref 22–29)
CREAT SERPL-MCNC: 0.93 MG/DL (ref 0.76–1.27)
DEPRECATED RDW RBC AUTO: 48.6 FL (ref 37–54)
EOSINOPHIL # BLD AUTO: 0.71 10*3/MM3 (ref 0–0.4)
EOSINOPHIL NFR BLD AUTO: 7.4 % (ref 0.3–6.2)
ERYTHROCYTE [DISTWIDTH] IN BLOOD BY AUTOMATED COUNT: 14.9 % (ref 12.3–15.4)
GFR SERPL CREATININE-BSD FRML MDRD: 81 ML/MIN/1.73
GLUCOSE SERPL-MCNC: 101 MG/DL (ref 65–99)
HCT VFR BLD AUTO: 41.8 % (ref 37.5–51)
HGB BLD-MCNC: 13.6 G/DL (ref 13–17.7)
IMM GRANULOCYTES # BLD AUTO: 0.02 10*3/MM3 (ref 0–0.05)
IMM GRANULOCYTES NFR BLD AUTO: 0.2 % (ref 0–0.5)
LYMPHOCYTES # BLD AUTO: 3.1 10*3/MM3 (ref 0.7–3.1)
LYMPHOCYTES NFR BLD AUTO: 32.3 % (ref 19.6–45.3)
MCH RBC QN AUTO: 29 PG (ref 26.6–33)
MCHC RBC AUTO-ENTMCNC: 32.5 G/DL (ref 31.5–35.7)
MCV RBC AUTO: 89.1 FL (ref 79–97)
MONOCYTES # BLD AUTO: 0.75 10*3/MM3 (ref 0.1–0.9)
MONOCYTES NFR BLD AUTO: 7.8 % (ref 5–12)
NEUTROPHILS NFR BLD AUTO: 4.96 10*3/MM3 (ref 1.7–7)
NEUTROPHILS NFR BLD AUTO: 51.6 % (ref 42.7–76)
NRBC BLD AUTO-RTO: 0 /100 WBC (ref 0–0.2)
PLATELET # BLD AUTO: 319 10*3/MM3 (ref 140–450)
PMV BLD AUTO: 8.5 FL (ref 6–12)
POTASSIUM SERPL-SCNC: 4.4 MMOL/L (ref 3.5–5.2)
RBC # BLD AUTO: 4.69 10*6/MM3 (ref 4.14–5.8)
SODIUM SERPL-SCNC: 130 MMOL/L (ref 136–145)
WBC # BLD AUTO: 9.61 10*3/MM3 (ref 3.4–10.8)

## 2021-02-23 PROCEDURE — 25010000002 HEPARIN (PORCINE) PER 1000 UNITS: Performed by: FAMILY MEDICINE

## 2021-02-23 PROCEDURE — 85025 COMPLETE CBC W/AUTO DIFF WBC: CPT | Performed by: FAMILY MEDICINE

## 2021-02-23 PROCEDURE — 99232 SBSQ HOSP IP/OBS MODERATE 35: CPT | Performed by: FAMILY MEDICINE

## 2021-02-23 PROCEDURE — 25010000002 METHYLPREDNISOLONE PER 80 MG: Performed by: FAMILY MEDICINE

## 2021-02-23 PROCEDURE — 94799 UNLISTED PULMONARY SVC/PX: CPT

## 2021-02-23 PROCEDURE — 97112 NEUROMUSCULAR REEDUCATION: CPT

## 2021-02-23 PROCEDURE — 80048 BASIC METABOLIC PNL TOTAL CA: CPT | Performed by: FAMILY MEDICINE

## 2021-02-23 PROCEDURE — 97167 OT EVAL HIGH COMPLEX 60 MIN: CPT | Performed by: OCCUPATIONAL THERAPIST

## 2021-02-23 PROCEDURE — 97535 SELF CARE MNGMENT TRAINING: CPT | Performed by: OCCUPATIONAL THERAPIST

## 2021-02-23 PROCEDURE — 97112 NEUROMUSCULAR REEDUCATION: CPT | Performed by: OCCUPATIONAL THERAPIST

## 2021-02-23 PROCEDURE — 97161 PT EVAL LOW COMPLEX 20 MIN: CPT

## 2021-02-23 PROCEDURE — 97110 THERAPEUTIC EXERCISES: CPT

## 2021-02-23 RX ORDER — HYDROCHLOROTHIAZIDE 25 MG/1
25 TABLET ORAL DAILY
Status: DISCONTINUED | OUTPATIENT
Start: 2021-02-23 | End: 2021-03-03

## 2021-02-23 RX ORDER — METHYLPREDNISOLONE ACETATE 80 MG/ML
40 INJECTION, SUSPENSION INTRA-ARTICULAR; INTRALESIONAL; INTRAMUSCULAR; SOFT TISSUE ONCE
Status: COMPLETED | OUTPATIENT
Start: 2021-02-23 | End: 2021-02-23

## 2021-02-23 RX ADMIN — CLOPIDOGREL 75 MG: 75 TABLET, FILM COATED ORAL at 08:46

## 2021-02-23 RX ADMIN — IPRATROPIUM BROMIDE AND ALBUTEROL SULFATE 3 ML: .5; 3 SOLUTION RESPIRATORY (INHALATION) at 07:16

## 2021-02-23 RX ADMIN — SODIUM CHLORIDE, PRESERVATIVE FREE 10 ML: 5 INJECTION INTRAVENOUS at 08:48

## 2021-02-23 RX ADMIN — HYDROCHLOROTHIAZIDE 25 MG: 25 TABLET ORAL at 12:03

## 2021-02-23 RX ADMIN — METOPROLOL TARTRATE 75 MG: 50 TABLET, FILM COATED ORAL at 21:16

## 2021-02-23 RX ADMIN — ALPRAZOLAM 0.5 MG: 0.5 TABLET ORAL at 08:59

## 2021-02-23 RX ADMIN — Medication 1 TABLET: at 08:46

## 2021-02-23 RX ADMIN — POLYETHYLENE GLYCOL (3350) 17 G: 17 POWDER, FOR SOLUTION ORAL at 08:47

## 2021-02-23 RX ADMIN — CARISOPRODOL 350 MG: 350 TABLET ORAL at 12:08

## 2021-02-23 RX ADMIN — GABAPENTIN 200 MG: 100 CAPSULE ORAL at 21:15

## 2021-02-23 RX ADMIN — ATORVASTATIN CALCIUM 80 MG: 40 TABLET, FILM COATED ORAL at 21:16

## 2021-02-23 RX ADMIN — HYDROCODONE BITARTRATE AND ACETAMINOPHEN 1 TABLET: 7.5; 325 TABLET ORAL at 05:45

## 2021-02-23 RX ADMIN — IPRATROPIUM BROMIDE AND ALBUTEROL SULFATE 3 ML: .5; 3 SOLUTION RESPIRATORY (INHALATION) at 19:20

## 2021-02-23 RX ADMIN — METHYLPREDNISOLONE ACETATE 40 MG: 80 INJECTION, SUSPENSION INTRA-ARTICULAR; INTRALESIONAL; INTRAMUSCULAR; SOFT TISSUE at 12:03

## 2021-02-23 RX ADMIN — ALPRAZOLAM 0.5 MG: 0.5 TABLET ORAL at 21:15

## 2021-02-23 RX ADMIN — HYDROCODONE BITARTRATE AND ACETAMINOPHEN 1 TABLET: 7.5; 325 TABLET ORAL at 21:15

## 2021-02-23 RX ADMIN — ASPIRIN 81 MG: 81 TABLET, COATED ORAL at 08:46

## 2021-02-23 RX ADMIN — PANTOPRAZOLE SODIUM 40 MG: 40 TABLET, DELAYED RELEASE ORAL at 05:45

## 2021-02-23 RX ADMIN — HEPARIN SODIUM 5000 UNITS: 5000 INJECTION INTRAVENOUS; SUBCUTANEOUS at 08:47

## 2021-02-23 RX ADMIN — METOPROLOL TARTRATE 75 MG: 50 TABLET, FILM COATED ORAL at 08:46

## 2021-02-23 RX ADMIN — LOSARTAN POTASSIUM 100 MG: 50 TABLET, FILM COATED ORAL at 08:47

## 2021-02-23 RX ADMIN — Medication 1 PATCH: at 08:48

## 2021-02-23 RX ADMIN — HEPARIN SODIUM 5000 UNITS: 5000 INJECTION INTRAVENOUS; SUBCUTANEOUS at 21:15

## 2021-02-23 NOTE — PAYOR COMM NOTE
"[facility]  NPI:[id number]    Utilization Review  Contact: Breanna Hankins RN  Phone: [phone]  Fax:[phone]    DISCHARGE NOTIFICATION    [id number]    Valerie Ott (65 y.o. Male)     Date of Birth Social Security Number Address Home Phone MRN    1955  [address] [facility] [address] [phone] [id number]    Holiness Marital Status          None        Admission Date Admission Type Admitting Provider Attending Provider Department, Room/Bed    21 Emergency James Bailey MD  [facility] PROGRESS CARE, P210/S2    Discharge Date Discharge Disposition Discharge Destination        2021 Rehab Facility or Unit ([facility])              Attending Provider: (none)   Allergies: No Known Allergies    Isolation: None   Infection: None   Code Status: CPR    Ht: 170.2 cm (67\")   Wt: 80.9 kg (178 lb 6.4 oz)    Admission Cmt: None   Principal Problem: None                Active Insurance as of 2021     Primary Coverage     Payor Plan Insurance Group Employer/Plan Group    [facility] MEDICARE REPLACEMENT [employer] MEDICARE REPLACEMENT      Payor Plan Address Payor Plan Phone Number Payor Plan Fax Number Effective Dates    [address] [address] [phone]  2021 - None Entered    [facility] [id number]       Subscriber Name Subscriber Birth Date Member ID       VALERIE OTT 1955 [id number]                 Emergency Contacts      (Rel.) Home Phone Work Phone Mobile Phone    Jerry Leos (Significant Other) -- -- [phone]               Discharge Summary      Ashish Coyle MD at 21 1329              [facility]    Patient Identification:  Name:  Valerie Ott  Age:  65 y.o.  Sex:  male  :  1955  MRN:  [id number]  Visit Number:  [id number]    Date of Admission: 2021  Date of Discharge:  2021     PCP: Darwin Alvarez MD    DISCHARGE DIAGNOSIS  -Acute left " choroidal CVA with pure right-sided motor weakness  -Hypertension controlled  -Tobacco use  -Dyslipidemia  -Constipation  -Chronic neck pain and back pain on chronic opiates and benzodiazepines use  -Chronic hip pain but more persistent lately, patient has fallen multiple times at home before coming to the hospital.  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 male presented to [facility] complaining of rt sided weakness.  Please see the admitting history and physical for further details.  Admitted on 16 February for frequent fall right-sided weakness, he has acute left anterior choroidal stroke with pure motor hemiparesis on the right side.  He has chronic pain syndrome from back pain cervical spine and lumbar spine hip pain.  Reports more pain in the right hip, x-ray shows superior migration of the femur, orthopedics is evaluating.  Patient did not have any acute bony pathology rather was found to have osteoarthritis findings which could be treated electively in the outpatient setting.  Patient assessed by PT OT and recommended for rehabilitation, his insurance finally came through for approval to [facility] rehab.      VITAL SIGNS:  Temp:  [95.4 °F (35.2 °C)-98.6 °F (37 °C)] 98.4 °F (36.9 °C)  Heart Rate:  [66-96] 66  Resp:  [16-23] 20  BP: ()/(61-98) 108/67  SpO2:  [95 %-100 %] 95 %  on  Flow (L/min):  [2] 2;   Device (Oxygen Therapy): nasal cannula    Body mass index is 27.94 kg/m².  Wt Readings from Last 3 Encounters:   02/22/21 80.9 kg (178 lb 6.4 oz)   11/24/19 82.2 kg (181 lb 5 oz)   11/23/19 78.5 kg (173 lb)       PHYSICAL EXAM:  General: Comfortable,awake, alert, oriented to self, place, and time, well-developed and well-nourished.  No respiratory distress.    Skin:  Skin is warm and dry. No rash noted. No pallor.    HENT:  Head:  Normocephalic and atraumatic.  Mouth:  Moist mucous membranes.  Slight right nasolabial  flattening  Eyes:  Conjunctivae and EOM are normal.  Pupils are equal, round, and reactive to light.  No scleral icterus.    Neck:  Neck supple.  No JVD present.  No bruit  Pulmonary/Chest:  No respiratory distress, no wheezes, no crackles, with normal breath sounds and good air movement.  Cardiovascular:  Normal rate, regular rhythm and normal heart sounds with no murmur.  Abdominal:  Soft.  Bowel sounds are normal.  No distension and no tenderness.   Extremities:  No edema, no tenderness, and no deformity.  No red or swollen joints anywhere.  Strong pulses in all 4 extremities with no clubbing, no cyanosis, no edema.  Right hip scar for previous surgery, right lower extremity shorter than the left  Neurological:  Motor strength equal no obvious deficit, sensory grossly intact.   No cranial nerve deficit.  No tongue deviation.  No facial droop.  No slurred speech.    Genitourinary: No Erickson catheter      DISCHARGE DISPOSITION   Stable    DISCHARGE MEDICATIONS:     Discharge Medications      Patient Not Prescribed Medications Upon Discharge           No future appointments.    Follow-up Information     Page, Reginald Tinajero MD Follow up in 3 month(s).    Specialty: Orthopedic Surgery  Contact information:  [address] [place] [id number] [phone]             Darwin Alvarez MD .    Specialty: Family Medicine  Contact information:  [address] [id number] [phone]                     CODE STATUS  Code Status and Medical Interventions:   Ordered at: 02/16/21 2233     Level Of Support Discussed With:    Patient     Code Status:    CPR     Medical Interventions (Level of Support Prior to Arrest):    Full       Ashish Coyle MD  02/22/21  13:29 EST    Please note that this discharge summary required more than 30 minutes to complete.    Please send a copy of this dictation to the following providers:  Darwin Alvarez MD      Electronically signed by Ashish Coyle MD at 02/22/21 0712

## 2021-02-24 LAB
ANION GAP SERPL CALCULATED.3IONS-SCNC: 10.6 MMOL/L (ref 5–15)
BUN SERPL-MCNC: 32 MG/DL (ref 8–23)
BUN/CREAT SERPL: 32 (ref 7–25)
CALCIUM SPEC-SCNC: 9.4 MG/DL (ref 8.6–10.5)
CHLORIDE SERPL-SCNC: 97 MMOL/L (ref 98–107)
CO2 SERPL-SCNC: 23.4 MMOL/L (ref 22–29)
CREAT SERPL-MCNC: 1 MG/DL (ref 0.76–1.27)
GFR SERPL CREATININE-BSD FRML MDRD: 75 ML/MIN/1.73
GLUCOSE SERPL-MCNC: 100 MG/DL (ref 65–99)
POTASSIUM SERPL-SCNC: 4.3 MMOL/L (ref 3.5–5.2)
SODIUM SERPL-SCNC: 131 MMOL/L (ref 136–145)

## 2021-02-24 PROCEDURE — 97530 THERAPEUTIC ACTIVITIES: CPT

## 2021-02-24 PROCEDURE — 94799 UNLISTED PULMONARY SVC/PX: CPT

## 2021-02-24 PROCEDURE — 25010000002 HEPARIN (PORCINE) PER 1000 UNITS: Performed by: FAMILY MEDICINE

## 2021-02-24 PROCEDURE — 99232 SBSQ HOSP IP/OBS MODERATE 35: CPT | Performed by: FAMILY MEDICINE

## 2021-02-24 PROCEDURE — 97535 SELF CARE MNGMENT TRAINING: CPT

## 2021-02-24 PROCEDURE — 97112 NEUROMUSCULAR REEDUCATION: CPT | Performed by: OCCUPATIONAL THERAPIST

## 2021-02-24 PROCEDURE — 97110 THERAPEUTIC EXERCISES: CPT

## 2021-02-24 PROCEDURE — 80048 BASIC METABOLIC PNL TOTAL CA: CPT | Performed by: FAMILY MEDICINE

## 2021-02-24 RX ORDER — ACETAMINOPHEN 325 MG/1
650 TABLET ORAL EVERY 6 HOURS PRN
Status: DISCONTINUED | OUTPATIENT
Start: 2021-02-24 | End: 2021-03-16 | Stop reason: HOSPADM

## 2021-02-24 RX ORDER — BISACODYL 5 MG/1
10 TABLET, DELAYED RELEASE ORAL DAILY PRN
Status: DISCONTINUED | OUTPATIENT
Start: 2021-02-24 | End: 2021-03-16 | Stop reason: HOSPADM

## 2021-02-24 RX ADMIN — GABAPENTIN 200 MG: 100 CAPSULE ORAL at 13:40

## 2021-02-24 RX ADMIN — HYDROCODONE BITARTRATE AND ACETAMINOPHEN 1 TABLET: 7.5; 325 TABLET ORAL at 05:41

## 2021-02-24 RX ADMIN — SODIUM CHLORIDE, PRESERVATIVE FREE 10 ML: 5 INJECTION INTRAVENOUS at 22:14

## 2021-02-24 RX ADMIN — METOPROLOL TARTRATE 75 MG: 50 TABLET, FILM COATED ORAL at 09:25

## 2021-02-24 RX ADMIN — HYDROCHLOROTHIAZIDE 25 MG: 25 TABLET ORAL at 09:26

## 2021-02-24 RX ADMIN — PANTOPRAZOLE SODIUM 40 MG: 40 TABLET, DELAYED RELEASE ORAL at 05:41

## 2021-02-24 RX ADMIN — CLOPIDOGREL 75 MG: 75 TABLET, FILM COATED ORAL at 09:26

## 2021-02-24 RX ADMIN — HYDROCODONE BITARTRATE AND ACETAMINOPHEN 1 TABLET: 7.5; 325 TABLET ORAL at 21:18

## 2021-02-24 RX ADMIN — IPRATROPIUM BROMIDE AND ALBUTEROL SULFATE 3 ML: .5; 3 SOLUTION RESPIRATORY (INHALATION) at 18:49

## 2021-02-24 RX ADMIN — GABAPENTIN 200 MG: 100 CAPSULE ORAL at 21:17

## 2021-02-24 RX ADMIN — LOSARTAN POTASSIUM 100 MG: 50 TABLET, FILM COATED ORAL at 09:26

## 2021-02-24 RX ADMIN — IPRATROPIUM BROMIDE AND ALBUTEROL SULFATE 3 ML: .5; 3 SOLUTION RESPIRATORY (INHALATION) at 08:14

## 2021-02-24 RX ADMIN — HEPARIN SODIUM 5000 UNITS: 5000 INJECTION INTRAVENOUS; SUBCUTANEOUS at 09:25

## 2021-02-24 RX ADMIN — GABAPENTIN 200 MG: 100 CAPSULE ORAL at 05:41

## 2021-02-24 RX ADMIN — Medication 1 PATCH: at 12:01

## 2021-02-24 RX ADMIN — CARISOPRODOL 350 MG: 350 TABLET ORAL at 12:00

## 2021-02-24 RX ADMIN — HYDROCODONE BITARTRATE AND ACETAMINOPHEN 1 TABLET: 7.5; 325 TABLET ORAL at 13:40

## 2021-02-24 RX ADMIN — POLYETHYLENE GLYCOL (3350) 17 G: 17 POWDER, FOR SOLUTION ORAL at 09:26

## 2021-02-24 RX ADMIN — Medication 1 TABLET: at 09:25

## 2021-02-24 RX ADMIN — HEPARIN SODIUM 5000 UNITS: 5000 INJECTION INTRAVENOUS; SUBCUTANEOUS at 21:18

## 2021-02-24 RX ADMIN — ASPIRIN 81 MG: 81 TABLET, COATED ORAL at 09:26

## 2021-02-24 RX ADMIN — ATORVASTATIN CALCIUM 80 MG: 40 TABLET, FILM COATED ORAL at 21:17

## 2021-02-24 RX ADMIN — CARISOPRODOL 350 MG: 350 TABLET ORAL at 00:07

## 2021-02-24 RX ADMIN — BISACODYL 10 MG: 5 TABLET, COATED ORAL at 21:18

## 2021-02-24 RX ADMIN — ALPRAZOLAM 0.5 MG: 0.5 TABLET ORAL at 21:18

## 2021-02-24 RX ADMIN — ALPRAZOLAM 0.5 MG: 0.5 TABLET ORAL at 09:25

## 2021-02-24 RX ADMIN — METOPROLOL TARTRATE 75 MG: 50 TABLET, FILM COATED ORAL at 21:18

## 2021-02-25 DIAGNOSIS — Z23 IMMUNIZATION DUE: ICD-10-CM

## 2021-02-25 PROCEDURE — 97110 THERAPEUTIC EXERCISES: CPT | Performed by: OCCUPATIONAL THERAPIST

## 2021-02-25 PROCEDURE — 94799 UNLISTED PULMONARY SVC/PX: CPT

## 2021-02-25 PROCEDURE — 97112 NEUROMUSCULAR REEDUCATION: CPT | Performed by: OCCUPATIONAL THERAPIST

## 2021-02-25 PROCEDURE — 99231 SBSQ HOSP IP/OBS SF/LOW 25: CPT | Performed by: FAMILY MEDICINE

## 2021-02-25 PROCEDURE — 97110 THERAPEUTIC EXERCISES: CPT

## 2021-02-25 PROCEDURE — 97112 NEUROMUSCULAR REEDUCATION: CPT

## 2021-02-25 PROCEDURE — 97535 SELF CARE MNGMENT TRAINING: CPT | Performed by: OCCUPATIONAL THERAPIST

## 2021-02-25 PROCEDURE — 94640 AIRWAY INHALATION TREATMENT: CPT

## 2021-02-25 PROCEDURE — 25010000002 HEPARIN (PORCINE) PER 1000 UNITS: Performed by: FAMILY MEDICINE

## 2021-02-25 PROCEDURE — 97530 THERAPEUTIC ACTIVITIES: CPT

## 2021-02-25 RX ADMIN — ASPIRIN 81 MG: 81 TABLET, COATED ORAL at 08:56

## 2021-02-25 RX ADMIN — METOPROLOL TARTRATE 75 MG: 50 TABLET, FILM COATED ORAL at 08:56

## 2021-02-25 RX ADMIN — HEPARIN SODIUM 5000 UNITS: 5000 INJECTION INTRAVENOUS; SUBCUTANEOUS at 08:56

## 2021-02-25 RX ADMIN — LOSARTAN POTASSIUM 100 MG: 50 TABLET, FILM COATED ORAL at 08:56

## 2021-02-25 RX ADMIN — GABAPENTIN 200 MG: 100 CAPSULE ORAL at 20:09

## 2021-02-25 RX ADMIN — POLYETHYLENE GLYCOL (3350) 17 G: 17 POWDER, FOR SOLUTION ORAL at 08:56

## 2021-02-25 RX ADMIN — HEPARIN SODIUM 5000 UNITS: 5000 INJECTION INTRAVENOUS; SUBCUTANEOUS at 20:10

## 2021-02-25 RX ADMIN — IPRATROPIUM BROMIDE AND ALBUTEROL SULFATE 3 ML: .5; 3 SOLUTION RESPIRATORY (INHALATION) at 19:11

## 2021-02-25 RX ADMIN — Medication 1 PATCH: at 09:00

## 2021-02-25 RX ADMIN — HYDROCHLOROTHIAZIDE 25 MG: 25 TABLET ORAL at 08:56

## 2021-02-25 RX ADMIN — GABAPENTIN 200 MG: 100 CAPSULE ORAL at 05:49

## 2021-02-25 RX ADMIN — SODIUM CHLORIDE, PRESERVATIVE FREE 10 ML: 5 INJECTION INTRAVENOUS at 10:16

## 2021-02-25 RX ADMIN — ALPRAZOLAM 0.5 MG: 0.5 TABLET ORAL at 20:09

## 2021-02-25 RX ADMIN — Medication 1 TABLET: at 08:56

## 2021-02-25 RX ADMIN — METOPROLOL TARTRATE 75 MG: 50 TABLET, FILM COATED ORAL at 20:09

## 2021-02-25 RX ADMIN — IPRATROPIUM BROMIDE AND ALBUTEROL SULFATE 3 ML: .5; 3 SOLUTION RESPIRATORY (INHALATION) at 07:44

## 2021-02-25 RX ADMIN — ATORVASTATIN CALCIUM 80 MG: 40 TABLET, FILM COATED ORAL at 20:09

## 2021-02-25 RX ADMIN — PANTOPRAZOLE SODIUM 40 MG: 40 TABLET, DELAYED RELEASE ORAL at 05:49

## 2021-02-25 RX ADMIN — CLOPIDOGREL 75 MG: 75 TABLET, FILM COATED ORAL at 08:56

## 2021-02-25 RX ADMIN — HYDROCODONE BITARTRATE AND ACETAMINOPHEN 1 TABLET: 7.5; 325 TABLET ORAL at 05:49

## 2021-02-25 RX ADMIN — HYDROCODONE BITARTRATE AND ACETAMINOPHEN 1 TABLET: 7.5; 325 TABLET ORAL at 14:45

## 2021-02-25 RX ADMIN — CARISOPRODOL 350 MG: 350 TABLET ORAL at 11:58

## 2021-02-26 LAB — GLUCOSE BLDC GLUCOMTR-MCNC: 111 MG/DL (ref 70–130)

## 2021-02-26 PROCEDURE — 25010000002 HEPARIN (PORCINE) PER 1000 UNITS: Performed by: FAMILY MEDICINE

## 2021-02-26 PROCEDURE — 97112 NEUROMUSCULAR REEDUCATION: CPT

## 2021-02-26 PROCEDURE — 94799 UNLISTED PULMONARY SVC/PX: CPT

## 2021-02-26 PROCEDURE — 82962 GLUCOSE BLOOD TEST: CPT

## 2021-02-26 PROCEDURE — 97530 THERAPEUTIC ACTIVITIES: CPT

## 2021-02-26 PROCEDURE — 97530 THERAPEUTIC ACTIVITIES: CPT | Performed by: OCCUPATIONAL THERAPIST

## 2021-02-26 PROCEDURE — 97112 NEUROMUSCULAR REEDUCATION: CPT | Performed by: OCCUPATIONAL THERAPIST

## 2021-02-26 PROCEDURE — 97535 SELF CARE MNGMENT TRAINING: CPT | Performed by: OCCUPATIONAL THERAPIST

## 2021-02-26 PROCEDURE — 99231 SBSQ HOSP IP/OBS SF/LOW 25: CPT | Performed by: FAMILY MEDICINE

## 2021-02-26 PROCEDURE — 97110 THERAPEUTIC EXERCISES: CPT

## 2021-02-26 RX ADMIN — CARISOPRODOL 350 MG: 350 TABLET ORAL at 20:24

## 2021-02-26 RX ADMIN — CLOPIDOGREL 75 MG: 75 TABLET, FILM COATED ORAL at 08:59

## 2021-02-26 RX ADMIN — CARISOPRODOL 350 MG: 350 TABLET ORAL at 02:27

## 2021-02-26 RX ADMIN — POLYETHYLENE GLYCOL (3350) 17 G: 17 POWDER, FOR SOLUTION ORAL at 08:58

## 2021-02-26 RX ADMIN — LOSARTAN POTASSIUM 100 MG: 50 TABLET, FILM COATED ORAL at 08:59

## 2021-02-26 RX ADMIN — SODIUM CHLORIDE, PRESERVATIVE FREE 10 ML: 5 INJECTION INTRAVENOUS at 09:00

## 2021-02-26 RX ADMIN — IPRATROPIUM BROMIDE AND ALBUTEROL SULFATE 3 ML: .5; 3 SOLUTION RESPIRATORY (INHALATION) at 08:07

## 2021-02-26 RX ADMIN — PANTOPRAZOLE SODIUM 40 MG: 40 TABLET, DELAYED RELEASE ORAL at 05:30

## 2021-02-26 RX ADMIN — METOPROLOL TARTRATE 75 MG: 50 TABLET, FILM COATED ORAL at 08:59

## 2021-02-26 RX ADMIN — HYDROCHLOROTHIAZIDE 25 MG: 25 TABLET ORAL at 08:59

## 2021-02-26 RX ADMIN — HYDROCODONE BITARTRATE AND ACETAMINOPHEN 1 TABLET: 7.5; 325 TABLET ORAL at 21:28

## 2021-02-26 RX ADMIN — HYDROCODONE BITARTRATE AND ACETAMINOPHEN 1 TABLET: 7.5; 325 TABLET ORAL at 13:34

## 2021-02-26 RX ADMIN — Medication 1 TABLET: at 08:59

## 2021-02-26 RX ADMIN — IPRATROPIUM BROMIDE AND ALBUTEROL SULFATE 3 ML: .5; 3 SOLUTION RESPIRATORY (INHALATION) at 19:37

## 2021-02-26 RX ADMIN — HEPARIN SODIUM 5000 UNITS: 5000 INJECTION INTRAVENOUS; SUBCUTANEOUS at 20:25

## 2021-02-26 RX ADMIN — GABAPENTIN 200 MG: 100 CAPSULE ORAL at 04:20

## 2021-02-26 RX ADMIN — HEPARIN SODIUM 5000 UNITS: 5000 INJECTION INTRAVENOUS; SUBCUTANEOUS at 08:58

## 2021-02-26 RX ADMIN — ACETAMINOPHEN 650 MG: 325 TABLET ORAL at 20:24

## 2021-02-26 RX ADMIN — ASPIRIN 81 MG: 81 TABLET, COATED ORAL at 08:59

## 2021-02-26 RX ADMIN — METOPROLOL TARTRATE 75 MG: 50 TABLET, FILM COATED ORAL at 20:25

## 2021-02-26 RX ADMIN — Medication 1 PATCH: at 09:00

## 2021-02-26 RX ADMIN — ALPRAZOLAM 0.5 MG: 0.5 TABLET ORAL at 13:34

## 2021-02-26 RX ADMIN — ATORVASTATIN CALCIUM 80 MG: 40 TABLET, FILM COATED ORAL at 20:25

## 2021-02-26 RX ADMIN — GABAPENTIN 200 MG: 100 CAPSULE ORAL at 13:34

## 2021-02-26 RX ADMIN — HYDROCODONE BITARTRATE AND ACETAMINOPHEN 1 TABLET: 7.5; 325 TABLET ORAL at 02:27

## 2021-02-27 PROCEDURE — 97112 NEUROMUSCULAR REEDUCATION: CPT

## 2021-02-27 PROCEDURE — 97535 SELF CARE MNGMENT TRAINING: CPT | Performed by: OCCUPATIONAL THERAPIST

## 2021-02-27 PROCEDURE — 94799 UNLISTED PULMONARY SVC/PX: CPT

## 2021-02-27 PROCEDURE — 97112 NEUROMUSCULAR REEDUCATION: CPT | Performed by: OCCUPATIONAL THERAPIST

## 2021-02-27 PROCEDURE — 97530 THERAPEUTIC ACTIVITIES: CPT

## 2021-02-27 PROCEDURE — 97530 THERAPEUTIC ACTIVITIES: CPT | Performed by: OCCUPATIONAL THERAPIST

## 2021-02-27 PROCEDURE — 99232 SBSQ HOSP IP/OBS MODERATE 35: CPT | Performed by: FAMILY MEDICINE

## 2021-02-27 PROCEDURE — 25010000002 HEPARIN (PORCINE) PER 1000 UNITS: Performed by: FAMILY MEDICINE

## 2021-02-27 RX ORDER — HYDROCODONE BITARTRATE AND ACETAMINOPHEN 10; 325 MG/1; MG/1
1 TABLET ORAL EVERY 6 HOURS PRN
Status: DISCONTINUED | OUTPATIENT
Start: 2021-02-27 | End: 2021-03-16 | Stop reason: HOSPADM

## 2021-02-27 RX ORDER — MIRTAZAPINE 15 MG/1
15 TABLET, FILM COATED ORAL NIGHTLY
Status: DISCONTINUED | OUTPATIENT
Start: 2021-02-27 | End: 2021-03-16 | Stop reason: HOSPADM

## 2021-02-27 RX ADMIN — METOPROLOL TARTRATE 75 MG: 50 TABLET, FILM COATED ORAL at 08:59

## 2021-02-27 RX ADMIN — ASPIRIN 81 MG: 81 TABLET, COATED ORAL at 08:59

## 2021-02-27 RX ADMIN — IPRATROPIUM BROMIDE AND ALBUTEROL SULFATE 3 ML: .5; 3 SOLUTION RESPIRATORY (INHALATION) at 19:08

## 2021-02-27 RX ADMIN — HEPARIN SODIUM 5000 UNITS: 5000 INJECTION INTRAVENOUS; SUBCUTANEOUS at 20:23

## 2021-02-27 RX ADMIN — Medication 1 TABLET: at 08:59

## 2021-02-27 RX ADMIN — POLYETHYLENE GLYCOL (3350) 17 G: 17 POWDER, FOR SOLUTION ORAL at 09:00

## 2021-02-27 RX ADMIN — ALPRAZOLAM 0.5 MG: 0.5 TABLET ORAL at 01:45

## 2021-02-27 RX ADMIN — ALPRAZOLAM 0.5 MG: 0.5 TABLET ORAL at 20:23

## 2021-02-27 RX ADMIN — IPRATROPIUM BROMIDE AND ALBUTEROL SULFATE 3 ML: .5; 3 SOLUTION RESPIRATORY (INHALATION) at 07:40

## 2021-02-27 RX ADMIN — CLOPIDOGREL 75 MG: 75 TABLET, FILM COATED ORAL at 08:59

## 2021-02-27 RX ADMIN — ATORVASTATIN CALCIUM 80 MG: 40 TABLET, FILM COATED ORAL at 20:24

## 2021-02-27 RX ADMIN — MIRTAZAPINE 15 MG: 15 TABLET, FILM COATED ORAL at 20:25

## 2021-02-27 RX ADMIN — HYDROCODONE BITARTRATE AND ACETAMINOPHEN 1 TABLET: 7.5; 325 TABLET ORAL at 05:20

## 2021-02-27 RX ADMIN — HYDROCODONE BITARTRATE AND ACETAMINOPHEN 1 TABLET: 10; 325 TABLET ORAL at 20:23

## 2021-02-27 RX ADMIN — CARISOPRODOL 350 MG: 350 TABLET ORAL at 09:10

## 2021-02-27 RX ADMIN — Medication 1 PATCH: at 09:00

## 2021-02-27 RX ADMIN — HEPARIN SODIUM 5000 UNITS: 5000 INJECTION INTRAVENOUS; SUBCUTANEOUS at 08:59

## 2021-02-27 RX ADMIN — PANTOPRAZOLE SODIUM 40 MG: 40 TABLET, DELAYED RELEASE ORAL at 05:20

## 2021-02-27 RX ADMIN — LOSARTAN POTASSIUM 100 MG: 50 TABLET, FILM COATED ORAL at 08:59

## 2021-02-27 RX ADMIN — HYDROCHLOROTHIAZIDE 25 MG: 25 TABLET ORAL at 08:59

## 2021-02-27 RX ADMIN — GABAPENTIN 200 MG: 100 CAPSULE ORAL at 01:45

## 2021-02-27 RX ADMIN — HYDROCODONE BITARTRATE AND ACETAMINOPHEN 1 TABLET: 10; 325 TABLET ORAL at 12:55

## 2021-02-28 PROCEDURE — 94799 UNLISTED PULMONARY SVC/PX: CPT

## 2021-02-28 PROCEDURE — 99232 SBSQ HOSP IP/OBS MODERATE 35: CPT | Performed by: FAMILY MEDICINE

## 2021-02-28 PROCEDURE — 25010000002 HEPARIN (PORCINE) PER 1000 UNITS: Performed by: FAMILY MEDICINE

## 2021-02-28 RX ADMIN — Medication 1 PATCH: at 12:35

## 2021-02-28 RX ADMIN — IPRATROPIUM BROMIDE AND ALBUTEROL SULFATE 3 ML: .5; 3 SOLUTION RESPIRATORY (INHALATION) at 07:00

## 2021-02-28 RX ADMIN — MIRTAZAPINE 15 MG: 15 TABLET, FILM COATED ORAL at 20:59

## 2021-02-28 RX ADMIN — HEPARIN SODIUM 5000 UNITS: 5000 INJECTION INTRAVENOUS; SUBCUTANEOUS at 10:14

## 2021-02-28 RX ADMIN — Medication 1 TABLET: at 10:14

## 2021-02-28 RX ADMIN — ATORVASTATIN CALCIUM 80 MG: 40 TABLET, FILM COATED ORAL at 20:59

## 2021-02-28 RX ADMIN — PANTOPRAZOLE SODIUM 40 MG: 40 TABLET, DELAYED RELEASE ORAL at 05:03

## 2021-02-28 RX ADMIN — CLOPIDOGREL 75 MG: 75 TABLET, FILM COATED ORAL at 10:14

## 2021-02-28 RX ADMIN — ALPRAZOLAM 0.5 MG: 0.5 TABLET ORAL at 10:20

## 2021-02-28 RX ADMIN — POLYETHYLENE GLYCOL (3350) 17 G: 17 POWDER, FOR SOLUTION ORAL at 10:14

## 2021-02-28 RX ADMIN — HYDROCODONE BITARTRATE AND ACETAMINOPHEN 1 TABLET: 10; 325 TABLET ORAL at 12:34

## 2021-02-28 RX ADMIN — HYDROCHLOROTHIAZIDE 25 MG: 25 TABLET ORAL at 10:14

## 2021-02-28 RX ADMIN — HYDROCODONE BITARTRATE AND ACETAMINOPHEN 1 TABLET: 10; 325 TABLET ORAL at 20:59

## 2021-02-28 RX ADMIN — HEPARIN SODIUM 5000 UNITS: 5000 INJECTION INTRAVENOUS; SUBCUTANEOUS at 21:00

## 2021-02-28 RX ADMIN — IPRATROPIUM BROMIDE AND ALBUTEROL SULFATE 3 ML: .5; 3 SOLUTION RESPIRATORY (INHALATION) at 22:59

## 2021-02-28 RX ADMIN — CARISOPRODOL 350 MG: 350 TABLET ORAL at 10:20

## 2021-02-28 RX ADMIN — HYDROCODONE BITARTRATE AND ACETAMINOPHEN 1 TABLET: 10; 325 TABLET ORAL at 05:03

## 2021-02-28 RX ADMIN — ALPRAZOLAM 0.5 MG: 0.5 TABLET ORAL at 22:42

## 2021-02-28 RX ADMIN — ASPIRIN 81 MG: 81 TABLET, COATED ORAL at 10:15

## 2021-02-28 RX ADMIN — GABAPENTIN 200 MG: 100 CAPSULE ORAL at 22:42

## 2021-02-28 RX ADMIN — METOPROLOL TARTRATE 75 MG: 50 TABLET, FILM COATED ORAL at 12:31

## 2021-02-28 RX ADMIN — CARISOPRODOL 350 MG: 350 TABLET ORAL at 22:42

## 2021-03-01 PROCEDURE — 94799 UNLISTED PULMONARY SVC/PX: CPT

## 2021-03-01 PROCEDURE — 97112 NEUROMUSCULAR REEDUCATION: CPT | Performed by: OCCUPATIONAL THERAPIST

## 2021-03-01 PROCEDURE — 97530 THERAPEUTIC ACTIVITIES: CPT

## 2021-03-01 PROCEDURE — 25010000002 HEPARIN (PORCINE) PER 1000 UNITS: Performed by: FAMILY MEDICINE

## 2021-03-01 PROCEDURE — 97535 SELF CARE MNGMENT TRAINING: CPT | Performed by: OCCUPATIONAL THERAPIST

## 2021-03-01 PROCEDURE — 97110 THERAPEUTIC EXERCISES: CPT | Performed by: OCCUPATIONAL THERAPIST

## 2021-03-01 PROCEDURE — 97110 THERAPEUTIC EXERCISES: CPT

## 2021-03-01 PROCEDURE — 97530 THERAPEUTIC ACTIVITIES: CPT | Performed by: OCCUPATIONAL THERAPIST

## 2021-03-01 PROCEDURE — 97112 NEUROMUSCULAR REEDUCATION: CPT

## 2021-03-01 RX ADMIN — ASPIRIN 81 MG: 81 TABLET, COATED ORAL at 09:01

## 2021-03-01 RX ADMIN — Medication 1 TABLET: at 09:01

## 2021-03-01 RX ADMIN — CARISOPRODOL 350 MG: 350 TABLET ORAL at 13:24

## 2021-03-01 RX ADMIN — ATORVASTATIN CALCIUM 80 MG: 40 TABLET, FILM COATED ORAL at 21:22

## 2021-03-01 RX ADMIN — GABAPENTIN 200 MG: 100 CAPSULE ORAL at 21:22

## 2021-03-01 RX ADMIN — HYDROCODONE BITARTRATE AND ACETAMINOPHEN 1 TABLET: 10; 325 TABLET ORAL at 13:24

## 2021-03-01 RX ADMIN — MIRTAZAPINE 15 MG: 15 TABLET, FILM COATED ORAL at 21:22

## 2021-03-01 RX ADMIN — ALPRAZOLAM 0.5 MG: 0.5 TABLET ORAL at 13:26

## 2021-03-01 RX ADMIN — HYDROCODONE BITARTRATE AND ACETAMINOPHEN 1 TABLET: 10; 325 TABLET ORAL at 21:22

## 2021-03-01 RX ADMIN — GABAPENTIN 200 MG: 100 CAPSULE ORAL at 06:08

## 2021-03-01 RX ADMIN — HYDROCHLOROTHIAZIDE 25 MG: 25 TABLET ORAL at 09:01

## 2021-03-01 RX ADMIN — PANTOPRAZOLE SODIUM 40 MG: 40 TABLET, DELAYED RELEASE ORAL at 06:08

## 2021-03-01 RX ADMIN — HEPARIN SODIUM 5000 UNITS: 5000 INJECTION INTRAVENOUS; SUBCUTANEOUS at 09:01

## 2021-03-01 RX ADMIN — CLOPIDOGREL 75 MG: 75 TABLET, FILM COATED ORAL at 09:01

## 2021-03-01 RX ADMIN — LOSARTAN POTASSIUM 100 MG: 50 TABLET, FILM COATED ORAL at 09:01

## 2021-03-01 RX ADMIN — IPRATROPIUM BROMIDE AND ALBUTEROL SULFATE 3 ML: .5; 3 SOLUTION RESPIRATORY (INHALATION) at 07:08

## 2021-03-01 RX ADMIN — Medication 1 PATCH: at 09:02

## 2021-03-01 RX ADMIN — HYDROCODONE BITARTRATE AND ACETAMINOPHEN 1 TABLET: 10; 325 TABLET ORAL at 06:08

## 2021-03-01 RX ADMIN — METOPROLOL TARTRATE 75 MG: 50 TABLET, FILM COATED ORAL at 09:01

## 2021-03-01 RX ADMIN — HEPARIN SODIUM 5000 UNITS: 5000 INJECTION INTRAVENOUS; SUBCUTANEOUS at 21:22

## 2021-03-01 NOTE — PLAN OF CARE
Goal Outcome Evaluation:         Patient is progressing medically and physically with all therapies, continue with current plan of care.

## 2021-03-01 NOTE — THERAPY TREATMENT NOTE
Inpatient Rehabilitation - Physical Therapy Treatment Note       CHEYENNE No     Patient Name: Valerie Moore  : 1955  MRN: [id number]    Today's Date: 3/1/2021                    Admit Date: 2021      Visit Dx: No diagnosis found.    Patient Active Problem List   Diagnosis   • Anxiety   • Hypertension   • Hypercholesteremia   • Seasonal allergies   • Sleep disorder   • GERD (gastroesophageal reflux disease)   • Arthritis   • Airway hyperreactivity   • Gout   • Weakness   • CAP (community acquired pneumonia)   • Hypokalemia   • Hyponatremia   • Hyperglycemia   • Normocytic anemia   • Abdominal aortic aneurysm (AAA) 3.0 cm to 5.5 cm in diameter in male (CMS/HCC)   • Tobacco abuse   • Chest pain, atypical   • Cerebrovascular accident (CVA) (CMS/HCC)   • CVA (cerebral vascular accident) (CMS/HCC)       Past Medical History:   Diagnosis Date   • Anxiety    • GERD (gastroesophageal reflux disease) 2017   • Hypercholesteremia    • Hypertension    • Seasonal allergies    • Sleep disorder    • Stroke (CMS/HCC)        Past Surgical History:   Procedure Laterality Date   • HERNIA REPAIR     • HIP SURGERY Right     plate and 8 screws in right hip           PT ASSESSMENT (last 12 hours)      IRF PT Evaluation and Treatment     Row Name 21 1009          PT Time and Intention    Document Type  daily treatment  -LL     Mode of Treatment  individual therapy;physical therapy  -LL     Patient/Family/Caregiver Comments/Observations  Patient agreeable to PT session this date  -LL     Row Name 21 1009          General Information    Patient Profile Reviewed  yes  -LL     Existing Precautions/Restrictions  fall R HP  -LL     Row Name 21 1009          Cognition/Psychosocial    Affect/Mental Status (Cognitive)  WFL  -LL     Orientation Status (Cognition)  oriented x 3  -LL     Follows Commands (Cognition)  verbal cues/prompting required;physical/tactile prompts required  -LL     Personal Safety  Interventions  fall prevention program maintained;gait belt;nonskid shoes/slippers when out of bed;supervised activity  -     Row Name 03/01/21 1009          Pain Scale: FACES Pre/Post-Treatment    Pain: FACES Scale, Pretreatment  0-->no hurt  -LL     Posttreatment Pain Rating  0-->no hurt  -LL     Row Name 03/01/21 1009          Bed Mobility    Supine-Sit [place] (Bed Mobility)  moderate assist (50% patient effort);verbal cues;nonverbal cues (demo/gesture)  -     Assistive Device (Bed Mobility)  bed rails;draw sheet  -     Row Name 03/01/21 1009          Transfers    Bed-Chair [place] (Transfers)  moderate assist (50% patient effort);verbal cues;nonverbal cues (demo/gesture)  -LL     Chair-Bed [place] (Transfers)  moderate assist (50% patient effort);verbal cues;nonverbal cues (demo/gesture)  -     Assistive Device (Bed-Chair Transfers)  wheelchair  -LL     Sit-Stand [place] (Transfers)  minimum assist (75% patient effort);verbal cues;nonverbal cues (demo/gesture)  -LL     Stand-Sit [place] (Transfers)  minimum assist (75% patient effort);verbal cues;nonverbal cues (demo/gesture)  -     Row Name 03/01/21 1009          Chair-Bed Transfer    Assistive Device (Chair-Bed Transfers)  wheelchair  -     Row Name 03/01/21 1009          Sit-Stand Transfer    Assistive Device (Sit-Stand Transfers)  -- PB  -     Row Name 03/01/21 1009          Stand-Sit Transfer    Assistive Device (Stand-Sit Transfers)  -- // bars  -     Row Name 03/01/21 1009          Stand Pivot/Stand Step Transfer    Stand Pivot/Stand Step [place] (Transfers)  moderate assist (50% patient effort);verbal cues;nonverbal cues (demo/gesture)  -     Row Name 03/01/21 1009          Gait/Stairs (Locomotion)    Comment (Gait/Stairs)  Not attempted this date.  -LL     Row Name 03/01/21 1009          Balance    Comment, Balance  Standing weight shifting in // bars  -LL     Row Name 03/01/21 1009          Hip  (Therapeutic Exercise)    Hip PROM (Therapeutic Exercise)  right;flexion;extension;aBduction;aDduction;sitting;supine  -LL     Hip Strengthening (Therapeutic Exercise)  left;flexion;extension;aBduction;aDduction;marching while seated;sitting;resistance band;green;10 repetitions;2 sets;supine 2# R LE  -LL     Row Name 03/01/21 1009          Knee (Therapeutic Exercise)    Knee PROM (Therapeutic Exercise)  right;flexion;extension;sitting;supine  -LL     Knee Strengthening (Therapeutic Exercise)  left;flexion;extension;marching while seated;LAQ (long arc quad);hamstring curls;sitting;2 lb free weight;resistance band;green;10 repetitions;2 sets;supine  -LL     Row Name 03/01/21 1009          Ankle (Therapeutic Exercise)    Ankle Strengthening (Therapeutic Exercise)  left;dorsiflexion;plantarflexion;10 repetitions;2 sets  -     Row Name 03/01/21 1009          Modality Treatment    Treatment Location 1 (Modality)  -- R anterior tibialis & R quad  -LL     Modality Performed  NMES (neuromuscular electrical stimulation)  -LL     Modality Treatment Results  mm contraction noted  -LL     Comment, Modality Treatment  x 20 min  -     Row Name 03/01/21 1009          Positioning and Restraints    Pre-Treatment Position  in bed  -LL     Post Treatment Position  wheelchair  -LL     In Wheelchair  sitting;call light within reach;encouraged to call for assist;legs elevated  -     Row Name 03/01/21 1009          Therapy Assessment/Plan (PT)    Patient's Goals For Discharge  return home  -     Row Name 03/01/21 1009          Therapy Assessment/Plan (PT)    Rehab Potential/Prognosis (PT)  adequate, monitor progress closely  -     Frequency of Treatment (PT)  5 times per week  -LL     Estimated Duration of Therapy (PT)  3 weeks  -     Problem List (PT)  balance;coordination;hemiparesis/hemiplegia;mobility;muscle tone;sensation;strength;pain;postural control  -     Activity Limitations Related to Problem List (PT)  unable to  ambulate safely;unable to transfer safely  -     Row Name 03/01/21 1009          Therapy Plan Review/Discharge Plan (PT)    Anticipated Equipment Needs at Discharge (PT Eval)  -- tbd  -LL     Expected Discharge Disposition (PT Eval)  home with home health care  -LL       User Key  (r) = Recorded By, (t) = Taken By, (c) = Cosigned By    Initials Name Provider Type    LL Alise Handy PTA Physical Therapy Assistant           Physical Therapy Education                 Title: PT OT SLP Therapies (Done)     Topic: Physical Therapy (Done)     Point: Mobility training (Done)     Learning Progress Summary           Patient Acceptance, E,D, VU,NR by LL at 3/1/2021 1016    Acceptance, E,TB, VU by CT at 2/27/2021 1228    Acceptance, E,D, VU,NR by LL at 2/26/2021 1829    Acceptance, E,D, VU,NR by LL at 2/25/2021 1541    Acceptance, E, VU,NR by LB at 2/23/2021 1604                   Point: Home exercise program (Done)     Learning Progress Summary           Patient Acceptance, E,D, VU,NR by LL at 3/1/2021 1016    Acceptance, E,TB, VU by CT at 2/27/2021 1228    Acceptance, E,D, VU,NR by LL at 2/26/2021 1829    Acceptance, E,D, VU,NR by LL at 2/25/2021 1541    Acceptance, E, VU,NR by LB at 2/23/2021 1604                   Point: Body mechanics (Done)     Learning Progress Summary           Patient Acceptance, E,D, VU,NR by LL at 3/1/2021 1016    Acceptance, E,TB, VU by CT at 2/27/2021 1228    Acceptance, E,D, VU,NR by LL at 2/26/2021 1829    Acceptance, E,D, VU,NR by LL at 2/25/2021 1541    Acceptance, E, VU,NR by LB at 2/23/2021 1604                   Point: Precautions (Done)     Learning Progress Summary           Patient Acceptance, E,D, VU,NR by LL at 3/1/2021 1016    Acceptance, E,TB, VU by CT at 2/27/2021 1228    Acceptance, E,D, VU,NR by LL at 2/26/2021 1829    Acceptance, E,D, VU,NR by LL at 2/25/2021 1541    Acceptance, E, VU,NR by LB at 2/23/2021 1604                               User Key     Initials Effective  Dates Name Provider Type Discipline    LB 03/14/16 -  Cathleen Carnes, PT Physical Therapist PT    CT 04/03/18 -  Nilsa Bermudez, PT Physical Therapist PT    LL 05/02/16 -  Alise Handy PTA Physical Therapy Assistant PT                PT Recommendation and Plan    Frequency of Treatment (PT): 5 times per week  Anticipated Equipment Needs at Discharge (PT Eval): (tbd)                  Time Calculation:     PT Charges     Row Name 03/01/21 1016             Time Calculation    Start Time  0735  -LL      Stop Time  0915  -LL      Time Calculation (min)  100 min  -LL      PT Received On  03/01/21  -LL         Time Calculation- PT    Total Timed Code Minutes- PT  100 minute(s)  -LL        User Key  (r) = Recorded By, (t) = Taken By, (c) = Cosigned By    Initials Name Provider Type    LL Alise Handy PTA Physical Therapy Assistant          Therapy Charges for Today     Code Description Service Date Service Provider Modifiers Qty    55189008820 HC PT NEUROMUSC RE EDUCATION EA 15 MIN 3/1/2021 Alise Handy PTA GP 2    98267595134 HC PT THERAPEUTIC ACT EA 15 MIN 3/1/2021 Alise Handy PTA GP 2    07723067855 HC PT THER PROC EA 15 MIN 3/1/2021 Alise Handy PTA GP 3                   Alise. WAGNER Handy  3/1/2021

## 2021-03-01 NOTE — PROGRESS NOTES
Inpatient Rehabilitation Plan of Care Note    Plan of Care  Mobility    [PT] Bed/Chair/Wheelchair(Active)  Current Status(03/01/2021): mod A  Weekly Goal(03/08/2021): min A  Discharge Goal: SBA    [PT] Walk(Active)  Current Status(03/01/2021): unable  Weekly Goal(03/08/2021): amb 20' AAD min A x2  Discharge Goal: amb 100' AAD SBA    Signed by: Cahtleen Carnes, Physical Therapist

## 2021-03-01 NOTE — PROGRESS NOTES
Inpatient Rehabilitation Plan of Care Note    Plan of Care  Self Care    [OT] Dressing (Upper)(Active)  Current Status(03/01/2021): Max A  Weekly Goal(03/09/2021): Mod A  Discharge Goal: Min A    [OT] Dressing (Lower)(Active)  Current Status(03/01/2021): Total A  Weekly Goal(03/09/2021): Max A  Discharge Goal: Mod A    Performed Intervention(s)  ADL retraining, AE education, neuro re-ed, GMC/FMC theract, ROM, strengthening,  activity tolerance, fxal mobility    Signed by: Meghan Carbajal, OT

## 2021-03-01 NOTE — PROGRESS NOTES
Rehabilitation Nursing  Inpatient Rehabilitation Plan of Care Note    Plan of Care  Copy from POCBody Function Structure    Skin Integrity (Active)  Current Status (2/23/2021 12:00:00 AM): free of skin breakdown this shift  Weekly Goal: free of skin breakdown this stay  Discharge Goal: home free of skin breakdown    Safety    Potential for Injury (Active)  Current Status (2/23/2021 12:00:00 AM): free from injury this shift  Weekly Goal: free from injury this stay  Discharge Goal: home free from injury    Signed by: Tonia Velasquez, Nurse

## 2021-03-01 NOTE — THERAPY TREATMENT NOTE
Inpatient Rehabilitation - Occupational Therapy Treatment Note    CHEYENNE No     Patient Name: Valerie Moore  : 1955  MRN: [id number]    Today's Date: 3/1/2021                 Admit Date: 2021       No diagnosis found.    Patient Active Problem List   Diagnosis   • Anxiety   • Hypertension   • Hypercholesteremia   • Seasonal allergies   • Sleep disorder   • GERD (gastroesophageal reflux disease)   • Arthritis   • Airway hyperreactivity   • Gout   • Weakness   • CAP (community acquired pneumonia)   • Hypokalemia   • Hyponatremia   • Hyperglycemia   • Normocytic anemia   • Abdominal aortic aneurysm (AAA) 3.0 cm to 5.5 cm in diameter in male (CMS/HCC)   • Tobacco abuse   • Chest pain, atypical   • Cerebrovascular accident (CVA) (CMS/HCC)   • CVA (cerebral vascular accident) (CMS/HCC)       Past Medical History:   Diagnosis Date   • Anxiety    • GERD (gastroesophageal reflux disease) 2017   • Hypercholesteremia    • Hypertension    • Seasonal allergies    • Sleep disorder    • Stroke (CMS/HCC)        Past Surgical History:   Procedure Laterality Date   • HERNIA REPAIR     • HIP SURGERY Right     plate and 8 screws in right hip                 IRF OT ASSESSMENT FLOWSHEET (last 12 hours)      IRF OT Evaluation and Treatment     Row Name 21 1432          OT Time and Intention    Document Type  daily treatment  -BF     Mode of Treatment  occupational therapy  -BF     Patient Effort  good  -BF     Row Name 21 1432          General Information    Existing Precautions/Restrictions  fall R HP  -BF     Row Name 21 1432          Cognition/Psychosocial    Orientation Status (Cognition)  oriented x 3  -BF     Follows Commands (Cognition)  follows one-step commands;verbal cues/prompting required;repetition of directions required  -BF     Row Name 21 1432          Transfer Assessment/Treatment    Transfers  chair-bed transfer;bed-chair transfer  -BF     Row Name 21 1432           Transfers    Bed-Chair [place] (Transfers)  moderate assist (50% patient effort);verbal cues;nonverbal cues (demo/gesture)  -BF     Chair-Bed [place] (Transfers)  moderate assist (50% patient effort);verbal cues;nonverbal cues (demo/gesture)  -BF     Assistive Device (Bed-Chair Transfers)  wheelchair  -BF     Row Name 03/01/21 1432          Chair-Bed Transfer    Assistive Device (Chair-Bed Transfers)  wheelchair  -BF     Row Name 03/01/21 1432          Motor Skills    Motor Skills  neuro-muscular function;motor control/coordination interventions  -BF     Neuromuscular Function  right;upper extremity  -BF     Motor Control/Coordination Interventions  neuro-muscular re-education;gross motor coordination activities RUE AA/PROM, NDT; BUE pushbox w/ RUE supported  -BF     Row Name 03/01/21 1432          Neuromuscular Re-education    Interventions (Neuromuscular Re-education)  facilitation/inhibition;massage;tapping;weight bearing RUE  -BF     Positioning (Neuromuscular Re-education)  sitting;supported  -BF     Row Name 03/01/21 1432          Positioning and Restraints    In Bed  supine;call light within reach;encouraged to call for assist;RUE elevated  -BF       User Key  (r) = Recorded By, (t) = Taken By, (c) = Cosigned By    Initials Name Effective Dates    BF Meghan Carbajal OT 07/05/20 -            Occupational Therapy Education                 Title: PT OT SLP Therapies (Done)     Topic: Occupational Therapy (Done)     Point: ADL training (Done)     Description:   Instruct learner(s) on proper safety adaptation and remediation techniques during self care or transfers.   Instruct in proper use of assistive devices.              Learning Progress Summary           Patient Acceptance, E, VU,NR by  at 3/1/2021 1432    Acceptance, E, VU,NR by BF at 2/27/2021 1110    Acceptance, E, VU,NR by  at 2/25/2021 1425    Acceptance, E, VU,NR by  at 2/23/2021 1548                   Point: Precautions (Done)      Description:   Instruct learner(s) on prescribed precautions during self-care and functional transfers.              Learning Progress Summary           Patient Acceptance, E, VU,NR by BF at 3/1/2021 1432    Acceptance, E, VU,NR by BF at 2/27/2021 1110    Acceptance, E, VU,NR by BF at 2/25/2021 1425    Acceptance, E, VU,NR by BF at 2/23/2021 1548                               User Key     Initials Effective Dates Name Provider Type Discipline     07/05/20 -  Meghan Carbajal OT Occupational Therapist OT                    OT Recommendation and Plan    Planned Therapy Interventions (OT): activity tolerance training, adaptive equipment training, BADL retraining, neuromuscular control/coordination retraining, passive ROM/stretching, ROM/therapeutic exercise, strengthening exercise, transfer/mobility retraining                    Time Calculation:     Time Calculation- OT     Row Name 03/01/21 1439 03/01/21 0925          Time Calculation- OT    OT Start Time  1330  -BF  0925  -BF     OT Stop Time  1425  -BF  1010  -BF     OT Time Calculation (min)  55 min  -BF  45 min  -BF     Total Timed Code Minutes- OT  55 minute(s)  -BF  45 minute(s)  -BF     OT Non-Billable Time (min)  --  20 min  -BF       User Key  (r) = Recorded By, (t) = Taken By, (c) = Cosigned By    Initials Name Provider Type     Meghan Carbajal OT Occupational Therapist        Therapy Charges for Today     Code Description Service Date Service Provider Modifiers Qty    98489582539 HC OT NEUROMUSC RE EDUCATION EA 15 MIN 3/1/2021 Meghan Carbajal OT GO 3    46632939290 HC OT THER PROC EA 15 MIN 3/1/2021 Meghan Carbajal OT GO 1    13722450456 HC OT SELF CARE/MGMT/TRAIN EA 15 MIN 3/1/2021 Meghan Carbajal OT GO 1    06808169492 HC OT THERAPEUTIC ACT EA 15 MIN 3/1/2021 Meghan Carbajal OT GO 2                   Meghan Carbajal OT  3/1/2021

## 2021-03-01 NOTE — PROGRESS NOTES
Occupational Therapy: Individual: 100 minutes.    Physical Therapy:    Speech Language Pathology:    Signed by: Meghan Carbajal OT

## 2021-03-01 NOTE — PROGRESS NOTES
PROGRESS NOTE         Patient Identification:  Name:  Valerie Moore  Age:  65 y.o.  Sex:  male  :  1955  MRN:  [id number]  Visit Number:  [id number]  Primary Care Provider:  Darwin Alvarez MD         LOS: 7 days       ----------------------------------------------------------------------------------------------------------------------  Subjective       Chief Complaints:    Acute CVA  Depression improved with Remeron  Right hip osteoarthritis  Hypertension      Interval History:      Patient seems to be stable without any evidence of acute distress and seems to be less depressed with no complaints this morning.  Patient had an uneventful night with no issues reported, vitals are stable with no fever documented overnight.  Patient denies any chest pain, shortness of breath, nausea, vomiting, diarrhea or headache.  Has been participating with physical therapy without any difficulty.  Seems to be fairly comfortable this morning with no evidence of acute distress.      Review of Systems:    Constitutional: no fever, chills and night sweats.  Generalized fatigue.  Eyes: no eye drainage, itching or redness.  HEENT: no mouth sores, dysphagia or nose bleed.  Respiratory: no for shortness of breath, cough or production of sputum.  Cardiovascular: no chest pain, no palpitations, no orthopnea.  Gastrointestinal: no nausea, vomiting or diarrhea. No abdominal pain, hematemesis or rectal bleeding.  Genitourinary: no dysuria or polyuria.  Hematologic/lymphatic: no lymph node abnormalities, no easy bruising or easy bleeding.  Musculoskeletal: no muscle or joint pain.  Skin: No rash and no itching.  Neurological: CVA  Behavioral/Psych: Mild depression improving  Endocrine: no hot flashes.  Immunologic: negative.    ----------------------------------------------------------------------------------------------------------------------      Objective       Current [facility] Meds:  aspirin, 81 mg, Oral,  Daily  atorvastatin, 80 mg, Oral, Nightly  clopidogrel, 75 mg, Oral, Daily  heparin (porcine), 5,000 Units, Subcutaneous, Q12H  hydroCHLOROthiazide, 25 mg, Oral, Daily  losartan, 100 mg, Oral, Q24H  metoprolol tartrate, 75 mg, Oral, Q12H  mirtazapine, 15 mg, Oral, Nightly  multivitamin, 1 tablet, Oral, Daily  nicotine, 1 patch, Transdermal, Q24H  pantoprazole, 40 mg, Oral, Q AM  polyethylene glycol, 17 g, Oral, Daily         ----------------------------------------------------------------------------------------------------------------------    Vital Signs:  Temp:  [97.5 °F (36.4 °C)-97.7 °F (36.5 °C)] 97.5 °F (36.4 °C)  Heart Rate:  [] 77  Resp:  [20] 20  BP: (100-145)/(54-86) 100/54  No data found.  SpO2 Percentage    02/28/21 1905 02/28/21 2259 02/28/21 2309   SpO2: 94% 94% 95%     SpO2:  [94 %-97 %] 95 %  on  Flow (L/min):  [2] 2;   Device (Oxygen Therapy): nasal cannula    Body mass index is 27.99 kg/m².  Wt Readings from Last 3 Encounters:   02/22/21 80.9 kg (178 lb 5.6 oz)   02/22/21 80.9 kg (178 lb 6.4 oz)   11/24/19 82.2 kg (181 lb 5 oz)        Intake/Output Summary (Last 24 hours) at 3/1/2021 0800  Last data filed at 3/1/2021 0503  Gross per 24 hour   Intake 1440 ml   Output --   Net 1440 ml     Diet Regular; Cardiac  ----------------------------------------------------------------------------------------------------------------------      Physical Exam:    General Appearance: alert, pleasant, appears stated age, interactive and  Cooperative    Head: normocephalic, without obvious abnormality and atraumatic    Eyes: lids and lashes normal, conjunctivae and sclerae normal, no icterus, no  pallor, corneas clear and PERRLA    Ears: ears appear intact with no abnormalities noted    Nose: nares normal, septum midline, mucosa normal and no drainage     Lungs: clear to auscultation, respirations regular, respirations even and  respirations unlabored. No accessory muscle use.     Heart:: regular rhythm &  normal rate, normal S1, S2, no murmur, no gallop, no  rub and no click.  Chest wall with no abnormalities observed. PMI nondisplaced    Abdomen: normal bowel sounds in all quadrants, no masses, no hepatomegaly,  no splenomegaly, soft non-tender, no guarding and no rebound tenderness    Extremities: no edema, no cyanosis, no redness, no tenderness, no clubbing    Musculoskeletal: joints with no effusion nor erythema nor warmth.  Pedal pulses palpable and equal bilaterally    Skin: no bleeding, bruising or rash and no lesions noted    Lymph Nodes: no palpable adenopathy    Neurologic:    Mental Status: Patient is awake alert and oriented x3.  Appropriate.   Sensory: Sensory overall seems to be intact in BLE and BUE.   Motor strength: Right-sided hemiparesis      ----------------------------------------------------------------------------------------------------------------------            Results from last 7 days   Lab Units 02/23/21  0223   WBC 10*3/mm3 9.61   HEMOGLOBIN g/dL 13.6   HEMATOCRIT % 41.8   MCV fL 89.1   MCHC g/dL 32.5   PLATELETS 10*3/mm3 319     Results from last 7 days   Lab Units 02/24/21 0211 02/23/21  0223   SODIUM mmol/L 131* 130*   POTASSIUM mmol/L 4.3 4.4   CHLORIDE mmol/L 97* 99   CO2 mmol/L 23.4 22.1   BUN mg/dL 32* 30*   CREATININE mg/dL 1.00 0.93   EGFR IF NONAFRICN AM mL/min/1.73 75 81   CALCIUM mg/dL 9.4 9.3   GLUCOSE mg/dL 100* 101*   Estimated Creatinine Clearance: 72.9 mL/min (by C-G formula based on SCr of 1 mg/dL).  No results found for: AMMONIA    Glucose   Date/Time Value Ref Range Status   02/26/2021 1136 111 70 - 130 mg/dL Final     Lab Results   Component Value Date    HGBA1C 5.40 11/24/2019     Lab Results   Component Value Date    TSH 0.698 02/16/2021    FREET4 1.40 02/16/2021       No results found for: BLOODCX  No results found for: URINECX  No results found for: WOUNDCX  No results found for: STOOLCX  No results found for: RESPCX  Pain Management Panel     Pain Management  Panel Latest Ref Rng & Units 2/16/2021 11/24/2019    AMPHETAMINES SCREEN, URINE Negative Negative Negative    BARBITURATES SCREEN Negative Negative Negative    BENZODIAZEPINE SCREEN, URINE Negative Negative Negative    BUPRENORPHINEUR Negative Negative Negative    COCAINE SCREEN, URINE Negative Negative Negative    METHADONE SCREEN, URINE Negative Negative Negative            ----------------------------------------------------------------------------------------------------------------------  Imaging Results (Last 24 Hours)     ** No results found for the last 24 hours. **          ----------------------------------------------------------------------------------------------------------------------    Assessment/Plan       Assessment/Plan     ASSESSMENT:    Acute infarct of the left external capsule basal left ganglia and pericallosal regions  Right sided hemiparesis  Right-sided hip osteoarthritis  Depression  Anxiety neurosis  Hypertension  Tobacco use  GERD      PLAN:    Patient seems to be stable without any evidence of acute distress and seems to be less depressed with no complaints this morning.  Patient had an uneventful night with no issues reported, vitals are stable with no fever documented overnight.  Patient denies any chest pain, shortness of breath, nausea, vomiting, diarrhea or headache.  Has been participating with physical therapy without any difficulty.  Seems to be fairly comfortable this morning with no evidence of acute distress.    Status post acute infarct involving the left external capsule, basal left basal ganglia, and left pericallosal regions.  Patient currently on Plavix, aspirin, and statin therapy.  Patient requiring moderate assistance for bed mobility; moderate assistance for bed to chair and chair to bed transfers.  Minimum assistance for sit to stand and stand to sit transfers.  Requires moderate assistance for stand pivot/stand transfer.     Depression--continue Remeron 15 mg  nightly.  Has helped with resting better.     Right hip osteoarthritis--have adjusted Norco to help with pain management.  Has advanced disease     Anxiety neurosis--Xanax     Hypertension continue metoprolol and losartan with HCTZ     Tobacco abuse NicoDerm     GERD--Protonix        Code Status:   Code Status and Medical Interventions:   Ordered at: 02/22/21 3318     Level Of Support Discussed With:    Patient     Code Status:    CPR     Medical Interventions (Level of Support Prior to Arrest):    Full       Thea Wolfe MD  03/01/21  08:00 EST

## 2021-03-01 NOTE — PROGRESS NOTES
Occupational Therapy:    Physical Therapy: Individual: 100 minutes.    Speech Language Pathology:    Signed by: Alise Handy PTA

## 2021-03-02 LAB
ALBUMIN SERPL-MCNC: 3.8 G/DL (ref 3.5–5.2)
ALBUMIN/GLOB SERPL: 1.2 G/DL
ALP SERPL-CCNC: 117 U/L (ref 39–117)
ALT SERPL W P-5'-P-CCNC: 27 U/L (ref 1–41)
ANION GAP SERPL CALCULATED.3IONS-SCNC: 8.7 MMOL/L (ref 5–15)
AST SERPL-CCNC: 20 U/L (ref 1–40)
BASOPHILS # BLD AUTO: 0.09 10*3/MM3 (ref 0–0.2)
BASOPHILS NFR BLD AUTO: 1 % (ref 0–1.5)
BILIRUB SERPL-MCNC: 0.2 MG/DL (ref 0–1.2)
BUN SERPL-MCNC: 39 MG/DL (ref 8–23)
BUN/CREAT SERPL: 38.6 (ref 7–25)
CALCIUM SPEC-SCNC: 9.7 MG/DL (ref 8.6–10.5)
CHLORIDE SERPL-SCNC: 95 MMOL/L (ref 98–107)
CO2 SERPL-SCNC: 24.3 MMOL/L (ref 22–29)
CREAT SERPL-MCNC: 1.01 MG/DL (ref 0.76–1.27)
DEPRECATED RDW RBC AUTO: 49.1 FL (ref 37–54)
EOSINOPHIL # BLD AUTO: 0.46 10*3/MM3 (ref 0–0.4)
EOSINOPHIL NFR BLD AUTO: 5 % (ref 0.3–6.2)
ERYTHROCYTE [DISTWIDTH] IN BLOOD BY AUTOMATED COUNT: 14.6 % (ref 12.3–15.4)
GFR SERPL CREATININE-BSD FRML MDRD: 74 ML/MIN/1.73
GLOBULIN UR ELPH-MCNC: 3.3 GM/DL
GLUCOSE SERPL-MCNC: 105 MG/DL (ref 65–99)
HCT VFR BLD AUTO: 39.7 % (ref 37.5–51)
HGB BLD-MCNC: 12.7 G/DL (ref 13–17.7)
IMM GRANULOCYTES # BLD AUTO: 0.03 10*3/MM3 (ref 0–0.05)
IMM GRANULOCYTES NFR BLD AUTO: 0.3 % (ref 0–0.5)
LYMPHOCYTES # BLD AUTO: 2.89 10*3/MM3 (ref 0.7–3.1)
LYMPHOCYTES NFR BLD AUTO: 31.4 % (ref 19.6–45.3)
MAGNESIUM SERPL-MCNC: 2 MG/DL (ref 1.6–2.4)
MCH RBC QN AUTO: 29.3 PG (ref 26.6–33)
MCHC RBC AUTO-ENTMCNC: 32 G/DL (ref 31.5–35.7)
MCV RBC AUTO: 91.5 FL (ref 79–97)
MONOCYTES # BLD AUTO: 0.76 10*3/MM3 (ref 0.1–0.9)
MONOCYTES NFR BLD AUTO: 8.3 % (ref 5–12)
NEUTROPHILS NFR BLD AUTO: 4.98 10*3/MM3 (ref 1.7–7)
NEUTROPHILS NFR BLD AUTO: 54 % (ref 42.7–76)
NRBC BLD AUTO-RTO: 0 /100 WBC (ref 0–0.2)
PLATELET # BLD AUTO: 292 10*3/MM3 (ref 140–450)
PMV BLD AUTO: 8.7 FL (ref 6–12)
POTASSIUM SERPL-SCNC: 4.4 MMOL/L (ref 3.5–5.2)
PROT SERPL-MCNC: 7.1 G/DL (ref 6–8.5)
RBC # BLD AUTO: 4.34 10*6/MM3 (ref 4.14–5.8)
SODIUM SERPL-SCNC: 128 MMOL/L (ref 136–145)
WBC # BLD AUTO: 9.21 10*3/MM3 (ref 3.4–10.8)

## 2021-03-02 PROCEDURE — 85025 COMPLETE CBC W/AUTO DIFF WBC: CPT | Performed by: INTERNAL MEDICINE

## 2021-03-02 PROCEDURE — 97112 NEUROMUSCULAR REEDUCATION: CPT

## 2021-03-02 PROCEDURE — 94799 UNLISTED PULMONARY SVC/PX: CPT

## 2021-03-02 PROCEDURE — 97112 NEUROMUSCULAR REEDUCATION: CPT | Performed by: OCCUPATIONAL THERAPIST

## 2021-03-02 PROCEDURE — 97110 THERAPEUTIC EXERCISES: CPT | Performed by: OCCUPATIONAL THERAPIST

## 2021-03-02 PROCEDURE — 80053 COMPREHEN METABOLIC PANEL: CPT | Performed by: INTERNAL MEDICINE

## 2021-03-02 PROCEDURE — 97535 SELF CARE MNGMENT TRAINING: CPT | Performed by: OCCUPATIONAL THERAPIST

## 2021-03-02 PROCEDURE — 97116 GAIT TRAINING THERAPY: CPT

## 2021-03-02 PROCEDURE — 97530 THERAPEUTIC ACTIVITIES: CPT

## 2021-03-02 PROCEDURE — 25010000002 HEPARIN (PORCINE) PER 1000 UNITS: Performed by: FAMILY MEDICINE

## 2021-03-02 PROCEDURE — 97110 THERAPEUTIC EXERCISES: CPT

## 2021-03-02 PROCEDURE — 83735 ASSAY OF MAGNESIUM: CPT | Performed by: INTERNAL MEDICINE

## 2021-03-02 RX ADMIN — MIRTAZAPINE 15 MG: 15 TABLET, FILM COATED ORAL at 21:21

## 2021-03-02 RX ADMIN — ATORVASTATIN CALCIUM 80 MG: 40 TABLET, FILM COATED ORAL at 21:21

## 2021-03-02 RX ADMIN — CARISOPRODOL 350 MG: 350 TABLET ORAL at 15:17

## 2021-03-02 RX ADMIN — IPRATROPIUM BROMIDE AND ALBUTEROL SULFATE 3 ML: .5; 3 SOLUTION RESPIRATORY (INHALATION) at 07:11

## 2021-03-02 RX ADMIN — HYDROCODONE BITARTRATE AND ACETAMINOPHEN 1 TABLET: 10; 325 TABLET ORAL at 12:56

## 2021-03-02 RX ADMIN — Medication 1 TABLET: at 08:36

## 2021-03-02 RX ADMIN — CARISOPRODOL 350 MG: 350 TABLET ORAL at 01:28

## 2021-03-02 RX ADMIN — HEPARIN SODIUM 5000 UNITS: 5000 INJECTION INTRAVENOUS; SUBCUTANEOUS at 12:13

## 2021-03-02 RX ADMIN — ALPRAZOLAM 0.5 MG: 0.5 TABLET ORAL at 01:28

## 2021-03-02 RX ADMIN — HYDROCODONE BITARTRATE AND ACETAMINOPHEN 1 TABLET: 10; 325 TABLET ORAL at 05:40

## 2021-03-02 RX ADMIN — CLOPIDOGREL 75 MG: 75 TABLET, FILM COATED ORAL at 08:37

## 2021-03-02 RX ADMIN — LOSARTAN POTASSIUM 100 MG: 50 TABLET, FILM COATED ORAL at 08:36

## 2021-03-02 RX ADMIN — Medication 1 PATCH: at 08:37

## 2021-03-02 RX ADMIN — HEPARIN SODIUM 5000 UNITS: 5000 INJECTION INTRAVENOUS; SUBCUTANEOUS at 21:21

## 2021-03-02 RX ADMIN — GABAPENTIN 200 MG: 100 CAPSULE ORAL at 05:40

## 2021-03-02 RX ADMIN — METOPROLOL TARTRATE 75 MG: 50 TABLET, FILM COATED ORAL at 08:36

## 2021-03-02 RX ADMIN — HYDROCHLOROTHIAZIDE 25 MG: 25 TABLET ORAL at 08:36

## 2021-03-02 RX ADMIN — ALPRAZOLAM 0.5 MG: 0.5 TABLET ORAL at 15:17

## 2021-03-02 RX ADMIN — HYDROCODONE BITARTRATE AND ACETAMINOPHEN 1 TABLET: 10; 325 TABLET ORAL at 21:21

## 2021-03-02 RX ADMIN — ASPIRIN 81 MG: 81 TABLET, COATED ORAL at 08:37

## 2021-03-02 RX ADMIN — PANTOPRAZOLE SODIUM 40 MG: 40 TABLET, DELAYED RELEASE ORAL at 05:40

## 2021-03-02 NOTE — PROGRESS NOTES
Occupational Therapy: Individual: 90 minutes.    Physical Therapy:    Speech Language Pathology:    Signed by: Meghan Carbajal OT

## 2021-03-02 NOTE — THERAPY TREATMENT NOTE
Inpatient Rehabilitation - Occupational Therapy Treatment Note    [facility] No     Patient Name: Valerie Moore  : 1955  MRN: [id number]    Today's Date: 3/2/2021                 Admit Date: 2021       No diagnosis found.    Patient Active Problem List   Diagnosis   • Anxiety   • Hypertension   • Hypercholesteremia   • Seasonal allergies   • Sleep disorder   • GERD (gastroesophageal reflux disease)   • Arthritis   • Airway hyperreactivity   • Gout   • Weakness   • CAP (community acquired pneumonia)   • Hypokalemia   • Hyponatremia   • Hyperglycemia   • Normocytic anemia   • Abdominal aortic aneurysm (AAA) 3.0 cm to 5.5 cm in diameter in male (CMS/HCC)   • Tobacco abuse   • Chest pain, atypical   • Cerebrovascular accident (CVA) (CMS/HCC)   • CVA (cerebral vascular accident) (CMS/HCC)       Past Medical History:   Diagnosis Date   • Anxiety    • GERD (gastroesophageal reflux disease) 2017   • Hypercholesteremia    • Hypertension    • Seasonal allergies    • Sleep disorder    • Stroke (CMS/HCC)        Past Surgical History:   Procedure Laterality Date   • HERNIA REPAIR     • HIP SURGERY Right     plate and 8 screws in right hip                 IRF OT ASSESSMENT FLOWSHEET (last 12 hours)      IRF OT Evaluation and Treatment     Row Name 21 1445          OT Time and Intention    Document Type  daily treatment  -BF     Mode of Treatment  occupational therapy  -BF     Patient Effort  good  -BF     Row Name 21 1445          General Information    Existing Precautions/Restrictions  fall;oxygen therapy device and L/min R HP  -BF     Row Name 21 1445          Cognition/Psychosocial    Orientation Status (Cognition)  oriented x 3  -BF     Follows Commands (Cognition)  follows one-step commands;verbal cues/prompting required;repetition of directions required  -BF     Row Name 21 1445          Transfers    Chair-Bed [place] (Transfers)  moderate assist (50% patient effort);verbal  cues;nonverbal cues (demo/gesture)  -BF     Row Name 03/02/21 1445          Chair-Bed Transfer    Assistive Device (Chair-Bed Transfers)  wheelchair  -BF     Row Name 03/02/21 1445          Motor Skills    Motor Skills  neuro-muscular function;functional endurance;therapeutic exercise;motor control/coordination interventions  -BF     Motor Control/Coordination Interventions  neuro-muscular re-education;therapeutic exercise/ROM;gross motor coordination activities RUE AA/PROM, NDT, arm skate; BUE pushbox w/ RUE AAROM  -BF     Row Name 03/02/21 1445          Positioning and Restraints    In Bed  supine;call light within reach;encouraged to call for assist;RUE elevated In PM  -BF     In Wheelchair  sitting;call light within reach;encouraged to call for assist In AM  -BF       User Key  (r) = Recorded By, (t) = Taken By, (c) = Cosigned By    Initials Name Effective Dates    BF Meghan Carbajal OT 07/05/20 -            Occupational Therapy Education                 Title: PT OT SLP Therapies (Done)     Topic: Occupational Therapy (Done)     Point: ADL training (Done)     Description:   Instruct learner(s) on proper safety adaptation and remediation techniques during self care or transfers.   Instruct in proper use of assistive devices.              Learning Progress Summary           Patient Acceptance, E, VU,NR by BF at 3/2/2021 1445    Acceptance, E, VU,NR by BF at 3/1/2021 1432    Acceptance, E, VU,NR by BF at 2/27/2021 1110    Acceptance, E, VU,NR by BF at 2/25/2021 1425    Acceptance, E, VU,NR by BF at 2/23/2021 1548                   Point: Precautions (Done)     Description:   Instruct learner(s) on prescribed precautions during self-care and functional transfers.              Learning Progress Summary           Patient Acceptance, E, VU,NR by BF at 3/2/2021 1445    Acceptance, E, VU,NR by BF at 3/1/2021 1432    Acceptance, E, VU,NR by BF at 2/27/2021 1110    Acceptance, E, VU,NR by BF at 2/25/2021 1425     Acceptance, E, VU,NR by BF at 2/23/2021 1548                               User Key     Initials Effective Dates Name Provider Type Discipline     07/05/20 -  Meghan Carbajal, RACHEL Occupational Therapist OT                    OT Recommendation and Plan    Planned Therapy Interventions (OT): activity tolerance training, adaptive equipment training, BADL retraining, neuromuscular control/coordination retraining, passive ROM/stretching, ROM/therapeutic exercise, strengthening exercise, transfer/mobility retraining                    Time Calculation:     Time Calculation- OT     Row Name 03/02/21 1452 03/02/21 1448          Time Calculation- OT    OT Start Time  1330  -BF  0930  -BF     OT Stop Time  1430  -BF  1000  -BF     OT Time Calculation (min)  60 min  -BF  30 min  -BF     Total Timed Code Minutes- OT  60 minute(s)  -BF  30 minute(s)  -BF     OT Non-Billable Time (min)  --  -BF  20 min  -BF       User Key  (r) = Recorded By, (t) = Taken By, (c) = Cosigned By    Initials Name Provider Type     Meghan Carbajal, OT Occupational Therapist        Therapy Charges for Today     Code Description Service Date Service Provider Modifiers Qty    09044132367 HC OT NEUROMUSC RE EDUCATION EA 15 MIN 3/1/2021 CarbajalMeghan szymanski OT GO 3    05064348199 HC OT THER PROC EA 15 MIN 3/1/2021 CarbajalMeghan szymanski OT GO 1    82476871456 HC OT SELF CARE/MGMT/TRAIN EA 15 MIN 3/1/2021 CarbajalMeghan szymanski, OT GO 1    87963190368 HC OT THERAPEUTIC ACT EA 15 MIN 3/1/2021 Meghan Carbajal OT GO 2    54329572952 HC OT NEUROMUSC RE EDUCATION EA 15 MIN 3/2/2021 CarbajalMeghan szymanski OT GO 3    36253158535 HC OT SELF CARE/MGMT/TRAIN EA 15 MIN 3/2/2021 Meghan Carbajal OT GO 1    16125398417 HC OT THER PROC EA 15 MIN 3/2/2021 Meghan Carbajal, OT GO 2                   Meghan Carbajal, OT  3/2/2021

## 2021-03-02 NOTE — THERAPY TREATMENT NOTE
Inpatient Rehabilitation - Physical Therapy Treatment Note       CHEYENNE No     Patient Name: Valerie Moore  : 1955  MRN: [id number]    Today's Date: 3/2/2021                    Admit Date: 2021      Visit Dx: No diagnosis found.    Patient Active Problem List   Diagnosis   • Anxiety   • Hypertension   • Hypercholesteremia   • Seasonal allergies   • Sleep disorder   • GERD (gastroesophageal reflux disease)   • Arthritis   • Airway hyperreactivity   • Gout   • Weakness   • CAP (community acquired pneumonia)   • Hypokalemia   • Hyponatremia   • Hyperglycemia   • Normocytic anemia   • Abdominal aortic aneurysm (AAA) 3.0 cm to 5.5 cm in diameter in male (CMS/HCC)   • Tobacco abuse   • Chest pain, atypical   • Cerebrovascular accident (CVA) (CMS/HCC)   • CVA (cerebral vascular accident) (CMS/HCC)       Past Medical History:   Diagnosis Date   • Anxiety    • GERD (gastroesophageal reflux disease) 2017   • Hypercholesteremia    • Hypertension    • Seasonal allergies    • Sleep disorder    • Stroke (CMS/HCC)        Past Surgical History:   Procedure Laterality Date   • HERNIA REPAIR     • HIP SURGERY Right     plate and 8 screws in right hip           PT ASSESSMENT (last 12 hours)      IRF PT Evaluation and Treatment     Row Name 21 1446          PT Time and Intention    Document Type  progress note  -LL     Mode of Treatment  individual therapy;physical therapy  -LL     Patient/Family/Caregiver Comments/Observations  Patient continues to make steady progress with therapy requiring less assistance  -LL     Row Name 21 1446          General Information    Patient Profile Reviewed  yes  -LL     Existing Precautions/Restrictions  fall R HP  -LL     Row Name 21 1446          Cognition/Psychosocial    Affect/Mental Status (Cognitive)  WFL  -LL     Orientation Status (Cognition)  oriented x 3  -LL     Follows Commands (Cognition)  verbal cues/prompting required;physical/tactile prompts  required  -LL     Personal Safety Interventions  fall prevention program maintained;gait belt;nonskid shoes/slippers when out of bed;supervised activity  -LL     Row Name 03/02/21 1446          Pain Scale: FACES Pre/Post-Treatment    Pain: FACES Scale, Pretreatment  0-->no hurt  -LL     Posttreatment Pain Rating  0-->no hurt  -LL     Row Name 03/02/21 1446          Bed Mobility    Supine-Sit [place] (Bed Mobility)  moderate assist (50% patient effort);verbal cues;nonverbal cues (demo/gesture)  -     Assistive Device (Bed Mobility)  bed rails;draw sheet  -     Row Name 03/02/21 1446          Transfers    Bed-Chair [place] (Transfers)  moderate assist (50% patient effort);verbal cues;nonverbal cues (demo/gesture)  -     Chair-Bed [place] (Transfers)  moderate assist (50% patient effort);verbal cues;nonverbal cues (demo/gesture)  -     Assistive Device (Bed-Chair Transfers)  wheelchair  -LL     Sit-Stand [place] (Transfers)  minimum assist (75% patient effort);verbal cues;nonverbal cues (demo/gesture)  -     Stand-Sit [place] (Transfers)  minimum assist (75% patient effort);verbal cues;nonverbal cues (demo/gesture)  -     Row Name 03/02/21 1446          Chair-Bed Transfer    Assistive Device (Chair-Bed Transfers)  wheelchair  -     Row Name 03/02/21 1446          Sit-Stand Transfer    Assistive Device (Sit-Stand Transfers)  -- PB  -LL     Row Name 03/02/21 1446          Stand-Sit Transfer    Assistive Device (Stand-Sit Transfers)  -- // bars  -     Row Name 03/02/21 1446          Stand Pivot/Stand Step Transfer    Stand Pivot/Stand Step [place] (Transfers)  moderate assist (50% patient effort);verbal cues;nonverbal cues (demo/gesture)  -     Row Name 03/02/21 1446          Gait/Stairs (Locomotion)    [place] Level (Gait)  moderate assist (50% patient effort);maximum assist (25% patient effort);2 person assist;verbal cues;nonverbal cues (demo/gesture)  -      Assistive Device (Gait)  parallel bars  -LL     Distance in Feet (Gait)  3 steps  -LL     Pattern (Gait)  step-to  -LL     Deviations/Abnormal Patterns (Gait)  antalgic;base of support, narrow;steppage;stride length decreased;weight shifting decreased  -LL     Right Sided Gait Deviations  heel strike decreased;knee buckling, right side;leans left;weight shift ability decreased  -LL     Row Name 03/02/21 1446          Balance    Comment, Balance  Standing weight shifting  -LL     Row Name 03/02/21 1446          Hip (Therapeutic Exercise)    Hip PROM (Therapeutic Exercise)  right;flexion;extension;aBduction;aDduction;sitting  -LL     Hip Strengthening (Therapeutic Exercise)  left;flexion;extension;aBduction;aDduction;marching while seated;sitting;2 lb free weight;resistance band;green Stretching to R gastroc & hamstring mm  -LL     Row Name 03/02/21 1446          Knee (Therapeutic Exercise)    Knee PROM (Therapeutic Exercise)  right;flexion;extension;sitting  -LL     Knee Strengthening (Therapeutic Exercise)  left;flexion;extension;marching while seated;LAQ (long arc quad);hamstring curls;sitting;2 lb free weight;resistance band;green  -LL     Row Name 03/02/21 1446          Ankle (Therapeutic Exercise)    Ankle PROM (Therapeutic Exercise)  right;bilateral;dorsiflexion;sitting  -LL     Ankle Strengthening (Therapeutic Exercise)  left;bilateral;dorsiflexion;sitting  -LL     Row Name 03/02/21 1446          Modality Treatment    Treatment Location 1 (Modality)  -- R anterior tibialis & quad   -LL     Modality Performed  NMES (neuromuscular electrical stimulation)  -LL     Modality Treatment Results  mm contraction  -LL     Comment, Modality Treatment  x 20 min  -LL     Row Name 03/02/21 1446          Bed Mobility Goal 1 (PT-IRF)    Progress/Outcomes (Bed Mobility Goal 1, PT-IRF)  goal ongoing  -LL     Row Name 03/02/21 1446          Bed Mobility Goal 2 (PT-IRF)    Progress/Outcomes (Bed Mobility Goal 2, PT-IRF)  goal  ongoing  -LL     Row Name 03/02/21 1446          Transfer Goal 1 (PT-IRF)    Progress/Outcomes (Transfer Goal 1, PT-IRF)  goal ongoing  -LL     Row Name 03/02/21 1446          Transfer Goal 2 (PT-IRF)    Progress/Outcomes (Transfer Goal 2, PT-IRF)  goal ongoing  -     Row Name 03/02/21 1446          Gait/Walking Locomotion Goal 1 (PT-IRF)    Progress/Outcomes (Gait/Walking Locomotion Goal 1, PT-IRF)  goal ongoing  -     Row Name 03/02/21 1446          Gait/Walking Locomotion Goal 2 (PT-IRF)    Progress/Outcomes (Gait/Walking Locomotion Goal 2, PT-IRF)  goal ongoing  -     Row Name 03/02/21 1446          Positioning and Restraints    Pre-Treatment Position  in bed  -LL     Post Treatment Position  wheelchair  -LL     In Wheelchair  sitting;with OT;legs elevated  -     Row Name 03/02/21 1446          Therapy Assessment/Plan (PT)    Patient's Goals For Discharge  return home  -     Row Name 03/02/21 1446          Therapy Assessment/Plan (PT)    Rehab Potential/Prognosis (PT)  adequate, monitor progress closely  -     Frequency of Treatment (PT)  5 times per week  -     Estimated Duration of Therapy (PT)  3 weeks  -     Problem List (PT)  balance;coordination;hemiparesis/hemiplegia;mobility;muscle tone;sensation;strength;pain;postural control  -     Activity Limitations Related to Problem List (PT)  unable to ambulate safely;unable to transfer safely  -     Row Name 03/02/21 1446          Therapy Plan Review/Discharge Plan (PT)    Anticipated Equipment Needs at Discharge (PT Eval)  -- tbd  -LL     Expected Discharge Disposition (PT Eval)  home with home health care  -       User Key  (r) = Recorded By, (t) = Taken By, (c) = Cosigned By    Initials Name Provider Type    LL Alise Handy PTA Physical Therapy Assistant           Physical Therapy Education                 Title: PT OT SLP Therapies (Done)     Topic: Physical Therapy (Done)     Point: Mobility training (Done)     Learning Progress  Summary           Patient Acceptance, E,D, VU,NR by LL at 3/2/2021 1517    Acceptance, E,D, VU,NR by LL at 3/1/2021 1016    Acceptance, E,TB, VU by CT at 2/27/2021 1228    Acceptance, E,D, VU,NR by LL at 2/26/2021 1829    Acceptance, E,D, VU,NR by LL at 2/25/2021 1541    Acceptance, E, VU,NR by LB at 2/23/2021 1604                   Point: Home exercise program (Done)     Learning Progress Summary           Patient Acceptance, E,D, VU,NR by LL at 3/2/2021 1517    Acceptance, E,D, VU,NR by LL at 3/1/2021 1016    Acceptance, E,TB, VU by CT at 2/27/2021 1228    Acceptance, E,D, VU,NR by LL at 2/26/2021 1829    Acceptance, E,D, VU,NR by LL at 2/25/2021 1541    Acceptance, E, VU,NR by LB at 2/23/2021 1604                   Point: Body mechanics (Done)     Learning Progress Summary           Patient Acceptance, E,D, VU,NR by LL at 3/2/2021 1517    Acceptance, E,D, VU,NR by LL at 3/1/2021 1016    Acceptance, E,TB, VU by CT at 2/27/2021 1228    Acceptance, E,D, VU,NR by LL at 2/26/2021 1829    Acceptance, E,D, VU,NR by LL at 2/25/2021 1541    Acceptance, E, VU,NR by LB at 2/23/2021 1604                   Point: Precautions (Done)     Learning Progress Summary           Patient Acceptance, E,D, VU,NR by LL at 3/2/2021 1517    Acceptance, E,D, VU,NR by LL at 3/1/2021 1016    Acceptance, E,TB, VU by CT at 2/27/2021 1228    Acceptance, E,D, VU,NR by LL at 2/26/2021 1829    Acceptance, E,D, VU,NR by LL at 2/25/2021 1541    Acceptance, E, VU,NR by LB at 2/23/2021 1604                               User Key     Initials Effective Dates Name Provider Type Discipline    LB 03/14/16 -  Cathleen Carnes, PT Physical Therapist PT    CT 04/03/18 -  Nilsa Bermudez, PT Physical Therapist PT    LL 05/02/16 -  Alise Handy PTA Physical Therapy Assistant PT                PT Recommendation and Plan    Frequency of Treatment (PT): 5 times per week  Anticipated Equipment Needs at Discharge (PT Eval): (tbd)                  Time  Calculation:     PT Charges     Row Name 03/02/21 1517             Time Calculation    Start Time  0735  -LL      Stop Time  0915  -LL      Time Calculation (min)  100 min  -LL      PT Received On  03/02/21  -LL      PT Goal Re-Cert Due Date  03/09/21  -LL         Time Calculation- PT    Total Timed Code Minutes- PT  100 minute(s)  -LL        User Key  (r) = Recorded By, (t) = Taken By, (c) = Cosigned By    Initials Name Provider Type    LL Alise Handy PTA Physical Therapy Assistant          Therapy Charges for Today     Code Description Service Date Service Provider Modifiers Qty    35139111833 HC PT NEUROMUSC RE EDUCATION EA 15 MIN 3/1/2021 Alise Handy, PTA GP 2    67301240783 HC PT THERAPEUTIC ACT EA 15 MIN 3/1/2021 Alise Handy, PTA GP 2    71434262107 HC PT THER PROC EA 15 MIN 3/1/2021 Alise Handy, PTA GP 3    33713201124 HC PT NEUROMUSC RE EDUCATION EA 15 MIN 3/2/2021 Alise Handy, PTA GP 2    64525835620 HC PT THERAPEUTIC ACT EA 15 MIN 3/2/2021 Alise Handy, PTA GP 1    52801114421 HC GAIT TRAINING EA 15 MIN 3/2/2021 Alise Handy, PTA GP 1    45447233103 HC PT THER PROC EA 15 MIN 3/2/2021 Alise Handy, PTA GP 3                   Alise. WAGNER Handy  3/2/2021

## 2021-03-02 NOTE — SIGNIFICANT NOTE
03/02/21 1200   Plan   Plan Team conference held today.  Spoke to pt about plans for discharge on 3-16-21 and how he is doing in therapy.  Pt plans to return home with friend Renus at discharge.   Patient/Family in Agreement with Plan yes

## 2021-03-02 NOTE — PROGRESS NOTES
PROGRESS NOTE         Patient Identification:  Name:  Valerie Moore  Age:  65 y.o.  Sex:  male  :  1955  MRN:  [id number]  Visit Number:  [id number]  Primary Care Provider:  Darwin Alvarez MD         LOS: 8 days       ----------------------------------------------------------------------------------------------------------------------  Subjective       Chief Complaints:    Acute CVA  Depression improved with Remeron  Right hip osteoarthritis  Hypertension      Interval History:      Patient participated with physical therapy and Occupational Therapy on 3/1/2021 and was at moderate assist with bed to chair and chair to bed transfers requiring verbal and nonverbal cues.  Patient was at moderate assist with supine to sit transfers and minimum assist with sit to stand and stand to sit transfers requiring verbal and nonverbal cues.  Moderate assist with stand pivot and stand step transfers and locomotion was not of attempted.  Otherwise, patient had an uneventful night with no issues reported, vitals are stable with no fever documented overnight.  Patient denies any chest pain, shortness of breath, nausea, vomiting, diarrhea or headache.  Has been participating with physical therapy without any difficulty.  Seems to be fairly comfortable this morning with no evidence of acute distress.      Review of Systems:    Constitutional: no fever, chills and night sweats.  Generalized fatigue.  Eyes: no eye drainage, itching or redness.  HEENT: no mouth sores, dysphagia or nose bleed.  Respiratory: no for shortness of breath, cough or production of sputum.  Cardiovascular: no chest pain, no palpitations, no orthopnea.  Gastrointestinal: no nausea, vomiting or diarrhea. No abdominal pain, hematemesis or rectal bleeding.  Genitourinary: no dysuria or polyuria.  Hematologic/lymphatic: no lymph node abnormalities, no easy bruising or easy bleeding.  Musculoskeletal: no muscle or joint pain.  Skin: No rash and no  itching.  Neurological: CVA  Behavioral/Psych: Mild depression improving  Endocrine: no hot flashes.  Immunologic: negative.    ----------------------------------------------------------------------------------------------------------------------      Objective       Current [facility] Meds:  aspirin, 81 mg, Oral, Daily  atorvastatin, 80 mg, Oral, Nightly  clopidogrel, 75 mg, Oral, Daily  heparin (porcine), 5,000 Units, Subcutaneous, Q12H  hydroCHLOROthiazide, 25 mg, Oral, Daily  losartan, 100 mg, Oral, Q24H  metoprolol tartrate, 75 mg, Oral, Q12H  mirtazapine, 15 mg, Oral, Nightly  multivitamin, 1 tablet, Oral, Daily  nicotine, 1 patch, Transdermal, Q24H  pantoprazole, 40 mg, Oral, Q AM  polyethylene glycol, 17 g, Oral, Daily         ----------------------------------------------------------------------------------------------------------------------    Vital Signs:  Temp:  [97.6 °F (36.4 °C)-98.8 °F (37.1 °C)] 97.6 °F (36.4 °C)  Heart Rate:  [57-86] 73  Resp:  [16-20] 20  BP: (103-140)/(61-80) 103/61  No data found.  SpO2 Percentage    03/01/21 0821 03/01/21 1904 03/01/21 1905   SpO2: 95% 93% 95%     SpO2:  [93 %-97 %] 95 %  on  Flow (L/min):  [2] 2;   Device (Oxygen Therapy): room air    Body mass index is 27.99 kg/m².  Wt Readings from Last 3 Encounters:   02/22/21 80.9 kg (178 lb 5.6 oz)   02/22/21 80.9 kg (178 lb 6.4 oz)   11/24/19 82.2 kg (181 lb 5 oz)        Intake/Output Summary (Last 24 hours) at 3/2/2021 0609  Last data filed at 3/2/2021 0505  Gross per 24 hour   Intake 1780 ml   Output --   Net 1780 ml     Diet Regular; Cardiac, Daily Fluid Restriction; 1500 mL Fluid Per Day  ----------------------------------------------------------------------------------------------------------------------      Physical Exam:    General Appearance: alert, pleasant, appears stated age, interactive and  Cooperative.  Feels great with no complaints this morning.    Head: normocephalic, without obvious abnormality and  atraumatic    Eyes: lids and lashes normal, conjunctivae and sclerae normal, no icterus, no  pallor, corneas clear and PERRLA    Ears: ears appear intact with no abnormalities noted    Nose: nares normal, septum midline, mucosa normal and no drainage     Lungs: clear to auscultation, respirations regular, respirations even and  respirations unlabored. No accessory muscle use.     Heart:: regular rhythm & normal rate, normal S1, S2, no murmur, no gallop, no  rub and no click.  Chest wall with no abnormalities observed. PMI nondisplaced    Abdomen: normal bowel sounds in all quadrants, no masses, no hepatomegaly,  no splenomegaly, soft non-tender, no guarding and no rebound tenderness    Extremities: no edema, no cyanosis, no redness, no tenderness, no clubbing    Musculoskeletal: joints with no effusion nor erythema nor warmth.  Pedal pulses palpable and equal bilaterally    Skin: no bleeding, bruising or rash and no lesions noted    Lymph Nodes: no palpable adenopathy    Neurologic:    Mental Status: Patient is awake alert and oriented x3.  Appropriate.   Sensory: Sensory overall seems to be intact in BLE and BUE.   Motor strength: Right-sided hemiparesis      ----------------------------------------------------------------------------------------------------------------------            Results from last 7 days   Lab Units 03/02/21  0128   WBC 10*3/mm3 9.21   HEMOGLOBIN g/dL 12.7*   HEMATOCRIT % 39.7   MCV fL 91.5   MCHC g/dL 32.0   PLATELETS 10*3/mm3 292     Results from last 7 days   Lab Units 03/02/21  0128 02/24/21  0211   SODIUM mmol/L 128* 131*   POTASSIUM mmol/L 4.4 4.3   MAGNESIUM mg/dL 2.0  --    CHLORIDE mmol/L 95* 97*   CO2 mmol/L 24.3 23.4   BUN mg/dL 39* 32*   CREATININE mg/dL 1.01 1.00   EGFR IF NONAFRICN AM mL/min/1.73 74 75   CALCIUM mg/dL 9.7 9.4   GLUCOSE mg/dL 105* 100*   ALBUMIN g/dL 3.80  --    BILIRUBIN mg/dL 0.2  --    ALK PHOS U/L 117  --    AST (SGOT) U/L 20  --    ALT (SGPT) U/L 27  --     Estimated Creatinine Clearance: 72.2 mL/min (by C-G formula based on SCr of 1.01 mg/dL).  No results found for: AMMONIA    No results found for: HGBA1C, POCGLU  Lab Results   Component Value Date    HGBA1C 5.40 11/24/2019     Lab Results   Component Value Date    TSH 0.698 02/16/2021    FREET4 1.40 02/16/2021       No results found for: BLOODCX  No results found for: URINECX  No results found for: WOUNDCX  No results found for: STOOLCX  No results found for: RESPCX  Pain Management Panel     Pain Management Panel Latest Ref Rng & Units 2/16/2021 11/24/2019    AMPHETAMINES SCREEN, URINE Negative Negative Negative    BARBITURATES SCREEN Negative Negative Negative    BENZODIAZEPINE SCREEN, URINE Negative Negative Negative    BUPRENORPHINEUR Negative Negative Negative    COCAINE SCREEN, URINE Negative Negative Negative    METHADONE SCREEN, URINE Negative Negative Negative            ----------------------------------------------------------------------------------------------------------------------  Imaging Results (Last 24 Hours)     ** No results found for the last 24 hours. **          ----------------------------------------------------------------------------------------------------------------------    Assessment/Plan       Assessment/Plan     ASSESSMENT:    Acute infarct of the left external capsule basal left ganglia and pericallosal regions  Right sided hemiparesis  Right-sided hip osteoarthritis  Depression  Anxiety neurosis  Hypertension  Tobacco use  GERD      PLAN:    Patient participated with physical therapy and Occupational Therapy on 3/1/2021 and was at moderate assist with bed to chair and chair to bed transfers requiring verbal and nonverbal cues.  Patient was at moderate assist with supine to sit transfers and minimum assist with sit to stand and stand to sit transfers requiring verbal and nonverbal cues.  Moderate assist with stand pivot and stand step transfers and locomotion was not of  attempted.  Otherwise, patient had an uneventful night with no issues reported, vitals are stable with no fever documented overnight.  Patient denies any chest pain, shortness of breath, nausea, vomiting, diarrhea or headache.  Has been participating with physical therapy without any difficulty.  Seems to be fairly comfortable this morning with no evidence of acute distress.    Patient with worsening hyponatremia down to 128 this morning and I initiated fluid restriction as he seems to be drinking quite a bit of water and will recheck sodium level in the next 48 hours.    Status post acute infarct involving the left external capsule, basal left basal ganglia, and left pericallosal regions.  Patient currently on Plavix, aspirin, and statin therapy.  Patient requiring moderate assistance for bed mobility; moderate assistance for bed to chair and chair to bed transfers.  Minimum assistance for sit to stand and stand to sit transfers.  Requires moderate assistance for stand pivot/stand transfer.     Depression--continue Remeron 15 mg nightly.  Has helped with resting better.     Right hip osteoarthritis--have adjusted Norco to help with pain management.  Has advanced disease     Anxiety neurosis--Xanax     Hypertension continue metoprolol and losartan with HCTZ     Tobacco abuse NicoDerm     GERD--Protonix        Code Status:   Code Status and Medical Interventions:   Ordered at: 02/22/21 1624     Level Of Support Discussed With:    Patient     Code Status:    CPR     Medical Interventions (Level of Support Prior to Arrest):    Full       Thea Wolfe MD  03/02/21  06:43 EST

## 2021-03-02 NOTE — PROGRESS NOTES
Rehabilitation Nursing  Inpatient Rehabilitation Plan of Care Note    Plan of Care  Copy from POCBody Function Structure    Skin Integrity (Active)  Current Status (2/23/2021 12:00:00 AM): free of skin breakdown this shift  Weekly Goal: free of skin breakdown this stay  Discharge Goal: home free of skin breakdown    Safety    Potential for Injury (Active)  Current Status (2/23/2021 12:00:00 AM): free from injury this shift  Weekly Goal: free from injury this stay  Discharge Goal: home free from injury    Signed by: Natalya Gaytan, Nurse

## 2021-03-02 NOTE — SIGNIFICANT NOTE
03/02/21 4984   Plan   Plan Spoke to friend Jerry [phone] about how pt is doing in therapy, plans for discharge on 3-16-21 and coming to rehab prior to discharge to receive education and observe therapy sessions.  Friend wants to come to rehab on Monday 3-8-21 at 9:00 am to observe therapy and receive education.  Friend plans to assist pt with caregiving at home.  Friend says pt's children work and they are unable to provide assistance.  Will follow.   Patient/Family in Agreement with Plan yes

## 2021-03-03 PROCEDURE — 97032 APPL MODALITY 1+ESTIM EA 15: CPT | Performed by: OCCUPATIONAL THERAPIST

## 2021-03-03 PROCEDURE — 97110 THERAPEUTIC EXERCISES: CPT

## 2021-03-03 PROCEDURE — 25010000002 HEPARIN (PORCINE) PER 1000 UNITS: Performed by: FAMILY MEDICINE

## 2021-03-03 PROCEDURE — 97530 THERAPEUTIC ACTIVITIES: CPT

## 2021-03-03 PROCEDURE — 97112 NEUROMUSCULAR REEDUCATION: CPT | Performed by: OCCUPATIONAL THERAPIST

## 2021-03-03 PROCEDURE — 94799 UNLISTED PULMONARY SVC/PX: CPT

## 2021-03-03 PROCEDURE — 97112 NEUROMUSCULAR REEDUCATION: CPT

## 2021-03-03 PROCEDURE — 97535 SELF CARE MNGMENT TRAINING: CPT | Performed by: OCCUPATIONAL THERAPIST

## 2021-03-03 RX ORDER — METOPROLOL TARTRATE 50 MG/1
50 TABLET, FILM COATED ORAL EVERY 12 HOURS SCHEDULED
Status: DISCONTINUED | OUTPATIENT
Start: 2021-03-03 | End: 2021-03-16 | Stop reason: HOSPADM

## 2021-03-03 RX ORDER — HYDROCHLOROTHIAZIDE 12.5 MG/1
12.5 TABLET ORAL DAILY
Status: DISCONTINUED | OUTPATIENT
Start: 2021-03-03 | End: 2021-03-04

## 2021-03-03 RX ORDER — FLUTICASONE PROPIONATE 50 MCG
2 SPRAY, SUSPENSION (ML) NASAL DAILY
Status: DISCONTINUED | OUTPATIENT
Start: 2021-03-03 | End: 2021-03-16 | Stop reason: HOSPADM

## 2021-03-03 RX ADMIN — ALPRAZOLAM 0.5 MG: 0.5 TABLET ORAL at 20:52

## 2021-03-03 RX ADMIN — GABAPENTIN 200 MG: 100 CAPSULE ORAL at 05:35

## 2021-03-03 RX ADMIN — HYDROCODONE BITARTRATE AND ACETAMINOPHEN 1 TABLET: 10; 325 TABLET ORAL at 20:52

## 2021-03-03 RX ADMIN — CARISOPRODOL 350 MG: 350 TABLET ORAL at 20:52

## 2021-03-03 RX ADMIN — MIRTAZAPINE 15 MG: 15 TABLET, FILM COATED ORAL at 20:54

## 2021-03-03 RX ADMIN — GABAPENTIN 200 MG: 100 CAPSULE ORAL at 20:52

## 2021-03-03 RX ADMIN — METOPROLOL TARTRATE 50 MG: 50 TABLET, FILM COATED ORAL at 08:53

## 2021-03-03 RX ADMIN — HEPARIN SODIUM 5000 UNITS: 5000 INJECTION INTRAVENOUS; SUBCUTANEOUS at 20:53

## 2021-03-03 RX ADMIN — PANTOPRAZOLE SODIUM 40 MG: 40 TABLET, DELAYED RELEASE ORAL at 05:34

## 2021-03-03 RX ADMIN — CLOPIDOGREL 75 MG: 75 TABLET, FILM COATED ORAL at 08:52

## 2021-03-03 RX ADMIN — FLUTICASONE PROPIONATE 2 SPRAY: 50 SPRAY, METERED NASAL at 09:03

## 2021-03-03 RX ADMIN — HYDROCODONE BITARTRATE AND ACETAMINOPHEN 1 TABLET: 10; 325 TABLET ORAL at 05:34

## 2021-03-03 RX ADMIN — HEPARIN SODIUM 5000 UNITS: 5000 INJECTION INTRAVENOUS; SUBCUTANEOUS at 08:55

## 2021-03-03 RX ADMIN — ASPIRIN 81 MG: 81 TABLET, COATED ORAL at 08:52

## 2021-03-03 RX ADMIN — Medication 1 PATCH: at 10:27

## 2021-03-03 RX ADMIN — METOPROLOL TARTRATE 50 MG: 50 TABLET, FILM COATED ORAL at 20:54

## 2021-03-03 RX ADMIN — HYDROCODONE BITARTRATE AND ACETAMINOPHEN 1 TABLET: 10; 325 TABLET ORAL at 14:09

## 2021-03-03 RX ADMIN — HYDROCHLOROTHIAZIDE 12.5 MG: 12.5 TABLET ORAL at 08:53

## 2021-03-03 RX ADMIN — ALPRAZOLAM 0.5 MG: 0.5 TABLET ORAL at 08:58

## 2021-03-03 RX ADMIN — CARISOPRODOL 350 MG: 350 TABLET ORAL at 08:58

## 2021-03-03 RX ADMIN — Medication 1 TABLET: at 08:52

## 2021-03-03 RX ADMIN — ATORVASTATIN CALCIUM 80 MG: 40 TABLET, FILM COATED ORAL at 20:54

## 2021-03-03 NOTE — PROGRESS NOTES
Occupational Therapy: Individual: 90 minutes.    Physical Therapy:    Speech Language Pathology:    Signed by: Yue Velasquez, Occupational Therapist

## 2021-03-03 NOTE — THERAPY TREATMENT NOTE
Inpatient Rehabilitation - Physical Therapy Treatment Note       CHEYENNE No     Patient Name: Valerie Moore  : 1955  MRN: [id number]    Today's Date: 3/3/2021                    Admit Date: 2021      Visit Dx: No diagnosis found.    Patient Active Problem List   Diagnosis   • Anxiety   • Hypertension   • Hypercholesteremia   • Seasonal allergies   • Sleep disorder   • GERD (gastroesophageal reflux disease)   • Arthritis   • Airway hyperreactivity   • Gout   • Weakness   • CAP (community acquired pneumonia)   • Hypokalemia   • Hyponatremia   • Hyperglycemia   • Normocytic anemia   • Abdominal aortic aneurysm (AAA) 3.0 cm to 5.5 cm in diameter in male (CMS/HCC)   • Tobacco abuse   • Chest pain, atypical   • Cerebrovascular accident (CVA) (CMS/HCC)   • CVA (cerebral vascular accident) (CMS/HCC)       Past Medical History:   Diagnosis Date   • Anxiety    • GERD (gastroesophageal reflux disease) 2017   • Hypercholesteremia    • Hypertension    • Seasonal allergies    • Sleep disorder    • Stroke (CMS/HCC)        Past Surgical History:   Procedure Laterality Date   • HERNIA REPAIR     • HIP SURGERY Right     plate and 8 screws in right hip           PT ASSESSMENT (last 12 hours)      IRF PT Evaluation and Treatment     Row Name 21 1054          PT Time and Intention    Document Type  daily treatment  -LL     Mode of Treatment  individual therapy;physical therapy  -LL     Patient/Family/Caregiver Comments/Observations  Patient agreeable to PT session this date.  -LL     Row Name 21 1054          General Information    Patient Profile Reviewed  yes  -LL     Existing Precautions/Restrictions  fall R HP  -LL     Row Name 21 1054          Cognition/Psychosocial    Affect/Mental Status (Cognitive)  WFL  -LL     Orientation Status (Cognition)  oriented x 3  -LL     Follows Commands (Cognition)  verbal cues/prompting required;physical/tactile prompts required  -LL     Personal Safety  Interventions  fall prevention program maintained;gait belt;nonskid shoes/slippers when out of bed;supervised activity  -     Row Name 03/03/21 1054          Pain Scale: FACES Pre/Post-Treatment    Pain: FACES Scale, Pretreatment  0-->no hurt  -LL     Posttreatment Pain Rating  0-->no hurt  -     Row Name 03/03/21 1054          Bed Mobility    Supine-Sit Wells (Bed Mobility)  moderate assist (50% patient effort);verbal cues;nonverbal cues (demo/gesture)  -     Assistive Device (Bed Mobility)  bed rails;draw sheet  -     Row Name 03/03/21 1054          Transfers    Bed-Chair Wells (Transfers)  moderate assist (50% patient effort);verbal cues;nonverbal cues (demo/gesture)  -LL     Chair-Bed Wells (Transfers)  moderate assist (50% patient effort);verbal cues;nonverbal cues (demo/gesture)  -     Assistive Device (Bed-Chair Transfers)  wheelchair  -LL     Sit-Stand Wells (Transfers)  minimum assist (75% patient effort);verbal cues;nonverbal cues (demo/gesture)  -LL     Stand-Sit Wells (Transfers)  minimum assist (75% patient effort);verbal cues;nonverbal cues (demo/gesture)  -     Row Name 03/03/21 1054          Chair-Bed Transfer    Assistive Device (Chair-Bed Transfers)  wheelchair  -     Row Name 03/03/21 1054          Sit-Stand Transfer    Assistive Device (Sit-Stand Transfers)  -- PB  -     Row Name 03/03/21 1054          Stand-Sit Transfer    Assistive Device (Stand-Sit Transfers)  -- // bars  -     Row Name 03/03/21 1054          Stand Pivot/Stand Step Transfer    Stand Pivot/Stand Step Wells (Transfers)  moderate assist (50% patient effort);verbal cues;nonverbal cues (demo/gesture)  -     Row Name 03/03/21 1054          Gait/Stairs (Locomotion)    Comment (Gait/Stairs)  Not attempted this date  -     Row Name 03/03/21 1054          Balance    Comment, Balance  Standing weight shifting; standing R foot placement in // bars  -     Row Name 03/03/21  1054          Hip (Therapeutic Exercise)    Hip AAROM (Therapeutic Exercise)  right;flexion;extension;aBduction;aDduction;sitting  -LL     Hip Strengthening (Therapeutic Exercise)  left;flexion;extension;aBduction;aDduction;marching while seated;sitting;2 lb free weight;resistance band;green R hip flexion standing  -LL     Row Name 03/03/21 1054          Knee (Therapeutic Exercise)    Knee AAROM (Therapeutic Exercise)  right;flexion;extension;sitting  -LL     Knee Strengthening (Therapeutic Exercise)  left;flexion;extension;marching while seated;LAQ (long arc quad);hamstring curls;sitting;2 lb free weight;resistance band;green  -LL     Row Name 03/03/21 1054          Ankle (Therapeutic Exercise)    Ankle PROM (Therapeutic Exercise)  right;dorsiflexion;plantarflexion Stretching to R gastroc & hamstring mm  -LL     Ankle Strengthening (Therapeutic Exercise)  left;plantarflexion;dorsiflexion  -LL     Row Name 03/03/21 1054          Modality Treatment    Treatment Location 1 (Modality)  -- R anterior tibialis & R quad  -LL     Modality Performed  NMES (neuromuscular electrical stimulation)  -LL     Modality Treatment Results  mm contraction noted  -LL     Comment, Modality Treatment  x 20 reps  -LL     Row Name 03/03/21 1054          Bed Mobility Goal 1 (PT-IRF)    Progress/Outcomes (Bed Mobility Goal 1, PT-IRF)  goal ongoing  -LL     Row Name 03/03/21 1054          Bed Mobility Goal 2 (PT-IRF)    Progress/Outcomes (Bed Mobility Goal 2, PT-IRF)  goal ongoing  -LL     Row Name 03/03/21 1054          Transfer Goal 1 (PT-IRF)    Progress/Outcomes (Transfer Goal 1, PT-IRF)  goal ongoing  -LL     Row Name 03/03/21 1054          Transfer Goal 2 (PT-IRF)    Progress/Outcomes (Transfer Goal 2, PT-IRF)  goal ongoing  -LL     Row Name 03/03/21 1054          Gait/Walking Locomotion Goal 1 (PT-IRF)    Progress/Outcomes (Gait/Walking Locomotion Goal 1, PT-IRF)  goal ongoing  -LL     Row Name 03/03/21 1054          Gait/Walking  Locomotion Goal 2 (PT-IRF)    Progress/Outcomes (Gait/Walking Locomotion Goal 2, PT-IRF)  goal ongoing  -LL     Row Name 03/03/21 1054          Positioning and Restraints    Pre-Treatment Position  in bed  -LL     Post Treatment Position  wheelchair  -LL     In Wheelchair  sitting;with OT;legs elevated  -LL     Row Name 03/03/21 1054          Therapy Assessment/Plan (PT)    Patient's Goals For Discharge  return home  -     Row Name 03/03/21 1054          Therapy Assessment/Plan (PT)    Rehab Potential/Prognosis (PT)  adequate, monitor progress closely  -     Frequency of Treatment (PT)  5 times per week  -LL     Estimated Duration of Therapy (PT)  3 weeks  -     Problem List (PT)  balance;coordination;hemiparesis/hemiplegia;mobility;muscle tone;sensation;strength;pain;postural control  -     Activity Limitations Related to Problem List (PT)  unable to ambulate safely;unable to transfer safely  -     Row Name 03/03/21 1054          Therapy Plan Review/Discharge Plan (PT)    Anticipated Equipment Needs at Discharge (PT Eval)  -- tbd  -LL     Expected Discharge Disposition (PT Eval)  home with home health care  -       User Key  (r) = Recorded By, (t) = Taken By, (c) = Cosigned By    Initials Name Provider Type    LL Alise Handy PTA Physical Therapy Assistant           Physical Therapy Education                 Title: PT OT SLP Therapies (Done)     Topic: Physical Therapy (Done)     Point: Mobility training (Done)     Learning Progress Summary           Patient Acceptance, E,D, VU,NR by LL at 3/3/2021 1101    Acceptance, E,D, VU,NR by LL at 3/2/2021 1517    Acceptance, E,D, VU,NR by LL at 3/1/2021 1016    Acceptance, E,TB, VU by CT at 2/27/2021 1228    Acceptance, E,D, VU,NR by LL at 2/26/2021 1829    Acceptance, E,D, VU,NR by LL at 2/25/2021 1541    Acceptance, E, VU,NR by LB at 2/23/2021 1604                   Point: Home exercise program (Done)     Learning Progress Summary           Patient  Acceptance, E,D, VU,NR by LL at 3/3/2021 1101    Acceptance, E,D, VU,NR by LL at 3/2/2021 1517    Acceptance, E,D, VU,NR by LL at 3/1/2021 1016    Acceptance, E,TB, VU by CT at 2/27/2021 1228    Acceptance, E,D, VU,NR by LL at 2/26/2021 1829    Acceptance, E,D, VU,NR by LL at 2/25/2021 1541    Acceptance, E, VU,NR by LB at 2/23/2021 1604                   Point: Body mechanics (Done)     Learning Progress Summary           Patient Acceptance, E,D, VU,NR by LL at 3/3/2021 1101    Acceptance, E,D, VU,NR by LL at 3/2/2021 1517    Acceptance, E,D, VU,NR by LL at 3/1/2021 1016    Acceptance, E,TB, VU by CT at 2/27/2021 1228    Acceptance, E,D, VU,NR by LL at 2/26/2021 1829    Acceptance, E,D, VU,NR by LL at 2/25/2021 1541    Acceptance, E, VU,NR by LB at 2/23/2021 1604                   Point: Precautions (Done)     Learning Progress Summary           Patient Acceptance, E,D, VU,NR by LL at 3/3/2021 1101    Acceptance, E,D, VU,NR by LL at 3/2/2021 1517    Acceptance, E,D, VU,NR by LL at 3/1/2021 1016    Acceptance, E,TB, VU by CT at 2/27/2021 1228    Acceptance, E,D, VU,NR by LL at 2/26/2021 1829    Acceptance, E,D, VU,NR by LL at 2/25/2021 1541    Acceptance, E, VU,NR by LB at 2/23/2021 1604                               User Key     Initials Effective Dates Name Provider Type Discipline    LB 03/14/16 -  Cathleen Carnes, PT Physical Therapist PT    CT 04/03/18 -  Nilsa Bermudez, PT Physical Therapist PT    LL 05/02/16 -  Alise Handy PTA Physical Therapy Assistant PT                PT Recommendation and Plan    Frequency of Treatment (PT): 5 times per week  Anticipated Equipment Needs at Discharge (PT Eval): (tbd)                  Time Calculation:     PT Charges     Row Name 03/03/21 1101             Time Calculation    Start Time  0735  -LL      Stop Time  0915  -LL      Time Calculation (min)  100 min  -LL      PT Received On  03/03/21  -LL         Time Calculation- PT    Total Timed Code Minutes- PT  100  minute(s)  -LL        User Key  (r) = Recorded By, (t) = Taken By, (c) = Cosigned By    Initials Name Provider Type     Alise Handy PTA Physical Therapy Assistant          Therapy Charges for Today     Code Description Service Date Service Provider Modifiers Qty    02423111104 HC PT NEUROMUSC RE EDUCATION EA 15 MIN 3/2/2021 Alise Handy, PTA GP 2    18130383865 HC PT THERAPEUTIC ACT EA 15 MIN 3/2/2021 Alise Handy, PTA GP 1    17711659722 HC GAIT TRAINING EA 15 MIN 3/2/2021 Alise Handy, PTA GP 1    07989830940 HC PT THER PROC EA 15 MIN 3/2/2021 Alise Handy, PTA GP 3    95516691312 HC PT NEUROMUSC RE EDUCATION EA 15 MIN 3/3/2021 Alise Handy, PTA GP 2    42851975705 HC PT THERAPEUTIC ACT EA 15 MIN 3/3/2021 Alise Handy, PTA GP 2    47345641659 HC PT THER PROC EA 15 MIN 3/3/2021 Alise Handy, PTA GP 3                   Alise. WAGNER Handy  3/3/2021

## 2021-03-03 NOTE — PROGRESS NOTES
PROGRESS NOTE         Patient Identification:  Name:  Valerie Moore  Age:  65 y.o.  Sex:  male  :  1955  MRN:  [id number]  Visit Number:  [id number]  Primary Care Provider:  Darwin Alvarez MD         LOS: 9 days       ----------------------------------------------------------------------------------------------------------------------  Subjective       Chief Complaints:    Acute CVA  Depression improved with Remeron  Right hip osteoarthritis  Hypertension      Interval History:      Patient's blood pressure remains low mostly at night and his metoprolol had to be held several times in the past 48 hours and I decreased the dose of his HCTZ down to 12.5 mg daily and possibly consider stopping it altogether and I decreased his metoprolol dose to 50 mg twice daily and will continue to monitor very closely.      Review of Systems:    Constitutional: no fever, chills and night sweats.  Generalized fatigue.  Eyes: no eye drainage, itching or redness.  HEENT: no mouth sores, dysphagia or nose bleed.  Respiratory: no for shortness of breath, cough or production of sputum.  Cardiovascular: no chest pain, no palpitations, no orthopnea.  Gastrointestinal: no nausea, vomiting or diarrhea. No abdominal pain, hematemesis or rectal bleeding.  Genitourinary: no dysuria or polyuria.  Hematologic/lymphatic: no lymph node abnormalities, no easy bruising or easy bleeding.  Musculoskeletal: no muscle or joint pain.  Skin: No rash and no itching.  Neurological: CVA  Behavioral/Psych: Mild depression improving  Endocrine: no hot flashes.  Immunologic: negative.    ----------------------------------------------------------------------------------------------------------------------      Objective       [facility] Meds:  aspirin, 81 mg, Oral, Daily  atorvastatin, 80 mg, Oral, Nightly  clopidogrel, 75 mg, Oral, Daily  heparin (porcine), 5,000 Units, Subcutaneous, Q12H  hydroCHLOROthiazide, 12.5 mg, Oral,  Daily  losartan, 100 mg, Oral, Q24H  metoprolol tartrate, 50 mg, Oral, Q12H  mirtazapine, 15 mg, Oral, Nightly  multivitamin, 1 tablet, Oral, Daily  nicotine, 1 patch, Transdermal, Q24H  pantoprazole, 40 mg, Oral, Q AM  polyethylene glycol, 17 g, Oral, Daily         ----------------------------------------------------------------------------------------------------------------------    Vital Signs:  Temp:  [97.6 °F (36.4 °C)-97.8 °F (36.6 °C)] 97.8 °F (36.6 °C)  Heart Rate:  [59-94] 78  Resp:  [18-20] 18  BP: ()/(56-77) 94/56  No data found.  SpO2 Percentage    03/02/21 0836 03/02/21 1903 03/02/21 1936   SpO2: 96% 96% 97%     SpO2:  [96 %-97 %] 97 %  on   ;   Device (Oxygen Therapy): room air    Body mass index is 27.99 kg/m².  Wt Readings from Last 3 Encounters:   02/22/21 80.9 kg (178 lb 5.6 oz)   02/22/21 80.9 kg (178 lb 6.4 oz)   11/24/19 82.2 kg (181 lb 5 oz)        Intake/Output Summary (Last 24 hours) at 3/3/2021 0677  Last data filed at 3/3/2021 9905  Gross per 24 hour   Intake 840 ml   Output --   Net 840 ml     Diet Regular; Cardiac, Daily Fluid Restriction; 1500 mL Fluid Per Day  ----------------------------------------------------------------------------------------------------------------------      Physical Exam:    General Appearance: alert, pleasant, appears stated age, interactive and  Cooperative.  Patient still in bed with no complaints at overall very happy with his progress and seems to be in less depressed mood.    Head: normocephalic, without obvious abnormality and atraumatic    Eyes: lids and lashes normal, conjunctivae and sclerae normal, no icterus, no  pallor, corneas clear and PERRLA    Ears: ears appear intact with no abnormalities noted    Nose: nares normal, septum midline, mucosa normal and no drainage     Lungs: clear to auscultation, respirations regular, respirations even and  respirations unlabored. No accessory muscle use.     Heart:: regular rhythm & normal rate, normal  S1, S2, no murmur, no gallop, no  rub and no click.  Chest wall with no abnormalities observed. PMI nondisplaced    Abdomen: normal bowel sounds in all quadrants, no masses, no hepatomegaly,  no splenomegaly, soft non-tender, no guarding and no rebound tenderness    Extremities: no edema, no cyanosis, no redness, no tenderness, no clubbing    Musculoskeletal: joints with no effusion nor erythema nor warmth.  Pedal pulses palpable and equal bilaterally    Skin: no bleeding, bruising or rash and no lesions noted    Lymph Nodes: no palpable adenopathy    Neurologic:    Mental Status: Patient is awake alert and oriented x3.  Appropriate.   Sensory: Sensory overall seems to be intact in BLE and BUE.   Motor strength: Right-sided hemiparesis      ----------------------------------------------------------------------------------------------------------------------            Results from last 7 days   Lab Units 03/02/21  0128   WBC 10*3/mm3 9.21   HEMOGLOBIN g/dL 12.7*   HEMATOCRIT % 39.7   MCV fL 91.5   MCHC g/dL 32.0   PLATELETS 10*3/mm3 292     Results from last 7 days   Lab Units 03/02/21  0128   SODIUM mmol/L 128*   POTASSIUM mmol/L 4.4   MAGNESIUM mg/dL 2.0   CHLORIDE mmol/L 95*   CO2 mmol/L 24.3   BUN mg/dL 39*   CREATININE mg/dL 1.01   EGFR IF NONAFRICN AM mL/min/1.73 74   CALCIUM mg/dL 9.7   GLUCOSE mg/dL 105*   ALBUMIN g/dL 3.80   BILIRUBIN mg/dL 0.2   ALK PHOS U/L 117   AST (SGOT) U/L 20   ALT (SGPT) U/L 27   Estimated Creatinine Clearance: 72.2 mL/min (by C-G formula based on SCr of 1.01 mg/dL).  No results found for: AMMONIA    No results found for: HGBA1C, POCGLU  Lab Results   Component Value Date    HGBA1C 5.40 11/24/2019     Lab Results   Component Value Date    TSH 0.698 02/16/2021    FREET4 1.40 02/16/2021       No results found for: BLOODCX  No results found for: URINECX  No results found for: WOUNDCX  No results found for: STOOLCX  No results found for: RESPCX  Pain Management Panel     Pain Management  Panel Latest Ref Rng & Units 2/16/2021 11/24/2019    AMPHETAMINES SCREEN, URINE Negative Negative Negative    BARBITURATES SCREEN Negative Negative Negative    BENZODIAZEPINE SCREEN, URINE Negative Negative Negative    BUPRENORPHINEUR Negative Negative Negative    COCAINE SCREEN, URINE Negative Negative Negative    METHADONE SCREEN, URINE Negative Negative Negative            ----------------------------------------------------------------------------------------------------------------------  Imaging Results (Last 24 Hours)     ** No results found for the last 24 hours. **          ----------------------------------------------------------------------------------------------------------------------    Assessment/Plan       Assessment/Plan     ASSESSMENT:    Acute infarct of the left external capsule basal left ganglia and pericallosal regions  Right sided hemiparesis  Right-sided hip osteoarthritis  Depression  Anxiety neurosis  Hypertension  Tobacco use  GERD      PLAN:    Patient's blood pressure remains low mostly at night and his metoprolol had to be held several times in the past 48 hours and I decreased the dose of his HCTZ down to 12.5 mg daily and possibly consider stopping it altogether and I decreased his metoprolol dose to 50 mg twice daily and will continue to monitor very closely.  Otherwise, patient had an uneventful night with no issues reported, vitals are stable with no fever documented overnight.  Patient denies any chest pain, shortness of breath, nausea, vomiting, diarrhea or headache.  Has been participating with physical therapy without any difficulty.  Seems to be fairly comfortable this morning with no evidence of acute distress.    Status post acute infarct involving the left external capsule, basal left basal ganglia, and left pericallosal regions.  Patient currently on Plavix, aspirin, and statin therapy.  Patient requiring moderate assistance for bed mobility; moderate assistance for bed  to chair and chair to bed transfers.  Minimum assistance for sit to stand and stand to sit transfers.  Requires moderate assistance for stand pivot/stand transfer.     Depression--continue Remeron 15 mg nightly.  Has helped with resting better.     Right hip osteoarthritis--have adjusted Norco to help with pain management.  Has advanced disease     Anxiety neurosis--Xanax     Hypertension continue metoprolol and losartan with HCTZ     Tobacco abuse NicoDerm     GERD--Protonix        Code Status:   Code Status and Medical Interventions:   Ordered at: 02/22/21 8715     Level Of Support Discussed With:    Patient     Code Status:    CPR     Medical Interventions (Level of Support Prior to Arrest):    Full       Thea Wolfe MD  03/03/21  06:42 EST

## 2021-03-03 NOTE — PROGRESS NOTES
Rehabilitation Nursing  Inpatient Rehabilitation Plan of Care Note    Plan of Care  Copy from POCBody Function Structure    Skin Integrity (Active)  Current Status (2/23/2021 12:00:00 AM): free of skin breakdown this shift  Weekly Goal: free of skin breakdown this stay  Discharge Goal: home free of skin breakdown    Safety    Potential for Injury (Active)  Current Status (2/23/2021 12:00:00 AM): free from injury this shift  Weekly Goal: free from injury this stay  Discharge Goal: home free from injury    Signed by: Huong Moore, Nurse

## 2021-03-03 NOTE — THERAPY TREATMENT NOTE
Inpatient Rehabilitation - Occupational Therapy Treatment Note    CHEYENNE No     Patient Name: Valerie Moore  : 1955  MRN: [id number]    Today's Date: 3/3/2021                 Admit Date: 2021       No diagnosis found.    Patient Active Problem List   Diagnosis   • Anxiety   • Hypertension   • Hypercholesteremia   • Seasonal allergies   • Sleep disorder   • GERD (gastroesophageal reflux disease)   • Arthritis   • Airway hyperreactivity   • Gout   • Weakness   • CAP (community acquired pneumonia)   • Hypokalemia   • Hyponatremia   • Hyperglycemia   • Normocytic anemia   • Abdominal aortic aneurysm (AAA) 3.0 cm to 5.5 cm in diameter in male (CMS/HCC)   • Tobacco abuse   • Chest pain, atypical   • Cerebrovascular accident (CVA) (CMS/HCC)   • CVA (cerebral vascular accident) (CMS/HCC)       Past Medical History:   Diagnosis Date   • Anxiety    • GERD (gastroesophageal reflux disease) 2017   • Hypercholesteremia    • Hypertension    • Seasonal allergies    • Sleep disorder    • Stroke (CMS/HCC)        Past Surgical History:   Procedure Laterality Date   • HERNIA REPAIR     • HIP SURGERY Right     plate and 8 screws in right hip                 IRF OT ASSESSMENT FLOWSHEET (last 12 hours)      IRF OT Evaluation and Treatment     Row Name 21 1600          OT Time and Intention    Document Type  daily treatment  -     Mode of Treatment  individual therapy;occupational therapy  -     Row Name 21 1600          General Information    Patient/Family/Caregiver Comments/Observations  patient agreeable to therapy this am  -     Existing Precautions/Restrictions  fall  -     Row Name 21 1600          Transfers    Chair-Bed [place] (Transfers)  minimum assist (75% patient effort);moderate assist (50% patient effort)  -     Row Name 21 1600          Chair-Bed Transfer    Assistive Device (Chair-Bed Transfers)  wheelchair  -     Row Name 21 1600          Motor Skills     Motor Skills  coordination;functional endurance;neuro-muscular function;therapeutic exercise  -     Therapeutic Exercise  -- PROM, neuro, AAROM, vibration  -     Additional Documentation  -- NMES right wrist and hand ext  -     Row Name 03/03/21 1600          Neuromuscular Re-education    Interventions (Neuromuscular Re-education)  massage;facilitation/inhibition;tapping;weight bearing  -       User Key  (r) = Recorded By, (t) = Taken By, (c) = Cosigned By    Initials Name Effective Dates     Catherine Velasquez OT 03/29/20 -            Occupational Therapy Education                 Title: PT OT SLP Therapies (Done)     Topic: Occupational Therapy (Done)     Point: ADL training (Done)     Description:   Instruct learner(s) on proper safety adaptation and remediation techniques during self care or transfers.   Instruct in proper use of assistive devices.              Learning Progress Summary           Patient Acceptance, E, VU,NR by BF at 3/2/2021 1445    Acceptance, E, VU,NR by BF at 3/1/2021 1432    Acceptance, E, VU,NR by BF at 2/27/2021 1110    Acceptance, E, VU,NR by BF at 2/25/2021 1425    Acceptance, E, VU,NR by BF at 2/23/2021 1548                   Point: Precautions (Done)     Description:   Instruct learner(s) on prescribed precautions during self-care and functional transfers.              Learning Progress Summary           Patient Acceptance, E, VU,NR by BF at 3/2/2021 1445    Acceptance, E, VU,NR by BF at 3/1/2021 1432    Acceptance, E, VU,NR by BF at 2/27/2021 1110    Acceptance, E, VU,NR by BF at 2/25/2021 1425    Acceptance, E, VU,NR by BF at 2/23/2021 1548                               User Key     Initials Effective Dates Name Provider Type Discipline     07/05/20 -  Meghan Carbajal OT Occupational Therapist OT                    OT Recommendation and Plan                         Time Calculation:     Time Calculation- OT     Row Name 03/03/21 1652             Time  Calculation- OT    OT Start Time  0915  -      OT Stop Time  1045  -      OT Time Calculation (min)  90 min  -        User Key  (r) = Recorded By, (t) = Taken By, (c) = Cosigned By    Initials Name Provider Type     Catherine Velasquez OT Occupational Therapist        Therapy Charges for Today     Code Description Service Date Service Provider Modifiers Qty    92722736788 HC OT NEUROMUSC RE EDUCATION EA 15 MIN 3/3/2021 Catherine Velasquez OT GO 3    35587748402 HC OT SELF CARE/MGMT/TRAIN EA 15 MIN 3/3/2021 Catherine Velasquez OT GO 2    [id number]  OT ELEC STIM EA-PER 15 MIN 3/3/2021 Catherine Velasquez OT GO 1                   Catherine Velasquez OT  3/3/2021

## 2021-03-04 PROCEDURE — 97110 THERAPEUTIC EXERCISES: CPT | Performed by: OCCUPATIONAL THERAPIST

## 2021-03-04 PROCEDURE — 97110 THERAPEUTIC EXERCISES: CPT

## 2021-03-04 PROCEDURE — 97112 NEUROMUSCULAR REEDUCATION: CPT | Performed by: OCCUPATIONAL THERAPIST

## 2021-03-04 PROCEDURE — 97530 THERAPEUTIC ACTIVITIES: CPT

## 2021-03-04 PROCEDURE — 97535 SELF CARE MNGMENT TRAINING: CPT | Performed by: OCCUPATIONAL THERAPIST

## 2021-03-04 PROCEDURE — 25010000002 HEPARIN (PORCINE) PER 1000 UNITS: Performed by: FAMILY MEDICINE

## 2021-03-04 PROCEDURE — 94799 UNLISTED PULMONARY SVC/PX: CPT

## 2021-03-04 PROCEDURE — 97112 NEUROMUSCULAR REEDUCATION: CPT

## 2021-03-04 PROCEDURE — 97032 APPL MODALITY 1+ESTIM EA 15: CPT

## 2021-03-04 RX ORDER — LOSARTAN POTASSIUM 50 MG/1
50 TABLET ORAL
Status: DISCONTINUED | OUTPATIENT
Start: 2021-03-05 | End: 2021-03-15

## 2021-03-04 RX ADMIN — HYDROCODONE BITARTRATE AND ACETAMINOPHEN 1 TABLET: 10; 325 TABLET ORAL at 08:11

## 2021-03-04 RX ADMIN — FLUTICASONE PROPIONATE 2 SPRAY: 50 SPRAY, METERED NASAL at 08:11

## 2021-03-04 RX ADMIN — METOPROLOL TARTRATE 50 MG: 50 TABLET, FILM COATED ORAL at 20:25

## 2021-03-04 RX ADMIN — CARISOPRODOL 350 MG: 350 TABLET ORAL at 20:24

## 2021-03-04 RX ADMIN — HEPARIN SODIUM 5000 UNITS: 5000 INJECTION INTRAVENOUS; SUBCUTANEOUS at 20:25

## 2021-03-04 RX ADMIN — HYDROCODONE BITARTRATE AND ACETAMINOPHEN 1 TABLET: 10; 325 TABLET ORAL at 20:24

## 2021-03-04 RX ADMIN — Medication 1 PATCH: at 08:13

## 2021-03-04 RX ADMIN — CLOPIDOGREL 75 MG: 75 TABLET, FILM COATED ORAL at 08:13

## 2021-03-04 RX ADMIN — ATORVASTATIN CALCIUM 80 MG: 40 TABLET, FILM COATED ORAL at 20:25

## 2021-03-04 RX ADMIN — PANTOPRAZOLE SODIUM 40 MG: 40 TABLET, DELAYED RELEASE ORAL at 05:02

## 2021-03-04 RX ADMIN — HEPARIN SODIUM 5000 UNITS: 5000 INJECTION INTRAVENOUS; SUBCUTANEOUS at 08:11

## 2021-03-04 RX ADMIN — GABAPENTIN 200 MG: 100 CAPSULE ORAL at 04:56

## 2021-03-04 RX ADMIN — ALPRAZOLAM 0.5 MG: 0.5 TABLET ORAL at 20:24

## 2021-03-04 RX ADMIN — POLYETHYLENE GLYCOL (3350) 17 G: 17 POWDER, FOR SOLUTION ORAL at 08:11

## 2021-03-04 RX ADMIN — Medication 1 TABLET: at 08:11

## 2021-03-04 RX ADMIN — GABAPENTIN 200 MG: 100 CAPSULE ORAL at 14:17

## 2021-03-04 RX ADMIN — ASPIRIN 81 MG: 81 TABLET, COATED ORAL at 08:11

## 2021-03-04 RX ADMIN — HYDROCODONE BITARTRATE AND ACETAMINOPHEN 1 TABLET: 10; 325 TABLET ORAL at 14:17

## 2021-03-04 RX ADMIN — MIRTAZAPINE 15 MG: 15 TABLET, FILM COATED ORAL at 20:25

## 2021-03-04 RX ADMIN — HYDROCHLOROTHIAZIDE 12.5 MG: 12.5 TABLET ORAL at 08:11

## 2021-03-04 NOTE — THERAPY TREATMENT NOTE
Inpatient Rehabilitation - Occupational Therapy Progress Note     [place]     Patient Name: Valerie Moore  : 1955  MRN: [id number]    Today's Date: 3/4/2021                 Admit Date: 2021       No diagnosis found.    Patient Active Problem List   Diagnosis   • Anxiety   • Hypertension   • Hypercholesteremia   • Seasonal allergies   • Sleep disorder   • GERD (gastroesophageal reflux disease)   • Arthritis   • Airway hyperreactivity   • Gout   • Weakness   • CAP (community acquired pneumonia)   • Hypokalemia   • Hyponatremia   • Hyperglycemia   • Normocytic anemia   • Abdominal aortic aneurysm (AAA) 3.0 cm to 5.5 cm in diameter in male (CMS/HCC)   • Tobacco abuse   • Chest pain, atypical   • Cerebrovascular accident (CVA) (CMS/HCC)   • CVA (cerebral vascular accident) (CMS/HCC)       Past Medical History:   Diagnosis Date   • Anxiety    • GERD (gastroesophageal reflux disease) 2017   • Hypercholesteremia    • Hypertension    • Seasonal allergies    • Sleep disorder    • Stroke (CMS/HCC)        Past Surgical History:   Procedure Laterality Date   • HERNIA REPAIR     • HIP SURGERY Right     plate and 8 screws in right hip                 IRF OT ASSESSMENT FLOWSHEET (last 12 hours)      IRF OT Evaluation and Treatment     Row Name 21 1237          OT Time and Intention    Document Type  daily treatment;progress note  -BF     Mode of Treatment  occupational therapy  -BF     Patient Effort  good  -BF     Row Name 21 1237          General Information    Existing Precautions/Restrictions  fall;other (see comments) R HP  -BF     Row Name 21 1237          Cognition/Psychosocial    Orientation Status (Cognition)  oriented x 3  -BF     Follows Commands (Cognition)  follows one-step commands;verbal cues/prompting required;repetition of directions required;physical/tactile prompts required  -BF     Row Name 21 1237          General Upper Extremity Assessment (Range of Motion)     Comment: Upper Extremity ROM  RUE shoulder and elbow- trace; R hand flexion 50%, no extension; No R wrist AROM; LUE AROM WFL  -BF     Row Name 03/04/21 1237          Strength Comprehensive (MMT)    Comment, General Manual Muscle Testing (MMT) Assessment  RUE elbow and shoulder 1/5; R hand 2/5  -BF     Row Name 03/04/21 1237          Transfers    Chair-Bed [place] (Transfers)  moderate assist (50% patient effort);verbal cues;nonverbal cues (demo/gesture)  -BF     Row Name 03/04/21 1237          Chair-Bed Transfer    Assistive Device (Chair-Bed Transfers)  wheelchair  -BF     Row Name 03/04/21 1237          Motor Skills    Motor Skills  coordination;functional endurance;neuro-muscular function;therapeutic exercise;motor control/coordination interventions  -BF     Neuromuscular Function  right;upper extremity RUE AA/PROM, NDT; BUE arm bike, pushbox w/ RUE AAROM  -BF     Row Name 03/04/21 1237          Neuromuscular Re-education    Interventions (Neuromuscular Re-education)  facilitation/inhibition;massage;tapping RUE  -BF     Positioning (Neuromuscular Re-education)  sitting;supported  -BF     Row Name 03/04/21 1237          Basic Activities of Daily Living (BADLs)    Basic Activities of Daily Living  bathing;upper body dressing;lower body dressing;grooming;toileting;self-feeding  -BF     Row Name 03/04/21 1237          Bathing    Comment (Bathing)  Max A  -BF     Row Name 03/04/21 Replaced by [email or web address]          Upper Body Dressing    [place] Level (Upper Body Dressing)  moderate assist (50-74% patient effort);verbal cues;nonverbal cues (demo/gesture)  -BF     Position (Upper Body Dressing)  supported sitting  -BF     Comment (Upper Body Dressing)  Mod A  -BF     Row Name 03/04/21 1237          Lower Body Dressing    [place] Level (Lower Body Dressing)  maximum assist (25% patient effort);verbal cues;nonverbal cues (demo/gesture)  -BF     Position (Lower Body Dressing)  supine  -BF     Comment (Lower Body Dressing)  Max  A  -BF     Row Name 03/04/21 1237          Grooming    Comment (Grooming)  Min A  -BF     Row Name 03/04/21 1237          Toileting    Comment (Toileting)  Total A  -BF     Row Name 03/04/21 1237          Self-Feeding    Comment (Self-Feeding)  Set-up  -BF     Row Name 03/04/21 1237          Positioning and Restraints    In Bed  supine;call light within reach;encouraged to call for assist  -BF       User Key  (r) = Recorded By, (t) = Taken By, (c) = Cosigned By    Initials Name Effective Dates     Meghan Carbajal OT 07/05/20 -            Occupational Therapy Education                 Title: PT OT SLP Therapies (Done)     Topic: Occupational Therapy (Done)     Point: ADL training (Done)     Description:   Instruct learner(s) on proper safety adaptation and remediation techniques during self care or transfers.   Instruct in proper use of assistive devices.              Learning Progress Summary           Patient Acceptance, E, VU,NR by BF at 3/4/2021 1237    Acceptance, E, VU,NR by BF at 3/2/2021 1445    Acceptance, E, VU,NR by BF at 3/1/2021 1432    Acceptance, E, VU,NR by BF at 2/27/2021 1110    Acceptance, E, VU,NR by BF at 2/25/2021 1425    Acceptance, E, VU,NR by BF at 2/23/2021 1548                   Point: Precautions (Done)     Description:   Instruct learner(s) on prescribed precautions during self-care and functional transfers.              Learning Progress Summary           Patient Acceptance, E, VU,NR by BF at 3/4/2021 1237    Acceptance, E, VU,NR by BF at 3/2/2021 1445    Acceptance, E, VU,NR by BF at 3/1/2021 1432    Acceptance, E, VU,NR by BF at 2/27/2021 1110    Acceptance, E, VU,NR by BF at 2/25/2021 1425    Acceptance, E, VU,NR by BF at 2/23/2021 1548                               User Key     Initials Effective Dates Name Provider Type Discipline     07/05/20 -  Meghan Carbajal OT Occupational Therapist OT                    OT Recommendation and Plan    Planned Therapy  Interventions (OT): activity tolerance training, adaptive equipment training, BADL retraining, neuromuscular control/coordination retraining, passive ROM/stretching, ROM/therapeutic exercise, strengthening exercise, transfer/mobility retraining                    Time Calculation:     Time Calculation- OT     Row Name 03/04/21 1249             Time Calculation- OT    OT Start Time  0940  -BF      OT Stop Time  1110  -BF      OT Time Calculation (min)  90 min  -BF      Total Timed Code Minutes- OT  90 minute(s)  -BF      OT Non-Billable Time (min)  20 min  -BF        User Key  (r) = Recorded By, (t) = Taken By, (c) = Cosigned By    Initials Name Provider Type    BF Meghan Carbajal OT Occupational Therapist        Therapy Charges for Today     Code Description Service Date Service Provider Modifiers Qty    20096984120 HC OT NEUROMUSC RE EDUCATION EA 15 MIN 3/4/2021 Meghan Carbajal OT GO 3    81593456239 HC OT SELF CARE/MGMT/TRAIN EA 15 MIN 3/4/2021 Meghan Carbajal OT GO 1    07006842261 HC OT THER PROC EA 15 MIN 3/4/2021 Meghan Carbajal OT GO 2                   Meghan Carbajal OT  3/4/2021

## 2021-03-04 NOTE — PROGRESS NOTES
PROGRESS NOTE         Patient Identification:  Name:  Valerie Moore  Age:  65 y.o.  Sex:  male  :  1955  MRN:  [id number]  Visit Number:  [id number]  Primary Care Provider:  Darwin Alvarez MD         LOS: 10 days       ----------------------------------------------------------------------------------------------------------------------  Subjective       Chief Complaints:    Acute CVA  Depression improved with Remeron  Right hip osteoarthritis  Hypertension      Interval History:      After decreasing his HCTZ and metoprolol doses patient's blood pressure remains slightly on the lower side and I decreased his Cozaar down to 50 mg daily and discontinued his HCTZ altogether.  Patient is completely asymptomatic with this and has no complaints this morning.  Patient had an uneventful night with no issues reported, vitals are stable with no fever documented overnight.  Patient denies any chest pain, shortness of breath, nausea, vomiting, diarrhea or headache.  Has been participating with physical therapy without any difficulty.  Seems to be fairly comfortable this morning with no evidence of acute distress.          Review of Systems:    Constitutional: no fever, chills and night sweats.  Generalized fatigue.  Eyes: no eye drainage, itching or redness.  HEENT: no mouth sores, dysphagia or nose bleed.  Respiratory: no for shortness of breath, cough or production of sputum.  Cardiovascular: no chest pain, no palpitations, no orthopnea.  Gastrointestinal: no nausea, vomiting or diarrhea. No abdominal pain, hematemesis or rectal bleeding.  Genitourinary: no dysuria or polyuria.  Hematologic/lymphatic: no lymph node abnormalities, no easy bruising or easy bleeding.  Musculoskeletal: no muscle or joint pain.  Skin: No rash and no itching.  Neurological: CVA  Behavioral/Psych: Mild depression improving  Endocrine: no hot flashes.  Immunologic:  negative.    ----------------------------------------------------------------------------------------------------------------------      Objective       [facility] Meds:  aspirin, 81 mg, Oral, Daily  atorvastatin, 80 mg, Oral, Nightly  clopidogrel, 75 mg, Oral, Daily  fluticasone, 2 spray, Each Nare, Daily  heparin (porcine), 5,000 Units, Subcutaneous, Q12H  hydroCHLOROthiazide, 12.5 mg, Oral, Daily  losartan, 100 mg, Oral, Q24H  metoprolol tartrate, 50 mg, Oral, Q12H  mirtazapine, 15 mg, Oral, Nightly  multivitamin, 1 tablet, Oral, Daily  nicotine, 1 patch, Transdermal, Q24H  pantoprazole, 40 mg, Oral, Q AM  polyethylene glycol, 17 g, Oral, Daily         ----------------------------------------------------------------------------------------------------------------------    Vital Signs:  Temp:  [97.6 °F (36.4 °C)-97.9 °F (36.6 °C)] 97.8 °F (36.6 °C)  Heart Rate:  [63-77] 63  Resp:  [16-20] 20  BP: ()/(56-68) 93/56  No data found.  SpO2 Percentage    03/03/21 1905 03/03/21 1945 03/04/21 0741   SpO2: 96% 96% 93%     SpO2:  [93 %-98 %] 93 %  on  Flow (L/min):  [2] 2;   Device (Oxygen Therapy): room air    Body mass index is 27.99 kg/m².  Wt Readings from Last 3 Encounters:   02/22/21 80.9 kg (178 lb 5.6 oz)   02/22/21 80.9 kg (178 lb 6.4 oz)   11/24/19 82.2 kg (181 lb 5 oz)        Intake/Output Summary (Last 24 hours) at 3/4/2021 0838  Last data filed at 3/3/2021 2100  Gross per 24 hour   Intake 840 ml   Output --   Net 840 ml     Diet Regular; Cardiac, Daily Fluid Restriction; 1500 mL Fluid Per Day  ----------------------------------------------------------------------------------------------------------------------      Physical Exam:    General Appearance: alert, pleasant, appears stated age, interactive and  Cooperative.  Feels great with no complaints this morning.    Head: normocephalic, without obvious abnormality and atraumatic    Eyes: lids and lashes normal, conjunctivae and sclerae normal, no  icterus, no  pallor, corneas clear and PERRLA    Ears: ears appear intact with no abnormalities noted    Nose: nares normal, septum midline, mucosa normal and no drainage     Lungs: clear to auscultation, respirations regular, respirations even and  respirations unlabored. No accessory muscle use.     Heart:: regular rhythm & normal rate, normal S1, S2, no murmur, no gallop, no  rub and no click.  Chest wall with no abnormalities observed. PMI nondisplaced    Abdomen: normal bowel sounds in all quadrants, no masses, no hepatomegaly,  no splenomegaly, soft non-tender, no guarding and no rebound tenderness    Extremities: no edema, no cyanosis, no redness, no tenderness, no clubbing    Musculoskeletal: joints with no effusion nor erythema nor warmth.  Pedal pulses palpable and equal bilaterally    Skin: no bleeding, bruising or rash and no lesions noted    Lymph Nodes: no palpable adenopathy    Neurologic:    Mental Status: Patient is awake alert and oriented x3.  Appropriate.   Sensory: Sensory overall seems to be intact in BLE and BUE.   Motor strength: Right-sided hemiparesis      ----------------------------------------------------------------------------------------------------------------------            Results from last 7 days   Lab Units 03/02/21  0128   WBC 10*3/mm3 9.21   HEMOGLOBIN g/dL 12.7*   HEMATOCRIT % 39.7   MCV fL 91.5   MCHC g/dL 32.0   PLATELETS 10*3/mm3 292     Results from last 7 days   Lab Units 03/02/21  0128   SODIUM mmol/L 128*   POTASSIUM mmol/L 4.4   MAGNESIUM mg/dL 2.0   CHLORIDE mmol/L 95*   CO2 mmol/L 24.3   BUN mg/dL 39*   CREATININE mg/dL 1.01   EGFR IF NONAFRICN AM mL/min/1.73 74   CALCIUM mg/dL 9.7   GLUCOSE mg/dL 105*   ALBUMIN g/dL 3.80   BILIRUBIN mg/dL 0.2   ALK PHOS U/L 117   AST (SGOT) U/L 20   ALT (SGPT) U/L 27   Estimated Creatinine Clearance: 72.2 mL/min (by C-G formula based on SCr of 1.01 mg/dL).  No results found for: AMMONIA    No results found for: HGBA1C, POCGLU  Lab  Results   Component Value Date    HGBA1C 5.40 11/24/2019     Lab Results   Component Value Date    TSH 0.698 02/16/2021    FREET4 1.40 02/16/2021       No results found for: BLOODCX  No results found for: URINECX  No results found for: WOUNDCX  No results found for: STOOLCX  No results found for: RESPCX  Pain Management Panel     Pain Management Panel Latest Ref Rng & Units 2/16/2021 11/24/2019    AMPHETAMINES SCREEN, URINE Negative Negative Negative    BARBITURATES SCREEN Negative Negative Negative    BENZODIAZEPINE SCREEN, URINE Negative Negative Negative    BUPRENORPHINEUR Negative Negative Negative    COCAINE SCREEN, URINE Negative Negative Negative    METHADONE SCREEN, URINE Negative Negative Negative            ----------------------------------------------------------------------------------------------------------------------  Imaging Results (Last 24 Hours)     ** No results found for the last 24 hours. **          ----------------------------------------------------------------------------------------------------------------------    Assessment/Plan       Assessment/Plan     ASSESSMENT:    Acute infarct of the left external capsule basal left ganglia and pericallosal regions  Right sided hemiparesis  Right-sided hip osteoarthritis  Depression  Anxiety neurosis  Hypertension  Tobacco use  GERD      PLAN:    After decreasing his HCTZ and metoprolol doses patient's blood pressure remains slightly on the lower side and I decreased his Cozaar down to 50 mg daily and discontinued his HCTZ altogether.  Patient is completely asymptomatic with this and has no complaints this morning.    Status post acute infarct involving the left external capsule, basal left basal ganglia, and left pericallosal regions.  Patient currently on Plavix, aspirin, and statin therapy.  Patient requiring moderate assistance for bed mobility; moderate assistance for bed to chair and chair to bed transfers.  Minimum assistance for sit to  stand and stand to sit transfers.  Requires moderate assistance for stand pivot/stand transfer.     Depression--continue Remeron 15 mg nightly.  Has helped with resting better.     Right hip osteoarthritis--have adjusted Norco to help with pain management.  Has advanced disease     Anxiety neurosis--Xanax     Hypertension continue metoprolol and losartan with HCTZ     Tobacco abuse NicoDerm     GERD--Protonix        Code Status:   Code Status and Medical Interventions:   Ordered at: 02/22/21 9874     Level Of Support Discussed With:    Patient     Code Status:    CPR     Medical Interventions (Level of Support Prior to Arrest):    Full       Thea Wolfe MD  03/04/21  08:38 EST

## 2021-03-04 NOTE — PROGRESS NOTES
Occupational Therapy:    Physical Therapy: Individual: 90 minutes.    Speech Language Pathology:    Signed by: Lucas Baker PT

## 2021-03-04 NOTE — THERAPY TREATMENT NOTE
Inpatient Rehabilitation - Physical Therapy Treatment Note       CHEYENNE No     Patient Name: Valerie Moore  : 1955  MRN: [id number]    Today's Date: 3/4/2021                    Admit Date: 2021      Visit Dx: No diagnosis found.    Patient Active Problem List   Diagnosis   • Anxiety   • Hypertension   • Hypercholesteremia   • Seasonal allergies   • Sleep disorder   • GERD (gastroesophageal reflux disease)   • Arthritis   • Airway hyperreactivity   • Gout   • Weakness   • CAP (community acquired pneumonia)   • Hypokalemia   • Hyponatremia   • Hyperglycemia   • Normocytic anemia   • Abdominal aortic aneurysm (AAA) 3.0 cm to 5.5 cm in diameter in male (CMS/HCC)   • Tobacco abuse   • Chest pain, atypical   • Cerebrovascular accident (CVA) (CMS/HCC)   • CVA (cerebral vascular accident) (CMS/HCC)       Past Medical History:   Diagnosis Date   • Anxiety    • GERD (gastroesophageal reflux disease) 2017   • Hypercholesteremia    • Hypertension    • Seasonal allergies    • Sleep disorder    • Stroke (CMS/HCC)        Past Surgical History:   Procedure Laterality Date   • HERNIA REPAIR     • HIP SURGERY Right     plate and 8 screws in right hip           PT ASSESSMENT (last 12 hours)      IRF PT Evaluation and Treatment     Row Name 21 3652          PT Time and Intention    Document Type  daily treatment  -CW     Mode of Treatment  physical therapy  -CW     Patient/Family/Caregiver Comments/Observations  Patient agreeable to physical therapy this date. He reports that he is trying his best, but it is hard to cope with his hemiplegia.  -CW     Total Minutes, Physical Therapy  90  -CW     Row Name 21 6958          General Information    Patient Profile Reviewed  yes  -CW     General Observations of Patient  Alert/cooperative  -CW     Existing Precautions/Restrictions  fall;other (see comments) R HP  -CW     Row Name 21 3562          Coping Strategies    Trust Relationship/Rapport  care  explained;choices provided;questions answered;thoughts/feelings acknowledged  -     Row Name 03/04/21 1349          Cognition/Psychosocial    Affect/Mental Status (Cognitive)  WFL  -     Orientation Status (Cognition)  oriented x 3  -CW     Follows Commands (Cognition)  follows one-step commands;verbal cues/prompting required;repetition of directions required;physical/tactile prompts required  -     Row Name 03/04/21 1349          Pain Scale: FACES Pre/Post-Treatment    Pre/Posttreatment Pain Comment  Patient reports pain in his R hip and knee related to chronic arthritis but exacerbated by attempted weight bearing and somewhat by passive mobility.  -     Row Name 03/04/21 1349          Bed Mobility    Rolling Right [place] (Bed Mobility)  minimum assist (75% patient effort);verbal cues;nonverbal cues (demo/gesture)  -CW     Scooting/Bridging [place] (Bed Mobility)  contact guard;verbal cues;nonverbal cues (demo/gesture)  -CW     Supine-Sit [place] (Bed Mobility)  minimum assist (75% patient effort);verbal cues;nonverbal cues (demo/gesture)  -CW     Sit-Supine [place] (Bed Mobility)  minimum assist (75% patient effort);verbal cues;nonverbal cues (demo/gesture)  -CW     Bed Mobility, Safety Issues  decreased use of arms for pushing/pulling;decreased use of legs for bridging/pushing  -CW     Assistive Device (Bed Mobility)  bed rails;head of bed elevated  -     Row Name 03/04/21 1349          Transfers    Bed-Chair [place] (Transfers)  minimum assist (75% patient effort);verbal cues;nonverbal cues (demo/gesture) When going to the L  -CW     Chair-Bed [place] (Transfers)  minimum assist (75% patient effort);verbal cues;nonverbal cues (demo/gesture) Going to the L  -CW     Assistive Device (Bed-Chair Transfers)  wheelchair  -CW     Sit-Stand [place] (Transfers)  moderate assist (50% patient effort);verbal cues;nonverbal cues (demo/gesture)  -CW     Stand-Sit [place]  (Transfers)  moderate assist (50% patient effort);verbal cues;nonverbal cues (demo/gesture);minimum assist (75% patient effort)  -     Row Name 03/04/21 1349          Chair-Bed Transfer    Assistive Device (Chair-Bed Transfers)  wheelchair  -CW     Row Name 03/04/21 1349          Sit-Stand Transfer    Assistive Device (Sit-Stand Transfers)  walker, teodora;wheelchair  -CW     Row Name 03/04/21 1349          Stand-Sit Transfer    Assistive Device (Stand-Sit Transfers)  walker, teodora;wheelchair  -CW     Row Name 03/04/21 1349          Gait/Stairs (Locomotion)    Comment (Gait/Stairs)  Currently unable, attempted forward weight shifting in parallel bars this date, ambulation currently unsafe.  -     Row Name 03/04/21 1349          Safety Issues, Functional Mobility    Safety Issues Affecting Function (Mobility)  insight into deficits/self-awareness;safety precaution awareness  -     Impairments Affecting Function (Mobility)  balance;endurance/activity tolerance;pain;postural/trunk control;strength;motor control;range of motion (ROM)  -     Row Name 03/04/21 1349          Balance    Balance Interventions  standing;supported;static;moderate challenge  -     Row Name 03/04/21 1349          Motor Skills    Motor Control/Coordination Interventions  neuro-muscular re-education  -CW     Therapeutic Exercise  other (see comments) Seated and supine LE strengthening interventions.  -     Row Name 03/04/21 1349          Neuromuscular Re-education    Interventions (Neuromuscular Re-education)  weight bearing;weight shifting;facilitation/inhibition  -CW     Positioning (Neuromuscular Re-education)  standing;supported  -CW     Comment (Neuromuscular Re-education)  In parallel bars, with PNF cues for knee extension, hip extension/abduction on R.  -     Row Name 03/04/21 1349          Modality Treatment    Treatment Location 1 (Modality)  R Quadriceps  -CW     Modality Performed  NMES (neuromuscular electrical stimulation)   -CW     Modality Treatment Results  Contraction of R quadriceps  -CW     Comment, Modality Treatment  x 15 minutes, Japanese preset with 10/20 on/off time, up to 57 mA  -CW     Row Name 03/04/21 134          Positioning and Restraints    Pre-Treatment Position  in bed  -CW     In Bed  supine;fowlers;call light within reach;side rails up x3 PM  -CW     In Wheelchair  with OT AM  -CW     Row Name 03/04/21 1349          Therapy Assessment/Plan (PT)    Comment, Therapy Assessment/Plan (PT)  Patient demonstrates ability to improve his independence with bed mobility and bed to chair transfers with education on ideal technique. He is also able to achieve more notable quadriceps contraction with altered placement of electrodes and increased amperage.  -CW     Row Name 03/04/21 1346          Daily Progress Summary (PT)    Functional Goal Overall Progress (PT)  progressing toward functional goals as expected  -CW       User Key  (r) = Recorded By, (t) = Taken By, (c) = Cosigned By    Initials Name Provider Type    CW Lucas Baker, PT Physical Therapist           Physical Therapy Education                 Title: PT OT SLP Therapies (Done)     Topic: Physical Therapy (Done)     Point: Mobility training (Done)     Learning Progress Summary           Patient Acceptance, E, VU by CW at 3/4/2021 1416    Acceptance, E,D, VU,NR by LL at 3/3/2021 1101    Acceptance, E,D, VU,NR by LL at 3/2/2021 1517    Acceptance, E,D, VU,NR by LL at 3/1/2021 1016    Acceptance, E,TB, VU by CT at 2/27/2021 1228    Acceptance, E,D, VU,NR by LL at 2/26/2021 1829    Acceptance, E,D, VU,NR by LL at 2/25/2021 1541    Acceptance, E, VU,NR by LB at 2/23/2021 1604                   Point: Home exercise program (Done)     Learning Progress Summary           Patient Acceptance, E, VU by CW at 3/4/2021 1416    Acceptance, E,D, VU,NR by LL at 3/3/2021 1101    Acceptance, E,D, VU,NR by LL at 3/2/2021 1517    Acceptance, E,D, VU,NR by LL at 3/1/2021 1016     Acceptance, E,TB, VU by CT at 2/27/2021 1228    Acceptance, E,D, VU,NR by LL at 2/26/2021 1829    Acceptance, E,D, VU,NR by LL at 2/25/2021 1541    Acceptance, E, VU,NR by LB at 2/23/2021 1604                   Point: Body mechanics (Done)     Learning Progress Summary           Patient Acceptance, E, VU by CW at 3/4/2021 1416    Acceptance, E,D, VU,NR by LL at 3/3/2021 1101    Acceptance, E,D, VU,NR by LL at 3/2/2021 1517    Acceptance, E,D, VU,NR by LL at 3/1/2021 1016    Acceptance, E,TB, VU by CT at 2/27/2021 1228    Acceptance, E,D, VU,NR by LL at 2/26/2021 1829    Acceptance, E,D, VU,NR by LL at 2/25/2021 1541    Acceptance, E, VU,NR by LB at 2/23/2021 1604                   Point: Precautions (Done)     Learning Progress Summary           Patient Acceptance, E, VU by CW at 3/4/2021 1416    Acceptance, E,D, VU,NR by LL at 3/3/2021 1101    Acceptance, E,D, VU,NR by LL at 3/2/2021 1517    Acceptance, E,D, VU,NR by LL at 3/1/2021 1016    Acceptance, E,TB, VU by CT at 2/27/2021 1228    Acceptance, E,D, VU,NR by LL at 2/26/2021 1829    Acceptance, E,D, VU,NR by LL at 2/25/2021 1541    Acceptance, E, VU,NR by LB at 2/23/2021 1604                               User Key     Initials Effective Dates Name Provider Type Discipline    LB 03/14/16 -  Cathleen Carnes, PT Physical Therapist PT    CT 04/03/18 -  Nilsa Bermudez, PT Physical Therapist PT    LL 05/02/16 -  Alise Handy, PTA Physical Therapy Assistant PT    CW 09/11/20 -  Lucas Baker, PT Physical Therapist PT                PT Recommendation and Plan          Daily Progress Summary (PT)  Functional Goal Overall Progress (PT): progressing toward functional goals as expected               Time Calculation:     PT Charges     Row Name 03/04/21 1418 03/04/21 1416          Time Calculation    Start Time  1245  -CW  0835  -CW     Stop Time  1335  -CW  0915  -CW     Time Calculation (min)  50 min  -CW  40 min  -CW     PT Received On  03/04/21  -CW  03/04/21   -CW     PT Goal Re-Cert Due Date  03/09/21  -CW  03/09/21  -CW        Time Calculation- PT    Total Timed Code Minutes- PT  50 minute(s)  -CW  40 minute(s)  -CW       User Key  (r) = Recorded By, (t) = Taken By, (c) = Cosigned By    Initials Name Provider Type    CW Lucas Baker, PT Physical Therapist          Therapy Charges for Today     Code Description Service Date Service Provider Modifiers Qty    57629955264 HC PT THER PROC EA 15 MIN 3/4/2021 Lucas Baker, PT GP 2    31949310896 HC PT THERAPEUTIC ACT EA 15 MIN 3/4/2021 Lucas Baker, PT GP 2    90467314430 HC PT ELEC STIM EA-PER 15 MIN 3/4/2021 Lucas Baker, PT GP 1    69732326282 HC PT NEUROMUSC RE EDUCATION EA 15 MIN 3/4/2021 Lucas Baker, PT GP 1                   Lucas Baker PT  3/4/2021

## 2021-03-04 NOTE — PROGRESS NOTES
Rehabilitation Nursing  Inpatient Rehabilitation Plan of Care Note    Plan of Care  Copy from POCBody Function Structure    Skin Integrity (Active)  Current Status (2/23/2021 12:00:00 AM): free of skin breakdown this shift  Weekly Goal: free of skin breakdown this stay  Discharge Goal: home free of skin breakdown    Safety    Potential for Injury (Active)  Current Status (2/23/2021 12:00:00 AM): free from injury this shift  Weekly Goal: free from injury this stay  Discharge Goal: home free from injury    Signed by: Astrid Oshea, Nurse
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
## 2021-03-04 NOTE — PLAN OF CARE
Goal Outcome Evaluation:  Plan of Care Reviewed With: patient  Progress: improving  Outcome Summary: PROGRESSING MEDICALLY AND PHYSICALLY WITH THERAPIES. CONTINUE POC

## 2021-03-05 PROCEDURE — 97110 THERAPEUTIC EXERCISES: CPT | Performed by: OCCUPATIONAL THERAPIST

## 2021-03-05 PROCEDURE — 97112 NEUROMUSCULAR REEDUCATION: CPT

## 2021-03-05 PROCEDURE — 97535 SELF CARE MNGMENT TRAINING: CPT | Performed by: OCCUPATIONAL THERAPIST

## 2021-03-05 PROCEDURE — 94799 UNLISTED PULMONARY SVC/PX: CPT

## 2021-03-05 PROCEDURE — 97530 THERAPEUTIC ACTIVITIES: CPT

## 2021-03-05 PROCEDURE — 97112 NEUROMUSCULAR REEDUCATION: CPT | Performed by: OCCUPATIONAL THERAPIST

## 2021-03-05 PROCEDURE — 25010000002 HEPARIN (PORCINE) PER 1000 UNITS: Performed by: FAMILY MEDICINE

## 2021-03-05 PROCEDURE — 97110 THERAPEUTIC EXERCISES: CPT

## 2021-03-05 RX ADMIN — HYDROCODONE BITARTRATE AND ACETAMINOPHEN 1 TABLET: 10; 325 TABLET ORAL at 21:48

## 2021-03-05 RX ADMIN — CLOPIDOGREL 75 MG: 75 TABLET, FILM COATED ORAL at 08:37

## 2021-03-05 RX ADMIN — HEPARIN SODIUM 5000 UNITS: 5000 INJECTION INTRAVENOUS; SUBCUTANEOUS at 20:56

## 2021-03-05 RX ADMIN — ATORVASTATIN CALCIUM 80 MG: 40 TABLET, FILM COATED ORAL at 20:56

## 2021-03-05 RX ADMIN — CARISOPRODOL 350 MG: 350 TABLET ORAL at 20:56

## 2021-03-05 RX ADMIN — ALPRAZOLAM 0.5 MG: 0.5 TABLET ORAL at 08:53

## 2021-03-05 RX ADMIN — FLUTICASONE PROPIONATE 2 SPRAY: 50 SPRAY, METERED NASAL at 08:37

## 2021-03-05 RX ADMIN — LOSARTAN POTASSIUM 50 MG: 50 TABLET, FILM COATED ORAL at 08:38

## 2021-03-05 RX ADMIN — HYDROCODONE BITARTRATE AND ACETAMINOPHEN 1 TABLET: 10; 325 TABLET ORAL at 16:20

## 2021-03-05 RX ADMIN — HEPARIN SODIUM 5000 UNITS: 5000 INJECTION INTRAVENOUS; SUBCUTANEOUS at 08:38

## 2021-03-05 RX ADMIN — ASPIRIN 81 MG: 81 TABLET, COATED ORAL at 08:37

## 2021-03-05 RX ADMIN — Medication 1 PATCH: at 08:41

## 2021-03-05 RX ADMIN — PANTOPRAZOLE SODIUM 40 MG: 40 TABLET, DELAYED RELEASE ORAL at 05:01

## 2021-03-05 RX ADMIN — GABAPENTIN 200 MG: 100 CAPSULE ORAL at 05:01

## 2021-03-05 RX ADMIN — GABAPENTIN 200 MG: 100 CAPSULE ORAL at 16:20

## 2021-03-05 RX ADMIN — Medication 1 TABLET: at 08:38

## 2021-03-05 RX ADMIN — ALPRAZOLAM 0.5 MG: 0.5 TABLET ORAL at 20:56

## 2021-03-05 RX ADMIN — POLYETHYLENE GLYCOL (3350) 17 G: 17 POWDER, FOR SOLUTION ORAL at 08:38

## 2021-03-05 RX ADMIN — METOPROLOL TARTRATE 50 MG: 50 TABLET, FILM COATED ORAL at 08:38

## 2021-03-05 RX ADMIN — MIRTAZAPINE 15 MG: 15 TABLET, FILM COATED ORAL at 20:56

## 2021-03-05 RX ADMIN — HYDROCODONE BITARTRATE AND ACETAMINOPHEN 1 TABLET: 10; 325 TABLET ORAL at 08:53

## 2021-03-05 NOTE — THERAPY TREATMENT NOTE
Inpatient Rehabilitation - Occupational Therapy Treatment Note    CHEYENNE No     Patient Name: Valerie Moore  : 1955  MRN: [id number]    Today's Date: 3/5/2021                 Admit Date: 2021       No diagnosis found.    Patient Active Problem List   Diagnosis   • Anxiety   • Hypertension   • Hypercholesteremia   • Seasonal allergies   • Sleep disorder   • GERD (gastroesophageal reflux disease)   • Arthritis   • Airway hyperreactivity   • Gout   • Weakness   • CAP (community acquired pneumonia)   • Hypokalemia   • Hyponatremia   • Hyperglycemia   • Normocytic anemia   • Abdominal aortic aneurysm (AAA) 3.0 cm to 5.5 cm in diameter in male (CMS/HCC)   • Tobacco abuse   • Chest pain, atypical   • Cerebrovascular accident (CVA) (CMS/HCC)   • CVA (cerebral vascular accident) (CMS/HCC)       Past Medical History:   Diagnosis Date   • Anxiety    • GERD (gastroesophageal reflux disease) 2017   • Hypercholesteremia    • Hypertension    • Seasonal allergies    • Sleep disorder    • Stroke (CMS/HCC)        Past Surgical History:   Procedure Laterality Date   • HERNIA REPAIR     • HIP SURGERY Right     plate and 8 screws in right hip                 IRF OT ASSESSMENT FLOWSHEET (last 12 hours)      IRF OT Evaluation and Treatment     Row Name 21 1231          OT Time and Intention    Document Type  daily treatment  -BF     Mode of Treatment  occupational therapy  -BF     Patient Effort  good  -BF     Row Name 21 1231          General Information    Existing Precautions/Restrictions  fall;other (see comments) R HP  -BF     Row Name 21 1231          Cognition/Psychosocial    Orientation Status (Cognition)  oriented x 3  -BF     Follows Commands (Cognition)  follows one-step commands;verbal cues/prompting required;repetition of directions required  -BF     Row Name 21 1231          Transfers    Chair-Bed [place] (Transfers)  minimum assist (75% patient effort);verbal cues;nonverbal  cues (demo/gesture)  -BF     Row Name 03/05/21 1231          Chair-Bed Transfer    Assistive Device (Chair-Bed Transfers)  wheelchair bed rails  -BF     Row Name 03/05/21 1231          Motor Skills    Motor Skills  coordination;functional endurance;neuro-muscular function;motor control/coordination interventions;therapeutic exercise  -BF     Neuromuscular Function  right;upper extremity  -BF     Motor Control/Coordination Interventions  gross motor coordination activities;neuro-muscular re-education;therapeutic exercise/ROM RUE AA/PROM, NDT; BUE arm bike, rickshaw 0 lbs w/ RUE AAROM  -BF     Row Name 03/05/21 1231          Neuromuscular Re-education    Interventions (Neuromuscular Re-education)  facilitation/inhibition;massage;tapping;weight bearing RUE  -BF     Row Name 03/05/21 1231          Positioning and Restraints    In Bed  supine;call light within reach;encouraged to call for assist  -BF       User Key  (r) = Recorded By, (t) = Taken By, (c) = Cosigned By    Initials Name Effective Dates    BF Meghan Carbajal OT 07/05/20 -            Occupational Therapy Education                 Title: PT OT SLP Therapies (Done)     Topic: Occupational Therapy (Done)     Point: ADL training (Done)     Description:   Instruct learner(s) on proper safety adaptation and remediation techniques during self care or transfers.   Instruct in proper use of assistive devices.              Learning Progress Summary           Patient Acceptance, E, VU,NR by BF at 3/5/2021 1231    Acceptance, E, VU,NR by BF at 3/4/2021 1237    Acceptance, E, VU,NR by BF at 3/2/2021 1445    Acceptance, E, VU,NR by BF at 3/1/2021 1432    Acceptance, E, VU,NR by BF at 2/27/2021 1110    Acceptance, E, VU,NR by BF at 2/25/2021 1425    Acceptance, E, VU,NR by BF at 2/23/2021 1548                   Point: Precautions (Done)     Description:   Instruct learner(s) on prescribed precautions during self-care and functional transfers.              Learning  Progress Summary           Patient Acceptance, E, VU,NR by BF at 3/5/2021 1231    Acceptance, E, VU,NR by BF at 3/4/2021 1237    Acceptance, E, VU,NR by BF at 3/2/2021 1445    Acceptance, E, VU,NR by BF at 3/1/2021 1432    Acceptance, E, VU,NR by BF at 2/27/2021 1110    Acceptance, E, VU,NR by BF at 2/25/2021 1425    Acceptance, E, VU,NR by BF at 2/23/2021 1548                               User Key     Initials Effective Dates Name Provider Type Discipline    BF 07/05/20 -  Meghan Carbajal OT Occupational Therapist OT                    OT Recommendation and Plan    Planned Therapy Interventions (OT): activity tolerance training, adaptive equipment training, BADL retraining, neuromuscular control/coordination retraining, passive ROM/stretching, ROM/therapeutic exercise, strengthening exercise, transfer/mobility retraining                    Time Calculation:     Time Calculation- OT     Row Name 03/05/21 1234             Time Calculation- OT    OT Start Time  0920  -BF      OT Stop Time  1050  -BF      OT Time Calculation (min)  90 min  -BF      Total Timed Code Minutes- OT  90 minute(s)  -BF      OT Non-Billable Time (min)  20 min  -BF        User Key  (r) = Recorded By, (t) = Taken By, (c) = Cosigned By    Initials Name Provider Type     Meghan Carbajal OT Occupational Therapist        Therapy Charges for Today     Code Description Service Date Service Provider Modifiers Qty    31086805189 HC OT NEUROMUSC RE EDUCATION EA 15 MIN 3/4/2021 Meghan Carbajal OT GO 3    23550034888 HC OT SELF CARE/MGMT/TRAIN EA 15 MIN 3/4/2021 Meghan Carbajal OT GO 1    23002495253 HC OT THER PROC EA 15 MIN 3/4/2021 Meghan Carbajal OT GO 2    41727552300 HC OT NEUROMUSC RE EDUCATION EA 15 MIN 3/5/2021 Meghan Carbajal OT GO 3    66029189304 HC OT THER PROC EA 15 MIN 3/5/2021 Meghan Carbajal OT GO 2    58147294960 HC OT SELF CARE/MGMT/TRAIN EA 15 MIN 3/5/2021 Meghan Carbajal  Renata, OT GO 1                   Meghan Carbajal, OT  3/5/2021

## 2021-03-05 NOTE — PROGRESS NOTES
PROGRESS NOTE         Patient Identification:  Name:  Valerie Moore  Age:  65 y.o.  Sex:  male  :  1955  MRN:  [id number]  Visit Number:  [id number]  Primary Care Provider:  Darwin Alvarez MD         LOS: 11 days       ----------------------------------------------------------------------------------------------------------------------  Subjective       Chief Complaints:    Acute CVA  Depression improved with Remeron  Right hip osteoarthritis  Hypertension      Interval History:      Patient is working with occupational therapy without any difficulty.  No issues reported overnight.  Vitals are stable with no fever remains on 2 L at 97% pulse ox with much better controlled blood pressure.  Labs with last creatinine at 1.01 on 3/2/2021 and I ordered labs for a.m.  We will follow-up on creatinine as well as sodium as patient has hyponatremia..  Patient participated with physical therapy and Occupational Therapy on 3/4/2021 and was at mostly minimum assist to contact-guard with bed mobility and bed to chair transfers and chair to bed transfers but was at moderate assist with sit to stand and stand to sit transfers with use of wheelchair.    After decreasing his HCTZ and metoprolol doses patient's blood pressure remains slightly on the lower side and I decreased his Cozaar down to 50 mg daily and discontinued his HCTZ altogether.  Patient is completely asymptomatic with this and has no complaints this morning.      Review of Systems:    Constitutional: no fever, chills and night sweats.  Generalized fatigue.  Eyes: no eye drainage, itching or redness.  HEENT: no mouth sores, dysphagia or nose bleed.  Respiratory: no for shortness of breath, cough or production of sputum.  Cardiovascular: no chest pain, no palpitations, no orthopnea.  Gastrointestinal: no nausea, vomiting or diarrhea. No abdominal pain, hematemesis or rectal bleeding.  Genitourinary: no dysuria or polyuria.  Hematologic/lymphatic: no  lymph node abnormalities, no easy bruising or easy bleeding.  Musculoskeletal: no muscle or joint pain.  Skin: No rash and no itching.  Neurological: CVA  Behavioral/Psych: Mild depression improving  Endocrine: no hot flashes.  Immunologic: negative.    ----------------------------------------------------------------------------------------------------------------------      Objective       Current [facility] Meds:  aspirin, 81 mg, Oral, Daily  atorvastatin, 80 mg, Oral, Nightly  clopidogrel, 75 mg, Oral, Daily  fluticasone, 2 spray, Each Nare, Daily  heparin (porcine), 5,000 Units, Subcutaneous, Q12H  losartan, 50 mg, Oral, Q24H  metoprolol tartrate, 50 mg, Oral, Q12H  mirtazapine, 15 mg, Oral, Nightly  multivitamin, 1 tablet, Oral, Daily  nicotine, 1 patch, Transdermal, Q24H  pantoprazole, 40 mg, Oral, Q AM  polyethylene glycol, 17 g, Oral, Daily         ----------------------------------------------------------------------------------------------------------------------    Vital Signs:  Temp:  [97.8 °F (36.6 °C)-98.2 °F (36.8 °C)] 98.2 °F (36.8 °C)  Heart Rate:  [52-97] 56  Resp:  [18-20] 20  BP: (112-115)/(70-73) 112/70  No data found.  SpO2 Percentage    03/04/21 1903 03/04/21 1940 03/05/21 0714   SpO2: 96% 97% 97%     SpO2:  [96 %-97 %] 97 %  on  Flow (L/min):  [2] 2;   Device (Oxygen Therapy): room air    Body mass index is 27.99 kg/m².  Wt Readings from Last 3 Encounters:   02/22/21 80.9 kg (178 lb 5.6 oz)   02/22/21 80.9 kg (178 lb 6.4 oz)   11/24/19 82.2 kg (181 lb 5 oz)        Intake/Output Summary (Last 24 hours) at 3/5/2021 1128  Last data filed at 3/5/2021 0900  Gross per 24 hour   Intake 1320 ml   Output --   Net 1320 ml     Diet Regular; Cardiac, Daily Fluid Restriction; 1500 mL Fluid Per Day  ----------------------------------------------------------------------------------------------------------------------      Physical Exam:    General Appearance: alert, pleasant, appears stated age, interactive  and  Cooperative.  Patient seems to be fairly stable with no evidence of acute distress working with occupational therapy.    Head: normocephalic, without obvious abnormality and atraumatic    Eyes: lids and lashes normal, conjunctivae and sclerae normal, no icterus, no  pallor, corneas clear and PERRLA    Ears: ears appear intact with no abnormalities noted    Nose: nares normal, septum midline, mucosa normal and no drainage     Lungs: clear to auscultation, respirations regular, respirations even and  respirations unlabored. No accessory muscle use.     Heart:: regular rhythm & normal rate, normal S1, S2, no murmur, no gallop, no  rub and no click.  Chest wall with no abnormalities observed. PMI nondisplaced    Abdomen: normal bowel sounds in all quadrants, no masses, no hepatomegaly,  no splenomegaly, soft non-tender, no guarding and no rebound tenderness    Extremities: no edema, no cyanosis, no redness, no tenderness, no clubbing    Musculoskeletal: joints with no effusion nor erythema nor warmth.  Pedal pulses palpable and equal bilaterally    Skin: no bleeding, bruising or rash and no lesions noted    Lymph Nodes: no palpable adenopathy    Neurologic:    Mental Status: Patient is awake alert and oriented x3.  Appropriate.   Sensory: Sensory overall seems to be intact in BLE and BUE.   Motor strength: Right-sided hemiparesis      ----------------------------------------------------------------------------------------------------------------------            Results from last 7 days   Lab Units 03/02/21  0128   WBC 10*3/mm3 9.21   HEMOGLOBIN g/dL 12.7*   HEMATOCRIT % 39.7   MCV fL 91.5   MCHC g/dL 32.0   PLATELETS 10*3/mm3 292     Results from last 7 days   Lab Units 03/02/21  0128   SODIUM mmol/L 128*   POTASSIUM mmol/L 4.4   MAGNESIUM mg/dL 2.0   CHLORIDE mmol/L 95*   CO2 mmol/L 24.3   BUN mg/dL 39*   CREATININE mg/dL 1.01   EGFR IF NONAFRICN AM mL/min/1.73 74   CALCIUM mg/dL 9.7   GLUCOSE mg/dL 105*    ALBUMIN g/dL 3.80   BILIRUBIN mg/dL 0.2   ALK PHOS U/L 117   AST (SGOT) U/L 20   ALT (SGPT) U/L 27   Estimated Creatinine Clearance: 72.2 mL/min (by C-G formula based on SCr of 1.01 mg/dL).  No results found for: AMMONIA    No results found for: HGBA1C, POCGLU  Lab Results   Component Value Date    HGBA1C 5.40 11/24/2019     Lab Results   Component Value Date    TSH 0.698 02/16/2021    FREET4 1.40 02/16/2021       No results found for: BLOODCX  No results found for: URINECX  No results found for: WOUNDCX  No results found for: STOOLCX  No results found for: RESPCX  Pain Management Panel     Pain Management Panel Latest Ref Rng & Units 2/16/2021 11/24/2019    AMPHETAMINES SCREEN, URINE Negative Negative Negative    BARBITURATES SCREEN Negative Negative Negative    BENZODIAZEPINE SCREEN, URINE Negative Negative Negative    BUPRENORPHINEUR Negative Negative Negative    COCAINE SCREEN, URINE Negative Negative Negative    METHADONE SCREEN, URINE Negative Negative Negative            ----------------------------------------------------------------------------------------------------------------------  Imaging Results (Last 24 Hours)     ** No results found for the last 24 hours. **          ----------------------------------------------------------------------------------------------------------------------    Assessment/Plan       Assessment/Plan     ASSESSMENT:    Acute infarct of the left external capsule basal left ganglia and pericallosal regions  Right sided hemiparesis  Right-sided hip osteoarthritis  Depression  Anxiety neurosis  Hypertension  Tobacco use  GERD      PLAN:    APatient is working with occupational therapy without any difficulty.  No issues reported overnight.  Vitals are stable with no fever remains on 2 L at 97% pulse ox with much better controlled blood pressure.  Labs with last creatinine at 1.01 on 3/2/2021 and I ordered labs for a.m.  We will follow-up on creatinine as well as sodium as  patient has hyponatremia..  Patient participated with physical therapy and Occupational Therapy on 3/4/2021 and was at mostly minimum assist to contact-guard with bed mobility and bed to chair transfers and chair to bed transfers but was at moderate assist with sit to stand and stand to sit transfers with use of wheelchair.    After decreasing his HCTZ and metoprolol doses patient's blood pressure remains slightly on the lower side and I decreased his Cozaar down to 50 mg daily and discontinued his HCTZ altogether.  Patient is completely asymptomatic with this and has no complaints this morning.    fter decreasing his HCTZ and metoprolol doses patient's blood pressure remains slightly on the lower side and I decreased his Cozaar down to 50 mg daily and discontinued his HCTZ altogether.  Patient is completely asymptomatic with this and has no complaints this morning.    Status post acute infarct involving the left external capsule, basal left basal ganglia, and left pericallosal regions.  Patient currently on Plavix, aspirin, and statin therapy.  Patient requiring moderate assistance for bed mobility; moderate assistance for bed to chair and chair to bed transfers.  Minimum assistance for sit to stand and stand to sit transfers.  Requires moderate assistance for stand pivot/stand transfer.     Depression--continue Remeron 15 mg nightly.  Has helped with resting better.     Right hip osteoarthritis--have adjusted Norco to help with pain management.  Has advanced disease     Anxiety neurosis--Xanax     Hypertension continue metoprolol and losartan with HCTZ     Tobacco abuse NicoDerm     GERD--Protonix        Code Status:   Code Status and Medical Interventions:   Ordered at: 02/22/21 8873     Level Of Support Discussed With:    Patient     Code Status:    CPR     Medical Interventions (Level of Support Prior to Arrest):    Full       Thea Wolfe MD  03/05/21  11:28 EST

## 2021-03-05 NOTE — THERAPY TREATMENT NOTE
Inpatient Rehabilitation - Physical Therapy Treatment Note       CHEYENNE No     Patient Name: Valerie Moore  : 1955  MRN: [id number]    Today's Date: 3/5/2021                    Admit Date: 2021      Visit Dx: No diagnosis found.    Patient Active Problem List   Diagnosis   • Anxiety   • Hypertension   • Hypercholesteremia   • Seasonal allergies   • Sleep disorder   • GERD (gastroesophageal reflux disease)   • Arthritis   • Airway hyperreactivity   • Gout   • Weakness   • CAP (community acquired pneumonia)   • Hypokalemia   • Hyponatremia   • Hyperglycemia   • Normocytic anemia   • Abdominal aortic aneurysm (AAA) 3.0 cm to 5.5 cm in diameter in male (CMS/HCC)   • Tobacco abuse   • Chest pain, atypical   • Cerebrovascular accident (CVA) (CMS/HCC)   • CVA (cerebral vascular accident) (CMS/HCC)       Past Medical History:   Diagnosis Date   • Anxiety    • GERD (gastroesophageal reflux disease) 2017   • Hypercholesteremia    • Hypertension    • Seasonal allergies    • Sleep disorder    • Stroke (CMS/HCC)        Past Surgical History:   Procedure Laterality Date   • HERNIA REPAIR     • HIP SURGERY Right     plate and 8 screws in right hip           PT ASSESSMENT (last 12 hours)      IRF PT Evaluation and Treatment     Row Name 21 1558          PT Time and Intention    Document Type  daily treatment  -RF     Mode of Treatment  physical therapy  -RF     Row Name 21 1554          General Information    Patient Profile Reviewed  yes  -RF     Existing Precautions/Restrictions  fall;other (see comments) R HP  -RF     Row Name 21 1558          Cognition/Psychosocial    Affect/Mental Status (Cognitive)  WFL  -RF     Orientation Status (Cognition)  oriented x 3  -RF     Follows Commands (Cognition)  follows one-step commands;verbal cues/prompting required;repetition of directions required;physical/tactile prompts required  -RF     Row Name 21 1550          Bed Mobility    Supine-Sit  Dietrich (Bed Mobility)  verbal cues;nonverbal cues (demo/gesture);contact guard;standby assist requires min A when getting out of bed on weaker side.   -RF     Sit-Supine Dietrich (Bed Mobility)  verbal cues;nonverbal cues (demo/gesture);contact guard;standby assist  -RF     Bed Mobility, Safety Issues  decreased use of arms for pushing/pulling;decreased use of legs for bridging/pushing  -RF     Assistive Device (Bed Mobility)  bed rails;head of bed elevated  -RF     Row Name 03/05/21 1558          Transfers    Bed-Chair Dietrich (Transfers)  minimum assist (75% patient effort);verbal cues;nonverbal cues (demo/gesture) When going to the L  -RF     Chair-Bed Dietrich (Transfers)  minimum assist (75% patient effort);verbal cues;nonverbal cues (demo/gesture) Going to the L  -RF     Assistive Device (Bed-Chair Transfers)  wheelchair  -RF     Sit-Stand Dietrich (Transfers)  verbal cues;nonverbal cues (demo/gesture);minimum assist (75% patient effort);contact guard  -RF     Stand-Sit Dietrich (Transfers)  verbal cues;nonverbal cues (demo/gesture);minimum assist (75% patient effort);contact guard  -RF     Row Name 03/05/21 1558          Chair-Bed Transfer    Assistive Device (Chair-Bed Transfers)  wheelchair  -RF     Row Name 03/05/21 1558          Sit-Stand Transfer    Assistive Device (Sit-Stand Transfers)  walker, teodora;wheelchair  -RF     Row Name 03/05/21 1558          Stand-Sit Transfer    Assistive Device (Stand-Sit Transfers)  walker, teodora;wheelchair  -RF     Row Name 03/05/21 1558          Gait/Stairs (Locomotion)    Comment (Gait/Stairs)  Attempted, but unable to support weight on L   -RF     Row Name 03/05/21 1552          Safety Issues, Functional Mobility    Impairments Affecting Function (Mobility)  balance;endurance/activity tolerance;pain;postural/trunk control;strength;motor control;range of motion (ROM)  -RF     Row Name 03/05/21 1555          Balance    Dynamic Standing Balance   moderate impairment;severe impairment;supported;standing;asymmetrical weight shifting;other (see comments) W/S forward, side and tandem  -RF     Comment, Balance  Required manual knee blocking on RLE  -RF     Row Name 03/05/21 1558          Hip (Therapeutic Exercise)    Hip AAROM (Therapeutic Exercise)  right;flexion;extension;aBduction;aDduction;external rotation;internal rotation;supine;2 sets;10 repetitions;sitting  -RF     Hip Strengthening (Therapeutic Exercise)  bilateral;flexion;extension;aBduction;aDduction;external rotation;internal rotation;heel slides;marching while seated;marching while standing;sitting;standing;supine;resistance band;yellow;2 sets;10 repetitions  -RF     Row Name 03/05/21 1558          Knee (Therapeutic Exercise)    Knee AAROM (Therapeutic Exercise)  right;bilateral;flexion;extension;supine;sitting;2 sets;10 repetitions  -RF     Knee Strengthening (Therapeutic Exercise)  bilateral;flexion;extension;heel slides;marching while seated;marching while standing;LAQ (long arc quad);hamstring curls;sitting;standing;supine;resistance band;yellow;2 sets;10 repetitions  -RF     Row Name 03/05/21 1557          Ankle (Therapeutic Exercise)    Ankle (Therapeutic Exercise)  AAROM (active assistive range of motion)  -RF     Ankle AAROM (Therapeutic Exercise)  right;bilateral;dorsiflexion;sitting;supine;2 sets;10 repetitions  -RF     Ankle Strengthening (Therapeutic Exercise)  bilateral;dorsiflexion;plantarflexion;sitting;standing;supine;2 sets;10 repetitions  -RF     Row Name 03/05/21 1555          Modality Treatment    Treatment Location 1 (Modality)  R quad/hip flexors  -RF     Modality Performed  NMES (neuromuscular electrical stimulation)  -RF     Modality Treatment Results  enahnced activation/contraction  -RF     Comment, Modality Treatment  15 mins 36mA; no skin changes noted  -RF     Row Name 03/05/21 1556          Positioning and Restraints    Pre-Treatment Position  in bed  -RF     Post  Treatment Position  bed  -RF     In Bed  supine;call light within reach;encouraged to call for assist  -RF     Row Name 03/05/21 1558          Therapy Assessment/Plan (PT)    Comment, Therapy Assessment/Plan (PT)  Patient demonstrates improved functional mobility this date as less assistance is required for bed mobility and bed<>chair. Poor neuromuscular activation noted on RLE. Continued skilled care required for further improvements.    -RF       User Key  (r) = Recorded By, (t) = Taken By, (c) = Cosigned By    Initials Name Provider Type    RF Pooja Mcqueen, PTA Physical Therapy Assistant           Physical Therapy Education                 Title: PT OT SLP Therapies (Done)     Topic: Physical Therapy (Done)     Point: Mobility training (Done)     Learning Progress Summary           Patient Acceptance, E,TB, VU by RF at 3/5/2021 1605    Acceptance, E, VU by CW at 3/4/2021 1416    Acceptance, E,D, VU,NR by LL at 3/3/2021 1101    Acceptance, E,D, VU,NR by LL at 3/2/2021 1517    Acceptance, E,D, VU,NR by LL at 3/1/2021 1016    Acceptance, E,TB, VU by CT at 2/27/2021 1228    Acceptance, E,D, VU,NR by LL at 2/26/2021 1829    Acceptance, E,D, VU,NR by LL at 2/25/2021 1541    Acceptance, E, VU,NR by LB at 2/23/2021 1604                   Point: Home exercise program (Done)     Learning Progress Summary           Patient Acceptance, E,TB, VU by RF at 3/5/2021 1605    Acceptance, E, VU by CW at 3/4/2021 1416    Acceptance, E,D, VU,NR by LL at 3/3/2021 1101    Acceptance, E,D, VU,NR by LL at 3/2/2021 1517    Acceptance, E,D, VU,NR by LL at 3/1/2021 1016    Acceptance, E,TB, VU by CT at 2/27/2021 1228    Acceptance, E,D, VU,NR by LL at 2/26/2021 1829    Acceptance, E,D, VU,NR by LL at 2/25/2021 1541    Acceptance, E, VU,NR by LB at 2/23/2021 1604                   Point: Body mechanics (Done)     Learning Progress Summary           Patient Acceptance, E,TB, VU by RF at 3/5/2021 1605    Acceptance, E, VU by CW at  3/4/2021 1416    Acceptance, E,D, VU,NR by LL at 3/3/2021 1101    Acceptance, E,D, VU,NR by LL at 3/2/2021 1517    Acceptance, E,D, VU,NR by LL at 3/1/2021 1016    Acceptance, E,TB, VU by CT at 2/27/2021 1228    Acceptance, E,D, VU,NR by LL at 2/26/2021 1829    Acceptance, E,D, VU,NR by LL at 2/25/2021 1541    Acceptance, E, VU,NR by LB at 2/23/2021 1604                   Point: Precautions (Done)     Learning Progress Summary           Patient Acceptance, E,TB, VU by RF at 3/5/2021 1605    Acceptance, E, VU by CW at 3/4/2021 1416    Acceptance, E,D, VU,NR by LL at 3/3/2021 1101    Acceptance, E,D, VU,NR by LL at 3/2/2021 1517    Acceptance, E,D, VU,NR by LL at 3/1/2021 1016    Acceptance, E,TB, VU by CT at 2/27/2021 1228    Acceptance, E,D, VU,NR by LL at 2/26/2021 1829    Acceptance, E,D, VU,NR by LL at 2/25/2021 1541    Acceptance, E, VU,NR by LB at 2/23/2021 1604                               User Key     Initials Effective Dates Name Provider Type Discipline    LB 03/14/16 -  Cathleen Carnes, PT Physical Therapist PT    CT 04/03/18 -  Nilsa Bermudez, PT Physical Therapist PT    LL 05/02/16 -  Alise Handy, PTA Physical Therapy Assistant PT    RF 03/07/18 -  Pooja Mcqueen PTA Physical Therapy Assistant PT    CW 09/11/20 -  Lucas Baker, PT Physical Therapist PT                PT Recommendation and Plan                          Time Calculation:     PT Charges     Row Name 03/05/21 1608 03/05/21 1606          Time Calculation    Start Time  1245  -RF  0835  -RF     Stop Time  1340  -RF  0920  -RF     Time Calculation (min)  55 min  -RF  45 min  -RF     PT Received On  03/05/21  -RF  03/05/21  -RF     PT - Next Appointment  03/08/21  -RF  03/05/21  -RF     PT Goal Re-Cert Due Date  03/09/21  -RF  03/09/21  -RF        Time Calculation- PT    Total Timed Code Minutes- PT  55 minute(s)  -RF  45 minute(s)  -RF       User Key  (r) = Recorded By, (t) = Taken By, (c) = Cosigned By    Initials Name  Provider Type    RF Pooja Mcqueen, WAGNER Physical Therapy Assistant          Therapy Charges for Today     Code Description Service Date Service Provider Modifiers Qty    95009316978  PT NEUROMUSC RE EDUCATION EA 15 MIN 3/5/2021 Pooja Mcqueen, WAGNER GP 2    [id number]  PT THER PROC EA 15 MIN 3/5/2021 Pooja Mcqueen, WAGNER GP 4    [id number]  PT THERAPEUTIC ACT EA 15 MIN 3/5/2021 Pooja Mcqueen, WAGNER GP 1                   Pooja Mcqueen PTA  3/5/2021

## 2021-03-05 NOTE — PROGRESS NOTES
Occupational Therapy:    Physical Therapy: Individual: 100 minutes.    Speech Language Pathology:    Signed by: Pooja Mcqueen PTA

## 2021-03-06 LAB
ALBUMIN SERPL-MCNC: 3.55 G/DL (ref 3.5–5.2)
ALBUMIN/GLOB SERPL: 1.1 G/DL
ALP SERPL-CCNC: 105 U/L (ref 39–117)
ALT SERPL W P-5'-P-CCNC: 19 U/L (ref 1–41)
ANION GAP SERPL CALCULATED.3IONS-SCNC: 8.4 MMOL/L (ref 5–15)
AST SERPL-CCNC: 16 U/L (ref 1–40)
BASOPHILS # BLD AUTO: 0.08 10*3/MM3 (ref 0–0.2)
BASOPHILS NFR BLD AUTO: 1.1 % (ref 0–1.5)
BILIRUB SERPL-MCNC: 0.2 MG/DL (ref 0–1.2)
BUN SERPL-MCNC: 29 MG/DL (ref 8–23)
BUN/CREAT SERPL: 33.3 (ref 7–25)
CALCIUM SPEC-SCNC: 9.5 MG/DL (ref 8.6–10.5)
CHLORIDE SERPL-SCNC: 101 MMOL/L (ref 98–107)
CO2 SERPL-SCNC: 23.6 MMOL/L (ref 22–29)
CREAT SERPL-MCNC: 0.87 MG/DL (ref 0.76–1.27)
DEPRECATED RDW RBC AUTO: 50.8 FL (ref 37–54)
EOSINOPHIL # BLD AUTO: 0.54 10*3/MM3 (ref 0–0.4)
EOSINOPHIL NFR BLD AUTO: 7.1 % (ref 0.3–6.2)
ERYTHROCYTE [DISTWIDTH] IN BLOOD BY AUTOMATED COUNT: 15.1 % (ref 12.3–15.4)
GFR SERPL CREATININE-BSD FRML MDRD: 88 ML/MIN/1.73
GLOBULIN UR ELPH-MCNC: 3.2 GM/DL
GLUCOSE SERPL-MCNC: 102 MG/DL (ref 65–99)
HCT VFR BLD AUTO: 36.2 % (ref 37.5–51)
HGB BLD-MCNC: 11.4 G/DL (ref 13–17.7)
IMM GRANULOCYTES # BLD AUTO: 0.02 10*3/MM3 (ref 0–0.05)
IMM GRANULOCYTES NFR BLD AUTO: 0.3 % (ref 0–0.5)
LYMPHOCYTES # BLD AUTO: 2.94 10*3/MM3 (ref 0.7–3.1)
LYMPHOCYTES NFR BLD AUTO: 38.8 % (ref 19.6–45.3)
MAGNESIUM SERPL-MCNC: 1.9 MG/DL (ref 1.6–2.4)
MCH RBC QN AUTO: 28.9 PG (ref 26.6–33)
MCHC RBC AUTO-ENTMCNC: 31.5 G/DL (ref 31.5–35.7)
MCV RBC AUTO: 91.6 FL (ref 79–97)
MONOCYTES # BLD AUTO: 0.64 10*3/MM3 (ref 0.1–0.9)
MONOCYTES NFR BLD AUTO: 8.5 % (ref 5–12)
NEUTROPHILS NFR BLD AUTO: 3.35 10*3/MM3 (ref 1.7–7)
NEUTROPHILS NFR BLD AUTO: 44.2 % (ref 42.7–76)
NRBC BLD AUTO-RTO: 0 /100 WBC (ref 0–0.2)
PLATELET # BLD AUTO: 301 10*3/MM3 (ref 140–450)
PMV BLD AUTO: 8.8 FL (ref 6–12)
POTASSIUM SERPL-SCNC: 4.2 MMOL/L (ref 3.5–5.2)
PROT SERPL-MCNC: 6.7 G/DL (ref 6–8.5)
RBC # BLD AUTO: 3.95 10*6/MM3 (ref 4.14–5.8)
SODIUM SERPL-SCNC: 133 MMOL/L (ref 136–145)
WBC # BLD AUTO: 7.57 10*3/MM3 (ref 3.4–10.8)

## 2021-03-06 PROCEDURE — 25010000002 HEPARIN (PORCINE) PER 1000 UNITS: Performed by: FAMILY MEDICINE

## 2021-03-06 PROCEDURE — 94799 UNLISTED PULMONARY SVC/PX: CPT

## 2021-03-06 PROCEDURE — 85025 COMPLETE CBC W/AUTO DIFF WBC: CPT | Performed by: INTERNAL MEDICINE

## 2021-03-06 PROCEDURE — 80053 COMPREHEN METABOLIC PANEL: CPT | Performed by: INTERNAL MEDICINE

## 2021-03-06 PROCEDURE — 83735 ASSAY OF MAGNESIUM: CPT | Performed by: INTERNAL MEDICINE

## 2021-03-06 RX ADMIN — LOSARTAN POTASSIUM 50 MG: 50 TABLET, FILM COATED ORAL at 08:42

## 2021-03-06 RX ADMIN — FLUTICASONE PROPIONATE 2 SPRAY: 50 SPRAY, METERED NASAL at 08:41

## 2021-03-06 RX ADMIN — CARISOPRODOL 350 MG: 350 TABLET ORAL at 20:59

## 2021-03-06 RX ADMIN — ATORVASTATIN CALCIUM 80 MG: 40 TABLET, FILM COATED ORAL at 20:27

## 2021-03-06 RX ADMIN — ASPIRIN 81 MG: 81 TABLET, COATED ORAL at 08:42

## 2021-03-06 RX ADMIN — ALPRAZOLAM 0.5 MG: 0.5 TABLET ORAL at 08:54

## 2021-03-06 RX ADMIN — METOPROLOL TARTRATE 50 MG: 50 TABLET, FILM COATED ORAL at 08:42

## 2021-03-06 RX ADMIN — Medication 1 TABLET: at 08:42

## 2021-03-06 RX ADMIN — HYDROCODONE BITARTRATE AND ACETAMINOPHEN 1 TABLET: 10; 325 TABLET ORAL at 17:08

## 2021-03-06 RX ADMIN — GABAPENTIN 200 MG: 100 CAPSULE ORAL at 05:07

## 2021-03-06 RX ADMIN — ALPRAZOLAM 0.5 MG: 0.5 TABLET ORAL at 20:59

## 2021-03-06 RX ADMIN — PANTOPRAZOLE SODIUM 40 MG: 40 TABLET, DELAYED RELEASE ORAL at 05:08

## 2021-03-06 RX ADMIN — METOPROLOL TARTRATE 50 MG: 50 TABLET, FILM COATED ORAL at 20:27

## 2021-03-06 RX ADMIN — HEPARIN SODIUM 5000 UNITS: 5000 INJECTION INTRAVENOUS; SUBCUTANEOUS at 20:27

## 2021-03-06 RX ADMIN — POLYETHYLENE GLYCOL (3350) 17 G: 17 POWDER, FOR SOLUTION ORAL at 08:42

## 2021-03-06 RX ADMIN — HYDROCODONE BITARTRATE AND ACETAMINOPHEN 1 TABLET: 10; 325 TABLET ORAL at 05:07

## 2021-03-06 RX ADMIN — CARISOPRODOL 350 MG: 350 TABLET ORAL at 09:13

## 2021-03-06 RX ADMIN — ALPRAZOLAM 0.5 MG: 0.5 TABLET ORAL at 09:12

## 2021-03-06 RX ADMIN — CARISOPRODOL 350 MG: 350 TABLET ORAL at 08:54

## 2021-03-06 RX ADMIN — IPRATROPIUM BROMIDE AND ALBUTEROL SULFATE 3 ML: .5; 3 SOLUTION RESPIRATORY (INHALATION) at 07:26

## 2021-03-06 RX ADMIN — CLOPIDOGREL 75 MG: 75 TABLET, FILM COATED ORAL at 08:42

## 2021-03-06 RX ADMIN — Medication 1 PATCH: at 08:42

## 2021-03-06 RX ADMIN — MIRTAZAPINE 15 MG: 15 TABLET, FILM COATED ORAL at 20:27

## 2021-03-06 RX ADMIN — HEPARIN SODIUM 5000 UNITS: 5000 INJECTION INTRAVENOUS; SUBCUTANEOUS at 08:42

## 2021-03-06 NOTE — NURSING NOTE
Pt in his room tearful and worried regarding his diagnosis, provided reassurance and inst him I will call MD and tell him how hes feeling,voiced understanding.

## 2021-03-06 NOTE — PROGRESS NOTES
PROGRESS NOTE         Patient Identification:  Name:  Valerie Moore  Age:  65 y.o.  Sex:  male  :  1955  MRN:  [id number]  Visit Number:  [id number]  Primary Care Provider:  Darwin Alvarez MD         LOS: 12 days       ----------------------------------------------------------------------------------------------------------------------  Subjective       Chief Complaints:    Acute CVA  Depression improved with Remeron  Right hip osteoarthritis  Hypertension      Interval History:      Still in bed, no therapy today.  No issues reported overnight.  Vitals are stable with no fever remains on 2 L at 97% pulse ox with much better controlled blood pressure.  Labs with last creatinine at 1.01 on 3/2/2021 and I ordered labs for a.m.  We will follow-up on creatinine as well as sodium as patient has hyponatremia..  Patient participated with physical therapy and Occupational Therapy and was at mostly minimum assist to contact-guard with bed mobility and bed to chair transfers and chair to bed transfers but was at moderate assist with sit to stand and stand to sit transfers with use of wheelchair.    After decreasing his HCTZ and metoprolol doses patient's blood pressure remains slightly on the lower side and I decreased his Cozaar down to 50 mg daily and discontinued his HCTZ altogether.  Patient is completely asymptomatic with this and has no complaints this morning.      Review of Systems:    Constitutional: no fever, chills and night sweats.  Generalized fatigue.  Eyes: no eye drainage, itching or redness.  HEENT: no mouth sores, dysphagia or nose bleed.  Respiratory: no for shortness of breath, cough or production of sputum.  Cardiovascular: no chest pain, no palpitations, no orthopnea.  Gastrointestinal: no nausea, vomiting or diarrhea. No abdominal pain, hematemesis or rectal bleeding.  Genitourinary: no dysuria or polyuria.  Hematologic/lymphatic: no lymph node abnormalities, no easy bruising or  easy bleeding.  Musculoskeletal: no muscle or joint pain.  Skin: No rash and no itching.  Neurological: CVA  Behavioral/Psych: Mild depression improving  Endocrine: no hot flashes.  Immunologic: negative.    ----------------------------------------------------------------------------------------------------------------------      Objective       Current [facility] Meds:  aspirin, 81 mg, Oral, Daily  atorvastatin, 80 mg, Oral, Nightly  clopidogrel, 75 mg, Oral, Daily  fluticasone, 2 spray, Each Nare, Daily  heparin (porcine), 5,000 Units, Subcutaneous, Q12H  losartan, 50 mg, Oral, Q24H  metoprolol tartrate, 50 mg, Oral, Q12H  mirtazapine, 15 mg, Oral, Nightly  multivitamin, 1 tablet, Oral, Daily  nicotine, 1 patch, Transdermal, Q24H  pantoprazole, 40 mg, Oral, Q AM  polyethylene glycol, 17 g, Oral, Daily         ----------------------------------------------------------------------------------------------------------------------    Vital Signs:  Temp:  [97.8 °F (36.6 °C)] 97.8 °F (36.6 °C)  Heart Rate:  [55-59] 59  Resp:  [18] 18  BP: (116)/(62) 116/62  No data found.  SpO2 Percentage    03/05/21 1923 03/06/21 0726 03/06/21 0716   SpO2: 96% 96% 96%     SpO2:  [96 %] 96 %  on  Flow (L/min):  [2] 2;   Device (Oxygen Therapy): room air    Body mass index is 27.99 kg/m².  Wt Readings from Last 3 Encounters:   02/22/21 80.9 kg (178 lb 5.6 oz)   02/22/21 80.9 kg (178 lb 6.4 oz)   11/24/19 82.2 kg (181 lb 5 oz)        Intake/Output Summary (Last 24 hours) at 3/6/2021 0951  Last data filed at 3/5/2021 1923  Gross per 24 hour   Intake 960 ml   Output --   Net 960 ml     Diet Regular; Cardiac, Daily Fluid Restriction; 1500 mL Fluid Per Day  ----------------------------------------------------------------------------------------------------------------------      Physical Exam:    General Appearance: alert, pleasant, appears stated age, interactive and  Cooperative.  Patient seems to be fairly stable with no complaints this  morning.    Head: normocephalic, without obvious abnormality and atraumatic    Eyes: lids and lashes normal, conjunctivae and sclerae normal, no icterus, no  pallor, corneas clear and PERRLA    Ears: ears appear intact with no abnormalities noted    Nose: nares normal, septum midline, mucosa normal and no drainage     Lungs: clear to auscultation, respirations regular, respirations even and  respirations unlabored. No accessory muscle use.     Heart:: regular rhythm & normal rate, normal S1, S2, no murmur, no gallop, no  rub and no click.  Chest wall with no abnormalities observed. PMI nondisplaced    Abdomen: normal bowel sounds in all quadrants, no masses, no hepatomegaly,  no splenomegaly, soft non-tender, no guarding and no rebound tenderness    Extremities: no edema, no cyanosis, no redness, no tenderness, no clubbing    Musculoskeletal: joints with no effusion nor erythema nor warmth.  Pedal pulses palpable and equal bilaterally    Skin: no bleeding, bruising or rash and no lesions noted    Lymph Nodes: no palpable adenopathy    Neurologic:    Mental Status: Patient is awake alert and oriented x3.  Appropriate.   Sensory: Sensory overall seems to be intact in BLE and BUE.   Motor strength: Right-sided hemiparesis      ----------------------------------------------------------------------------------------------------------------------            Results from last 7 days   Lab Units 03/06/21  0012 03/02/21  0128   WBC 10*3/mm3 7.57 9.21   HEMOGLOBIN g/dL 11.4* 12.7*   HEMATOCRIT % 36.2* 39.7   MCV fL 91.6 91.5   MCHC g/dL 31.5 32.0   PLATELETS 10*3/mm3 301 292     Results from last 7 days   Lab Units 03/06/21 0012 03/02/21  0128   SODIUM mmol/L 133* 128*   POTASSIUM mmol/L 4.2 4.4   MAGNESIUM mg/dL 1.9 2.0   CHLORIDE mmol/L 101 95*   CO2 mmol/L 23.6 24.3   BUN mg/dL 29* 39*   CREATININE mg/dL 0.87 1.01   EGFR IF NONAFRICN AM mL/min/1.73 88 74   CALCIUM mg/dL 9.5 9.7   GLUCOSE mg/dL 102* 105*   ALBUMIN g/dL  3.55 3.80   BILIRUBIN mg/dL 0.2 0.2   ALK PHOS U/L 105 117   AST (SGOT) U/L 16 20   ALT (SGPT) U/L 19 27   Estimated Creatinine Clearance: 83.8 mL/min (by C-G formula based on SCr of 0.87 mg/dL).  No results found for: AMMONIA    No results found for: HGBA1C, POCGLU  Lab Results   Component Value Date    HGBA1C 5.40 11/24/2019     Lab Results   Component Value Date    TSH 0.698 02/16/2021    FREET4 1.40 02/16/2021       No results found for: BLOODCX  No results found for: URINECX  No results found for: WOUNDCX  No results found for: STOOLCX  No results found for: RESPCX  Pain Management Panel     Pain Management Panel Latest Ref Rng & Units 2/16/2021 11/24/2019    AMPHETAMINES SCREEN, URINE Negative Negative Negative    BARBITURATES SCREEN Negative Negative Negative    BENZODIAZEPINE SCREEN, URINE Negative Negative Negative    BUPRENORPHINEUR Negative Negative Negative    COCAINE SCREEN, URINE Negative Negative Negative    METHADONE SCREEN, URINE Negative Negative Negative            ----------------------------------------------------------------------------------------------------------------------  Imaging Results (Last 24 Hours)     ** No results found for the last 24 hours. **          ----------------------------------------------------------------------------------------------------------------------    Assessment/Plan       Assessment/Plan     ASSESSMENT:    Acute infarct of the left external capsule basal left ganglia and pericallosal regions  Right sided hemiparesis  Right-sided hip osteoarthritis  Depression  Anxiety neurosis  Hypertension  Tobacco use  GERD      PLAN:    Repeat sodium at 133 this morning, much improved.  Patient is comfortable in bed this morning.  No issues reported overnight.  Vitals are stable with no fever remains on 2 L at 97% pulse ox with much better controlled blood pressure.  We will follow-up on creatinine as well as sodium as patient has hyponatremia..  Patient participated  with physical therapy and Occupational Therapy and was at mostly minimum assist to contact-guard with bed mobility and bed to chair transfers and chair to bed transfers but was at moderate assist with sit to stand and stand to sit transfers with use of wheelchair.    After decreasing his HCTZ and metoprolol doses patient's blood pressure remains slightly on the lower side and I decreased his Cozaar down to 50 mg daily and discontinued his HCTZ altogether.  Patient is completely asymptomatic with this and has no complaints this morning.    Status post acute infarct involving the left external capsule, basal left basal ganglia, and left pericallosal regions.  Patient currently on Plavix, aspirin, and statin therapy.  Patient requiring moderate assistance for bed mobility; moderate assistance for bed to chair and chair to bed transfers.  Minimum assistance for sit to stand and stand to sit transfers.  Requires moderate assistance for stand pivot/stand transfer.     Depression--continue Remeron 15 mg nightly.  Has helped with resting better.     Right hip osteoarthritis--have adjusted Norco to help with pain management.  Has advanced disease     Anxiety neurosis--Xanax     Hypertension continue metoprolol and losartan with HCTZ     Tobacco abuse NicoDerm     GERD--Protonix        Code Status:   Code Status and Medical Interventions:   Ordered at: 02/22/21 1624     Level Of Support Discussed With:    Patient     Code Status:    CPR     Medical Interventions (Level of Support Prior to Arrest):    Full       Thea Wolfe MD  03/06/21  09:51 EST

## 2021-03-06 NOTE — PROGRESS NOTES
Rehabilitation Nursing  Inpatient Rehabilitation Plan of Care Note    Plan of Care  Copy from POCBody Function Structure    Skin Integrity (Active)  Current Status (2/23/2021 12:00:00 AM): free of skin breakdown this shift  Weekly Goal: free of skin breakdown this stay  Discharge Goal: home free of skin breakdown    Safety    Potential for Injury (Active)  Current Status (2/23/2021 12:00:00 AM): free from injury this shift  Weekly Goal: free from injury this stay  Discharge Goal: home free from injury    Signed by: Patricia Smart, Nurse

## 2021-03-07 PROCEDURE — 94799 UNLISTED PULMONARY SVC/PX: CPT

## 2021-03-07 PROCEDURE — 25010000002 HEPARIN (PORCINE) PER 1000 UNITS: Performed by: FAMILY MEDICINE

## 2021-03-07 RX ORDER — BUPROPION HYDROCHLORIDE 150 MG/1
150 TABLET, EXTENDED RELEASE ORAL EVERY 12 HOURS SCHEDULED
Status: DISCONTINUED | OUTPATIENT
Start: 2021-03-07 | End: 2021-03-16 | Stop reason: HOSPADM

## 2021-03-07 RX ADMIN — METOPROLOL TARTRATE 50 MG: 50 TABLET, FILM COATED ORAL at 08:41

## 2021-03-07 RX ADMIN — HEPARIN SODIUM 5000 UNITS: 5000 INJECTION INTRAVENOUS; SUBCUTANEOUS at 08:42

## 2021-03-07 RX ADMIN — HYDROCODONE BITARTRATE AND ACETAMINOPHEN 1 TABLET: 10; 325 TABLET ORAL at 18:14

## 2021-03-07 RX ADMIN — PANTOPRAZOLE SODIUM 40 MG: 40 TABLET, DELAYED RELEASE ORAL at 05:12

## 2021-03-07 RX ADMIN — ASPIRIN 81 MG: 81 TABLET, COATED ORAL at 08:40

## 2021-03-07 RX ADMIN — CLOPIDOGREL 75 MG: 75 TABLET, FILM COATED ORAL at 08:40

## 2021-03-07 RX ADMIN — BUPROPION HYDROCHLORIDE 150 MG: 150 TABLET, EXTENDED RELEASE ORAL at 21:07

## 2021-03-07 RX ADMIN — CARISOPRODOL 350 MG: 350 TABLET ORAL at 21:07

## 2021-03-07 RX ADMIN — Medication 1 PATCH: at 08:41

## 2021-03-07 RX ADMIN — MIRTAZAPINE 15 MG: 15 TABLET, FILM COATED ORAL at 21:07

## 2021-03-07 RX ADMIN — HYDROCODONE BITARTRATE AND ACETAMINOPHEN 1 TABLET: 10; 325 TABLET ORAL at 09:07

## 2021-03-07 RX ADMIN — ALPRAZOLAM 0.5 MG: 0.5 TABLET ORAL at 21:07

## 2021-03-07 RX ADMIN — ATORVASTATIN CALCIUM 80 MG: 40 TABLET, FILM COATED ORAL at 21:07

## 2021-03-07 RX ADMIN — BUPROPION HYDROCHLORIDE 150 MG: 150 TABLET, EXTENDED RELEASE ORAL at 12:28

## 2021-03-07 RX ADMIN — Medication 1 TABLET: at 08:41

## 2021-03-07 RX ADMIN — FLUTICASONE PROPIONATE 2 SPRAY: 50 SPRAY, METERED NASAL at 08:42

## 2021-03-07 RX ADMIN — METOPROLOL TARTRATE 50 MG: 50 TABLET, FILM COATED ORAL at 21:07

## 2021-03-07 RX ADMIN — HEPARIN SODIUM 5000 UNITS: 5000 INJECTION INTRAVENOUS; SUBCUTANEOUS at 21:07

## 2021-03-07 RX ADMIN — POLYETHYLENE GLYCOL (3350) 17 G: 17 POWDER, FOR SOLUTION ORAL at 08:42

## 2021-03-07 NOTE — PLAN OF CARE
Goal Outcome Evaluation:      Pt progressing medically and physically with all therapy.  Cont. Current poc

## 2021-03-08 LAB — GLUCOSE BLDC GLUCOMTR-MCNC: 106 MG/DL (ref 70–130)

## 2021-03-08 PROCEDURE — 82962 GLUCOSE BLOOD TEST: CPT

## 2021-03-08 PROCEDURE — 97110 THERAPEUTIC EXERCISES: CPT

## 2021-03-08 PROCEDURE — 97110 THERAPEUTIC EXERCISES: CPT | Performed by: OCCUPATIONAL THERAPIST

## 2021-03-08 PROCEDURE — 99232 SBSQ HOSP IP/OBS MODERATE 35: CPT | Performed by: FAMILY MEDICINE

## 2021-03-08 PROCEDURE — 97112 NEUROMUSCULAR REEDUCATION: CPT | Performed by: OCCUPATIONAL THERAPIST

## 2021-03-08 PROCEDURE — 25010000002 HEPARIN (PORCINE) PER 1000 UNITS: Performed by: FAMILY MEDICINE

## 2021-03-08 PROCEDURE — 97530 THERAPEUTIC ACTIVITIES: CPT

## 2021-03-08 PROCEDURE — 97535 SELF CARE MNGMENT TRAINING: CPT | Performed by: OCCUPATIONAL THERAPIST

## 2021-03-08 PROCEDURE — 94799 UNLISTED PULMONARY SVC/PX: CPT

## 2021-03-08 PROCEDURE — 97112 NEUROMUSCULAR REEDUCATION: CPT

## 2021-03-08 RX ADMIN — ASPIRIN 81 MG: 81 TABLET, COATED ORAL at 08:28

## 2021-03-08 RX ADMIN — METOPROLOL TARTRATE 50 MG: 50 TABLET, FILM COATED ORAL at 20:19

## 2021-03-08 RX ADMIN — POLYETHYLENE GLYCOL (3350) 17 G: 17 POWDER, FOR SOLUTION ORAL at 08:28

## 2021-03-08 RX ADMIN — Medication 1 TABLET: at 08:27

## 2021-03-08 RX ADMIN — Medication 1 PATCH: at 08:28

## 2021-03-08 RX ADMIN — HEPARIN SODIUM 5000 UNITS: 5000 INJECTION INTRAVENOUS; SUBCUTANEOUS at 08:28

## 2021-03-08 RX ADMIN — BUPROPION HYDROCHLORIDE 150 MG: 150 TABLET, EXTENDED RELEASE ORAL at 08:28

## 2021-03-08 RX ADMIN — CLOPIDOGREL 75 MG: 75 TABLET, FILM COATED ORAL at 08:28

## 2021-03-08 RX ADMIN — ALPRAZOLAM 0.5 MG: 0.5 TABLET ORAL at 20:19

## 2021-03-08 RX ADMIN — CARISOPRODOL 350 MG: 350 TABLET ORAL at 20:20

## 2021-03-08 RX ADMIN — IPRATROPIUM BROMIDE AND ALBUTEROL SULFATE 3 ML: .5; 3 SOLUTION RESPIRATORY (INHALATION) at 07:24

## 2021-03-08 RX ADMIN — HYDROCODONE BITARTRATE AND ACETAMINOPHEN 1 TABLET: 10; 325 TABLET ORAL at 20:23

## 2021-03-08 RX ADMIN — FLUTICASONE PROPIONATE 2 SPRAY: 50 SPRAY, METERED NASAL at 08:28

## 2021-03-08 RX ADMIN — BUPROPION HYDROCHLORIDE 150 MG: 150 TABLET, EXTENDED RELEASE ORAL at 20:19

## 2021-03-08 RX ADMIN — PANTOPRAZOLE SODIUM 40 MG: 40 TABLET, DELAYED RELEASE ORAL at 05:52

## 2021-03-08 RX ADMIN — HEPARIN SODIUM 5000 UNITS: 5000 INJECTION INTRAVENOUS; SUBCUTANEOUS at 20:20

## 2021-03-08 RX ADMIN — HYDROCODONE BITARTRATE AND ACETAMINOPHEN 1 TABLET: 10; 325 TABLET ORAL at 05:54

## 2021-03-08 RX ADMIN — MIRTAZAPINE 15 MG: 15 TABLET, FILM COATED ORAL at 20:19

## 2021-03-08 RX ADMIN — HYDROCODONE BITARTRATE AND ACETAMINOPHEN 1 TABLET: 10; 325 TABLET ORAL at 12:52

## 2021-03-08 RX ADMIN — ATORVASTATIN CALCIUM 80 MG: 40 TABLET, FILM COATED ORAL at 20:19

## 2021-03-08 NOTE — PROGRESS NOTES
Inpatient Rehabilitation Functional Measures Assessment and Plan of Care    Plan of Care  Self Care    [OT] Dressing (Upper)(Active)  Current Status(03/08/2021): Mod A  Weekly Goal(03/16/2021): Min A  Discharge Goal: Min A    [OT] Dressing (Lower)(Active)  Current Status(03/08/2021): Mod A  Weekly Goal(03/16/2021): Min A  Discharge Goal: Min A    Performed Intervention(s)  ADL retraining, AE education, neuro re-ed, GMC/FMC theract, ROM, strengthening,  activity tolerance, fxal mobility    Functional Measures  DEBBIE Eating:  DEBBIE Grooming:  DEBBIE Bathing:  DEBBIE Upper Body Dressing:  DEBBIE Lower Body Dressing:  DEBBIE Toileting:    DEBBIE Bladder Management  Level of Assistance:  Frequency/Number of Accidents this Shift:    DEBBIE Bowel Management  Level of Assistance:  Frequency/Number of Accidents this Shift:    DEBBIE Bed/Chair/Wheelchair Transfer:  DEBBIE Toilet Transfer:  DEBBIE Tub/Shower Transfer:    Previously Documented Mode of Locomotion at Discharge:  DEBBIE Expected Mode of Locomotion at Discharge:  DEBBIE Walk/Wheelchair:  DEBBIE Stairs:    DEBBIE Comprehension:  DEBBIE Expression:  DEBBIE Social Interaction:  DEBBIE Problem Solving:  DEBBIE Memory:    Therapy Mode Minutes  Occupational Therapy: Individual: 90 minutes.  Physical Therapy:  Speech Language Pathology:    Discharge Functional Goals:    Signed by: Meghan Carbajal OT

## 2021-03-08 NOTE — PROGRESS NOTES
[facility] REHAB PROGRESS NOTE     Patient Identification:  Name:  Valerie Moore  Age:  65 y.o.  Sex:  male  :  1955  MRN:  [id number]  Visit Number:  [id number]  ROOM: [facility]     Primary Care Provider:  Darwin Alvarez MD    Length of stay in inpatient status:  14    Subjective     Chief Compliant:  No chief complaint on file.      History of Presenting Illness: 65-year-old gentleman with history of CVA with fairly dense right-sided hemiparesis.  Patient speaking and swallowing well without difficulty.  Patient has been somewhat depressed in the aftermath of the stroke.  No other complaints at this time.    Objective     Current Hospital Meds:aspirin, 81 mg, Oral, Daily  atorvastatin, 80 mg, Oral, Nightly  buPROPion SR, 150 mg, Oral, Q12H  clopidogrel, 75 mg, Oral, Daily  fluticasone, 2 spray, Each Nare, Daily  heparin (porcine), 5,000 Units, Subcutaneous, Q12H  losartan, 50 mg, Oral, Q24H  metoprolol tartrate, 50 mg, Oral, Q12H  mirtazapine, 15 mg, Oral, Nightly  multivitamin, 1 tablet, Oral, Daily  nicotine, 1 patch, Transdermal, Q24H  pantoprazole, 40 mg, Oral, Q AM  polyethylene glycol, 17 g, Oral, Daily       ----------------------------------------------------------------------------------------------------------------------  Vital Signs:  Temp:  [97.4 °F (36.3 °C)-97.5 °F (36.4 °C)] 97.4 °F (36.3 °C)  Heart Rate:  [53-66] 53  Resp:  [16-20] 16  BP: (115-137)/(61-66) 137/66  SpO2:  [94 %-99 %] 99 %  on   ;   Device (Oxygen Therapy): room air  Body mass index is 27.99 kg/m².    Wt Readings from Last 3 Encounters:   21 80.9 kg (178 lb 5.6 oz)   21 80.9 kg (178 lb 6.4 oz)   19 82.2 kg (181 lb 5 oz)     Intake & Output (last 3 days)        07 -  0700  07 -  07 07 -  07 07 -  0700    P.O. 1200 1440 1440 240    Total Intake(mL/kg) 1200 (14.8) 1440 (17.8) 1440 (17.8) 240 (3)    Urine (mL/kg/hr)  1400 (0.7)      Total  Output  1400      Net +1200 +40 +1440 +240            Urine Unmeasured Occurrence 4 x 4 x 8 x 1 x    Stool Unmeasured Occurrence   2 x         Diet Regular; Cardiac, Daily Fluid Restriction; 1500 mL Fluid Per Day  ----------------------------------------------------------------------------------------------------------------------  Physical exam:  Constitutional:   No acute distress  HEENT: Normocephalic atraumatic  Neck:    Supple  Cardiovascular: Regular rate and rhythm  Pulmonary/Chest: Clear to auscultation  Abdominal: Positive bowel sounds soft.   Musculoskeletal: No arthropathy  Neurological: 1-2 out of 5 strength on the right upper and lower extremities.  Skin: No rash  Peripheral vascular:  Genitourinary:  ----------------------------------------------------------------------------------------------------------------------    Last echocardiogram:  Results for orders placed during the hospital encounter of 02/16/21    Adult Transthoracic Echo Complete w/ Color, Spectral and Contrast if necessary per protocol    Interpretation Summary  · Estimated left ventricular EF = 65% Left ventricular systolic function is normal.  · Mild aortic valve stenosis is present.  · Estimated right ventricular systolic pressure from tricuspid regurgitation is normal (<35 mmHg).  · Mild Aortic regurgitation is present  · Trivial to mild MR is noted  · No previous echo    ----------------------------------------------------------------------------------------------------------------------  Results from last 7 days   Lab Units 03/06/21 0012 03/02/21  0128   WBC 10*3/mm3 7.57 9.21   HEMOGLOBIN g/dL 11.4* 12.7*   HEMATOCRIT % 36.2* 39.7   MCV fL 91.6 91.5   MCHC g/dL 31.5 32.0   PLATELETS 10*3/mm3 301 292         Results from last 7 days   Lab Units 03/06/21 0012 03/02/21  0128   SODIUM mmol/L 133* 128*   POTASSIUM mmol/L 4.2 4.4   MAGNESIUM mg/dL 1.9 2.0   CHLORIDE mmol/L 101 95*   CO2 mmol/L 23.6 24.3   BUN mg/dL 29* 39*    CREATININE mg/dL 0.87 1.01   EGFR IF NONAFRICN AM mL/min/1.73 88 74   CALCIUM mg/dL 9.5 9.7   GLUCOSE mg/dL 102* 105*   ALBUMIN g/dL 3.55 3.80   BILIRUBIN mg/dL 0.2 0.2   ALK PHOS U/L 105 117   AST (SGOT) U/L 16 20   ALT (SGPT) U/L 19 27   Estimated Creatinine Clearance: 83.8 mL/min (by C-G formula based on SCr of 0.87 mg/dL).  No results found for: AMMONIA              Glucose   Date/Time Value Ref Range Status   03/08/2021 0701 106 70 - 130 mg/dL Final     Lab Results   Component Value Date    TSH 0.698 02/16/2021    FREET4 1.40 02/16/2021     No results found for: PREGTESTUR, PREGSERUM, HCG, HCGQUANT  Pain Management Panel     Pain Management Panel Latest Ref Rng & Units 2/16/2021 11/24/2019    AMPHETAMINES SCREEN, URINE Negative Negative Negative    BARBITURATES SCREEN Negative Negative Negative    BENZODIAZEPINE SCREEN, URINE Negative Negative Negative    BUPRENORPHINEUR Negative Negative Negative    COCAINE SCREEN, URINE Negative Negative Negative    METHADONE SCREEN, URINE Negative Negative Negative        Brief Urine Lab Results  (Last result in the past 365 days)      Color   Clarity   Blood   Leuk Est   Nitrite   Protein   CREAT   Urine HCG        02/16/21 1847 Yellow Clear Negative Negative Negative Negative             No results found for: BLOODCX      No results found for: URINECX  No results found for: WOUNDCX  No results found for: STOOLCX        I have personally looked at the labs and they are summarized above.  ----------------------------------------------------------------------------------------------------------------------  Detailed radiology reports for the last 24 hours:    Imaging Results (Last 24 Hours)     ** No results found for the last 24 hours. **        Final impressions for the last 30 days of radiology reports:    CT Angiogram Neck    Result Date: 2/16/2021  1.  Occlusion of the right vertebral artery. 2.  Mild to moderate plaque right greater than left carotid systems but  without hemodynamically significant stenosis or occlusion identified. 3.  Tortuosity of the bilateral extracranial ICA segments. 4.  No evidence of dissection. 5.  Degenerative changes cervical spine. 6.  Emphysema of the partially imaged upper lungs. 7.  Other nonacute findings above.  This report was finalized on 2/16/2021 6:10 PM by Dr. Johnathon Clark MD.      CT Cervical Spine Without Contrast    Result Date: 2/16/2021  1.  Limited exam due to patient positioning and cervical spine rotation without convincing evidence of acute fracture. 2.  No traumatic malalignment is identified. 3.  Small right mastoid effusion. 4.  Advanced degenerative changes with multilevel stenosis. 5.  If persistent concern for cervical spine injury, repeat exam may be indicated.  This report was finalized on 2/16/2021 6:01 PM by Dr. Johnathon Clark MD.      MRI Brain Without Contrast    Result Date: 2/16/2021  1.  Focal acute infarcts in the left basal ganglionic, left external capsule, and left pericallosal regions which follow along the distribution of the lenticulostriate perforating arteries of the left MCA and the anterior choroidal artery distribution. 2.  No MRI evidence of hemorrhage. 3.  No midline shift, focal mass effect, or hydrocephalus. 4.  Advanced cerebral atrophy and chronic small vessel ischemic disease.  This report was finalized on 2/16/2021 8:56 PM by Dr. Johnathon Clark MD.      MRI Cervical Spine Without Contrast    Result Date: 2/16/2021  1. No acute fracture or traumatic malalignment. 2. Advanced multilevel degenerative disc disease with facet arthropathy with variable central canal and neural foraminal stenosis detailed above. Stenosis most severe at the C3/4 level. 3. Degenerative listhesis posteriorly of C3 on C4.  This report was finalized on 2/16/2021 8:59 PM by Dr. Johnathon Clark MD.      XR Chest 1 View    Result Date: 2/16/2021    Stable chest. No acute changes identified.  This report was finalized on  2/16/2021 6:36 PM by Dr. Johnathon Clark MD.      CT Angiogram Head    Result Date: 2/16/2021  1.  No large vessel occlusion identified involving the major intracranial arterial segments. 2.  Occlusion of the right vertebral artery with reconstitution of flow just prior to basilar artery confluence likely due to collateralization. 3.  Diffuse cerebral atrophy and advanced chronic small vessel ischemic disease. 4.  Other nonacute findings above.  This report was finalized on 2/16/2021 6:14 PM by Dr. Johnathon Clark MD.      CT Head Without Contrast Stroke Protocol    Result Date: 2/16/2021  1.  No CT evidence of acute intracranial abnormality. 2.  Generalized atrophy and advanced chronic small vessel ischemic disease noted.  This report was finalized on 2/16/2021 5:20 PM by Dr. Johnathon Clark MD.      CT Lower Extremity Right Without Contrast    Result Date: 2/20/2021  Markedly abnormal appearance of the right hip. There is no identifiable ball-and-socket joint identified due to the marked irregularity. The expected articulation is superiorly subluxed and there is a large joint effusion but sterility cannot be determined by imaging. Orthopedic consultation is advised. Signer Name: BROOKS ESQUIVEL MD  Signed: 2/20/2021 8:49 PM  Workstation Name: [facility]  [facility]    CT CEREBRAL PERFUSION W WO CONTRAST W AI ANALYSIS OF LVO    Result Date: 2/16/2021   1. No core infarct identified. 2. Some diminished perfusion of the left frontal region compatible with variable ischemia which may be chronic in nature.  This report was finalized on 2/16/2021 6:17 PM by Dr. Johnathon Clark MD.      XR Hip With or Without Pelvis 2 - 3 View Right    Result Date: 2/19/2021  1.  Probable osteotomy changes of the right femoral head with superior migration of the right femur. 2.  No acute appearing fracture. 3.  Right joint effusion. 4.  Otherwise stable exam.  This report was finalized on 2/19/2021 7:38 PM by Dr. Diego  EARNESTINE Clark MD.      I have personally looked at the radiology images and read the final radiology report.    Assessment & Plan    Status post acute infarction involving the left external capsule, left basal ganglia, and left pericallosal regions with a fairly dense right-sided hemiparesis.  Patient continues to work with PT and OT and is making some progress.  Continue aspirin, Plavix, Lipitor.    Chronic pain secondary to osteoarthritis--controlled at this time.  Patient is on Norco and Soma    Anxiety neurosis continue Xanax therapy which has been chronic for him.    Depression--continue medical management.  With patient's particular situation with it is not surprising patient does have some depression.    Hypertension--continue metoprolol and losartan.    Tobacco abuse--NicoDerm, continue to encourage cessation    GERD--Protonix    VTE Prophylaxis:   Mechanical Order History:     None      Pharmalogical Order History:      Ordered     Dose Route Frequency Stop    02/22/21 1624  heparin (porcine) 5000 UNIT/ML injection 5,000 Units      5,000 Units SC Every 12 Hours Scheduled --                    Rickie Haney MD  [facility]  03/08/21  10:58 EST

## 2021-03-08 NOTE — PROGRESS NOTES
Occupational Therapy:    Physical Therapy: Individual: 90 minutes.    Speech Language Pathology:    Signed by: Pooja Mcqueen PTA

## 2021-03-08 NOTE — THERAPY TREATMENT NOTE
Inpatient Rehabilitation - Physical Therapy Treatment Note       CHEYENNE No     Patient Name: Valerie Moore  : 1955  MRN: [id number]    Today's Date: 3/8/2021                    Admit Date: 2021      Visit Dx: No diagnosis found.    Patient Active Problem List   Diagnosis   • Anxiety   • Hypertension   • Hypercholesteremia   • Seasonal allergies   • Sleep disorder   • GERD (gastroesophageal reflux disease)   • Arthritis   • Airway hyperreactivity   • Gout   • Weakness   • CAP (community acquired pneumonia)   • Hypokalemia   • Hyponatremia   • Hyperglycemia   • Normocytic anemia   • Abdominal aortic aneurysm (AAA) 3.0 cm to 5.5 cm in diameter in male (CMS/HCC)   • Tobacco abuse   • Chest pain, atypical   • Cerebrovascular accident (CVA) (CMS/HCC)   • CVA (cerebral vascular accident) (CMS/HCC)       Past Medical History:   Diagnosis Date   • Anxiety    • GERD (gastroesophageal reflux disease) 2017   • Hypercholesteremia    • Hypertension    • Seasonal allergies    • Sleep disorder    • Stroke (CMS/HCC)        Past Surgical History:   Procedure Laterality Date   • HERNIA REPAIR     • HIP SURGERY Right     plate and 8 screws in right hip           PT ASSESSMENT (last 12 hours)      IRF PT Evaluation and Treatment     Row Name 21 1607          PT Time and Intention    Document Type  daily treatment  -RF     Mode of Treatment  physical therapy  -RF     Patient/Family/Caregiver Comments/Observations  Pt c/o R hip and knee pain with activity   -RF     Row Name 21 1607          General Information    Patient Profile Reviewed  yes  -RF     Existing Precautions/Restrictions  fall;other (see comments) R HP  -RF     Row Name 21 1604          Cognition/Psychosocial    Affect/Mental Status (Cognitive)  WFL  -RF     Orientation Status (Cognition)  oriented x 3  -RF     Follows Commands (Cognition)  follows one-step commands;verbal cues/prompting required;repetition of directions  required;physical/tactile prompts required  -RF     Row Name 03/08/21 1607          Bed Mobility    Supine-Sit [place] (Bed Mobility)  verbal cues;nonverbal cues (demo/gesture);standby assist;contact guard requires min A when getting out of bed on weaker side.   -RF     Sit-Supine [place] (Bed Mobility)  verbal cues;nonverbal cues (demo/gesture);contact guard;standby assist  -RF     Bed Mobility, Safety Issues  decreased use of arms for pushing/pulling;decreased use of legs for bridging/pushing  -RF     Assistive Device (Bed Mobility)  bed rails;head of bed elevated  -RF     Row Name 03/08/21 1607          Transfers    Bed-Chair [place] (Transfers)  minimum assist (75% patient effort);verbal cues;nonverbal cues (demo/gesture);contact guard When going to the L  -RF     Chair-Bed [place] (Transfers)  minimum assist (75% patient effort);verbal cues;nonverbal cues (demo/gesture);contact guard Going to the L  -RF     Assistive Device (Bed-Chair Transfers)  wheelchair  -RF     Sit-Stand [place] (Transfers)  verbal cues;nonverbal cues (demo/gesture);minimum assist (75% patient effort);contact guard  -RF     Stand-Sit [place] (Transfers)  verbal cues;nonverbal cues (demo/gesture);minimum assist (75% patient effort);contact guard  -RF     Row Name 03/08/21 1607          Chair-Bed Transfer    Assistive Device (Chair-Bed Transfers)  wheelchair  -RF     Row Name 03/08/21 1607          Sit-Stand Transfer    Assistive Device (Sit-Stand Transfers)  walker, teodora;wheelchair  -RF     Row Name 03/08/21 1607          Stand-Sit Transfer    Assistive Device (Stand-Sit Transfers)  walker, teodora;wheelchair  -RF     Row Name 03/08/21 1607          Gait/Stairs (Locomotion)    Comment (Gait/Stairs)  Significant R hip and knee pain with WB   -RF     Row Name 03/08/21 1607          Safety Issues, Functional Mobility    Impairments Affecting Function (Mobility)  balance;endurance/activity  tolerance;pain;postural/trunk control;strength;motor control;range of motion (ROM)  -     Row Name 03/08/21 1607          Balance    Static Standing Balance  moderate impairment;supported;standing;other (see comments) standing with teodora walker, mirror for postural feedback  -     Comment, Balance  LOB noted; pt unable to WB on RLE due to hip and knee joint instability; significant popping noted with WB  -     Row Name 03/08/21 1607          Hip (Therapeutic Exercise)    Hip PROM (Therapeutic Exercise)  right;flexion;extension;aBduction;aDduction;sitting;supine;other (see comments) fair ADD and quad activation noted  -RF     Hip Strengthening (Therapeutic Exercise)  bilateral;flexion;extension;aBduction;aDduction;external rotation;internal rotation;heel slides;marching while seated;sitting;supine;2 lb free weight;resistance band;yellow;2 sets;10 repetitions  -     Row Name 03/08/21 1607          Knee (Therapeutic Exercise)    Knee PROM (Therapeutic Exercise)  right;flexion;extension;sitting;supine  -     Knee Strengthening (Therapeutic Exercise)  bilateral;flexion;extension;marching while seated;SAQ (short arc quad);LAQ (long arc quad);hamstring curls;sitting;supine;resistance band;yellow;2 sets;10 repetitions  -     Row Name 03/08/21 1607          Ankle (Therapeutic Exercise)    Ankle PROM (Therapeutic Exercise)  right;dorsiflexion;plantarflexion  -RF     Ankle Strengthening (Therapeutic Exercise)  bilateral;dorsiflexion;plantarflexion;sitting;supine;2 sets;10 repetitions  -     Row Name 03/08/21 1607          Neuromuscular Re-education    Interventions (Neuromuscular Re-education)  facilitation/inhibition;tapping  -     Row Name 03/08/21 1607          Modality Treatment    Treatment Location 1 (Modality)  R quad   -RF     Modality Performed  NMES (neuromuscular electrical stimulation)  -RF     Modality Treatment Results  Enhance contraction with R LAQ  -RF     Comment, Modality Treatment  15 mins;  36 mA  -RF     Row Name 03/08/21 1607          Positioning and Restraints    Pre-Treatment Position  sitting in chair/recliner BID  -RF     In Bed  supine;call light within reach;encouraged to call for assist PM  -RF     In Wheelchair  sitting;call light within reach;encouraged to call for assist AM  -RF     Row Name 03/08/21 1607          Therapy Assessment/Plan (PT)    Comment, Therapy Assessment/Plan (PT)  Improved neuromuscular activation noted in R quad this date. Improving functional mobility noted as well. Continued skilled care required for further improvements.   -RF       User Key  (r) = Recorded By, (t) = Taken By, (c) = Cosigned By    Initials Name Provider Type    RF Pooja Mcqueen, WAGNER Physical Therapy Assistant           Physical Therapy Education                 Title: PT OT SLP Therapies (Done)     Topic: Physical Therapy (Done)     Point: Mobility training (Done)     Learning Progress Summary           Patient Acceptance, E,TB, VU by RF at 3/8/2021 1613    Acceptance, E,TB, VU by RF at 3/5/2021 1605    Acceptance, E, VU by CW at 3/4/2021 1416    Acceptance, E,D, VU,NR by LL at 3/3/2021 1101    Acceptance, E,D, VU,NR by LL at 3/2/2021 1517    Acceptance, E,D, VU,NR by LL at 3/1/2021 1016    Acceptance, E,TB, VU by CT at 2/27/2021 1228    Acceptance, E,D, VU,NR by LL at 2/26/2021 1829    Acceptance, E,D, VU,NR by LL at 2/25/2021 1541    Acceptance, E, VU,NR by LB at 2/23/2021 1604                   Point: Home exercise program (Done)     Learning Progress Summary           Patient Acceptance, E,TB, VU by RF at 3/8/2021 1613    Acceptance, E,TB, VU by RF at 3/5/2021 1605    Acceptance, E, VU by CW at 3/4/2021 1416    Acceptance, E,D, VU,NR by LL at 3/3/2021 1101    Acceptance, E,D, VU,NR by LL at 3/2/2021 1517    Acceptance, E,D, VU,NR by LL at 3/1/2021 1016    Acceptance, E,TB, VU by CT at 2/27/2021 1228    Acceptance, E,D, VU,NR by LL at 2/26/2021 1829    Acceptance, E,D, VU,NR by LL at 2/25/2021  1541    Acceptance, E, VU,NR by LB at 2/23/2021 1604                   Point: Body mechanics (Done)     Learning Progress Summary           Patient Acceptance, E,TB, VU by RF at 3/8/2021 1613    Acceptance, E,TB, VU by RF at 3/5/2021 1605    Acceptance, E, VU by CW at 3/4/2021 1416    Acceptance, E,D, VU,NR by LL at 3/3/2021 1101    Acceptance, E,D, VU,NR by LL at 3/2/2021 1517    Acceptance, E,D, VU,NR by LL at 3/1/2021 1016    Acceptance, E,TB, VU by CT at 2/27/2021 1228    Acceptance, E,D, VU,NR by LL at 2/26/2021 1829    Acceptance, E,D, VU,NR by LL at 2/25/2021 1541    Acceptance, E, VU,NR by LB at 2/23/2021 1604                   Point: Precautions (Done)     Learning Progress Summary           Patient Acceptance, E,TB, VU by RF at 3/8/2021 1613    Acceptance, E,TB, VU by RF at 3/5/2021 1605    Acceptance, E, VU by CW at 3/4/2021 1416    Acceptance, E,D, VU,NR by LL at 3/3/2021 1101    Acceptance, E,D, VU,NR by LL at 3/2/2021 1517    Acceptance, E,D, VU,NR by LL at 3/1/2021 1016    Acceptance, E,TB, VU by CT at 2/27/2021 1228    Acceptance, E,D, VU,NR by LL at 2/26/2021 1829    Acceptance, E,D, VU,NR by LL at 2/25/2021 1541    Acceptance, E, VU,NR by LB at 2/23/2021 1604                               User Key     Initials Effective Dates Name Provider Type Discipline    LB 03/14/16 -  Cathleen Carnes, PT Physical Therapist PT    CT 04/03/18 -  Nilsa Bermudez, PT Physical Therapist PT    LL 05/02/16 -  Alise Handy, PTA Physical Therapy Assistant PT    RF 03/07/18 -  Pooja Mcqueen PTA Physical Therapy Assistant PT    CW 09/11/20 -  Lucas Baker, PT Physical Therapist PT                PT Recommendation and Plan                          Time Calculation:     PT Charges     Row Name 03/08/21 1614 03/08/21 1613          Time Calculation    Start Time  1330  -RF  1010  -RF     Stop Time  1415  -RF  1055  -RF     Time Calculation (min)  45 min  -RF  45 min  -RF     PT Received On  03/08/21  -RF   03/08/21  -RF     PT - Next Appointment  03/09/21  -RF  03/08/21  -RF     PT Goal Re-Cert Due Date  03/09/21  -RF  03/09/21  -RF        Time Calculation- PT    Total Timed Code Minutes- PT  45 minute(s)  -RF  45 minute(s)  -RF       User Key  (r) = Recorded By, (t) = Taken By, (c) = Cosigned By    Initials Name Provider Type    RF Pooja Mcqueen PTA Physical Therapy Assistant          Therapy Charges for Today     Code Description Service Date Service Provider Modifiers Qty    72228679660 HC PT NEUROMUSC RE EDUCATION EA 15 MIN 3/8/2021 Pooja Mcqueen PTA GP 2    43621546243 HC PT THER PROC EA 15 MIN 3/8/2021 Pooja Mcqueen PTA GP 3    50884106590 HC PT THERAPEUTIC ACT EA 15 MIN 3/8/2021 Pooja Mcqueen PTA GP 1                   Pooja Mcqueen PTA  3/8/2021

## 2021-03-08 NOTE — THERAPY TREATMENT NOTE
Inpatient Rehabilitation - Occupational Therapy Treatment Note    CHEYENNE No     Patient Name: Valerie Moore  : 1955  MRN: [id number]    Today's Date: 3/8/2021                 Admit Date: 2021       No diagnosis found.    Patient Active Problem List   Diagnosis   • Anxiety   • Hypertension   • Hypercholesteremia   • Seasonal allergies   • Sleep disorder   • GERD (gastroesophageal reflux disease)   • Arthritis   • Airway hyperreactivity   • Gout   • Weakness   • CAP (community acquired pneumonia)   • Hypokalemia   • Hyponatremia   • Hyperglycemia   • Normocytic anemia   • Abdominal aortic aneurysm (AAA) 3.0 cm to 5.5 cm in diameter in male (CMS/HCC)   • Tobacco abuse   • Chest pain, atypical   • Cerebrovascular accident (CVA) (CMS/HCC)   • CVA (cerebral vascular accident) (CMS/HCC)       Past Medical History:   Diagnosis Date   • Anxiety    • GERD (gastroesophageal reflux disease) 2017   • Hypercholesteremia    • Hypertension    • Seasonal allergies    • Sleep disorder    • Stroke (CMS/HCC)        Past Surgical History:   Procedure Laterality Date   • HERNIA REPAIR     • HIP SURGERY Right     plate and 8 screws in right hip                 IRF OT ASSESSMENT FLOWSHEET (last 12 hours)      IRF OT Evaluation and Treatment     Row Name 21 1414          OT Time and Intention    Document Type  daily treatment  -BF     Mode of Treatment  occupational therapy  -BF     Patient Effort  good  -BF     Row Name 21 1414          General Information    Existing Precautions/Restrictions  fall;other (see comments) R HP  -BF     Row Name 21 1414          Cognition/Psychosocial    Orientation Status (Cognition)  oriented x 3  -BF     Follows Commands (Cognition)  follows one-step commands;verbal cues/prompting required;repetition of directions required  -BF     Row Name 21 1414          Transfers    Bed-Chair [place] (Transfers)  minimum assist (75% patient effort);verbal cues;nonverbal  cues (demo/gesture)  -BF     Assistive Device (Bed-Chair Transfers)  wheelchair  -BF     Row Name 03/08/21 1414          Motor Skills    Motor Skills  coordination;functional endurance;neuro-muscular function;therapeutic exercise;motor control/coordination interventions  -BF     Neuromuscular Function  right;upper extremity  -BF     Motor Control/Coordination Interventions  gross motor coordination activities;neuro-muscular re-education;therapeutic exercise/ROM RUE AAROM theract, NDT   -BF     Gross Motor Skill, Impairments Detail  BUE arm bike, rickshaw 0 lbs w/ RUE supported  -BF     Row Name 03/08/21 1414          Neuromuscular Re-education    Interventions (Neuromuscular Re-education)  facilitation/inhibition;massage;tapping RUE  -BF     Row Name 03/08/21 1414          Lower Body Dressing    [place] Level (Lower Body Dressing)  moderate assist (50% patient effort);verbal cues;nonverbal cues (demo/gesture)  -BF     Position (Lower Body Dressing)  supine  -BF     Comment (Lower Body Dressing)  Mod A  -BF     Row Name 03/08/21 1414          Positioning and Restraints    In Wheelchair  sitting;with PT after both sessions  -BF       User Key  (r) = Recorded By, (t) = Taken By, (c) = Cosigned By    Initials Name Effective Dates    BF Meghan Carbajal OT 07/05/20 -            Occupational Therapy Education                 Title: PT OT SLP Therapies (Done)     Topic: Occupational Therapy (Done)     Point: ADL training (Done)     Description:   Instruct learner(s) on proper safety adaptation and remediation techniques during self care or transfers.   Instruct in proper use of assistive devices.              Learning Progress Summary           Patient Acceptance, E, VU,NR by BF at 3/8/2021 1414    Acceptance, E, VU,NR by BF at 3/5/2021 1231    Acceptance, E, VU,NR by BF at 3/4/2021 1237    Acceptance, E, VU,NR by BF at 3/2/2021 1445    Acceptance, E, VU,NR by BF at 3/1/2021 1432    Acceptance, E, VU,NR by BF  at 2/27/2021 1110    Acceptance, E, VU,NR by BF at 2/25/2021 1425    Acceptance, E, VU,NR by BF at 2/23/2021 1548                   Point: Precautions (Done)     Description:   Instruct learner(s) on prescribed precautions during self-care and functional transfers.              Learning Progress Summary           Patient Acceptance, E, VU,NR by BF at 3/8/2021 1414    Acceptance, E, VU,NR by BF at 3/5/2021 1231    Acceptance, E, VU,NR by BF at 3/4/2021 1237    Acceptance, E, VU,NR by BF at 3/2/2021 1445    Acceptance, E, VU,NR by BF at 3/1/2021 1432    Acceptance, E, VU,NR by BF at 2/27/2021 1110    Acceptance, E, VU,NR by BF at 2/25/2021 1425    Acceptance, E, VU,NR by BF at 2/23/2021 1548                               User Key     Initials Effective Dates Name Provider Type Discipline     07/05/20 -  Meghan Carbajal OT Occupational Therapist OT                    OT Recommendation and Plan    Planned Therapy Interventions (OT): activity tolerance training, adaptive equipment training, BADL retraining, neuromuscular control/coordination retraining, passive ROM/stretching, ROM/therapeutic exercise, strengthening exercise, transfer/mobility retraining                    Time Calculation:     Time Calculation- OT     Row Name 03/08/21 1421 03/08/21 0940          Time Calculation- OT    OT Start Time  1230  -BF  0940  -BF     OT Stop Time  1330  -BF  1010  -BF     OT Time Calculation (min)  60 min  -BF  30 min  -BF     Total Timed Code Minutes- OT  60 minute(s)  -BF  30 minute(s)  -BF     OT Non-Billable Time (min)  --  20 min  -BF       User Key  (r) = Recorded By, (t) = Taken By, (c) = Cosigned By    Initials Name Provider Type     Meghan Carbajal OT Occupational Therapist        Therapy Charges for Today     Code Description Service Date Service Provider Modifiers Qty    11271120029  OT SELF CARE/MGMT/TRAIN EA 15 MIN 3/8/2021 Meghan Carbjaal OT GO 1    [id number]  OT NEUROMUSC RE  EDUCATION EA 15 MIN 3/8/2021 Meghan Carbajal, OT GO 3    [id number]  OT THER PROC EA 15 MIN 3/8/2021 Meghan Carbajal OT GO 2                   Meghan Carbajal, OT  3/8/2021

## 2021-03-09 PROCEDURE — 94799 UNLISTED PULMONARY SVC/PX: CPT

## 2021-03-09 PROCEDURE — 97116 GAIT TRAINING THERAPY: CPT

## 2021-03-09 PROCEDURE — 99231 SBSQ HOSP IP/OBS SF/LOW 25: CPT | Performed by: FAMILY MEDICINE

## 2021-03-09 PROCEDURE — 25010000002 HEPARIN (PORCINE) PER 1000 UNITS: Performed by: FAMILY MEDICINE

## 2021-03-09 PROCEDURE — 97112 NEUROMUSCULAR REEDUCATION: CPT | Performed by: OCCUPATIONAL THERAPIST

## 2021-03-09 PROCEDURE — 97110 THERAPEUTIC EXERCISES: CPT

## 2021-03-09 PROCEDURE — 97530 THERAPEUTIC ACTIVITIES: CPT

## 2021-03-09 PROCEDURE — 97110 THERAPEUTIC EXERCISES: CPT | Performed by: OCCUPATIONAL THERAPIST

## 2021-03-09 PROCEDURE — 97535 SELF CARE MNGMENT TRAINING: CPT | Performed by: OCCUPATIONAL THERAPIST

## 2021-03-09 RX ADMIN — Medication 1 PATCH: at 08:22

## 2021-03-09 RX ADMIN — PANTOPRAZOLE SODIUM 40 MG: 40 TABLET, DELAYED RELEASE ORAL at 05:18

## 2021-03-09 RX ADMIN — BUPROPION HYDROCHLORIDE 150 MG: 150 TABLET, EXTENDED RELEASE ORAL at 08:20

## 2021-03-09 RX ADMIN — ATORVASTATIN CALCIUM 80 MG: 40 TABLET, FILM COATED ORAL at 20:32

## 2021-03-09 RX ADMIN — HYDROCODONE BITARTRATE AND ACETAMINOPHEN 1 TABLET: 10; 325 TABLET ORAL at 21:04

## 2021-03-09 RX ADMIN — MIRTAZAPINE 15 MG: 15 TABLET, FILM COATED ORAL at 20:32

## 2021-03-09 RX ADMIN — BUPROPION HYDROCHLORIDE 150 MG: 150 TABLET, EXTENDED RELEASE ORAL at 20:33

## 2021-03-09 RX ADMIN — HYDROCODONE BITARTRATE AND ACETAMINOPHEN 1 TABLET: 10; 325 TABLET ORAL at 15:21

## 2021-03-09 RX ADMIN — METOPROLOL TARTRATE 50 MG: 50 TABLET, FILM COATED ORAL at 20:33

## 2021-03-09 RX ADMIN — HEPARIN SODIUM 5000 UNITS: 5000 INJECTION INTRAVENOUS; SUBCUTANEOUS at 08:22

## 2021-03-09 RX ADMIN — HEPARIN SODIUM 5000 UNITS: 5000 INJECTION INTRAVENOUS; SUBCUTANEOUS at 20:32

## 2021-03-09 RX ADMIN — IPRATROPIUM BROMIDE AND ALBUTEROL SULFATE 3 ML: .5; 3 SOLUTION RESPIRATORY (INHALATION) at 07:20

## 2021-03-09 RX ADMIN — ALPRAZOLAM 0.5 MG: 0.5 TABLET ORAL at 20:33

## 2021-03-09 RX ADMIN — FLUTICASONE PROPIONATE 2 SPRAY: 50 SPRAY, METERED NASAL at 08:23

## 2021-03-09 RX ADMIN — ASPIRIN 81 MG: 81 TABLET, COATED ORAL at 08:20

## 2021-03-09 RX ADMIN — CLOPIDOGREL 75 MG: 75 TABLET, FILM COATED ORAL at 08:20

## 2021-03-09 RX ADMIN — Medication 1 TABLET: at 08:22

## 2021-03-09 RX ADMIN — CARISOPRODOL 350 MG: 350 TABLET ORAL at 20:33

## 2021-03-09 RX ADMIN — GABAPENTIN 200 MG: 100 CAPSULE ORAL at 20:33

## 2021-03-09 RX ADMIN — HYDROCODONE BITARTRATE AND ACETAMINOPHEN 1 TABLET: 10; 325 TABLET ORAL at 08:26

## 2021-03-09 RX ADMIN — METOPROLOL TARTRATE 50 MG: 50 TABLET, FILM COATED ORAL at 08:21

## 2021-03-09 RX ADMIN — IPRATROPIUM BROMIDE AND ALBUTEROL SULFATE 3 ML: .5; 3 SOLUTION RESPIRATORY (INHALATION) at 19:21

## 2021-03-09 NOTE — PROGRESS NOTES
[facility] REHAB PROGRESS NOTE     Patient Identification:  Name:  Valerie Moore  Age:  65 y.o.  Sex:  male  :  1955  MRN:  [id number]  Visit Number:  [id number]  ROOM: [facility]     Primary Care Provider:  Darwin Alvarez MD    Length of stay in inpatient status:  15    Subjective     Chief Compliant:  No chief complaint on file.      History of Presenting Illness: 65-year-old gentleman with a history of CVA with fairly dense right-sided hemiparesis.  Patient speaking and swallowing well without difficulty.  Has had some depression since the stroke but states he is doing reasonably well.  She has no new complaints overnight.    Objective     Current Hospital Meds:aspirin, 81 mg, Oral, Daily  atorvastatin, 80 mg, Oral, Nightly  buPROPion SR, 150 mg, Oral, Q12H  clopidogrel, 75 mg, Oral, Daily  fluticasone, 2 spray, Each Nare, Daily  heparin (porcine), 5,000 Units, Subcutaneous, Q12H  losartan, 50 mg, Oral, Q24H  metoprolol tartrate, 50 mg, Oral, Q12H  mirtazapine, 15 mg, Oral, Nightly  multivitamin, 1 tablet, Oral, Daily  nicotine, 1 patch, Transdermal, Q24H  pantoprazole, 40 mg, Oral, Q AM  polyethylene glycol, 17 g, Oral, Daily       ----------------------------------------------------------------------------------------------------------------------  Vital Signs:  Temp:  [97.8 °F (36.6 °C)-98.2 °F (36.8 °C)] 98.2 °F (36.8 °C)  Heart Rate:  [57-74] 72  Resp:  [16-20] 16  BP: (134-137)/(61-78) 137/61  SpO2:  [94 %-98 %] 98 %  on  Flow (L/min):  [2] 2;   Device (Oxygen Therapy): nasal cannula  Body mass index is 27.99 kg/m².    Wt Readings from Last 3 Encounters:   21 80.9 kg (178 lb 5.6 oz)   21 80.9 kg (178 lb 6.4 oz)   19 82.2 kg (181 lb 5 oz)     Intake & Output (last 3 days)       701 -  0700  07 -  07 07 -  0700  07 - 03/10 0700    P.O. 1440 1440 1080 240    Total Intake(mL/kg) 1440 (17.8) 1440 (17.8) 1080 (13.3) 240 (3)     Urine (mL/kg/hr) 1400 (0.7)       Total Output 1400       Net +40 +1440 +1080 +240            Urine Unmeasured Occurrence 4 x 8 x 7 x 1 x    Stool Unmeasured Occurrence  2 x          Diet Regular; Cardiac, Daily Fluid Restriction; 1500 mL Fluid Per Day  ----------------------------------------------------------------------------------------------------------------------  Physical exam:  Constitutional:   No acute distress  HEENT: Normocephalic atraumatic  Neck:    Supple  Cardiovascular: Regular rate and rhythm  Pulmonary/Chest: Clear to auscultation  Abdominal: Positive bowel sounds soft.   Musculoskeletal: No arthropathy  Neurological: 1-2 out of 5 strength in the right upper and lower extremities.  Skin: No rash  Peripheral vascular:  Genitourinary:  ----------------------------------------------------------------------------------------------------------------------    Last echocardiogram:  Results for orders placed during the hospital encounter of 02/16/21    Adult Transthoracic Echo Complete w/ Color, Spectral and Contrast if necessary per protocol    Interpretation Summary  · Estimated left ventricular EF = 65% Left ventricular systolic function is normal.  · Mild aortic valve stenosis is present.  · Estimated right ventricular systolic pressure from tricuspid regurgitation is normal (<35 mmHg).  · Mild Aortic regurgitation is present  · Trivial to mild MR is noted  · No previous echo    ----------------------------------------------------------------------------------------------------------------------  Results from last 7 days   Lab Units 03/06/21  0012   WBC 10*3/mm3 7.57   HEMOGLOBIN g/dL 11.4*   HEMATOCRIT % 36.2*   MCV fL 91.6   MCHC g/dL 31.5   PLATELETS 10*3/mm3 301         Results from last 7 days   Lab Units 03/06/21  0012   SODIUM mmol/L 133*   POTASSIUM mmol/L 4.2   MAGNESIUM mg/dL 1.9   CHLORIDE mmol/L 101   CO2 mmol/L 23.6   BUN mg/dL 29*   CREATININE mg/dL 0.87   EGFR IF NONAFRICN AM  mL/min/1.73 88   CALCIUM mg/dL 9.5   GLUCOSE mg/dL 102*   ALBUMIN g/dL 3.55   BILIRUBIN mg/dL 0.2   ALK PHOS U/L 105   AST (SGOT) U/L 16   ALT (SGPT) U/L 19   Estimated Creatinine Clearance: 83.8 mL/min (by C-G formula based on SCr of 0.87 mg/dL).  No results found for: AMMONIA              Glucose   Date/Time Value Ref Range Status   03/08/2021 0701 106 70 - 130 mg/dL Final     Lab Results   Component Value Date    TSH 0.698 02/16/2021    FREET4 1.40 02/16/2021     No results found for: PREGTESTUR, PREGSERUM, HCG, HCGQUANT  Pain Management Panel     Pain Management Panel Latest Ref Rng & Units 2/16/2021 11/24/2019    AMPHETAMINES SCREEN, URINE Negative Negative Negative    BARBITURATES SCREEN Negative Negative Negative    BENZODIAZEPINE SCREEN, URINE Negative Negative Negative    BUPRENORPHINEUR Negative Negative Negative    COCAINE SCREEN, URINE Negative Negative Negative    METHADONE SCREEN, URINE Negative Negative Negative        Brief Urine Lab Results  (Last result in the past 365 days)      Color   Clarity   Blood   Leuk Est   Nitrite   Protein   CREAT   Urine HCG        02/16/21 1847 Yellow Clear Negative Negative Negative Negative             No results found for: BLOODCX      No results found for: URINECX  No results found for: WOUNDCX  No results found for: STOOLCX        I have personally looked at the labs and they are summarized above.  ----------------------------------------------------------------------------------------------------------------------  Detailed radiology reports for the last 24 hours:    Imaging Results (Last 24 Hours)     ** No results found for the last 24 hours. **        Final impressions for the last 30 days of radiology reports:    CT Angiogram Neck    Result Date: 2/16/2021  1.  Occlusion of the right vertebral artery. 2.  Mild to moderate plaque right greater than left carotid systems but without hemodynamically significant stenosis or occlusion identified. 3.  Tortuosity of  the bilateral extracranial ICA segments. 4.  No evidence of dissection. 5.  Degenerative changes cervical spine. 6.  Emphysema of the partially imaged upper lungs. 7.  Other nonacute findings above.  This report was finalized on 2/16/2021 6:10 PM by Dr. Johnathon Clark MD.      CT Cervical Spine Without Contrast    Result Date: 2/16/2021  1.  Limited exam due to patient positioning and cervical spine rotation without convincing evidence of acute fracture. 2.  No traumatic malalignment is identified. 3.  Small right mastoid effusion. 4.  Advanced degenerative changes with multilevel stenosis. 5.  If persistent concern for cervical spine injury, repeat exam may be indicated.  This report was finalized on 2/16/2021 6:01 PM by Dr. Johnathon Clark MD.      MRI Brain Without Contrast    Result Date: 2/16/2021  1.  Focal acute infarcts in the left basal ganglionic, left external capsule, and left pericallosal regions which follow along the distribution of the lenticulostriate perforating arteries of the left MCA and the anterior choroidal artery distribution. 2.  No MRI evidence of hemorrhage. 3.  No midline shift, focal mass effect, or hydrocephalus. 4.  Advanced cerebral atrophy and chronic small vessel ischemic disease.  This report was finalized on 2/16/2021 8:56 PM by Dr. Johnathon Clark MD.      MRI Cervical Spine Without Contrast    Result Date: 2/16/2021  1. No acute fracture or traumatic malalignment. 2. Advanced multilevel degenerative disc disease with facet arthropathy with variable central canal and neural foraminal stenosis detailed above. Stenosis most severe at the C3/4 level. 3. Degenerative listhesis posteriorly of C3 on C4.  This report was finalized on 2/16/2021 8:59 PM by Dr. Johnathon Clark MD.      XR Chest 1 View    Result Date: 2/16/2021    Stable chest. No acute changes identified.  This report was finalized on 2/16/2021 6:36 PM by Dr. Johnathon Clark MD.      CT Angiogram Head    Result Date:  2/16/2021  1.  No large vessel occlusion identified involving the major intracranial arterial segments. 2.  Occlusion of the right vertebral artery with reconstitution of flow just prior to basilar artery confluence likely due to collateralization. 3.  Diffuse cerebral atrophy and advanced chronic small vessel ischemic disease. 4.  Other nonacute findings above.  This report was finalized on 2/16/2021 6:14 PM by Dr. Johnathon Clark MD.      CT Head Without Contrast Stroke Protocol    Result Date: 2/16/2021  1.  No CT evidence of acute intracranial abnormality. 2.  Generalized atrophy and advanced chronic small vessel ischemic disease noted.  This report was finalized on 2/16/2021 5:20 PM by Dr. Johnathon Clark MD.      CT Lower Extremity Right Without Contrast    Result Date: 2/20/2021  Markedly abnormal appearance of the right hip. There is no identifiable ball-and-socket joint identified due to the marked irregularity. The expected articulation is superiorly subluxed and there is a large joint effusion but sterility cannot be determined by imaging. Orthopedic consultation is advised. Signer Name: BROOKS ESQUIVEL MD  Signed: 2/20/2021 8:49 PM  Workstation Name: [email or web address]  [facility]    CT CEREBRAL PERFUSION W WO CONTRAST W AI ANALYSIS OF LVO    Result Date: 2/16/2021   1. No core infarct identified. 2. Some diminished perfusion of the left frontal region compatible with variable ischemia which may be chronic in nature.  This report was finalized on 2/16/2021 6:17 PM by Dr. Johnathon Clark MD.      XR Hip With or Without Pelvis 2 - 3 View Right    Result Date: 2/19/2021  1.  Probable osteotomy changes of the right femoral head with superior migration of the right femur. 2.  No acute appearing fracture. 3.  Right joint effusion. 4.  Otherwise stable exam.  This report was finalized on 2/19/2021 7:38 PM by Dr. Johnathon Clark MD.      I have personally looked at the radiology images and read the  final radiology report.    Assessment & Plan    Status post recent acute infarction involving the left external capsule, left basal ganglia, and left pericallosal regions with fairly dense right-sided hemiparesis.  Patient is currently on aspirin, Plavix, and Lipitor.  Patient requiring moderate assistance for ADLs; has been noted to have some increased movement in the elbow on the right as well as some increased movement in the right leg.  Patient requiring contact-guard for help with transfers.  Unfortunately he is unable to bear weight on the right leg at this time and has not been able to ambulate to this point.    Chronic pain secondary to osteoarthritis--continue Norco and Soma as needed.  Patient has severe osteoarthritis involving the right hip     Depression--continue antidepressant therapy at this time.    Anxiety neurosis as needed Xanax    Hypertension--controlled.  Continue metoprolol and losartan    Tobacco abuse--continue to encourage cessation.    GERD--Protonix        VTE Prophylaxis:   Mechanical Order History:     None      Pharmalogical Order History:      Ordered     Dose Route Frequency Stop    02/22/21 3494  heparin (porcine) 5000 UNIT/ML injection 5,000 Units      5,000 Units SC Every 12 Hours Scheduled --                    Rickie Haney MD  [facility]  03/09/21  10:49 EST

## 2021-03-09 NOTE — PROGRESS NOTES
Occupational Therapy:    Physical Therapy: Individual: 90 minutes.    Speech Language Pathology:    Signed by: Zoila Morse, Physical Therapist

## 2021-03-09 NOTE — THERAPY TREATMENT NOTE
Inpatient Rehabilitation - Occupational Therapy Progress Note     [facility]     Patient Name: Valerie Moore  : 1955  MRN: [id number]    Today's Date: 3/9/2021                 Admit Date: 2021       No diagnosis found.    Patient Active Problem List   Diagnosis   • Anxiety   • Hypertension   • Hypercholesteremia   • Seasonal allergies   • Sleep disorder   • GERD (gastroesophageal reflux disease)   • Arthritis   • Airway hyperreactivity   • Gout   • Weakness   • CAP (community acquired pneumonia)   • Hypokalemia   • Hyponatremia   • Hyperglycemia   • Normocytic anemia   • Abdominal aortic aneurysm (AAA) 3.0 cm to 5.5 cm in diameter in male (CMS/HCC)   • Tobacco abuse   • Chest pain, atypical   • Cerebrovascular accident (CVA) (CMS/HCC)   • CVA (cerebral vascular accident) (CMS/HCC)       Past Medical History:   Diagnosis Date   • Anxiety    • GERD (gastroesophageal reflux disease) 2017   • Hypercholesteremia    • Hypertension    • Seasonal allergies    • Sleep disorder    • Stroke (CMS/HCC)        Past Surgical History:   Procedure Laterality Date   • HERNIA REPAIR     • HIP SURGERY Right     plate and 8 screws in right hip                 IRF OT ASSESSMENT FLOWSHEET (last 12 hours)      IRF OT Evaluation and Treatment     Row Name 21 1501          OT Time and Intention    Document Type  progress note;daily treatment  -BF     Mode of Treatment  occupational therapy  -BF     Patient Effort  good  -BF     Row Name 21 1501          General Information    Existing Precautions/Restrictions  fall;oxygen therapy device and L/min R hemiplegia; O2 as needed  -BF     Row Name 21 1501          Cognition/Psychosocial    Orientation Status (Cognition)  oriented x 3  -BF     Follows Commands (Cognition)  follows one-step commands;verbal cues/prompting required;repetition of directions required;physical/tactile prompts required  -BF     Row Name 21 1501          General Upper Extremity  Assessment (Range of Motion)    Comment: Upper Extremity ROM  RUE shoulder trace, elbow <25%, hand 75% flexion, no extension, no wrist AROM; LUE AROM WFL  -BF     Row Name 03/09/21 1501          Strength Comprehensive (MMT)    Comment, General Manual Muscle Testing (MMT) Assessment  R shoulder 1/5, R elbow 2/5, R hand 3-/5  -BF     Row Name 03/09/21 1501          Transfers    Chair-Bed Olive (Transfers)  contact guard;verbal cues;nonverbal cues (demo/gesture)  -BF     Row Name 03/09/21 1501          Chair-Bed Transfer    Assistive Device (Chair-Bed Transfers)  wheelchair bed rails  -BF     Row Name 03/09/21 1501          Motor Skills    Neuromuscular Function  right;upper extremity  -BF     Motor Control/Coordination Interventions  gross motor coordination activities;therapeutic exercise/ROM;bilateral/bimanual training JODI ECHEVERRIA NDT; EDUARDO baer 15 lbs [id number]  -BF     Gross Motor Skill, Impairments Detail  BUE arm bike, isidroaw 5 lbs w/ RUE supported  -BF     Row Name 03/09/21 1501          Neuromuscular Re-education    Interventions (Neuromuscular Re-education)  facilitation/inhibition;massage;tapping RUE  -BF     Row Name 03/09/21 1501          Bathing    Comment (Bathing)  Mod A  -BF     Row Name 03/09/21 1501          Upper Body Dressing    Comment (Upper Body Dressing)  Mod A  -BF     Row Name 03/09/21 1501          Lower Body Dressing    Comment (Lower Body Dressing)  Mod A  -BF     Row Name 03/09/21 1501          Grooming    Comment (Grooming)  Min A  -BF     Row Name 03/09/21 1501          Toileting    Comment (Toileting)  Total A  -BF     Row Name 03/09/21 1501          Self-Feeding    Comment (Self-Feeding)  Set-up  -BF     Row Name 03/09/21 1501          Positioning and Restraints    In Bed  supine;call light within reach;encouraged to call for assist;RUE elevated after both sessions  -BF       User Key  (r) = Recorded By, (t) = Taken By, (c) = Cosigned By    Initials Name Effective Dates    BF  Meghan Carbajal OT 07/05/20 -            Occupational Therapy Education                 Title: PT OT SLP Therapies (Done)     Topic: Occupational Therapy (Done)     Point: ADL training (Done)     Description:   Instruct learner(s) on proper safety adaptation and remediation techniques during self care or transfers.   Instruct in proper use of assistive devices.              Learning Progress Summary           Patient Acceptance, E, VU,NR by BF at 3/9/2021 1500    Acceptance, E, VU,NR by BF at 3/8/2021 1414    Acceptance, E, VU,NR by BF at 3/5/2021 1231    Acceptance, E, VU,NR by BF at 3/4/2021 1237    Acceptance, E, VU,NR by BF at 3/2/2021 1445    Acceptance, E, VU,NR by BF at 3/1/2021 1432    Acceptance, E, VU,NR by BF at 2/27/2021 1110    Acceptance, E, VU,NR by BF at 2/25/2021 1425    Acceptance, E, VU,NR by BF at 2/23/2021 1548                   Point: Precautions (Done)     Description:   Instruct learner(s) on prescribed precautions during self-care and functional transfers.              Learning Progress Summary           Patient Acceptance, E, VU,NR by BF at 3/9/2021 1500    Acceptance, E, VU,NR by BF at 3/8/2021 1414    Acceptance, E, VU,NR by BF at 3/5/2021 1231    Acceptance, E, VU,NR by BF at 3/4/2021 1237    Acceptance, E, VU,NR by BF at 3/2/2021 1445    Acceptance, E, VU,NR by BF at 3/1/2021 1432    Acceptance, E, VU,NR by BF at 2/27/2021 1110    Acceptance, E, VU,NR by BF at 2/25/2021 1425    Acceptance, E, VU,NR by BF at 2/23/2021 1548                               User Key     Initials Effective Dates Name Provider Type Discipline     07/05/20 -  Meghan Carbajal OT Occupational Therapist OT                    OT Recommendation and Plan    Planned Therapy Interventions (OT): activity tolerance training, adaptive equipment training, BADL retraining, neuromuscular control/coordination retraining, passive ROM/stretching, ROM/therapeutic exercise, strengthening exercise,  transfer/mobility retraining                    Time Calculation:     Time Calculation- OT     Row Name 03/09/21 1515 03/09/21 0940          Time Calculation- OT    OT Start Time  1230  -BF  0940  -BF     OT Stop Time  1330  -BF  1010  -BF     OT Time Calculation (min)  60 min  -BF  30 min  -BF     Total Timed Code Minutes- OT  60 minute(s)  -BF  30 minute(s)  -BF     OT Non-Billable Time (min)  --  20 min  -BF       User Key  (r) = Recorded By, (t) = Taken By, (c) = Cosigned By    Initials Name Provider Type    BF Meghan Carbajal OT Occupational Therapist        Therapy Charges for Today     Code Description Service Date Service Provider Modifiers Qty    71582235547 HC OT SELF CARE/MGMT/TRAIN EA 15 MIN 3/8/2021 Meghan Carbajal, OT GO 1    65691170821 HC OT NEUROMUSC RE EDUCATION EA 15 MIN 3/8/2021 Meghan Carbajal, OT GO 3    22144158520 HC OT THER PROC EA 15 MIN 3/8/2021 Meghan Carbajal, OT GO 2    03372718218 HC OT SELF CARE/MGMT/TRAIN EA 15 MIN 3/9/2021 Meghan Carbajal, OT GO 1    95235391610 HC OT THER PROC EA 15 MIN 3/9/2021 Meghan Carbajal, OT GO 2    84400448397 HC OT NEUROMUSC RE EDUCATION EA 15 MIN 3/9/2021 Meghan Carbajal, OT GO 3                   Meghan Carbajal OT  3/9/2021

## 2021-03-09 NOTE — THERAPY RE-EVALUATION
Inpatient Rehabilitation - Physical Therapy Re-Evaluation        [place]     Patient Name: Valerie Moore  : 1955  MRN: [id number]    Today's Date: 3/9/2021                    Admit Date: 2021      Visit Dx: No diagnosis found.    Patient Active Problem List   Diagnosis   • Anxiety   • Hypertension   • Hypercholesteremia   • Seasonal allergies   • Sleep disorder   • GERD (gastroesophageal reflux disease)   • Arthritis   • Airway hyperreactivity   • Gout   • Weakness   • CAP (community acquired pneumonia)   • Hypokalemia   • Hyponatremia   • Hyperglycemia   • Normocytic anemia   • Abdominal aortic aneurysm (AAA) 3.0 cm to 5.5 cm in diameter in male (CMS/HCC)   • Tobacco abuse   • Chest pain, atypical   • Cerebrovascular accident (CVA) (CMS/HCC)   • CVA (cerebral vascular accident) (CMS/HCC)       Past Medical History:   Diagnosis Date   • Anxiety    • GERD (gastroesophageal reflux disease) 2017   • Hypercholesteremia    • Hypertension    • Seasonal allergies    • Sleep disorder    • Stroke (CMS/HCC)        Past Surgical History:   Procedure Laterality Date   • HERNIA REPAIR     • HIP SURGERY Right     plate and 8 screws in right hip           PT ASSESSMENT (last 12 hours)      IRF PT Evaluation and Treatment     Row Name 21 1100          PT Time and Intention    Document Type  re-evaluation;daily treatment  -AG     Mode of Treatment  individual therapy;physical therapy  -AG     Patient/Family/Caregiver Comments/Observations  supine in bed prior to both sessions; reports fatigue; c/o R hip pain and instability  -AG     Row Name 21 1100          General Information    Existing Precautions/Restrictions  fall;oxygen therapy device and L/min R hemiplegia  -AG     Row Name 21 1100          Cognition/Psychosocial    Affect/Mental Status (Cognitive)  WFL  -AG     Orientation Status (Cognition)  oriented x 3  -AG     Follows Commands (Cognition)  follows one-step commands;verbal  cues/prompting required  -     Personal Safety Interventions  fall prevention program maintained;gait belt;nonskid shoes/slippers when out of bed;supervised activity  -     Row Name 03/09/21 1100          Pain Scale: FACES Pre/Post-Treatment    Pain: FACES Scale, Pretreatment  0-->no hurt  -AG     Posttreatment Pain Rating  2-->hurts little bit  -AG     Pain Location - Side  Right  -AG     Pain Location  hip  -     Row Name 03/09/21 1100          Strength (Manual Muscle Testing)    Additional Documentation  -- R quads2-/5; R hamstrings 1/5; R add 2-/5; ant tib 0/5  -     Row Name 03/09/21 1100          Mobility    Left Lower Extremity (Weight-bearing Status)  full weight-bearing (FWB)  -AG     Right Lower Extremity (Weight-bearing Status)  full weight-bearing (FWB)  -     Row Name 03/09/21 1100          Bed Mobility    Scooting/Bridging [place] (Bed Mobility)  verbal cues;nonverbal cues (demo/gesture);contact guard  -     Supine-Sit [place] (Bed Mobility)  standby assist;verbal cues  -     Bed Mobility, Safety Issues  decreased use of arms for pushing/pulling;decreased use of legs for bridging/pushing  -     Assistive Device (Bed Mobility)  bed rails;draw sheet;head of bed elevated  -     Row Name 03/09/21 1100          Transfer Assessment/Treatment    Transfers  bed-chair transfer;chair-bed transfer;sit-stand transfer;stand-sit transfer;stand pivot/stand step transfer  -     Row Name 03/09/21 1100          Transfers    Bed-Chair [place] (Transfers)  verbal cues;nonverbal cues (demo/gesture);contact guard;minimum assist (75% patient effort) requires min A transferring toward hemiparetic side  -     Chair-Bed [place] (Transfers)  verbal cues;nonverbal cues (demo/gesture);contact guard;minimum assist (75% patient effort)  -     Assistive Device (Bed-Chair Transfers)  wheelchair  -AG     Sit-Stand [place] (Transfers)  verbal cues;nonverbal cues (demo/gesture);contact  guard  -AG     Stand-Sit [place] (Transfers)  verbal cues;nonverbal cues (demo/gesture);contact guard;minimum assist (75% patient effort)  -AG     Row Name 03/09/21 1100          Chair-Bed Transfer    Assistive Device (Chair-Bed Transfers)  wheelchair  -AG     Row Name 03/09/21 1100          Sit-Stand Transfer    Assistive Device (Sit-Stand Transfers)  walker, teodora;wheelchair;other (see comments) parallel bars  -AG     Row Name 03/09/21 1100          Stand-Sit Transfer    Assistive Device (Stand-Sit Transfers)  walker, teodora;wheelchair;other (see comments) parallel bars  -AG     Row Name 03/09/21 1100          Stand Pivot/Stand Step Transfer    Stand Pivot/Stand Step [place] (Transfers)  verbal cues;nonverbal cues (demo/gesture);contact guard;minimum assist (75% patient effort)  -AG     Assistive Device (Stand Pivot Stand Step Transfer)  wheelchair  -AG     Row Name 03/09/21 1100          Gait/Stairs (Locomotion)    [place] Level (Gait)  verbal cues;nonverbal cues (demo/gesture);maximum assist (25% patient effort);2 person assist  -AG     Assistive Device (Gait)  parallel bars;walker, teodora  -AG     Distance in Feet (Gait)  4 steps in p-bars; 5 steps w/ hemiwalker  -AG     Pattern (Gait)  step-to  -AG     Deviations/Abnormal Patterns (Gait)  antalgic;base of support, narrow;weight shifting decreased  -AG     Right Sided Gait Deviations  foot drop/toe drag;forward flexed posture;heel strike decreased;knee buckling, right side;weight shift ability decreased  -AG     Comment (Gait/Stairs)  R hip, knee pain w/ weighbearing; reluctance to load weight onto R LE resulting in significant lean to L.  Pt. required manually block/ stabilization of R knee manual as well as physical advancement of R LE w/ all gait.   -AG     Row Name 03/09/21 1100          Safety Issues, Functional Mobility    Safety Issues Affecting Function (Mobility)  positioning of assistive device  -AG     Impairments Affecting Function  (Mobility)  balance;coordination;endurance/activity tolerance;grasp;motor control;muscle tone abnormal;pain;postural/trunk control;range of motion (ROM);shortness of breath;strength  -     Row Name 03/09/21 1100          Balance    Balance Assessment  sitting static balance;sitting dynamic balance;sit to stand dynamic balance;standing static balance;standing dynamic balance  -     Static Sitting Balance  WFL;unsupported;sitting in chair;sitting, edge of bed;sitting, edge of mat  -     Dynamic Sitting Balance  mild impairment;unsupported;sitting, edge of mat  -     Static Standing Balance  moderate impairment;supported;standing in parallel bars and at bedside  -     Dynamic Standing Balance  severe impairment;supported;standing;asymmetrical weight shifting  -     Comment, Balance  stood in parallel bars-stepping forward, backward w/ affected LE; weightshifting in parallel bars.   -     Row Name 03/09/21 1100          Motor Skills    Therapeutic Exercise  hip;knee;ankle  -[facility] Name 03/09/21 1100          Hip (Therapeutic Exercise)    Hip (Therapeutic Exercise)  isometric exercises;strengthening exercise  -     Hip PROM (Therapeutic Exercise)  bilateral;flexion;extension;external rotation;internal rotation;sitting  -     Hip Isometrics (Therapeutic Exercise)  bilateral;aBduction;aDduction;sitting  -     Row Name 03/09/21 1100          Knee (Therapeutic Exercise)    Knee Strengthening (Therapeutic Exercise)  right;LAQ (long arc quad);hamstring curls;sitting use of skateboard to assist w/ controlled hams curls  -     Row Name 03/09/21 1100          Ankle (Therapeutic Exercise)    Ankle PROM (Therapeutic Exercise)  -- R heel cord stretch  -     Row Name 03/09/21 1100          Neuromuscular Re-education    Positioning (Neuromuscular Re-education)  standing;supported  -     Row Name 03/09/21 1100          Bed Mobility Goal 1 (PT-IRF)    Progress/Outcomes (Bed Mobility Goal 1, PT-IRF)  goal  met  -AG     Row Name 03/09/21 1100          Bed Mobility Goal 2 (PT-IRF)    Progress/Outcomes (Bed Mobility Goal 2, PT-IRF)  goal partially met goal partially met (inconsistent)  -AG     Row Name 03/09/21 1100          Transfer Goal 1 (PT-IRF)    Progress/Outcomes (Transfer Goal 1, PT-IRF)  goal met  -AG     Row Name 03/09/21 1100          Transfer Goal 2 (PT-IRF)    Progress/Outcomes (Transfer Goal 2, PT-IRF)  goal ongoing  -AG     Row Name 03/09/21 1100          Gait/Walking Locomotion Goal 1 (PT-IRF)    Progress/Outcomes (Gait/Walking Locomotion Goal 1, PT-IRF)  goal ongoing  -AG     Row Name 03/09/21 1100          Gait/Walking Locomotion Goal 2 (PT-IRF)    Progress/Outcomes (Gait/Walking Locomotion Goal 2, PT-IRF)  goal ongoing  -AG     Row Name 03/09/21 1100          Positioning and Restraints    Pre-Treatment Position  in bed  -AG     Post Treatment Position  wheelchair  -AG     In Wheelchair  notified nsg;sitting;call light within reach;encouraged to call for assist;RUE elevated;legs elevated  -AG     Row Name 03/09/21 1100          Vital Signs    Intratreatment Heart Rate (beats/min)  48 RN notified  -AG     Intra SpO2 (%)  98  -AG     O2 Delivery Intra Treatment  room air  -AG     Intra Patient Position  Sitting  -AG     Row Name 03/09/21 1100          Therapy Assessment/Plan (PT)    Patient's Goals For Discharge  return home  -     Row Name 03/09/21 1100          Daily Progress Summary (PT)    Functional Goal Overall Progress (PT)  progressing toward functional goals slower than expected  -AG     Daily Progress Summary (PT)  progressing slowly w/ functional mobilty and R LE strength.   -AG     Impairments Still Limiting Function (PT)  muscle tone abnormal;ROM decreased;strength decreased;impaired balance;coordination impaired;impaired functional activity tolerance;motor control impaired;postural control impaired;pain  -AG     Recommendations (PT)  continue POC, remaining goals  -AG     Row Name 03/09/21  1100          Therapy Plan Review/Discharge Plan (PT)    Therapy Plan Review (PT)  evaluation/treatment results reviewed;care plan/treatment goals reviewed;risks/benefits reviewed;current/potential barriers reviewed;participants voiced agreement with care plan;participants included;patient  -AG     Expected Discharge Disposition (PT Eval)  home with caregiver;home with home health care  -AG       User Key  (r) = Recorded By, (t) = Taken By, (c) = Cosigned By    Initials Name Provider Type    Zoila Weaver, PT Physical Therapist           Physical Therapy Education                 Title: PT OT SLP Therapies (Done)     Topic: Physical Therapy (Done)     Point: Mobility training (Done)     Learning Progress Summary           Patient Acceptance, E,D, VU,NR by AG at 3/9/2021 1144    Acceptance, E,TB, VU by RF at 3/8/2021 1613    Acceptance, E,TB, VU by RF at 3/5/2021 1605    Acceptance, E, VU by CW at 3/4/2021 1416    Acceptance, E,D, VU,NR by LL at 3/3/2021 1101    Acceptance, E,D, VU,NR by LL at 3/2/2021 1517    Acceptance, E,D, VU,NR by LL at 3/1/2021 1016    Acceptance, E,TB, VU by CT at 2/27/2021 1228    Acceptance, E,D, VU,NR by LL at 2/26/2021 1829    Acceptance, E,D, VU,NR by LL at 2/25/2021 1541    Acceptance, E, VU,NR by LB at 2/23/2021 1604                   Point: Home exercise program (Done)     Learning Progress Summary           Patient Acceptance, E,D, VU,NR by AG at 3/9/2021 1144    Acceptance, E,TB, VU by RF at 3/8/2021 1613    Acceptance, E,TB, VU by RF at 3/5/2021 1605    Acceptance, E, VU by CW at 3/4/2021 1416    Acceptance, E,D, VU,NR by LL at 3/3/2021 1101    Acceptance, E,D, VU,NR by LL at 3/2/2021 1517    Acceptance, E,D, VU,NR by LL at 3/1/2021 1016    Acceptance, E,TB, VU by CT at 2/27/2021 1228    Acceptance, E,D, VU,NR by LL at 2/26/2021 1829    Acceptance, E,D, VU,NR by LL at 2/25/2021 1541    Acceptance, E, VU,NR by LB at 2/23/2021 1604                   Point: Body mechanics (Done)      Learning Progress Summary           Patient Acceptance, E,D, VU,NR by AG at 3/9/2021 1144    Acceptance, E,TB, VU by RF at 3/8/2021 1613    Acceptance, E,TB, VU by RF at 3/5/2021 1605    Acceptance, E, VU by CW at 3/4/2021 1416    Acceptance, E,D, VU,NR by LL at 3/3/2021 1101    Acceptance, E,D, VU,NR by LL at 3/2/2021 1517    Acceptance, E,D, VU,NR by LL at 3/1/2021 1016    Acceptance, E,TB, VU by CT at 2/27/2021 1228    Acceptance, E,D, VU,NR by LL at 2/26/2021 1829    Acceptance, E,D, VU,NR by LL at 2/25/2021 1541    Acceptance, E, VU,NR by LB at 2/23/2021 1604                   Point: Precautions (Done)     Learning Progress Summary           Patient Acceptance, E,D, VU,NR by AG at 3/9/2021 1144    Acceptance, E,TB, VU by RF at 3/8/2021 1613    Acceptance, E,TB, VU by RF at 3/5/2021 1605    Acceptance, E, VU by CW at 3/4/2021 1416    Acceptance, E,D, VU,NR by LL at 3/3/2021 1101    Acceptance, E,D, VU,NR by LL at 3/2/2021 1517    Acceptance, E,D, VU,NR by LL at 3/1/2021 1016    Acceptance, E,TB, VU by CT at 2/27/2021 1228    Acceptance, E,D, VU,NR by LL at 2/26/2021 1829    Acceptance, E,D, VU,NR by LL at 2/25/2021 1541    Acceptance, E, VU,NR by LB at 2/23/2021 1604                               User Key     Initials Effective Dates Name Provider Type Discipline    LB 03/14/16 -  Cathleen Carnes, PT Physical Therapist PT    AG 04/03/18 -  Zoila Morse, PT Physical Therapist PT    CT 04/03/18 -  Nilsa Bermudez, PT Physical Therapist PT    LL 05/02/16 -  Alise Handy, PTA Physical Therapy Assistant PT    RF 03/07/18 -  Pooja Mcqueen PTA Physical Therapy Assistant PT    CW 09/11/20 -  Lucas Baker, PT Physical Therapist PT                PT Recommendation and Plan: patient has met bed mobility and transfer STG; has demonstrated improved strength R LE and able to initiate gait training in parallel bars.  Pt. Still requires continued skilled PT to address remaining goals and achieve optimal  safe function.           Daily Progress Summary (PT)  Functional Goal Overall Progress (PT): progressing toward functional goals slower than expected  Daily Progress Summary (PT): progressing slowly w/ functional mobilty and R LE strength.   Impairments Still Limiting Function (PT): muscle tone abnormal, ROM decreased, strength decreased, impaired balance, coordination impaired, impaired functional activity tolerance, motor control impaired, postural control impaired, pain  Recommendations (PT): continue POC, remaining goals               Time Calculation:     PT Charges     Row Name 03/09/21 1145 03/09/21 1144          Time Calculation    Start Time  1050  -AG  0835  -AG     Stop Time  1135  -AG  0920  -AG     Time Calculation (min)  45 min  -AG  45 min  -AG     PT Received On  --  03/09/21  -     PT - Next Appointment  --  03/10/21  -     PT Goal Re-Cert Due Date  --  03/16/21  -       User Key  (r) = Recorded By, (t) = Taken By, (c) = Cosigned By    Initials Name Provider Type     Zoila Morse, PT Physical Therapist          Therapy Charges for Today     Code Description Service Date Service Provider Modifiers Qty    11924749467 HC PT THER PROC EA 15 MIN 3/9/2021 Zoila Morse, PT GP 2    44082398954 HC GAIT TRAINING EA 15 MIN 3/9/2021 Zoila Morse, PT GP 2    33736142313  PT THERAPEUTIC ACT EA 15 MIN 3/9/2021 Zoila Morse, PT GP 2                   Zoila Morse, PT  3/9/2021

## 2021-03-10 PROCEDURE — 99231 SBSQ HOSP IP/OBS SF/LOW 25: CPT | Performed by: FAMILY MEDICINE

## 2021-03-10 PROCEDURE — 97112 NEUROMUSCULAR REEDUCATION: CPT

## 2021-03-10 PROCEDURE — 97530 THERAPEUTIC ACTIVITIES: CPT

## 2021-03-10 PROCEDURE — 97110 THERAPEUTIC EXERCISES: CPT

## 2021-03-10 PROCEDURE — 94799 UNLISTED PULMONARY SVC/PX: CPT

## 2021-03-10 PROCEDURE — 25010000002 HEPARIN (PORCINE) PER 1000 UNITS: Performed by: FAMILY MEDICINE

## 2021-03-10 PROCEDURE — 97116 GAIT TRAINING THERAPY: CPT

## 2021-03-10 PROCEDURE — 97150 GROUP THERAPEUTIC PROCEDURES: CPT

## 2021-03-10 PROCEDURE — 97535 SELF CARE MNGMENT TRAINING: CPT

## 2021-03-10 RX ADMIN — GABAPENTIN 200 MG: 100 CAPSULE ORAL at 08:21

## 2021-03-10 RX ADMIN — IPRATROPIUM BROMIDE AND ALBUTEROL SULFATE 3 ML: .5; 3 SOLUTION RESPIRATORY (INHALATION) at 08:14

## 2021-03-10 RX ADMIN — BISACODYL 10 MG: 5 TABLET, COATED ORAL at 20:49

## 2021-03-10 RX ADMIN — IPRATROPIUM BROMIDE AND ALBUTEROL SULFATE 3 ML: .5; 3 SOLUTION RESPIRATORY (INHALATION) at 19:18

## 2021-03-10 RX ADMIN — METOPROLOL TARTRATE 50 MG: 50 TABLET, FILM COATED ORAL at 08:20

## 2021-03-10 RX ADMIN — CARISOPRODOL 350 MG: 350 TABLET ORAL at 08:21

## 2021-03-10 RX ADMIN — POLYETHYLENE GLYCOL (3350) 17 G: 17 POWDER, FOR SOLUTION ORAL at 08:20

## 2021-03-10 RX ADMIN — ATORVASTATIN CALCIUM 80 MG: 40 TABLET, FILM COATED ORAL at 20:51

## 2021-03-10 RX ADMIN — HYDROCODONE BITARTRATE AND ACETAMINOPHEN 1 TABLET: 10; 325 TABLET ORAL at 06:40

## 2021-03-10 RX ADMIN — CLOPIDOGREL 75 MG: 75 TABLET, FILM COATED ORAL at 08:20

## 2021-03-10 RX ADMIN — ASPIRIN 81 MG: 81 TABLET, COATED ORAL at 08:20

## 2021-03-10 RX ADMIN — HEPARIN SODIUM 5000 UNITS: 5000 INJECTION INTRAVENOUS; SUBCUTANEOUS at 08:20

## 2021-03-10 RX ADMIN — GABAPENTIN 200 MG: 100 CAPSULE ORAL at 20:49

## 2021-03-10 RX ADMIN — HEPARIN SODIUM 5000 UNITS: 5000 INJECTION INTRAVENOUS; SUBCUTANEOUS at 20:50

## 2021-03-10 RX ADMIN — Medication 1 TABLET: at 08:20

## 2021-03-10 RX ADMIN — LOSARTAN POTASSIUM 50 MG: 50 TABLET, FILM COATED ORAL at 08:20

## 2021-03-10 RX ADMIN — FLUTICASONE PROPIONATE 2 SPRAY: 50 SPRAY, METERED NASAL at 08:20

## 2021-03-10 RX ADMIN — BUPROPION HYDROCHLORIDE 150 MG: 150 TABLET, EXTENDED RELEASE ORAL at 08:20

## 2021-03-10 RX ADMIN — HYDROCODONE BITARTRATE AND ACETAMINOPHEN 1 TABLET: 10; 325 TABLET ORAL at 20:49

## 2021-03-10 RX ADMIN — ALPRAZOLAM 0.5 MG: 0.5 TABLET ORAL at 08:21

## 2021-03-10 RX ADMIN — Medication 1 PATCH: at 08:20

## 2021-03-10 RX ADMIN — BUPROPION HYDROCHLORIDE 150 MG: 150 TABLET, EXTENDED RELEASE ORAL at 20:51

## 2021-03-10 RX ADMIN — ALPRAZOLAM 0.5 MG: 0.5 TABLET ORAL at 20:49

## 2021-03-10 RX ADMIN — CARISOPRODOL 350 MG: 350 TABLET ORAL at 20:49

## 2021-03-10 RX ADMIN — PANTOPRAZOLE SODIUM 40 MG: 40 TABLET, DELAYED RELEASE ORAL at 05:28

## 2021-03-10 RX ADMIN — MIRTAZAPINE 15 MG: 15 TABLET, FILM COATED ORAL at 20:51

## 2021-03-10 RX ADMIN — HYDROCODONE BITARTRATE AND ACETAMINOPHEN 1 TABLET: 10; 325 TABLET ORAL at 14:29

## 2021-03-10 NOTE — THERAPY TREATMENT NOTE
Inpatient Rehabilitation - Occupational Therapy Treatment Note    [place]     Patient Name: Valerie Moore  : 1955  MRN: [id number]    Today's Date: 3/10/2021                 Admit Date: 2021       No diagnosis found.    Patient Active Problem List   Diagnosis   • Anxiety   • Hypertension   • Hypercholesteremia   • Seasonal allergies   • Sleep disorder   • GERD (gastroesophageal reflux disease)   • Arthritis   • Airway hyperreactivity   • Gout   • Weakness   • CAP (community acquired pneumonia)   • Hypokalemia   • Hyponatremia   • Hyperglycemia   • Normocytic anemia   • Abdominal aortic aneurysm (AAA) 3.0 cm to 5.5 cm in diameter in male (CMS/HCC)   • Tobacco abuse   • Chest pain, atypical   • Cerebrovascular accident (CVA) (CMS/HCC)   • CVA (cerebral vascular accident) (CMS/HCC)       Past Medical History:   Diagnosis Date   • Anxiety    • GERD (gastroesophageal reflux disease) 2017   • Hypercholesteremia    • Hypertension    • Seasonal allergies    • Sleep disorder    • Stroke (CMS/HCC)        Past Surgical History:   Procedure Laterality Date   • HERNIA REPAIR     • HIP SURGERY Right     plate and 8 screws in right hip                 IRF OT ASSESSMENT FLOWSHEET (last 12 hours)      IRF OT Evaluation and Treatment     Row Name 03/10/21 1036          OT Time and Intention    Document Type  daily treatment  -AB     Mode of Treatment  occupational therapy  -AB     Patient Effort  excellent  -AB     Symptoms Noted During/After Treatment  fatigue  -AB     Row Name 03/10/21 1036          General Information    Patient/Family/Caregiver Comments/Observations  Pt. agreeable to tx this date.   -AB     General Observations of Patient  alert/cooperative  -AB     Existing Precautions/Restrictions  fall;other (see comments) R hemiparesis  -AB     Limitations/Impairments  insensate body part  -AB     Row Name 03/10/21 1036          Cognition/Psychosocial    Affect/Mental Status (Cognitive)  WFL  -AB      Follows Commands (Cognition)  verbal cues/prompting required;physical/tactile prompts required  -AB     Personal Safety Interventions  fall prevention program maintained;gait belt;nonskid shoes/slippers when out of bed;supervised activity  -AB     Row Name 03/10/21 1036          Transfers    Bed-Chair [place] (Transfers)  minimum assist (75% patient effort);verbal cues;nonverbal cues (demo/gesture)  -AB     Assistive Device (Bed-Chair Transfers)  wheelchair  -AB     Row Name 03/10/21 1036          Motor Skills    Neuromuscular Function  right;upper extremity;flexion synergy pattern  -AB     Motor Control/Coordination Interventions  bilateral/bimanual training;therapeutic exercise/ROM;weight-bearing activities;neuro-muscular re-education;gross motor coordination activities BUE CoordEx; RUE GMC TherEx/Act; neuro re-ed, wt. bearing  -AB     Gross Motor Skill, Impairments Detail  Rick[place]aw(5lbs): RUE only while supported, then Rick[place]aw(15lbs): BUE w/ RUE supported  -AB     Row Name 03/10/21 1036          Neuromuscular Re-education    Interventions (Neuromuscular Re-education)  deep pressure awareness;facilitation/inhibition;inhibitory positioning;isolation;tapping;weight bearing;massage RUE  -AB     Positioning (Neuromuscular Re-education)  sitting;supported  -AB     Row Name 03/10/21 1036          Lower Body Dressing    [place] Level (Lower Body Dressing)  moderate assist (50% patient effort);verbal cues;nonverbal cues (demo/gesture)  -AB     Position (Lower Body Dressing)  supine;sitting up in bed  -AB     Row Name 03/10/21 1036          Grooming    [place] Level (Grooming)  minimum assist (75% patient effort);verbal cues;nonverbal cues (demo/gesture)  -AB     Position (Grooming)  sink side;supported sitting  -AB     Row Name 03/10/21 1036          Positioning and Restraints    Post Treatment Position  wheelchair  -AB     In Wheelchair  sitting;with PT;legs elevated;RUE elevated  -AB       User Key  (r)  = Recorded By, (t) = Taken By, (c) = Cosigned By    Initials Name Effective Dates    AB Esau Patricia Drewth, OT 04/03/18 -            Occupational Therapy Education                 Title: PT OT SLP Therapies (Done)     Topic: Occupational Therapy (Done)     Point: ADL training (Done)     Description:   Instruct learner(s) on proper safety adaptation and remediation techniques during self care or transfers.   Instruct in proper use of assistive devices.              Learning Progress Summary           Patient Acceptance, E,D, VU,DU,NR by AB at 3/10/2021 1042    Acceptance, E, VU,NR by BF at 3/9/2021 1500    Acceptance, E, VU,NR by BF at 3/8/2021 1414    Acceptance, E, VU,NR by BF at 3/5/2021 1231    Acceptance, E, VU,NR by BF at 3/4/2021 1237    Acceptance, E, VU,NR by BF at 3/2/2021 1445    Acceptance, E, VU,NR by BF at 3/1/2021 1432    Acceptance, E, VU,NR by BF at 2/27/2021 1110    Acceptance, E, VU,NR by BF at 2/25/2021 1425    Acceptance, E, VU,NR by BF at 2/23/2021 1548                   Point: Precautions (Done)     Description:   Instruct learner(s) on prescribed precautions during self-care and functional transfers.              Learning Progress Summary           Patient Acceptance, E,D, VU,DU,NR by AB at 3/10/2021 1042    Acceptance, E, VU,NR by BF at 3/9/2021 1500    Acceptance, E, VU,NR by BF at 3/8/2021 1414    Acceptance, E, VU,NR by BF at 3/5/2021 1231    Acceptance, E, VU,NR by BF at 3/4/2021 1237    Acceptance, E, VU,NR by BF at 3/2/2021 1445    Acceptance, E, VU,NR by BF at 3/1/2021 1432    Acceptance, E, VU,NR by BF at 2/27/2021 1110    Acceptance, E, VU,NR by BF at 2/25/2021 1425    Acceptance, E, VU,NR by BF at 2/23/2021 1548                               User Key     Initials Effective Dates Name Provider Type Discipline    AB 04/03/18 -  Patricia Cifuentes, OT Occupational Therapist OT    BF 07/05/20 -  Meghan Carbajal, OT Occupational Therapist OT                    OT  Recommendation and Plan                         Time Calculation:     Time Calculation- OT     Row Name 03/10/21 1043             Time Calculation- OT    OT Start Time  0830  -AB      OT Stop Time  1000  -AB      OT Time Calculation (min)  90 min  -AB      Total Timed Code Minutes- OT  90 minute(s)  -AB      OT Non-Billable Time (min)  25 min  -AB         OT KX Modifier    Exception criteria met to exceed therapy cap  Apply KX Modifier  -AB        User Key  (r) = Recorded By, (t) = Taken By, (c) = Cosigned By    Initials Name Provider Type    AB Patricia Cifuentes, OT Occupational Therapist        Therapy Charges for Today     Code Description Service Date Service Provider Modifiers Qty    37862436113 HC OT SELF CARE/MGMT/TRAIN EA 15 MIN 3/10/2021 Patricia Cifuentes OT GO, KX 1    13282924558 HC OT NEUROMUSC RE EDUCATION EA 15 MIN 3/10/2021 Patricia Cifuentes OT GO, KX 3    97716831688 HC OT THER PROC GROUP 3/10/2021 Patricia Cifuentes OT GO, KX 2                   Patricia Cifuentes OT  3/10/2021
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
## 2021-03-10 NOTE — PROGRESS NOTES
Rehabilitation Nursing  Inpatient Rehabilitation Plan of Care Note    Plan of Care  Body Function Structure    Skin Integrity (Active)  Current Status (2/23/2021 12:00:00 AM): free of skin breakdown this shift  Weekly Goal: free of skin breakdown this stay  Discharge Goal: home free of skin breakdown    Safety    Potential for Injury (Active)  Current Status (2/23/2021 12:00:00 AM): free from injury this shift  Weekly Goal: free from injury this stay  Discharge Goal: home free from injuryC    Signed by: Natalya Henriquez Nurse

## 2021-03-10 NOTE — THERAPY TREATMENT NOTE
Inpatient Rehabilitation - Physical Therapy Treatment Note        [facility]     Patient Name: Valerie Moore  : 1955  MRN: [id number]    Today's Date: 3/10/2021                    Admit Date: 2021      Visit Dx: No diagnosis found.    Patient Active Problem List   Diagnosis   • Anxiety   • Hypertension   • Hypercholesteremia   • Seasonal allergies   • Sleep disorder   • GERD (gastroesophageal reflux disease)   • Arthritis   • Airway hyperreactivity   • Gout   • Weakness   • CAP (community acquired pneumonia)   • Hypokalemia   • Hyponatremia   • Hyperglycemia   • Normocytic anemia   • Abdominal aortic aneurysm (AAA) 3.0 cm to 5.5 cm in diameter in male (CMS/HCC)   • Tobacco abuse   • Chest pain, atypical   • Cerebrovascular accident (CVA) (CMS/HCC)   • CVA (cerebral vascular accident) (CMS/HCC)       Past Medical History:   Diagnosis Date   • Anxiety    • GERD (gastroesophageal reflux disease) 2017   • Hypercholesteremia    • Hypertension    • Seasonal allergies    • Sleep disorder    • Stroke (CMS/HCC)        Past Surgical History:   Procedure Laterality Date   • HERNIA REPAIR     • HIP SURGERY Right     plate and 8 screws in right hip           PT ASSESSMENT (last 12 hours)      IRF PT Evaluation and Treatment     Row Name 03/10/21 1505 03/10/21 1459       PT Time and Intention    Document Type  daily treatment  -RG  daily treatment  (Pended)  AM treatment session  -AG    Mode of Treatment  individual therapy;physical therapy  -RG  individual therapy;physical therapy  (Pended)   -AG    Patient/Family/Caregiver Comments/Observations  Pt and nursing in agreement for skilled PT on this date.   -RG  pt. supine prior to session; room air; agreeable to participation.   (Pended)   -    Row Name 03/10/21 1505 03/10/21 1451       General Information    Existing Precautions/Restrictions  fall;oxygen therapy device and L/min R hemiplegia  -RG  fall  (Pended)  R hemiplegia  -AG    Row Name 03/10/21 1504  03/10/21 1459       Cognition/Psychosocial    Affect/Mental Status (Cognitive)  WFL  -RG  WFL  (Pended)   -AG    Orientation Status (Cognition)  oriented x 3  -RG  oriented x 3  (Pended)   -AG    Follows Commands (Cognition)  follows one-step commands;verbal cues/prompting required  -RG  repetition of directions required;verbal cues/prompting required  (Pended)   -AG    Personal Safety Interventions  gait belt;nonskid shoes/slippers when out of bed;fall prevention program maintained  -RG  fall prevention program maintained;gait belt;nonskid shoes/slippers when out of bed;supervised activity  (Pended)   -    Row Name 03/10/21 1505 03/10/21 1459       Pain Scale: FACES Pre/Post-Treatment    Pain: FACES Scale, Pretreatment  0-->no hurt  -RG  0-->no hurt  (Pended)   -AG    Posttreatment Pain Rating  2-->hurts little bit  -RG  2-->hurts little bit  (Pended)   -AG    Pain Location - Side  --  Right  (Pended)   -AG    Pain Location  --  hip  (Pended)   -    Row Name 03/10/21 1505 03/10/21 1459       Mobility    Left Lower Extremity (Weight-bearing Status)  full weight-bearing (FWB)  -RG  full weight-bearing (FWB)  (Pended)   -AG    Right Lower Extremity (Weight-bearing Status)  full weight-bearing (FWB)  -RG  full weight-bearing (FWB)  (Pended)   -    Row Name 03/10/21 1505 03/10/21 1459       Bed Mobility    Scooting/Bridging [place] (Bed Mobility)  verbal cues;nonverbal cues (demo/gesture);contact guard  -RG  verbal cues;nonverbal cues (demo/gesture);contact guard  (Pended)   -AG    Supine-Sit [place] (Bed Mobility)  standby assist;verbal cues  -RG  verbal cues;nonverbal cues (demo/gesture);minimum assist (75% patient effort)  (Pended)   -AG    Sit-Supine [place] (Bed Mobility)  --  standby assist;verbal cues;nonverbal cues (demo/gesture);contact guard  (Pended)   -AG    Bed Mobility, Safety Issues  decreased use of arms for pushing/pulling;decreased use of legs for bridging/pushing  -RG  decreased  use of arms for pushing/pulling;decreased use of legs for bridging/pushing  (Pended)   -AG    Assistive Device (Bed Mobility)  bed rails;draw sheet;head of bed elevated  -RG  bed rails;draw sheet;head of bed elevated  (Pended)   -    Row Name 03/10/21 1505 03/10/21 1459       Transfer Assessment/Treatment    Transfers  bed-chair transfer;chair-bed transfer;sit-stand transfer;stand-sit transfer;stand pivot/stand step transfer  -RG  bed-chair transfer;chair-bed transfer;sit-stand transfer;stand-sit transfer;stand pivot/stand step transfer  (Pended)   -    Row Name 03/10/21 1505 03/10/21 1459       Transfers    Bed-Chair [place] (Transfers)  verbal cues;nonverbal cues (demo/gesture);contact guard;minimum assist (75% patient effort) requires min A transferring toward hemiparetic side  -RG  verbal cues;nonverbal cues (demo/gesture);contact guard;minimum assist (75% patient effort)  (Pended)   -AG    Chair-Bed [place] (Transfers)  verbal cues;nonverbal cues (demo/gesture);contact guard;minimum assist (75% patient effort)  -RG  verbal cues;nonverbal cues (demo/gesture);contact guard  (Pended)   -AG    Assistive Device (Bed-Chair Transfers)  wheelchair  -RG  wheelchair  (Pended)   -AG    Sit-Stand [place] (Transfers)  verbal cues;nonverbal cues (demo/gesture);contact guard  -RG  verbal cues;nonverbal cues (demo/gesture);contact guard  (Pended)   -AG    Stand-Sit [place] (Transfers)  verbal cues;nonverbal cues (demo/gesture);contact guard;minimum assist (75% patient effort)  -RG  verbal cues;nonverbal cues (demo/gesture);contact guard  (Pended)   -    Row Name 03/10/21 1505 03/10/21 1459       Chair-Bed Transfer    Assistive Device (Chair-Bed Transfers)  wheelchair  -RG  wheelchair  (Pended)  bedrail  -    Row Name 03/10/21 1505 03/10/21 1459       Sit-Stand Transfer    Assistive Device (Sit-Stand Transfers)  walker, teodora;wheelchair;other (see comments) parallel bars  -RG  other (see comments)   (Pended)  parallel bars  -AG    Row Name 03/10/21 1505 03/10/21 1459       Stand-Sit Transfer    Assistive Device (Stand-Sit Transfers)  walker, teodora;wheelchair;other (see comments) parallel bars  -RG  other (see comments)  (Pended)  parallel bars  -AG    Row Name 03/10/21 1505 03/10/21 1459       Stand Pivot/Stand Step Transfer    Stand Pivot/Stand Step [place] (Transfers)  verbal cues;nonverbal cues (demo/gesture);contact guard;minimum assist (75% patient effort)  -RG  verbal cues;nonverbal cues (demo/gesture);contact guard;minimum assist (75% patient effort)  (Pended)   -AG    Assistive Device (Stand Pivot Stand Step Transfer)  wheelchair  -RG  wheelchair  (Pended)   -AG    Row Name 03/10/21 1505 03/10/21 1459       Gait/Stairs (Locomotion)    Gait/Stairs Locomotion  --  gait/ambulation independence;gait/ambulation assistive device;distance ambulated;gait pattern;gait deviations;maintains weight-bearing status  (Pended)   -    Rhodes Level (Gait)  verbal cues;nonverbal cues (demo/gesture);maximum assist (25% patient effort);2 person assist  -RG  --    Assistive Device (Gait)  parallel bars;walker, teodora  -RG  --    Pattern (Gait)  step-to  -RG  --    Deviations/Abnormal Patterns (Gait)  antalgic;base of support, narrow;weight shifting decreased  -RG  --    Right Sided Gait Deviations  foot drop/toe drag;forward flexed posture;heel strike decreased;knee buckling, right side;weight shift ability decreased  -RG  --    Row Name 03/10/21 1505          Safety Issues, Functional Mobility    Impairments Affecting Function (Mobility)  balance;coordination;endurance/activity tolerance;grasp;motor control;muscle tone abnormal;pain;postural/trunk control;range of motion (ROM);shortness of breath;strength  -RG     Row Name 03/10/21 1505          Hip (Therapeutic Exercise)    Hip AAROM (Therapeutic Exercise)  right;flexion;aBduction;aDduction;supine;10 repetitions;2 sets;external rotation;internal rotation  -RG      Hip Strengthening (Therapeutic Exercise)  bilateral;flexion;aBduction;aDduction;supine;heel slides;10 repetitions;2 sets  -     Row Name 03/10/21 1505          Knee (Therapeutic Exercise)    Knee Strengthening (Therapeutic Exercise)  bilateral;heel slides;SAQ (short arc quad);supine;10 repetitions;2 sets  -     Row Name 03/10/21 1505          Ankle (Therapeutic Exercise)    Ankle Strengthening (Therapeutic Exercise)  bilateral;dorsiflexion;plantarflexion;supine;10 repetitions;2 sets  -     Row Name 03/10/21 1505          Bed Mobility Goal 1 (PT-IRF)    Progress/Outcomes (Bed Mobility Goal 1, PT-IRF)  goal met  -     Row Name 03/10/21 1505          Bed Mobility Goal 2 (PT-IRF)    Progress/Outcomes (Bed Mobility Goal 2, PT-IRF)  goal partially met goal partially met (inconsistent)  -     Row Name 03/10/21 1505          Transfer Goal 1 (PT-IRF)    Progress/Outcomes (Transfer Goal 1, PT-IRF)  goal met  -     Row Name 03/10/21 1505          Transfer Goal 2 (PT-IRF)    Progress/Outcomes (Transfer Goal 2, PT-IRF)  goal ongoing  -     Row Name 03/10/21 1505          Gait/Walking Locomotion Goal 1 (PT-IRF)    Progress/Outcomes (Gait/Walking Locomotion Goal 1, PT-IRF)  goal ongoing  -     Row Name 03/10/21 1505          Gait/Walking Locomotion Goal 2 (PT-IRF)    Progress/Outcomes (Gait/Walking Locomotion Goal 2, PT-IRF)  goal ongoing  -     Row Name 03/10/21 1505          Positioning and Restraints    Pre-Treatment Position  in bed  -RG     Post Treatment Position  bed  -RG     In Bed  notified nsg;supine;call light within reach;encouraged to call for assist;legs elevated  -     Row Name 03/10/21 1505          Therapy Assessment/Plan (PT)    Patient's Goals For Discharge  return home  -     Row Name 03/10/21 1505          Daily Progress Summary (PT)    Impairments Still Limiting Function (PT)  muscle tone abnormal;ROM decreased;strength decreased;impaired balance;coordination impaired;impaired functional  activity tolerance;motor control impaired;postural control impaired;pain  -RG     Row Name 03/10/21 1505          Therapy Plan Review/Discharge Plan (PT)    Expected Discharge Disposition (PT Eval)  home with caregiver;home with home health care  -RG       User Key  (r) = Recorded By, (t) = Taken By, (c) = Cosigned By    Initials Name Provider Type    Zoila Weaver, PT Physical Therapist    Jevon Castañeda PTA Physical Therapy Assistant           Physical Therapy Education                 Title: PT OT SLP Therapies (Done)     Topic: Physical Therapy (Done)     Point: Mobility training (Done)     Learning Progress Summary           Patient Acceptance, E,D, VU,NR by RG at 3/10/2021 1509    Acceptance, E,D, VU,NR by AG at 3/10/2021 1453    Acceptance, E,D, VU,NR by AG at 3/9/2021 1144    Acceptance, E,TB, VU by RF at 3/8/2021 1613    Acceptance, E,TB, VU by RF at 3/5/2021 1605    Acceptance, E, VU by CW at 3/4/2021 1416    Acceptance, E,D, VU,NR by LL at 3/3/2021 1101    Acceptance, E,D, VU,NR by LL at 3/2/2021 1517    Acceptance, E,D, VU,NR by LL at 3/1/2021 1016    Acceptance, E,TB, VU by CT at 2/27/2021 1228    Acceptance, E,D, VU,NR by LL at 2/26/2021 1829    Acceptance, E,D, VU,NR by LL at 2/25/2021 1541    Acceptance, E, VU,NR by LB at 2/23/2021 1604                   Point: Home exercise program (Done)     Learning Progress Summary           Patient Acceptance, E,D, VU,NR by RG at 3/10/2021 1509    Acceptance, E,D, VU,NR by AG at 3/10/2021 1453    Acceptance, E,D, VU,NR by AG at 3/9/2021 1144    Acceptance, E,TB, VU by RF at 3/8/2021 1613    Acceptance, E,TB, VU by RF at 3/5/2021 1605    Acceptance, E, VU by CW at 3/4/2021 1416    Acceptance, E,D, VU,NR by LL at 3/3/2021 1101    Acceptance, E,D, VU,NR by LL at 3/2/2021 1517    Acceptance, E,D, VU,NR by LL at 3/1/2021 1016    Acceptance, E,TB, VU by CT at 2/27/2021 1228    Acceptance, E,D, VU,NR by LL at 2/26/2021 1829    Acceptance, E,D, VU,NR by LL at  2/25/2021 1541    Acceptance, E, VU,NR by LB at 2/23/2021 1604                   Point: Body mechanics (Done)     Learning Progress Summary           Patient Acceptance, E,D, VU,NR by RG at 3/10/2021 1509    Acceptance, E,D, VU,NR by AG at 3/10/2021 1453    Acceptance, E,D, VU,NR by AG at 3/9/2021 1144    Acceptance, E,TB, VU by RF at 3/8/2021 1613    Acceptance, E,TB, VU by RF at 3/5/2021 1605    Acceptance, E, VU by CW at 3/4/2021 1416    Acceptance, E,D, VU,NR by LL at 3/3/2021 1101    Acceptance, E,D, VU,NR by LL at 3/2/2021 1517    Acceptance, E,D, VU,NR by LL at 3/1/2021 1016    Acceptance, E,TB, VU by CT at 2/27/2021 1228    Acceptance, E,D, VU,NR by LL at 2/26/2021 1829    Acceptance, E,D, VU,NR by LL at 2/25/2021 1541    Acceptance, E, VU,NR by LB at 2/23/2021 1604                   Point: Precautions (Done)     Learning Progress Summary           Patient Acceptance, E,D, VU,NR by RG at 3/10/2021 1509    Acceptance, E,D, VU,NR by AG at 3/10/2021 1453    Acceptance, E,D, VU,NR by AG at 3/9/2021 1144    Acceptance, E,TB, VU by RF at 3/8/2021 1613    Acceptance, E,TB, VU by RF at 3/5/2021 1605    Acceptance, E, VU by CW at 3/4/2021 1416    Acceptance, E,D, VU,NR by LL at 3/3/2021 1101    Acceptance, E,D, VU,NR by LL at 3/2/2021 1517    Acceptance, E,D, VU,NR by LL at 3/1/2021 1016    Acceptance, E,TB, VU by CT at 2/27/2021 1228    Acceptance, E,D, VU,NR by LL at 2/26/2021 1829    Acceptance, E,D, VU,NR by LL at 2/25/2021 1541    Acceptance, E, VU,NR by LB at 2/23/2021 1604                               User Key     Initials Effective Dates Name Provider Type Discipline    LB 03/14/16 -  Cathleen Carnes, PT Physical Therapist PT    AG 04/03/18 -  Zoila Morse, PT Physical Therapist PT    CT 04/03/18 -  Nilsa Bermudez, PT Physical Therapist PT    LL 05/02/16 -  Alise Handy, PTA Physical Therapy Assistant PT    RF 03/07/18 -  Pooja Mcqueen PTA Physical Therapy Assistant PT    RG 10/31/19 -   Jevon Silver PTA Physical Therapy Assistant PT    CW 09/11/20 -  Lucas Baker PT Physical Therapist PT                PT Recommendation and Plan          Daily Progress Summary (PT)  Impairments Still Limiting Function (PT): muscle tone abnormal, ROM decreased, strength decreased, impaired balance, coordination impaired, impaired functional activity tolerance, motor control impaired, postural control impaired, pain               Time Calculation:     PT Charges     Row Name 03/10/21 1509 03/10/21 1453          Time Calculation    Start Time  1330  -  1005  -AG     Stop Time  1415  -  1050  -AG     Time Calculation (min)  45 min  -RG  45 min  -AG     PT Received On  03/10/21  -RG  03/10/21  -     PT - Next Appointment  --  03/11/21  -        Time Calculation- PT    Total Timed Code Minutes- PT  45 minute(s)  -  --       User Key  (r) = Recorded By, (t) = Taken By, (c) = Cosigned By    Initials Name Provider Type    Zoila Weaevr, PT Physical Therapist    Jevon Castañeda PTA Physical Therapy Assistant          Therapy Charges for Today     Code Description Service Date Service Provider Modifiers Qty    01103631715 HC PT THER PROC EA 15 MIN 3/10/2021 Jevon Silver PTA GP, KX 2    08620733577 HC PT THERAPEUTIC ACT EA 15 MIN 3/10/2021 Jevon Silver PTA GP, KX 1                   Jevon Silver PTA  3/10/2021

## 2021-03-10 NOTE — THERAPY TREATMENT NOTE
Inpatient Rehabilitation - Physical Therapy Treatment Note       CHEYENNE No     Patient Name: Valerie Moore  : 1955  MRN: [id number]    Today's Date: 3/10/2021                    Admit Date: 2021      Visit Dx: No diagnosis found.    Patient Active Problem List   Diagnosis   • Anxiety   • Hypertension   • Hypercholesteremia   • Seasonal allergies   • Sleep disorder   • GERD (gastroesophageal reflux disease)   • Arthritis   • Airway hyperreactivity   • Gout   • Weakness   • CAP (community acquired pneumonia)   • Hypokalemia   • Hyponatremia   • Hyperglycemia   • Normocytic anemia   • Abdominal aortic aneurysm (AAA) 3.0 cm to 5.5 cm in diameter in male (CMS/HCC)   • Tobacco abuse   • Chest pain, atypical   • Cerebrovascular accident (CVA) (CMS/HCC)   • CVA (cerebral vascular accident) (CMS/HCC)       Past Medical History:   Diagnosis Date   • Anxiety    • GERD (gastroesophageal reflux disease) 2017   • Hypercholesteremia    • Hypertension    • Seasonal allergies    • Sleep disorder    • Stroke (CMS/HCC)        Past Surgical History:   Procedure Laterality Date   • HERNIA REPAIR     • HIP SURGERY Right     plate and 8 screws in right hip           PT ASSESSMENT (last 12 hours)      IRF PT Evaluation and Treatment     Row Name 03/10/21 1505 03/10/21 1459       PT Time and Intention    Document Type  daily treatment  -RG  daily treatment AM treatment session  -AG    Mode of Treatment  individual therapy;physical therapy  -RG  individual therapy;physical therapy  -AG    Patient/Family/Caregiver Comments/Observations  Pt and nursing in agreement for skilled PT on this date.   -RG  pt. supine prior to session; room air; agreeable to participation.   -AG    Row Name 03/10/21 1505 03/10/21 1459       General Information    Existing Precautions/Restrictions  fall;oxygen therapy device and L/min R hemiplegia  -RG  fall R hemiplegia  -AG    Row Name 03/10/21 1505 03/10/21 1459       Cognition/Psychosocial     Affect/Mental Status (Cognitive)  WFL  -RG  WFL  -AG    Orientation Status (Cognition)  oriented x 3  -RG  oriented x 3  -AG    Follows Commands (Cognition)  follows one-step commands;verbal cues/prompting required  -RG  repetition of directions required;verbal cues/prompting required  -AG    Personal Safety Interventions  gait belt;nonskid shoes/slippers when out of bed;fall prevention program maintained  -RG  fall prevention program maintained;gait belt;nonskid shoes/slippers when out of bed;supervised activity  -AG    Row Name 03/10/21 1505 03/10/21 1459       Pain Scale: FACES Pre/Post-Treatment    Pain: FACES Scale, Pretreatment  0-->no hurt  -RG  0-->no hurt  -AG    Posttreatment Pain Rating  2-->hurts little bit  -RG  2-->hurts little bit  -AG    Pain Location - Side  --  Right  -AG    Pain Location  --  hip  -AG    Row Name 03/10/21 1505 03/10/21 1459       Mobility    Left Lower Extremity (Weight-bearing Status)  full weight-bearing (FWB)  -RG  full weight-bearing (FWB)  -AG    Right Lower Extremity (Weight-bearing Status)  full weight-bearing (FWB)  -RG  full weight-bearing (FWB)  -AG    Row Name 03/10/21 1505 03/10/21 1459       Bed Mobility    Scooting/Bridging [place] (Bed Mobility)  verbal cues;nonverbal cues (demo/gesture);contact guard  -RG  verbal cues;nonverbal cues (demo/gesture);contact guard  -AG    Supine-Sit [place] (Bed Mobility)  standby assist;verbal cues  -RG  verbal cues;nonverbal cues (demo/gesture);minimum assist (75% patient effort)  -AG    Sit-Supine [place] (Bed Mobility)  --  standby assist;verbal cues;nonverbal cues (demo/gesture);contact guard  -AG    Bed Mobility, Safety Issues  decreased use of arms for pushing/pulling;decreased use of legs for bridging/pushing  -RG  decreased use of arms for pushing/pulling;decreased use of legs for bridging/pushing  -AG    Assistive Device (Bed Mobility)  bed rails;draw sheet;head of bed elevated  -RG  bed rails;draw sheet;head  of bed elevated  -AG    Row Name 03/10/21 1505 03/10/21 1459       Transfer Assessment/Treatment    Transfers  bed-chair transfer;chair-bed transfer;sit-stand transfer;stand-sit transfer;stand pivot/stand step transfer  -RG  bed-chair transfer;chair-bed transfer;sit-stand transfer;stand-sit transfer;stand pivot/stand step transfer  -AG    Row Name 03/10/21 1505 03/10/21 1459       Transfers    Bed-Chair [place] (Transfers)  verbal cues;nonverbal cues (demo/gesture);contact guard;minimum assist (75% patient effort) requires min A transferring toward hemiparetic side  -RG  verbal cues;nonverbal cues (demo/gesture);contact guard;minimum assist (75% patient effort)  -AG    Chair-Bed [place] (Transfers)  verbal cues;nonverbal cues (demo/gesture);contact guard;minimum assist (75% patient effort)  -RG  verbal cues;nonverbal cues (demo/gesture);contact guard  -AG    Assistive Device (Bed-Chair Transfers)  wheelchair  -RG  wheelchair  -AG    Sit-Stand [place] (Transfers)  verbal cues;nonverbal cues (demo/gesture);contact guard  -RG  verbal cues;nonverbal cues (demo/gesture);contact guard  -AG    Stand-Sit [place] (Transfers)  verbal cues;nonverbal cues (demo/gesture);contact guard;minimum assist (75% patient effort)  -RG  verbal cues;nonverbal cues (demo/gesture);contact guard  -AG    Row Name 03/10/21 1505 03/10/21 1459       Chair-Bed Transfer    Assistive Device (Chair-Bed Transfers)  wheelchair  -RG  wheelchair bedrail  -AG    Row Name 03/10/21 1505 03/10/21 1459       Sit-Stand Transfer    Assistive Device (Sit-Stand Transfers)  walker, [place];wheelchair;other (see comments) parallel bars  -RG  other (see comments) parallel bars  -AG    Row Name 03/10/21 1505 03/10/21 1459       Stand-Sit Transfer    Assistive Device (Stand-Sit Transfers)  walker, [place];wheelchair;other (see comments) parallel bars  -RG  other (see comments) parallel bars  -AG    Row Name 03/10/21 1505 03/10/21 1459       Stand  Pivot/Stand Step Transfer    Stand Pivot/Stand Step [place] (Transfers)  verbal cues;nonverbal cues (demo/gesture);contact guard;minimum assist (75% patient effort)  -RG  verbal cues;nonverbal cues (demo/gesture);contact guard;minimum assist (75% patient effort)  -AG    Assistive Device (Stand Pivot Stand Step Transfer)  wheelchair  -RG  wheelchair  -AG    Row Name 03/10/21 1505 03/10/21 1459       Gait/Stairs (Locomotion)    Gait/Stairs Locomotion  --  gait/ambulation independence;gait/ambulation assistive device;distance ambulated;gait pattern;gait deviations;maintains weight-bearing status  -AG    [place] Level (Gait)  verbal cues;nonverbal cues (demo/gesture);maximum assist (25% patient effort);2 person assist  -RG  verbal cues;nonverbal cues (demo/gesture);moderate assist (50% patient effort);maximum assist (25% patient effort);2 person assist  -AG    Assistive Device (Gait)  parallel bars;walker, [place]  -RG  parallel bars  -AG    Distance in Feet (Gait)  --  8-10 ft in parallel bars; unable to take steps w/ HW today.   -AG    Pattern (Gait)  step-to  -RG  step-to  -AG    Deviations/Abnormal Patterns (Gait)  antalgic;base of support, narrow;weight shifting decreased  -RG  antalgic;base of support, narrow;stride length decreased;weight shifting decreased  -AG    Right Sided Gait Deviations  foot drop/toe drag;forward flexed posture;heel strike decreased;knee buckling, right side;weight shift ability decreased  -RG  forward flexed posture;heel strike decreased  -AG    Row Name 03/10/21 1505 03/10/21 3613       Safety Issues, Functional Mobility    Impairments Affecting Function (Mobility)  balance;coordination;endurance/activity tolerance;grasp;motor control;muscle tone abnormal;pain;postural/trunk control;range of motion (ROM);shortness of breath;strength  -RG  balance;endurance/activity tolerance;motor control;muscle tone abnormal;pain;range of motion (ROM);shortness of breath;strength  -AG    Row Name  03/10/21 1459          Balance    Static Sitting Balance  WFL;unsupported;sitting in chair;sitting, edge of bed  -AG     Static Standing Balance  moderate impairment;supported;standing  -AG     Dynamic Standing Balance  severe impairment;supported;standing  -AG     Comment, Balance  performed weightshifting in parallel bars: L<>R; use of R knee immobilizer and ace wrap to assist w/ R ankle DF  -AG     Row Name 03/10/21 1459          Motor Skills    Therapeutic Exercise  hip;knee;ankle  -     Row Name 03/10/21 1505          Hip (Therapeutic Exercise)    Hip AAROM (Therapeutic Exercise)  right;flexion;aBduction;aDduction;supine;10 repetitions;2 sets;external rotation;internal rotation  -     Hip Strengthening (Therapeutic Exercise)  bilateral;flexion;aBduction;aDduction;supine;heel slides;10 repetitions;2 sets  -     Row Name 03/10/21 1505 03/10/21 1459       Knee (Therapeutic Exercise)    Knee Strengthening (Therapeutic Exercise)  bilateral;heel slides;SAQ (short arc quad);supine;10 repetitions;2 sets  -  right;flexion;extension;LAQ (long arc quad);sitting R heel cord stretch  -    Row Name 03/10/21 1505          Ankle (Therapeutic Exercise)    Ankle Strengthening (Therapeutic Exercise)  bilateral;dorsiflexion;plantarflexion;supine;10 repetitions;2 sets  -     Row Name 03/10/21 1505          Bed Mobility Goal 1 (PT-IRF)    Progress/Outcomes (Bed Mobility Goal 1, PT-IRF)  goal met  -     Row Name 03/10/21 1505          Bed Mobility Goal 2 (PT-IRF)    Progress/Outcomes (Bed Mobility Goal 2, PT-IRF)  goal partially met goal partially met (inconsistent)  -     Row Name 03/10/21 1505          Transfer Goal 1 (PT-IRF)    Progress/Outcomes (Transfer Goal 1, PT-IRF)  goal met  -     Row Name 03/10/21 1505          Transfer Goal 2 (PT-IRF)    Progress/Outcomes (Transfer Goal 2, PT-IRF)  goal ongoing  -     Row Name 03/10/21 1505          Gait/Walking Locomotion Goal 1 (PT-IRF)    Progress/Outcomes  (Gait/Walking Locomotion Goal 1, PT-IRF)  goal ongoing  -RG     Row Name 03/10/21 1505          Gait/Walking Locomotion Goal 2 (PT-IRF)    Progress/Outcomes (Gait/Walking Locomotion Goal 2, PT-IRF)  goal ongoing  -RG     Row Name 03/10/21 1505 03/10/21 145       Positioning and Restraints    Pre-Treatment Position  in bed  -RG  in bed  -AG    Post Treatment Position  bed  -RG  bed  -AG    In Bed  notified nsg;supine;call light within reach;encouraged to call for assist;legs elevated  -RG  supine;call light within reach;encouraged to call for assist;side rails up x3;RUE elevated;heels elevated  -AG    Row Name 03/10/21 1505          Therapy Assessment/Plan (PT)    Patient's Goals For Discharge  return home  -RG     Row Name 03/10/21 1505 03/10/21 1454       Daily Progress Summary (PT)    Functional Goal Overall Progress (PT)  --  progressing toward functional goals slower than expected  -AG    Impairments Still Limiting Function (PT)  muscle tone abnormal;ROM decreased;strength decreased;impaired balance;coordination impaired;impaired functional activity tolerance;motor control impaired;postural control impaired;pain  -RG  muscle tone abnormal;ROM decreased;strength decreased;impaired balance;coordination impaired;impaired functional activity tolerance;motor control impaired;postural control impaired;pain  -AG    Recommendations (PT)  --  continue POC  -AG    Row Name 03/10/21 1505          Therapy Plan Review/Discharge Plan (PT)    Expected Discharge Disposition (PT Eval)  home with caregiver;home with home health care  -RG       User Key  (r) = Recorded By, (t) = Taken By, (c) = Cosigned By    Initials Name Provider Type    AG Zoila Morse, PT Physical Therapist    Jevon Castañeda PTA Physical Therapy Assistant           Physical Therapy Education                 Title: PT OT SLP Therapies (Done)     Topic: Physical Therapy (Done)     Point: Mobility training (Done)     Learning Progress Summary            Patient Acceptance, E,D, VU,NR by RG at 3/10/2021 1509    Acceptance, E,D, VU,NR by AG at 3/10/2021 1453    Acceptance, E,D, VU,NR by AG at 3/9/2021 1144    Acceptance, E,TB, VU by RF at 3/8/2021 1613    Acceptance, E,TB, VU by RF at 3/5/2021 1605    Acceptance, E, VU by CW at 3/4/2021 1416    Acceptance, E,D, VU,NR by LL at 3/3/2021 1101    Acceptance, E,D, VU,NR by LL at 3/2/2021 1517    Acceptance, E,D, VU,NR by LL at 3/1/2021 1016    Acceptance, E,TB, VU by CT at 2/27/2021 1228    Acceptance, E,D, VU,NR by LL at 2/26/2021 1829    Acceptance, E,D, VU,NR by LL at 2/25/2021 1541    Acceptance, E, VU,NR by LB at 2/23/2021 1604                   Point: Home exercise program (Done)     Learning Progress Summary           Patient Acceptance, E,D, VU,NR by RG at 3/10/2021 1509    Acceptance, E,D, VU,NR by AG at 3/10/2021 1453    Acceptance, E,D, VU,NR by AG at 3/9/2021 1144    Acceptance, E,TB, VU by RF at 3/8/2021 1613    Acceptance, E,TB, VU by RF at 3/5/2021 1605    Acceptance, E, VU by CW at 3/4/2021 1416    Acceptance, E,D, VU,NR by LL at 3/3/2021 1101    Acceptance, E,D, VU,NR by LL at 3/2/2021 1517    Acceptance, E,D, VU,NR by LL at 3/1/2021 1016    Acceptance, E,TB, VU by CT at 2/27/2021 1228    Acceptance, E,D, VU,NR by LL at 2/26/2021 1829    Acceptance, E,D, VU,NR by LL at 2/25/2021 1541    Acceptance, E, VU,NR by LB at 2/23/2021 1604                   Point: Body mechanics (Done)     Learning Progress Summary           Patient Acceptance, E,D, VU,NR by RG at 3/10/2021 1509    Acceptance, E,D, VU,NR by AG at 3/10/2021 1453    Acceptance, E,D, VU,NR by AG at 3/9/2021 1144    Acceptance, E,TB, VU by RF at 3/8/2021 1613    Acceptance, E,TB, VU by RF at 3/5/2021 1605    Acceptance, E, VU by CW at 3/4/2021 1416    Acceptance, E,D, VU,NR by LL at 3/3/2021 1101    Acceptance, E,D, VU,NR by LL at 3/2/2021 1517    Acceptance, E,D, VU,NR by LL at 3/1/2021 1016    Acceptance, E,TB, VU by CT at 2/27/2021 1228     Acceptance, E,D, VU,NR by LL at 2/26/2021 1829    Acceptance, E,D, VU,NR by LL at 2/25/2021 1541    Acceptance, E, VU,NR by LB at 2/23/2021 1604                   Point: Precautions (Done)     Learning Progress Summary           Patient Acceptance, E,D, VU,NR by RG at 3/10/2021 1509    Acceptance, E,D, VU,NR by AG at 3/10/2021 1453    Acceptance, E,D, VU,NR by AG at 3/9/2021 1144    Acceptance, E,TB, VU by RF at 3/8/2021 1613    Acceptance, E,TB, VU by RF at 3/5/2021 1605    Acceptance, E, VU by CW at 3/4/2021 1416    Acceptance, E,D, VU,NR by LL at 3/3/2021 1101    Acceptance, E,D, VU,NR by LL at 3/2/2021 1517    Acceptance, E,D, VU,NR by LL at 3/1/2021 1016    Acceptance, E,TB, VU by CT at 2/27/2021 1228    Acceptance, E,D, VU,NR by LL at 2/26/2021 1829    Acceptance, E,D, VU,NR by LL at 2/25/2021 1541    Acceptance, E, VU,NR by LB at 2/23/2021 1604                               User Key     Initials Effective Dates Name Provider Type Discipline    LB 03/14/16 -  Cathleen Carnes, PT Physical Therapist PT    AG 04/03/18 -  Zoila Morse, PT Physical Therapist PT    CT 04/03/18 -  Nilsa Bermudez, PT Physical Therapist PT    LL 05/02/16 -  Alise Handy, PTA Physical Therapy Assistant PT    RF 03/07/18 -  Pooja Mcqueen PTA Physical Therapy Assistant PT    RG 10/31/19 -  Jevon Silver, PTA Physical Therapy Assistant PT    CW 09/11/20 -  Lucas Baker, PT Physical Therapist PT                PT Recommendation and Plan          Daily Progress Summary (PT)  Functional Goal Overall Progress (PT): progressing toward functional goals slower than expected  Daily Progress Summary (PT): progressing slowly w/ functional mobilty and R LE strength.   Impairments Still Limiting Function (PT): muscle tone abnormal, ROM decreased, strength decreased, impaired balance, coordination impaired, impaired functional activity tolerance, motor control impaired, postural control impaired, pain  Recommendations (PT): continue  POC               Time Calculation:     PT Charges     Row Name 03/10/21 1509 03/10/21 1453          Time Calculation    Start Time  1330  -  1005  -AG     Stop Time  1415  -  1050  -AG     Time Calculation (min)  45 min  -RG  45 min  -AG     PT Received On  03/10/21  -RG  03/10/21  -AG     PT - Next Appointment  --  03/11/21  -AG        Time Calculation- PT    Total Timed Code Minutes- PT  45 minute(s)  -RG  --       User Key  (r) = Recorded By, (t) = Taken By, (c) = Cosigned By    Initials Name Provider Type    AG Zoila Morse, PT Physical Therapist    Jevon Castañeda PTA Physical Therapy Assistant          Therapy Charges for Today     Code Description Service Date Service Provider Modifiers Qty    73362898857 HC PT THER PROC EA 15 MIN 3/9/2021 Zoila Morse, PT GP 2    27396856741 HC GAIT TRAINING EA 15 MIN 3/9/2021 Zoila Morse, PT GP 2    24891917437 HC PT THERAPEUTIC ACT EA 15 MIN 3/9/2021 Zoila Morse, PT GP 2    52885144845 HC PT NEUROMUSC RE EDUCATION EA 15 MIN 3/10/2021 Zoila Morse, PT GP, KX 2    86164182176 HC GAIT TRAINING EA 15 MIN 3/10/2021 Zoila Morse, PT GP, KX 1                   Zoila Morse, PT  3/10/2021

## 2021-03-10 NOTE — PROGRESS NOTES
Occupational Therapy: Individual: 90 minutes.    Physical Therapy:    Speech Language Pathology:    Signed by: Patricia Cifuentes, Occupational Therapist

## 2021-03-10 NOTE — SIGNIFICANT NOTE
03/10/21 1420   Plan   Plan Team conference held 3-9-21.  Spoke to pt and friend Jerry [phone] about how pt is doing in therapy, inability to walk, and friend receiving education on 3-12-21 at 9:00 am.  Friend is concerned about getting pt in their home due to having 8 steps; he is unable to afford to have W/C ramp built.  Educated about potential resources to assist with W/C ramp, however, these resources most likely will not have ramp built by 3-16-21.  Friend says he may be able to get someone to help get pt in the home at discharge.  Pt plans to return home with friend who will assist with caregiving.   Patient/Family in Agreement with Plan yes

## 2021-03-10 NOTE — PROGRESS NOTES
Occupational Therapy:    Physical Therapy: Individual: 90 minutes.    Speech Language Pathology:    Signed by: Jevon Silver PTA

## 2021-03-10 NOTE — PROGRESS NOTES
[facility] REHAB PROGRESS NOTE     Patient Identification:  Name:  Valerie Moore  Age:  65 y.o.  Sex:  male  :  1955  MRN:  [id number]  Visit Number:  [id number]  ROOM: [facility]     Primary Care Provider:  Darwin Alvarez MD    Length of stay in inpatient status:  16    Subjective     Chief Compliant:  No chief complaint on file.      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  Patient speaking swallowing well without difficulty.  Patient states that he is doing some better and with strap on his need to keep his knee from giving out was able to take 8 steps yesterday.  This is marked improvement for patient and it is encouraging to him and to staff.    Objective     Current Hospital Meds:aspirin, 81 mg, Oral, Daily  atorvastatin, 80 mg, Oral, Nightly  buPROPion SR, 150 mg, Oral, Q12H  clopidogrel, 75 mg, Oral, Daily  fluticasone, 2 spray, Each Nare, Daily  heparin (porcine), 5,000 Units, Subcutaneous, Q12H  losartan, 50 mg, Oral, Q24H  metoprolol tartrate, 50 mg, Oral, Q12H  mirtazapine, 15 mg, Oral, Nightly  multivitamin, 1 tablet, Oral, Daily  nicotine, 1 patch, Transdermal, Q24H  pantoprazole, 40 mg, Oral, Q AM  polyethylene glycol, 17 g, Oral, Daily       ----------------------------------------------------------------------------------------------------------------------  Vital Signs:  Temp:  [97.5 °F (36.4 °C)] 97.5 °F (36.4 °C)  Heart Rate:  [51-59] 56  Resp:  [16-20] 16  BP: (141)/(61) 141/61  SpO2:  [95 %-99 %] 97 %  on  Flow (L/min):  [2] 2;   Device (Oxygen Therapy): nasal cannula  Body mass index is 27.99 kg/m².    Wt Readings from Last 3 Encounters:   21 80.9 kg (178 lb 5.6 oz)   21 80.9 kg (178 lb 6.4 oz)   19 82.2 kg (181 lb 5 oz)     Intake & Output (last 3 days)        07 -  0700 701 -  07 - 03/10 07  03/10 0701 - 03/11 0700    P.O. 1440 1080 1320 240    Total Intake(mL/kg) 1440 (17.8) 1080 (13.3) 1320 (16.3) 240 (3)    Urine (mL/kg/hr)        Total Output        Net +1440 +1080 +1320 +240            Urine Unmeasured Occurrence 8 x 7 x 6 x 1 x    Stool Unmeasured Occurrence 2 x           Diet Regular; Cardiac, Daily Fluid Restriction; 1500 mL Fluid Per Day  ----------------------------------------------------------------------------------------------------------------------  Physical exam:  Constitutional:   No acute distress  HEENT: Normocephalic atraumatic  Neck: Supple   Cardiovascular: Regular rate and rhythm  Pulmonary/Chest: Clear to auscultation  Abdominal: Positive bowel sounds soft.   Musculoskeletal: No arthropathy  Neurological: 2-3 out of 5 strength in right upper and lower extremities  Skin: No rash  Peripheral vascular:  Genitourinary:  ----------------------------------------------------------------------------------------------------------------------    Last echocardiogram:  Results for orders placed during the hospital encounter of 02/16/21    Adult Transthoracic Echo Complete w/ Color, Spectral and Contrast if necessary per protocol    Interpretation Summary  · Estimated left ventricular EF = 65% Left ventricular systolic function is normal.  · Mild aortic valve stenosis is present.  · Estimated right ventricular systolic pressure from tricuspid regurgitation is normal (<35 mmHg).  · Mild Aortic regurgitation is present  · Trivial to mild MR is noted  · No previous echo    ----------------------------------------------------------------------------------------------------------------------  Results from last 7 days   Lab Units 03/06/21  0012   WBC 10*3/mm3 7.57   HEMOGLOBIN g/dL 11.4*   HEMATOCRIT % 36.2*   MCV fL 91.6   MCHC g/dL 31.5   PLATELETS 10*3/mm3 301         Results from last 7 days   Lab Units 03/06/21  0012   SODIUM mmol/L 133*   POTASSIUM mmol/L 4.2   MAGNESIUM mg/dL 1.9   CHLORIDE  mmol/L 101   CO2 mmol/L 23.6   BUN mg/dL 29*   CREATININE mg/dL 0.87   EGFR IF NONAFRICN AM mL/min/1.73 88   CALCIUM mg/dL 9.5   GLUCOSE mg/dL 102*   ALBUMIN g/dL 3.55   BILIRUBIN mg/dL 0.2   ALK PHOS U/L 105   AST (SGOT) U/L 16   ALT (SGPT) U/L 19   Estimated Creatinine Clearance: 83.8 mL/min (by C-G formula based on SCr of 0.87 mg/dL).  No results found for: AMMONIA              Glucose   Date/Time Value Ref Range Status   03/08/2021 0701 106 70 - 130 mg/dL Final     Lab Results   Component Value Date    TSH 0.698 02/16/2021    FREET4 1.40 02/16/2021     No results found for: PREGTESTUR, PREGSERUM, HCG, HCGQUANT  Pain Management Panel     Pain Management Panel Latest Ref Rng & Units 2/16/2021 11/24/2019    AMPHETAMINES SCREEN, URINE Negative Negative Negative    BARBITURATES SCREEN Negative Negative Negative    BENZODIAZEPINE SCREEN, URINE Negative Negative Negative    BUPRENORPHINEUR Negative Negative Negative    COCAINE SCREEN, URINE Negative Negative Negative    METHADONE SCREEN, URINE Negative Negative Negative        Brief Urine Lab Results  (Last result in the past 365 days)      Color   Clarity   Blood   Leuk Est   Nitrite   Protein   CREAT   Urine HCG        02/16/21 1847 Yellow Clear Negative Negative Negative Negative             No results found for: BLOODCX      No results found for: URINECX  No results found for: WOUNDCX  No results found for: STOOLCX        I have personally looked at the labs and they are summarized above.  ----------------------------------------------------------------------------------------------------------------------  Detailed radiology reports for the last 24 hours:    Imaging Results (Last 24 Hours)     ** No results found for the last 24 hours. **        Final impressions for the last 30 days of radiology reports:    CT Angiogram Neck    Result Date: 2/16/2021  1.  Occlusion of the right vertebral artery. 2.  Mild to moderate plaque right greater than left carotid systems  but without hemodynamically significant stenosis or occlusion identified. 3.  Tortuosity of the bilateral extracranial ICA segments. 4.  No evidence of dissection. 5.  Degenerative changes cervical spine. 6.  Emphysema of the partially imaged upper lungs. 7.  Other nonacute findings above.  This report was finalized on 2/16/2021 6:10 PM by Dr. Johnathon Clark MD.      CT Cervical Spine Without Contrast    Result Date: 2/16/2021  1.  Limited exam due to patient positioning and cervical spine rotation without convincing evidence of acute fracture. 2.  No traumatic malalignment is identified. 3.  Small right mastoid effusion. 4.  Advanced degenerative changes with multilevel stenosis. 5.  If persistent concern for cervical spine injury, repeat exam may be indicated.  This report was finalized on 2/16/2021 6:01 PM by Dr. Johnathon Clark MD.      MRI Brain Without Contrast    Result Date: 2/16/2021  1.  Focal acute infarcts in the left basal ganglionic, left external capsule, and left pericallosal regions which follow along the distribution of the lenticulostriate perforating arteries of the left MCA and the anterior choroidal artery distribution. 2.  No MRI evidence of hemorrhage. 3.  No midline shift, focal mass effect, or hydrocephalus. 4.  Advanced cerebral atrophy and chronic small vessel ischemic disease.  This report was finalized on 2/16/2021 8:56 PM by Dr. Johnathon Clark MD.      MRI Cervical Spine Without Contrast    Result Date: 2/16/2021  1. No acute fracture or traumatic malalignment. 2. Advanced multilevel degenerative disc disease with facet arthropathy with variable central canal and neural foraminal stenosis detailed above. Stenosis most severe at the C3/4 level. 3. Degenerative listhesis posteriorly of C3 on C4.  This report was finalized on 2/16/2021 8:59 PM by Dr. Johnathon Clark MD.      XR Chest 1 View    Result Date: 2/16/2021    Stable chest. No acute changes identified.  This report was finalized on  2/16/2021 6:36 PM by Dr. Johnathon Clark MD.      CT Angiogram Head    Result Date: 2/16/2021  1.  No large vessel occlusion identified involving the major intracranial arterial segments. 2.  Occlusion of the right vertebral artery with reconstitution of flow just prior to basilar artery confluence likely due to collateralization. 3.  Diffuse cerebral atrophy and advanced chronic small vessel ischemic disease. 4.  Other nonacute findings above.  This report was finalized on 2/16/2021 6:14 PM by Dr. Johnathon Clark MD.      CT Head Without Contrast Stroke Protocol    Result Date: 2/16/2021  1.  No CT evidence of acute intracranial abnormality. 2.  Generalized atrophy and advanced chronic small vessel ischemic disease noted.  This report was finalized on 2/16/2021 5:20 PM by Dr. Johnathon Clark MD.      CT Lower Extremity Right Without Contrast    Result Date: 2/20/2021  Markedly abnormal appearance of the right hip. There is no identifiable ball-and-socket joint identified due to the marked irregularity. The expected articulation is superiorly subluxed and there is a large joint effusion but sterility cannot be determined by imaging. Orthopedic consultation is advised. Signer Name: BROOKS ESQUIVEL MD  Signed: 2/20/2021 8:49 PM  Workstation Name: [email or web address]  [facility]    CT CEREBRAL PERFUSION W WO CONTRAST W AI ANALYSIS OF LVO    Result Date: 2/16/2021   1. No core infarct identified. 2. Some diminished perfusion of the left frontal region compatible with variable ischemia which may be chronic in nature.  This report was finalized on 2/16/2021 6:17 PM by Dr. Johnathon Clark MD.      XR Hip With or Without Pelvis 2 - 3 View Right    Result Date: 2/19/2021  1.  Probable osteotomy changes of the right femoral head with superior migration of the right femur. 2.  No acute appearing fracture. 3.  Right joint effusion. 4.  Otherwise stable exam.  This report was finalized on 2/19/2021 7:38 PM by Dr. Diego  EARNESTINE Clark MD.      I have personally looked at the radiology images and read the final radiology report.    Assessment & Plan    Status post acute infarction involving the left external capsule, left basal ganglia, and left pericallosal regions with substantial right-sided hemiparesis.  Patient currently on aspirin, Plavix and Lipitor.  Patient is making improvements at this time and was able to take a few steps yesterday and is getting in knee immobilizer to try to help patient hold his knee straight while he tries to ambulate.  We will continue to work with patient diligently    Chronic pain secondary to osteoarthritis--pain reasonably well-controlled.  Patient has severe osteoporosis involving the right hip.    Depression--continue antidepressant therapy.  Patient states she is doing reasonably well    Anxiety neurosis--as needed Xanax    Hypertension--controlled.  Losartan and metoprolol    Tobacco abuse--continue cessation    GERD--well controlled with Protonix    VTE Prophylaxis:   Mechanical Order History:     None      Pharmalogical Order History:      Ordered     Dose Route Frequency Stop    02/22/21 7664  heparin (porcine) 5000 UNIT/ML injection 5,000 Units      5,000 Units SC Every 12 Hours Scheduled --                    Rickie Haney MD  [facility]  03/10/21  11:35 EST

## 2021-03-11 PROCEDURE — 97110 THERAPEUTIC EXERCISES: CPT

## 2021-03-11 PROCEDURE — 97112 NEUROMUSCULAR REEDUCATION: CPT | Performed by: OCCUPATIONAL THERAPIST

## 2021-03-11 PROCEDURE — 97535 SELF CARE MNGMENT TRAINING: CPT | Performed by: OCCUPATIONAL THERAPIST

## 2021-03-11 PROCEDURE — 94799 UNLISTED PULMONARY SVC/PX: CPT

## 2021-03-11 PROCEDURE — 25010000002 HEPARIN (PORCINE) PER 1000 UNITS: Performed by: FAMILY MEDICINE

## 2021-03-11 PROCEDURE — 99231 SBSQ HOSP IP/OBS SF/LOW 25: CPT | Performed by: FAMILY MEDICINE

## 2021-03-11 PROCEDURE — 97110 THERAPEUTIC EXERCISES: CPT | Performed by: OCCUPATIONAL THERAPIST

## 2021-03-11 PROCEDURE — 97530 THERAPEUTIC ACTIVITIES: CPT

## 2021-03-11 RX ADMIN — ALPRAZOLAM 0.5 MG: 0.5 TABLET ORAL at 20:23

## 2021-03-11 RX ADMIN — Medication 1 PATCH: at 09:00

## 2021-03-11 RX ADMIN — BUPROPION HYDROCHLORIDE 150 MG: 150 TABLET, EXTENDED RELEASE ORAL at 20:29

## 2021-03-11 RX ADMIN — METOPROLOL TARTRATE 50 MG: 50 TABLET, FILM COATED ORAL at 20:29

## 2021-03-11 RX ADMIN — BUPROPION HYDROCHLORIDE 150 MG: 150 TABLET, EXTENDED RELEASE ORAL at 08:56

## 2021-03-11 RX ADMIN — HYDROCODONE BITARTRATE AND ACETAMINOPHEN 1 TABLET: 10; 325 TABLET ORAL at 08:56

## 2021-03-11 RX ADMIN — CARISOPRODOL 350 MG: 350 TABLET ORAL at 20:23

## 2021-03-11 RX ADMIN — ATORVASTATIN CALCIUM 80 MG: 40 TABLET, FILM COATED ORAL at 20:28

## 2021-03-11 RX ADMIN — HYDROCODONE BITARTRATE AND ACETAMINOPHEN 1 TABLET: 10; 325 TABLET ORAL at 16:13

## 2021-03-11 RX ADMIN — ASPIRIN 81 MG: 81 TABLET, COATED ORAL at 08:56

## 2021-03-11 RX ADMIN — FLUTICASONE PROPIONATE 2 SPRAY: 50 SPRAY, METERED NASAL at 09:00

## 2021-03-11 RX ADMIN — BISACODYL 10 MG: 5 TABLET, COATED ORAL at 20:23

## 2021-03-11 RX ADMIN — LOSARTAN POTASSIUM 50 MG: 50 TABLET, FILM COATED ORAL at 08:56

## 2021-03-11 RX ADMIN — PANTOPRAZOLE SODIUM 40 MG: 40 TABLET, DELAYED RELEASE ORAL at 05:21

## 2021-03-11 RX ADMIN — GABAPENTIN 200 MG: 100 CAPSULE ORAL at 20:23

## 2021-03-11 RX ADMIN — ALPRAZOLAM 0.5 MG: 0.5 TABLET ORAL at 08:56

## 2021-03-11 RX ADMIN — IPRATROPIUM BROMIDE AND ALBUTEROL SULFATE 3 ML: .5; 3 SOLUTION RESPIRATORY (INHALATION) at 18:57

## 2021-03-11 RX ADMIN — IPRATROPIUM BROMIDE AND ALBUTEROL SULFATE 3 ML: .5; 3 SOLUTION RESPIRATORY (INHALATION) at 07:18

## 2021-03-11 RX ADMIN — METOPROLOL TARTRATE 50 MG: 50 TABLET, FILM COATED ORAL at 08:56

## 2021-03-11 RX ADMIN — Medication 1 TABLET: at 08:56

## 2021-03-11 RX ADMIN — MIRTAZAPINE 15 MG: 15 TABLET, FILM COATED ORAL at 20:29

## 2021-03-11 RX ADMIN — GABAPENTIN 200 MG: 100 CAPSULE ORAL at 05:21

## 2021-03-11 RX ADMIN — HEPARIN SODIUM 5000 UNITS: 5000 INJECTION INTRAVENOUS; SUBCUTANEOUS at 20:24

## 2021-03-11 RX ADMIN — HYDROCODONE BITARTRATE AND ACETAMINOPHEN 1 TABLET: 10; 325 TABLET ORAL at 21:42

## 2021-03-11 RX ADMIN — POLYETHYLENE GLYCOL (3350) 17 G: 17 POWDER, FOR SOLUTION ORAL at 09:00

## 2021-03-11 RX ADMIN — CLOPIDOGREL 75 MG: 75 TABLET, FILM COATED ORAL at 08:56

## 2021-03-11 RX ADMIN — HEPARIN SODIUM 5000 UNITS: 5000 INJECTION INTRAVENOUS; SUBCUTANEOUS at 08:56

## 2021-03-11 NOTE — SIGNIFICANT NOTE
03/11/21 1020   Plan   Plan Contacted [facility] [phone] per [place] who states they do not have any funds at this time to assist with W/C ramp.  Left message at [facility] [phone] and [facility] ext. [phone].  Attempted to contact [facility] [employer] [phone] which was busy.

## 2021-03-11 NOTE — PROGRESS NOTES
[facility] REHAB PROGRESS NOTE     Patient Identification:  Name:  Valerie Moore  Age:  65 y.o.  Sex:  male  :  1955  MRN:  [id number]  Visit Number:  [id number]  ROOM: [facility]     Primary Care Provider:  Darwin Alvarez MD    Length of stay in inpatient status:  17    Subjective     Chief Compliant:  No chief complaint on file.      History of Presenting Illness: 65-year-old gentleman with history of CVA involving the left basal ganglia, left external capsule, and left pericallosal regions with substantial right-sided hemiparesis.  Patient states that he is making improvements although at times he states it is frustrating.  Patient states he had a difficult day yesterday with his ambulation.  Patient was anxious to restart and therapy today    Objective     Current Hospital Meds:aspirin, 81 mg, Oral, Daily  atorvastatin, 80 mg, Oral, Nightly  buPROPion SR, 150 mg, Oral, Q12H  clopidogrel, 75 mg, Oral, Daily  fluticasone, 2 spray, Each Nare, Daily  heparin (porcine), 5,000 Units, Subcutaneous, Q12H  losartan, 50 mg, Oral, Q24H  metoprolol tartrate, 50 mg, Oral, Q12H  mirtazapine, 15 mg, Oral, Nightly  multivitamin, 1 tablet, Oral, Daily  nicotine, 1 patch, Transdermal, Q24H  pantoprazole, 40 mg, Oral, Q AM  polyethylene glycol, 17 g, Oral, Daily       ----------------------------------------------------------------------------------------------------------------------  Vital Signs:  Temp:  [97.8 °F (36.6 °C)] 97.8 °F (36.6 °C)  Heart Rate:  [51-68] 68  Resp:  [18-20] 18  BP: (140)/(63) 140/63  SpO2:  [96 %-99 %] 99 %  on  Flow (L/min):  [2] 2;   Device (Oxygen Therapy): nasal cannula  Body mass index is 27.99 kg/m².    Wt Readings from Last 3 Encounters:   21 80.9 kg (178 lb 5.6 oz)   21 80.9 kg (178 lb 6.4 oz)   19 82.2 kg (181 lb 5 oz)     Intake & Output (last 3 days)       701 -  0700 701 - 03/10 0700 03/10 07 -  07 -  07     P.O. 1080 1320 1200 480    Total Intake(mL/kg) 1080 (13.3) 1320 (16.3) 1200 (14.8) 480 (5.9)    Net +1080 +1320 +1200 +480            Urine Unmeasured Occurrence 7 x 6 x 6 x 2 x        Diet Regular; Cardiac, Daily Fluid Restriction; 1500 mL Fluid Per Day  ----------------------------------------------------------------------------------------------------------------------  Physical exam:  Constitutional:   No acute distress  HEENT: Normocephalic atraumatic  Neck:    Supple  Cardiovascular: Regular rate and rhythm  Pulmonary/Chest: Clear to auscultation  Abdominal: Positive bowel sounds soft.   Musculoskeletal: No arthropathy  Neurological: 2 out of 5 strength on the right side  Skin: No rash  Peripheral vascular:  Genitourinary:  ----------------------------------------------------------------------------------------------------------------------    Last echocardiogram:  Results for orders placed during the hospital encounter of 02/16/21    Adult Transthoracic Echo Complete w/ Color, Spectral and Contrast if necessary per protocol    Interpretation Summary  · Estimated left ventricular EF = 65% Left ventricular systolic function is normal.  · Mild aortic valve stenosis is present.  · Estimated right ventricular systolic pressure from tricuspid regurgitation is normal (<35 mmHg).  · Mild Aortic regurgitation is present  · Trivial to mild MR is noted  · No previous echo    ----------------------------------------------------------------------------------------------------------------------  Results from last 7 days   Lab Units 03/06/21  0012   WBC 10*3/mm3 7.57   HEMOGLOBIN g/dL 11.4*   HEMATOCRIT % 36.2*   MCV fL 91.6   MCHC g/dL 31.5   PLATELETS 10*3/mm3 301         Results from last 7 days   Lab Units 03/06/21  0012   SODIUM mmol/L 133*   POTASSIUM mmol/L 4.2   MAGNESIUM mg/dL 1.9   CHLORIDE mmol/L 101   CO2 mmol/L 23.6   BUN mg/dL 29*   CREATININE mg/dL 0.87   EGFR IF NONAFRICN AM mL/min/1.73 88   CALCIUM mg/dL  9.5   GLUCOSE mg/dL 102*   ALBUMIN g/dL 3.55   BILIRUBIN mg/dL 0.2   ALK PHOS U/L 105   AST (SGOT) U/L 16   ALT (SGPT) U/L 19   Estimated Creatinine Clearance: 83.8 mL/min (by C-G formula based on SCr of 0.87 mg/dL).  No results found for: AMMONIA              No results found for: HGBA1C, POCGLU  Lab Results   Component Value Date    TSH 0.698 02/16/2021    FREET4 1.40 02/16/2021     No results found for: PREGTESTUR, PREGSERUM, HCG, HCGQUANT  Pain Management Panel     Pain Management Panel Latest Ref Rng & Units 2/16/2021 11/24/2019    AMPHETAMINES SCREEN, URINE Negative Negative Negative    BARBITURATES SCREEN Negative Negative Negative    BENZODIAZEPINE SCREEN, URINE Negative Negative Negative    BUPRENORPHINEUR Negative Negative Negative    COCAINE SCREEN, URINE Negative Negative Negative    METHADONE SCREEN, URINE Negative Negative Negative        Brief Urine Lab Results  (Last result in the past 365 days)      Color   Clarity   Blood   Leuk Est   Nitrite   Protein   CREAT   Urine HCG        02/16/21 1847 Yellow Clear Negative Negative Negative Negative             No results found for: BLOODCX      No results found for: URINECX  No results found for: WOUNDCX  No results found for: STOOLCX        I have personally looked at the labs and they are summarized above.  ----------------------------------------------------------------------------------------------------------------------  Detailed radiology reports for the last 24 hours:    Imaging Results (Last 24 Hours)     ** No results found for the last 24 hours. **        Final impressions for the last 30 days of radiology reports:    CT Angiogram Neck    Result Date: 2/16/2021  1.  Occlusion of the right vertebral artery. 2.  Mild to moderate plaque right greater than left carotid systems but without hemodynamically significant stenosis or occlusion identified. 3.  Tortuosity of the bilateral extracranial ICA segments. 4.  No evidence of dissection. 5.   Degenerative changes cervical spine. 6.  Emphysema of the partially imaged upper lungs. 7.  Other nonacute findings above.  This report was finalized on 2/16/2021 6:10 PM by Dr. Johnathon Clark MD.      CT Cervical Spine Without Contrast    Result Date: 2/16/2021  1.  Limited exam due to patient positioning and cervical spine rotation without convincing evidence of acute fracture. 2.  No traumatic malalignment is identified. 3.  Small right mastoid effusion. 4.  Advanced degenerative changes with multilevel stenosis. 5.  If persistent concern for cervical spine injury, repeat exam may be indicated.  This report was finalized on 2/16/2021 6:01 PM by Dr. Johnathon Clark MD.      MRI Brain Without Contrast    Result Date: 2/16/2021  1.  Focal acute infarcts in the left basal ganglionic, left external capsule, and left pericallosal regions which follow along the distribution of the lenticulostriate perforating arteries of the left MCA and the anterior choroidal artery distribution. 2.  No MRI evidence of hemorrhage. 3.  No midline shift, focal mass effect, or hydrocephalus. 4.  Advanced cerebral atrophy and chronic small vessel ischemic disease.  This report was finalized on 2/16/2021 8:56 PM by Dr. Johnathon Clark MD.      MRI Cervical Spine Without Contrast    Result Date: 2/16/2021  1. No acute fracture or traumatic malalignment. 2. Advanced multilevel degenerative disc disease with facet arthropathy with variable central canal and neural foraminal stenosis detailed above. Stenosis most severe at the C3/4 level. 3. Degenerative listhesis posteriorly of C3 on C4.  This report was finalized on 2/16/2021 8:59 PM by Dr. Johnathon Clark MD.      XR Chest 1 View    Result Date: 2/16/2021    Stable chest. No acute changes identified.  This report was finalized on 2/16/2021 6:36 PM by Dr. Johnathon Clark MD.      CT Angiogram Head    Result Date: 2/16/2021  1.  No large vessel occlusion identified involving the major intracranial  arterial segments. 2.  Occlusion of the right vertebral artery with reconstitution of flow just prior to basilar artery confluence likely due to collateralization. 3.  Diffuse cerebral atrophy and advanced chronic small vessel ischemic disease. 4.  Other nonacute findings above.  This report was finalized on 2/16/2021 6:14 PM by Dr. Johnathon Clark MD.      CT Head Without Contrast Stroke Protocol    Result Date: 2/16/2021  1.  No CT evidence of acute intracranial abnormality. 2.  Generalized atrophy and advanced chronic small vessel ischemic disease noted.  This report was finalized on 2/16/2021 5:20 PM by Dr. Johnathon Clark MD.      CT Lower Extremity Right Without Contrast    Result Date: 2/20/2021  Markedly abnormal appearance of the right hip. There is no identifiable ball-and-socket joint identified due to the marked irregularity. The expected articulation is superiorly subluxed and there is a large joint effusion but sterility cannot be determined by imaging. Orthopedic consultation is advised. Signer Name: BROOKS ESQUIVEL MD  Signed: 2/20/2021 8:49 PM  Workstation Name: [email or web address]  [facility]    CT CEREBRAL PERFUSION W WO CONTRAST W AI ANALYSIS OF LVO    Result Date: 2/16/2021   1. No core infarct identified. 2. Some diminished perfusion of the left frontal region compatible with variable ischemia which may be chronic in nature.  This report was finalized on 2/16/2021 6:17 PM by Dr. Johnathon Clark MD.      XR Hip With or Without Pelvis 2 - 3 View Right    Result Date: 2/19/2021  1.  Probable osteotomy changes of the right femoral head with superior migration of the right femur. 2.  No acute appearing fracture. 3.  Right joint effusion. 4.  Otherwise stable exam.  This report was finalized on 2/19/2021 7:38 PM by Dr. Johnathon Clark MD.      I have personally looked at the radiology images and read the final radiology report.    Assessment & Plan    Status post CVA with right-sided  hemiparesis--patient improving with physical therapy.  Patient's bed mobility is only from requiring standby assist and verbal cues at this time.  Patient requiring minimum assistance and contact-guard for transfers from chair to bed sit to stand and stand to sit.  Minimum assistance for stand pivot/stand step transfer.  Requiring moderate assistance for lower body dressing.    Chronic pain secondary to osteoarthritis involving the right hip--continue pain control pain management.    Depression--continue antidepressant therapy doing well.    Hypertension--controlled.  Continue angiotensin receptor blocker and metoprolol    Tobacco abuse continue cessation    GERD Protonix    Anxiety neurosis--as needed Xanax  VTE Prophylaxis:   Mechanical Order History:     None      Pharmalogical Order History:      Ordered     Dose Route Frequency Stop    02/22/21 8184  heparin (porcine) 5000 UNIT/ML injection 5,000 Units      5,000 Units SC Every 12 Hours Scheduled --                    iRckie Haney MD  [facility]  03/11/21  14:42 EST

## 2021-03-11 NOTE — SIGNIFICANT NOTE
03/11/21 1110   Plan   Plan Rehab Director signed [facility] billing agreement and SS faxed to [phone].

## 2021-03-11 NOTE — SIGNIFICANT NOTE
03/11/21 1725   Plan   Plan Spoke to friend/caregiver Jerry who says he will call in am to [facility] if he cannot come to receive education from therapists.  Friend says if he cannot come on 3-12-21 he will come on 3-15-21.  Friend says he will take care of pt at home at discharge.  Informed him about efforts to get assistance with W/C ramp.  Friend says he may call [employer] [facility] about assistance.  Discussed possible DME needs for home.  Will follow.

## 2021-03-11 NOTE — PROGRESS NOTES
Occupational Therapy:    Physical Therapy: Individual: 90 minutes.    Speech Language Pathology:    Signed by: Alise Handy PTA

## 2021-03-11 NOTE — SIGNIFICANT NOTE
03/11/21 1520   Plan   Plan Contacted [facility] [employer] [phone] per [place] who states no programs at this time to assist with W/C ramp but informed SS to call [facility] and [facility].  Left message at [facility] [phone] for Reema and attempted to contact [facility] [employer] [phone] with no answer.

## 2021-03-11 NOTE — THERAPY TREATMENT NOTE
Inpatient Rehabilitation - Physical Therapy Treatment Note       CHEYENNE No     Patient Name: Valerie Moore  : 1955  MRN: [id number]    Today's Date: 3/11/2021                    Admit Date: 2021      Visit Dx: No diagnosis found.    Patient Active Problem List   Diagnosis   • Anxiety   • Hypertension   • Hypercholesteremia   • Seasonal allergies   • Sleep disorder   • GERD (gastroesophageal reflux disease)   • Arthritis   • Airway hyperreactivity   • Gout   • Weakness   • CAP (community acquired pneumonia)   • Hypokalemia   • Hyponatremia   • Hyperglycemia   • Normocytic anemia   • Abdominal aortic aneurysm (AAA) 3.0 cm to 5.5 cm in diameter in male (CMS/HCC)   • Tobacco abuse   • Chest pain, atypical   • Cerebrovascular accident (CVA) (CMS/HCC)   • CVA (cerebral vascular accident) (CMS/HCC)       Past Medical History:   Diagnosis Date   • Anxiety    • GERD (gastroesophageal reflux disease) 2017   • Hypercholesteremia    • Hypertension    • Seasonal allergies    • Sleep disorder    • Stroke (CMS/HCC)        Past Surgical History:   Procedure Laterality Date   • HERNIA REPAIR     • HIP SURGERY Right     plate and 8 screws in right hip           PT ASSESSMENT (last 12 hours)      IRF PT Evaluation and Treatment     Row Name 21 1455 21 1417       PT Time and Intention    Document Type  daily treatment AM session  -RG  daily treatment AM session  -LL    Mode of Treatment  individual therapy;physical therapy  -RG  individual therapy;physical therapy  -LL    Patient/Family/Caregiver Comments/Observations  Pt and nursing in agreement for skilled PT.   -RG  Patient agreeable to PT session this date.  -    Row Name 21 1455 21 1417       General Information    Existing Precautions/Restrictions  fall;oxygen therapy device and L/min R hemiplegia  -RG  fall;oxygen therapy device and L/min R hemiplegia  -LL    Row Name 21 1455 21 1417       Cognition/Psychosocial     Affect/Mental Status (Cognitive)  WFL  -RG  WFL  -LL    Orientation Status (Cognition)  oriented x 3  -RG  oriented x 3  -LL    Follows Commands (Cognition)  follows one-step commands;verbal cues/prompting required  -RG  follows one-step commands;verbal cues/prompting required  -LL    Personal Safety Interventions  gait belt;fall prevention program maintained;nonskid shoes/slippers when out of bed  -RG  fall prevention program maintained;gait belt;nonskid shoes/slippers when out of bed;supervised activity  -LL    Row Name 03/11/21 1455 03/11/21 1417       Pain Scale: FACES Pre/Post-Treatment    Pain: FACES Scale, Pretreatment  0-->no hurt  -RG  0-->no hurt  -LL    Posttreatment Pain Rating  2-->hurts little bit  -RG  2-->hurts little bit  -LL    Row Name 03/11/21 1455 03/11/21 1417       Mobility    Left Lower Extremity (Weight-bearing Status)  full weight-bearing (FWB)  -RG  full weight-bearing (FWB)  -LL    Right Lower Extremity (Weight-bearing Status)  full weight-bearing (FWB)  -RG  full weight-bearing (FWB)  -LL    Row Name 03/11/21 1455 03/11/21 1417       Bed Mobility    Scooting/Bridging [place] (Bed Mobility)  verbal cues;nonverbal cues (demo/gesture);contact guard  -RG  verbal cues;nonverbal cues (demo/gesture);contact guard  -LL    Supine-Sit [place] (Bed Mobility)  standby assist;verbal cues  -RG  standby assist;verbal cues  -LL    Bed Mobility, Safety Issues  decreased use of arms for pushing/pulling;decreased use of legs for bridging/pushing  -RG  decreased use of arms for pushing/pulling;decreased use of legs for bridging/pushing  -LL    Assistive Device (Bed Mobility)  bed rails;draw sheet;head of bed elevated  -RG  bed rails;draw sheet;head of bed elevated  -    Row Name 03/11/21 1455 03/11/21 1417       Transfer Assessment/Treatment    Transfers  bed-chair transfer;chair-bed transfer;sit-stand transfer;stand-sit transfer;stand pivot/stand step transfer  -RG  bed-chair transfer;chair-bed  transfer;sit-stand transfer;stand-sit transfer;stand pivot/stand step transfer  -LL    Row Name 03/11/21 1455 03/11/21 1417       Transfers    Chair-Bed [place] (Transfers)  verbal cues;nonverbal cues (demo/gesture);contact guard;minimum assist (75% patient effort)  -RG  verbal cues;nonverbal cues (demo/gesture);contact guard;minimum assist (75% patient effort)  -LL    Sit-Stand [place] (Transfers)  verbal cues;nonverbal cues (demo/gesture);contact guard  -RG  verbal cues;nonverbal cues (demo/gesture);contact guard  -LL    Stand-Sit [place] (Transfers)  verbal cues;nonverbal cues (demo/gesture);contact guard;minimum assist (75% patient effort)  -RG  verbal cues;nonverbal cues (demo/gesture);contact guard;minimum assist (75% patient effort)  -    Row Name 03/11/21 1455 03/11/21 1417       Chair-Bed Transfer    Assistive Device (Chair-Bed Transfers)  wheelchair  -RG  wheelchair  -LL    Row Name 03/11/21 1455 03/11/21 1417       Sit-Stand Transfer    Assistive Device (Sit-Stand Transfers)  walker, [place];wheelchair;other (see comments) parallel bars  -RG  walker, [place];wheelchair;other (see comments) parallel bars  -    Row Name 03/11/21 1455 03/11/21 1417       Stand-Sit Transfer    Assistive Device (Stand-Sit Transfers)  walker, [place];wheelchair;other (see comments) parallel bars  -RG  walker, [place];wheelchair;other (see comments) parallel bars  -    Row Name 03/11/21 1455 03/11/21 1417       Stand Pivot/Stand Step Transfer    Stand Pivot/Stand Step [place] (Transfers)  verbal cues;nonverbal cues (demo/gesture);contact guard;minimum assist (75% patient effort)  -RG  verbal cues;nonverbal cues (demo/gesture);contact guard;minimum assist (75% patient effort)  -    Assistive Device (Stand Pivot Stand Step Transfer)  wheelchair  -RG  wheelchair  -LL    Row Name 03/11/21 1455 03/11/21 1417       Safety Issues, Functional Mobility    Impairments Affecting Function (Mobility)   balance;coordination;endurance/activity tolerance;grasp;motor control;muscle tone abnormal;pain;postural/trunk control;range of motion (ROM);shortness of breath;strength  -RG  balance;coordination;endurance/activity tolerance;grasp;motor control;muscle tone abnormal;pain;postural/trunk control;range of motion (ROM);shortness of breath;strength  -    Row Name 03/11/21 1455          Balance    Static Sitting Balance  WFL;unsupported;sitting in chair  -RG     Dynamic Standing Balance  severe impairment  -RG     Comment, Balance  weight shift in // ss, fb  -RG     Row Name 03/11/21 1455 03/11/21 1417       Hip (Therapeutic Exercise)    Hip AAROM (Therapeutic Exercise)  --  right;flexion;extension;aBduction;aDduction;external rotation;internal rotation;supine;sitting  -LL    Hip Strengthening (Therapeutic Exercise)  bilateral;flexion;aBduction;aDduction;marching while seated;sitting;resistance band;green;10 repetitions;2 sets  -RG  --    Row Name 03/11/21 1455 03/11/21 1417       Knee (Therapeutic Exercise)    Knee Strengthening (Therapeutic Exercise)  bilateral;flexion;extension;marching while seated;hamstring curls;LAQ (long arc quad);sitting;resistance band;green;10 repetitions;2 sets  -RG  flexion;extension;right;SAQ (short arc quad);sitting;supine  -    Row Name 03/11/21 1455 03/11/21 1417       Ankle (Therapeutic Exercise)    Ankle PROM (Therapeutic Exercise)  --  left;dorsiflexion;plantarflexion  -LL    Ankle Strengthening (Therapeutic Exercise)  left;dorsiflexion;plantarflexion;sitting;10 repetitions;2 sets  -RG  --    Row Name 03/11/21 1455 03/11/21 1417       Bed Mobility Goal 1 (PT-IRF)    Progress/Outcomes (Bed Mobility Goal 1, PT-IRF)  goal met  -RG  goal met  -LL    Row Name 03/11/21 1455 03/11/21 1417       Bed Mobility Goal 2 (PT-IRF)    Progress/Outcomes (Bed Mobility Goal 2, PT-IRF)  goal partially met goal partially met (inconsistent)  -RG  goal partially met goal partially met (inconsistent)  -     Row Name 03/11/21 1455 03/11/21 1417       Transfer Goal 1 (PT-IRF)    Progress/Outcomes (Transfer Goal 1, PT-IRF)  goal met  -RG  goal met  -LL    Row Name 03/11/21 1455 03/11/21 1417       Transfer Goal 2 (PT-IRF)    Progress/Outcomes (Transfer Goal 2, PT-IRF)  goal ongoing  -RG  goal ongoing  -LL    Row Name 03/11/21 1455 03/11/21 1417       Gait/Walking Locomotion Goal 1 (PT-IRF)    Progress/Outcomes (Gait/Walking Locomotion Goal 1, PT-IRF)  goal ongoing  -RG  goal ongoing  -LL    Row Name 03/11/21 1455 03/11/21 1417       Gait/Walking Locomotion Goal 2 (PT-IRF)    Progress/Outcomes (Gait/Walking Locomotion Goal 2, PT-IRF)  goal ongoing  -RG  goal ongoing  -LL    Row Name 03/11/21 1455 03/11/21 1417       Positioning and Restraints    Pre-Treatment Position  in bed  -RG  -- WC in room  -LL    Post Treatment Position  bed  -RG  bed  -LL    In Bed  notified nsg;supine;call light within reach;encouraged to call for assist;legs elevated  -RG  supine;call light within reach;encouraged to call for assist;side rails up x2;heels elevated  -LL    Row Name 03/11/21 1455 03/11/21 1417       Therapy Assessment/Plan (PT)    Patient's Goals For Discharge  return home  -RG  return home  -LL    Row Name 03/11/21 1455 03/11/21 1417       Daily Progress Summary (PT)    Impairments Still Limiting Function (PT)  muscle tone abnormal;ROM decreased;strength decreased;impaired balance;coordination impaired;impaired functional activity tolerance;motor control impaired;postural control impaired;pain  -RG  muscle tone abnormal;ROM decreased;strength decreased;impaired balance;coordination impaired;impaired functional activity tolerance;motor control impaired;postural control impaired;pain  -LL    Row Name 03/11/21 1455 03/11/21 1417       Therapy Plan Review/Discharge Plan (PT)    Expected Discharge Disposition (PT Eval)  home with caregiver;home with home health care  -RG  home with caregiver;home with home health care  -LL      User  Key  (r) = Recorded By, (t) = Taken By, (c) = Cosigned By    Initials Name Provider Type    LL Alise Handy, WAGNER Physical Therapy Assistant    Jevon Castañeda PTA Physical Therapy Assistant           Physical Therapy Education                 Title: PT OT SLP Therapies (Done)     Topic: Physical Therapy (Done)     Point: Mobility training (Done)     Learning Progress Summary           Patient Acceptance, E,D, VU by RG at 3/11/2021 1501    Acceptance, E,D, VU,NR by LL at 3/11/2021 1424    Acceptance, E,D, VU,NR by RG at 3/10/2021 1509    Acceptance, E,D, VU,NR by AG at 3/10/2021 1453    Acceptance, E,D, VU,NR by AG at 3/9/2021 1144    Acceptance, E,TB, VU by RF at 3/8/2021 1613    Acceptance, E,TB, VU by RF at 3/5/2021 1605    Acceptance, E, VU by CW at 3/4/2021 1416    Acceptance, E,D, VU,NR by LL at 3/3/2021 1101    Acceptance, E,D, VU,NR by LL at 3/2/2021 1517    Acceptance, E,D, VU,NR by LL at 3/1/2021 1016    Acceptance, E,TB, VU by CT at 2/27/2021 1228    Acceptance, E,D, VU,NR by LL at 2/26/2021 1829    Acceptance, E,D, VU,NR by LL at 2/25/2021 1541    Acceptance, E, VU,NR by LB at 2/23/2021 1604                   Point: Home exercise program (Done)     Learning Progress Summary           Patient Acceptance, E,D, VU by RG at 3/11/2021 1501    Acceptance, E,D, VU,NR by LL at 3/11/2021 1424    Acceptance, E,D, VU,NR by RG at 3/10/2021 1509    Acceptance, E,D, VU,NR by AG at 3/10/2021 1453    Acceptance, E,D, VU,NR by AG at 3/9/2021 1144    Acceptance, E,TB, VU by RF at 3/8/2021 1613    Acceptance, E,TB, VU by RF at 3/5/2021 1605    Acceptance, E, VU by CW at 3/4/2021 1416    Acceptance, E,D, VU,NR by LL at 3/3/2021 1101    Acceptance, E,D, VU,NR by LL at 3/2/2021 1517    Acceptance, E,D, VU,NR by LL at 3/1/2021 1016    Acceptance, E,TB, VU by CT at 2/27/2021 1228    Acceptance, E,D, VU,NR by LL at 2/26/2021 1829    Acceptance, E,D, VU,NR by LL at 2/25/2021 1541    Acceptance, E, VU,NR by LB at 2/23/2021  1604                   Point: Body mechanics (Done)     Learning Progress Summary           Patient Acceptance, E,D, VU by RG at 3/11/2021 1501    Acceptance, E,D, VU,NR by LL at 3/11/2021 1424    Acceptance, E,D, VU,NR by RG at 3/10/2021 1509    Acceptance, E,D, VU,NR by AG at 3/10/2021 1453    Acceptance, E,D, VU,NR by AG at 3/9/2021 1144    Acceptance, E,TB, VU by RF at 3/8/2021 1613    Acceptance, E,TB, VU by RF at 3/5/2021 1605    Acceptance, E, VU by CW at 3/4/2021 1416    Acceptance, E,D, VU,NR by LL at 3/3/2021 1101    Acceptance, E,D, VU,NR by LL at 3/2/2021 1517    Acceptance, E,D, VU,NR by LL at 3/1/2021 1016    Acceptance, E,TB, VU by CT at 2/27/2021 1228    Acceptance, E,D, VU,NR by LL at 2/26/2021 1829    Acceptance, E,D, VU,NR by LL at 2/25/2021 1541    Acceptance, E, VU,NR by LB at 2/23/2021 1604                   Point: Precautions (Done)     Learning Progress Summary           Patient Acceptance, E,D, VU by RG at 3/11/2021 1501    Acceptance, E,D, VU,NR by LL at 3/11/2021 1424    Acceptance, E,D, VU,NR by RG at 3/10/2021 1509    Acceptance, E,D, VU,NR by AG at 3/10/2021 1453    Acceptance, E,D, VU,NR by AG at 3/9/2021 1144    Acceptance, E,TB, VU by RF at 3/8/2021 1613    Acceptance, E,TB, VU by RF at 3/5/2021 1605    Acceptance, E, VU by CW at 3/4/2021 1416    Acceptance, E,D, VU,NR by LL at 3/3/2021 1101    Acceptance, E,D, VU,NR by LL at 3/2/2021 1517    Acceptance, E,D, VU,NR by LL at 3/1/2021 1016    Acceptance, E,TB, VU by CT at 2/27/2021 1228    Acceptance, E,D, VU,NR by LL at 2/26/2021 1829    Acceptance, E,D, VU,NR by LL at 2/25/2021 1541    Acceptance, E, VU,NR by LB at 2/23/2021 1604                               User Key     Initials Effective Dates Name Provider Type Discipline    LB 03/14/16 -  Cathleen Carnes, PT Physical Therapist PT    AG 04/03/18 -  Zoila Morse, PT Physical Therapist PT    CT 04/03/18 -  Nilsa Bermudez, PT Physical Therapist PT    LL 05/02/16 -  Ole,  Alise, WAGNER Physical Therapy Assistant PT    RF 03/07/18 -  Pooja Mcqueen PTA Physical Therapy Assistant PT    RG 10/31/19 -  Jevon Silver PTA Physical Therapy Assistant PT     09/11/20 -  Lucas Baker PT Physical Therapist PT                PT Recommendation and Plan          Daily Progress Summary (PT)  Impairments Still Limiting Function (PT): muscle tone abnormal, ROM decreased, strength decreased, impaired balance, coordination impaired, impaired functional activity tolerance, motor control impaired, postural control impaired, pain               Time Calculation:     PT Charges     Row Name 03/11/21 1501 03/11/21 1424          Time Calculation    Start Time  1330  -RG  1000  -LL     Stop Time  1415  -  1045  -LL     Time Calculation (min)  45 min  -RG  45 min  -LL     PT Received On  03/11/21  -RG  03/11/21  -LL        Time Calculation- PT    Total Timed Code Minutes- PT  45 minute(s)  -RG  45 minute(s)  -LL       User Key  (r) = Recorded By, (t) = Taken By, (c) = Cosigned By    Initials Name Provider Type    LL Alise Handy, WAGNER Physical Therapy Assistant     Jevon Silver PTA Physical Therapy Assistant          Therapy Charges for Today     Code Description Service Date Service Provider Modifiers Qty    70436702080 HC PT THER PROC EA 15 MIN 3/10/2021 Jevon Silver PTA GP, KX 2    21376288629 HC PT THERAPEUTIC ACT EA 15 MIN 3/10/2021 Jevon Silver PTA GP, KX 1    25484207458 HC PT THERAPEUTIC ACT EA 15 MIN 3/11/2021 Jevon Silver PTA GP, KX 2    82128408694 HC PT THER PROC EA 15 MIN 3/11/2021 Jevon Silver PTA GP, KX 1                   Jevon Silver PTA  3/11/2021

## 2021-03-11 NOTE — THERAPY TREATMENT NOTE
Inpatient Rehabilitation - Occupational Therapy Treatment Note    CHEYENNE No     Patient Name: Valerie Moore  : 1955  MRN: [id number]    Today's Date: 3/11/2021                 Admit Date: 2021       No diagnosis found.    Patient Active Problem List   Diagnosis   • Anxiety   • Hypertension   • Hypercholesteremia   • Seasonal allergies   • Sleep disorder   • GERD (gastroesophageal reflux disease)   • Arthritis   • Airway hyperreactivity   • Gout   • Weakness   • CAP (community acquired pneumonia)   • Hypokalemia   • Hyponatremia   • Hyperglycemia   • Normocytic anemia   • Abdominal aortic aneurysm (AAA) 3.0 cm to 5.5 cm in diameter in male (CMS/HCC)   • Tobacco abuse   • Chest pain, atypical   • Cerebrovascular accident (CVA) (CMS/HCC)   • CVA (cerebral vascular accident) (CMS/HCC)       Past Medical History:   Diagnosis Date   • Anxiety    • GERD (gastroesophageal reflux disease) 2017   • Hypercholesteremia    • Hypertension    • Seasonal allergies    • Sleep disorder    • Stroke (CMS/HCC)        Past Surgical History:   Procedure Laterality Date   • HERNIA REPAIR     • HIP SURGERY Right     plate and 8 screws in right hip                 IRF OT ASSESSMENT FLOWSHEET (last 12 hours)      IRF OT Evaluation and Treatment     Row Name 21 1215          OT Time and Intention    Document Type  daily treatment  -BF     Mode of Treatment  occupational therapy  -BF     Patient Effort  excellent  -BF     Row Name 21 1215          General Information    Existing Precautions/Restrictions  fall;oxygen therapy device and L/min R hemiplegia  -BF     Row Name 21 1215          Cognition/Psychosocial    Orientation Status (Cognition)  oriented x 3  -BF     Follows Commands (Cognition)  follows one-step commands;verbal cues/prompting required;repetition of directions required;physical/tactile prompts required  -BF     Row Name 21 1215          Transfers    Bed-Chair [place] (Transfers)   verbal cues;nonverbal cues (demo/gesture);contact guard  -BF     Assistive Device (Bed-Chair Transfers)  wheelchair  -BF     Row Name 03/11/21 1215          Motor Skills    Neuromuscular Function  right;upper extremity  -BF     Motor Control/Coordination Interventions  bilateral/bimanual training;therapeutic exercise/ROM;gross motor coordination activities;neuro-muscular re-education SUZANNE CASTANEDA buffy 12 [id number]  -BF     Gross Motor Skill, Impairments Detail  BUE arm bike w/ RUE supported  -BF     Row Name 03/11/21 1215          Lower Body Dressing    [place] Level (Lower Body Dressing)  moderate assist (50% patient effort);verbal cues;nonverbal cues (demo/gesture)  -BF     Position (Lower Body Dressing)  supine  -BF     Comment (Lower Body Dressing)  Mod A  -BF     Row Name 03/11/21 1215          Positioning and Restraints    In Wheelchair  sitting;call light within reach;encouraged to call for assist  -BF       User Key  (r) = Recorded By, (t) = Taken By, (c) = Cosigned By    Initials Name Effective Dates    BF Meghan Carbajal OT 07/05/20 -            Occupational Therapy Education                 Title: PT OT SLP Therapies (Done)     Topic: Occupational Therapy (Done)     Point: ADL training (Done)     Description:   Instruct learner(s) on proper safety adaptation and remediation techniques during self care or transfers.   Instruct in proper use of assistive devices.              Learning Progress Summary           Patient Acceptance, E, VU,NR by BF at 3/11/2021 1215    Acceptance, E,D, VU,DU,NR by AB at 3/10/2021 1042    Acceptance, E, VU,NR by BF at 3/9/2021 1500    Acceptance, E, VU,NR by BF at 3/8/2021 1414    Acceptance, E, VU,NR by BF at 3/5/2021 1231    Acceptance, E, VU,NR by BF at 3/4/2021 1237    Acceptance, E, VU,NR by BF at 3/2/2021 1445    Acceptance, E, VU,NR by BF at 3/1/2021 1432    Acceptance, E, VU,NR by BF at 2/27/2021 1110    Acceptance, E, VU,NR by BF at 2/25/2021  1425    Acceptance, E, VU,NR by BF at 2/23/2021 1548                   Point: Precautions (Done)     Description:   Instruct learner(s) on prescribed precautions during self-care and functional transfers.              Learning Progress Summary           Patient Acceptance, E, VU,NR by BF at 3/11/2021 1215    Acceptance, E,D, VU,DU,NR by AB at 3/10/2021 1042    Acceptance, E, VU,NR by BF at 3/9/2021 1500    Acceptance, E, VU,NR by BF at 3/8/2021 1414    Acceptance, E, VU,NR by BF at 3/5/2021 1231    Acceptance, E, VU,NR by BF at 3/4/2021 1237    Acceptance, E, VU,NR by BF at 3/2/2021 1445    Acceptance, E, VU,NR by BF at 3/1/2021 1432    Acceptance, E, VU,NR by BF at 2/27/2021 1110    Acceptance, E, VU,NR by BF at 2/25/2021 1425    Acceptance, E, VU,NR by BF at 2/23/2021 1548                               User Key     Initials Effective Dates Name Provider Type Discipline    AB 04/03/18 -  Patricia Cifuentes, OT Occupational Therapist OT     07/05/20 -  Meghan Carbajal, OT Occupational Therapist OT                    OT Recommendation and Plan    Planned Therapy Interventions (OT): activity tolerance training, adaptive equipment training, BADL retraining, neuromuscular control/coordination retraining, passive ROM/stretching, ROM/therapeutic exercise, strengthening exercise, transfer/mobility retraining                    Time Calculation:     Time Calculation- OT     Row Name 03/11/21 1220             Time Calculation- OT    OT Start Time  0805  -BF      OT Stop Time  0935  -      OT Time Calculation (min)  90 min  -BF      Total Timed Code Minutes- OT  90 minute(s)  -BF      OT Non-Billable Time (min)  20 min  -        User Key  (r) = Recorded By, (t) = Taken By, (c) = Cosigned By    Initials Name Provider Type     Meghan Carbajal, OT Occupational Therapist        Therapy Charges for Today     Code Description Service Date Service Provider Modifiers Qty    27124770783 HC OT SELF  CARE/MGMT/TRAIN EA 15 MIN 3/11/2021 Meghan Carbajal OT HEIDY, KX 1    29505446220 HC OT NEUROMUSC RE EDUCATION EA 15 MIN 3/11/2021 Meghan Carbajal OT GO, KX 3    49408257890 HC OT THER PROC EA 15 MIN 3/11/2021 Meghan Carbajal OT GO KX 2                   Meghan Carbajal, OT  3/11/2021

## 2021-03-11 NOTE — SIGNIFICANT NOTE
03/11/21 1045   Plan   Plan Spoke to pt who does not think daughter can go with her to Ortho appointment in [place] on 3-15-21.  Pt discussed other options for continued nursing and rehab if daughter and her significant other are unable to provide care.  Pt will inform SS if her discharge plans change.  Spoke to Rehab Director who is agreeable to SS arranging [facility] W/C lift van for transport to Ortho follow-up in [place] on 3-15-21 and says to contact [facility] about getting a float CNA to ride with pt.  Contacted [facility] ext. [phone] per Eliezer who says CNA is not available on 3-15-21, however, he will reach out to see if anyone is available to travel with pt to [place] and call back if he gets someone.  Contacted [facility] [phone] per Leona to arrange W/C van transport for pt to travel to [place] for appointment with Dr. Milton Hernandez on 3-15-21 at 2:30 pm at [address] [facility], 1st floor, Wing [address], Room [address].  Informed [facility] to pay for transport to and from [place] and pt will need an escort to ride with her.  [facility] to fax billing agreement to SS.  Pt is scheduled for pick-up at 12:30 pm then [facility]  will return to MD office at 4:00 pm for return pick-up.   Patient/Family in Agreement with Plan yes

## 2021-03-11 NOTE — THERAPY TREATMENT NOTE
Inpatient Rehabilitation - Physical Therapy Treatment Note       CHEYENNE No     Patient Name: Valerie Moore  : 1955  MRN: [id number]    Today's Date: 3/11/2021                    Admit Date: 2021      Visit Dx: No diagnosis found.    Patient Active Problem List   Diagnosis   • Anxiety   • Hypertension   • Hypercholesteremia   • Seasonal allergies   • Sleep disorder   • GERD (gastroesophageal reflux disease)   • Arthritis   • Airway hyperreactivity   • Gout   • Weakness   • CAP (community acquired pneumonia)   • Hypokalemia   • Hyponatremia   • Hyperglycemia   • Normocytic anemia   • Abdominal aortic aneurysm (AAA) 3.0 cm to 5.5 cm in diameter in male (CMS/HCC)   • Tobacco abuse   • Chest pain, atypical   • Cerebrovascular accident (CVA) (CMS/HCC)   • CVA (cerebral vascular accident) (CMS/HCC)       Past Medical History:   Diagnosis Date   • Anxiety    • GERD (gastroesophageal reflux disease) 2017   • Hypercholesteremia    • Hypertension    • Seasonal allergies    • Sleep disorder    • Stroke (CMS/HCC)        Past Surgical History:   Procedure Laterality Date   • HERNIA REPAIR     • HIP SURGERY Right     plate and 8 screws in right hip           PT ASSESSMENT (last 12 hours)      IRF PT Evaluation and Treatment     Row Name 21 1417          PT Time and Intention    Document Type  daily treatment AM session  -LL     Mode of Treatment  individual therapy;physical therapy  -LL     Patient/Family/Caregiver Comments/Observations  Patient agreeable to PT session this date.  -LL     Row Name 21 1417          General Information    Existing Precautions/Restrictions  fall;oxygen therapy device and L/min R hemiplegia  -LL     Row Name 21 1417          Cognition/Psychosocial    Affect/Mental Status (Cognitive)  WFL  -LL     Orientation Status (Cognition)  oriented x 3  -LL     Follows Commands (Cognition)  follows one-step commands;verbal cues/prompting required  -LL     Personal Safety  Interventions  fall prevention program maintained;gait belt;nonskid shoes/slippers when out of bed;supervised activity  -LL     Row Name 03/11/21 1417          Pain Scale: FACES Pre/Post-Treatment    Pain: FACES Scale, Pretreatment  0-->no hurt  -LL     Posttreatment Pain Rating  2-->hurts little bit  -LL     Row Name 03/11/21 1417          Mobility    Left Lower Extremity (Weight-bearing Status)  full weight-bearing (FWB)  -     Right Lower Extremity (Weight-bearing Status)  full weight-bearing (FWB)  -LL     Row Name 03/11/21 [facility]7          Bed Mobility    Scooting/Bridging [place] (Bed Mobility)  verbal cues;nonverbal cues (demo/gesture);contact guard  -     Supine-Sit [place] (Bed Mobility)  standby assist;verbal cues  -     Bed Mobility, Safety Issues  decreased use of arms for pushing/pulling;decreased use of legs for bridging/pushing  -     Assistive Device (Bed Mobility)  bed rails;draw sheet;head of bed elevated  -LL     Row Name 03/11/21 1417          Transfer Assessment/Treatment    Transfers  bed-chair transfer;chair-bed transfer;sit-stand transfer;stand-sit transfer;stand pivot/stand step transfer  -LL     Row Name 03/11/21 1417          Transfers    Chair-Bed [place] (Transfers)  verbal cues;nonverbal cues (demo/gesture);contact guard;minimum assist (75% patient effort)  -     Sit-Stand [place] (Transfers)  verbal cues;nonverbal cues (demo/gesture);contact guard  -     Stand-Sit [place] (Transfers)  verbal cues;nonverbal cues (demo/gesture);contact guard;minimum assist (75% patient effort)  -     Row Name 03/11/21 1417          Chair-Bed Transfer    Assistive Device (Chair-Bed Transfers)  wheelchair  -LL     Row Name 03/11/21 1417          Sit-Stand Transfer    Assistive Device (Sit-Stand Transfers)  walker, teodora;wheelchair;other (see comments) parallel bars  -[facility] Name 03/11/21 1417          Stand-Sit Transfer    Assistive Device (Stand-Sit Transfers)   walker, [place];wheelchair;other (see comments) parallel bars  -     Row Name 03/11/21 1417          Stand Pivot/Stand Step Transfer    Stand Pivot/Stand Step [place] (Transfers)  verbal cues;nonverbal cues (demo/gesture);contact guard;minimum assist (75% patient effort)  -     Assistive Device (Stand Pivot Stand Step Transfer)  wheelchair  -     Row Name 03/11/21 1417          Safety Issues, Functional Mobility    Impairments Affecting Function (Mobility)  balance;coordination;endurance/activity tolerance;grasp;motor control;muscle tone abnormal;pain;postural/trunk control;range of motion (ROM);shortness of breath;strength  -     Row Name 03/11/21 1417          Hip (Therapeutic Exercise)    Hip AAROM (Therapeutic Exercise)  right;flexion;extension;aBduction;aDduction;external rotation;internal rotation;supine;sitting  -     Row Name 03/11/21 1417          Knee (Therapeutic Exercise)    Knee Strengthening (Therapeutic Exercise)  flexion;extension;right;SAQ (short arc quad);sitting;supine  -[facility] Name 03/11/21 1417          Ankle (Therapeutic Exercise)    Ankle PROM (Therapeutic Exercise)  left;dorsiflexion;plantarflexion  -     Row Name 03/11/21 1417          Bed Mobility Goal 1 (PT-IRF)    Progress/Outcomes (Bed Mobility Goal 1, PT-IRF)  goal met  -[facility] Name 03/11/21 1417          Bed Mobility Goal 2 (PT-IRF)    Progress/Outcomes (Bed Mobility Goal 2, PT-IRF)  goal partially met goal partially met (inconsistent)  -[facility] Name 03/11/21 1417          Transfer Goal 1 (PT-IRF)    Progress/Outcomes (Transfer Goal 1, PT-IRF)  goal met  -[facility] Name 03/11/21 1417          Transfer Goal 2 (PT-IRF)    Progress/Outcomes (Transfer Goal 2, PT-IRF)  goal ongoing  -[facility] Name 03/11/21 1417          Gait/Walking Locomotion Goal 1 (PT-IRF)    Progress/Outcomes (Gait/Walking Locomotion Goal 1, PT-IRF)  goal ongoing  -[facility] Name 03/11/21 1417          Gait/Walking Locomotion Goal 2 (PT-IRF)     Progress/Outcomes (Gait/Walking Locomotion Goal 2, PT-IRF)  goal ongoing  -LL     Row Name 03/11/21 1417          Positioning and Restraints    Pre-Treatment Position  -- WC in room  -LL     Post Treatment Position  bed  -LL     In Bed  supine;call light within reach;encouraged to call for assist;side rails up x2;heels elevated  -LL     Row Name 03/11/21 1417          Therapy Assessment/Plan (PT)    Patient's Goals For Discharge  return home  -LL     Row Name 03/11/21 1417          Daily Progress Summary (PT)    Impairments Still Limiting Function (PT)  muscle tone abnormal;ROM decreased;strength decreased;impaired balance;coordination impaired;impaired functional activity tolerance;motor control impaired;postural control impaired;pain  -LL     Row Name 03/11/21 1417          Therapy Plan Review/Discharge Plan (PT)    Expected Discharge Disposition (PT Eval)  home with caregiver;home with home health care  -LL       User Key  (r) = Recorded By, (t) = Taken By, (c) = Cosigned By    Initials Name Provider Type    Alise Stratton PTA Physical Therapy Assistant           Physical Therapy Education                 Title: PT OT SLP Therapies (Done)     Topic: Physical Therapy (Done)     Point: Mobility training (Done)     Learning Progress Summary           Patient Acceptance, E,D, VU,NR by LL at 3/11/2021 1424    Acceptance, E,D, VU,NR by RG at 3/10/2021 1509    Acceptance, E,D, VU,NR by AG at 3/10/2021 1453    Acceptance, E,D, VU,NR by AG at 3/9/2021 1144    Acceptance, E,TB, VU by RF at 3/8/2021 1613    Acceptance, E,TB, VU by RF at 3/5/2021 1605    Acceptance, E, VU by CW at 3/4/2021 1416    Acceptance, E,D, VU,NR by LL at 3/3/2021 1101    Acceptance, E,D, VU,NR by LL at 3/2/2021 1517    Acceptance, E,D, VU,NR by LL at 3/1/2021 1016    Acceptance, E,TB, VU by CT at 2/27/2021 1228    Acceptance, E,D, VU,NR by LL at 2/26/2021 1829    Acceptance, E,D, VU,NR by LL at 2/25/2021 1541    Acceptance, E, WARREN,NR by LB at  2/23/2021 1604                   Point: Home exercise program (Done)     Learning Progress Summary           Patient Acceptance, E,D, VU,NR by LL at 3/11/2021 1424    Acceptance, E,D, VU,NR by RG at 3/10/2021 1509    Acceptance, E,D, VU,NR by AG at 3/10/2021 1453    Acceptance, E,D, VU,NR by AG at 3/9/2021 1144    Acceptance, E,TB, VU by RF at 3/8/2021 1613    Acceptance, E,TB, VU by RF at 3/5/2021 1605    Acceptance, E, VU by CW at 3/4/2021 1416    Acceptance, E,D, VU,NR by LL at 3/3/2021 1101    Acceptance, E,D, VU,NR by LL at 3/2/2021 1517    Acceptance, E,D, VU,NR by LL at 3/1/2021 1016    Acceptance, E,TB, VU by CT at 2/27/2021 1228    Acceptance, E,D, VU,NR by LL at 2/26/2021 1829    Acceptance, E,D, VU,NR by LL at 2/25/2021 1541    Acceptance, E, VU,NR by LB at 2/23/2021 1604                   Point: Body mechanics (Done)     Learning Progress Summary           Patient Acceptance, E,D, VU,NR by LL at 3/11/2021 1424    Acceptance, E,D, VU,NR by RG at 3/10/2021 1509    Acceptance, E,D, VU,NR by AG at 3/10/2021 1453    Acceptance, E,D, VU,NR by AG at 3/9/2021 1144    Acceptance, E,TB, VU by RF at 3/8/2021 1613    Acceptance, E,TB, VU by RF at 3/5/2021 1605    Acceptance, E, VU by CW at 3/4/2021 1416    Acceptance, E,D, VU,NR by LL at 3/3/2021 1101    Acceptance, E,D, VU,NR by LL at 3/2/2021 1517    Acceptance, E,D, VU,NR by LL at 3/1/2021 1016    Acceptance, E,TB, VU by CT at 2/27/2021 1228    Acceptance, E,D, VU,NR by LL at 2/26/2021 1829    Acceptance, E,D, VU,NR by LL at 2/25/2021 1541    Acceptance, E, VU,NR by LB at 2/23/2021 1604                   Point: Precautions (Done)     Learning Progress Summary           Patient Acceptance, E,D, VU,NR by LL at 3/11/2021 1424    Acceptance, E,D, VU,NR by RG at 3/10/2021 1509    Acceptance, E,D, VU,NR by AG at 3/10/2021 1453    Acceptance, E,D, VU,NR by AG at 3/9/2021 1144    Acceptance, E,TB, VU by RF at 3/8/2021 1613    Acceptance, E,TB, VU by RF at 3/5/2021 1605     Acceptance, E, VU by CW at 3/4/2021 1416    Acceptance, E,D, VU,NR by LL at 3/3/2021 1101    Acceptance, E,D, VU,NR by LL at 3/2/2021 1517    Acceptance, E,D, VU,NR by LL at 3/1/2021 1016    Acceptance, E,TB, VU by CT at 2/27/2021 1228    Acceptance, E,D, VU,NR by LL at 2/26/2021 1829    Acceptance, E,D, VU,NR by LL at 2/25/2021 1541    Acceptance, E, VU,NR by LB at 2/23/2021 1604                               User Key     Initials Effective Dates Name Provider Type Discipline    LB 03/14/16 -  Cathleen Carnes, PT Physical Therapist PT    AG 04/03/18 -  Zoila Morse, PT Physical Therapist PT    CT 04/03/18 -  Nilsa Bermudez, PT Physical Therapist PT    LL 05/02/16 -  Alise Handy, PTA Physical Therapy Assistant PT    RF 03/07/18 -  Pooja Mcqueen, PTA Physical Therapy Assistant PT    RG 10/31/19 -  Jevon Silver, PTA Physical Therapy Assistant PT    CW 09/11/20 -  Lucas Baker, PT Physical Therapist PT                PT Recommendation and Plan    Frequency of Treatment (PT): 5 times per week  Anticipated Equipment Needs at Discharge (PT Eval):  (tbd)  Daily Progress Summary (PT)  Impairments Still Limiting Function (PT): muscle tone abnormal, ROM decreased, strength decreased, impaired balance, coordination impaired, impaired functional activity tolerance, motor control impaired, postural control impaired, pain               Time Calculation:     PT Charges     Row Name 03/11/21 1424             Time Calculation    Start Time  1000  -LL      Stop Time  1045  -LL      Time Calculation (min)  45 min  -LL      PT Received On  03/11/21  -LL         Time Calculation- PT    Total Timed Code Minutes- PT  45 minute(s)  -LL        User Key  (r) = Recorded By, (t) = Taken By, (c) = Cosigned By    Initials Name Provider Type    LL Alise Handy, WAGNER Physical Therapy Assistant          Therapy Charges for Today     Code Description Service Date Service Provider Modifiers Qty    20841136649 HC PT  THERAPEUTIC ACT EA 15 MIN 3/11/2021 Alise Handy PTA GP, KX 1    17050772103  PT THER PROC EA 15 MIN 3/11/2021 Alise Handy PTA GP, KX 2                   Alise. WAGNER Handy  3/11/2021

## 2021-03-12 PROCEDURE — 97112 NEUROMUSCULAR REEDUCATION: CPT | Performed by: OCCUPATIONAL THERAPIST

## 2021-03-12 PROCEDURE — 25010000002 HEPARIN (PORCINE) PER 1000 UNITS: Performed by: FAMILY MEDICINE

## 2021-03-12 PROCEDURE — 97110 THERAPEUTIC EXERCISES: CPT

## 2021-03-12 PROCEDURE — 94799 UNLISTED PULMONARY SVC/PX: CPT

## 2021-03-12 PROCEDURE — 97535 SELF CARE MNGMENT TRAINING: CPT | Performed by: OCCUPATIONAL THERAPIST

## 2021-03-12 PROCEDURE — 97112 NEUROMUSCULAR REEDUCATION: CPT

## 2021-03-12 PROCEDURE — 99232 SBSQ HOSP IP/OBS MODERATE 35: CPT | Performed by: FAMILY MEDICINE

## 2021-03-12 PROCEDURE — 97530 THERAPEUTIC ACTIVITIES: CPT

## 2021-03-12 PROCEDURE — 97110 THERAPEUTIC EXERCISES: CPT | Performed by: OCCUPATIONAL THERAPIST

## 2021-03-12 RX ORDER — ALPRAZOLAM 0.5 MG/1
1 TABLET ORAL 2 TIMES DAILY PRN
Status: DISPENSED | OUTPATIENT
Start: 2021-03-12 | End: 2021-03-14

## 2021-03-12 RX ADMIN — HEPARIN SODIUM 5000 UNITS: 5000 INJECTION INTRAVENOUS; SUBCUTANEOUS at 10:16

## 2021-03-12 RX ADMIN — HYDROCODONE BITARTRATE AND ACETAMINOPHEN 1 TABLET: 10; 325 TABLET ORAL at 17:31

## 2021-03-12 RX ADMIN — MIRTAZAPINE 15 MG: 15 TABLET, FILM COATED ORAL at 20:53

## 2021-03-12 RX ADMIN — HYDROCODONE BITARTRATE AND ACETAMINOPHEN 1 TABLET: 10; 325 TABLET ORAL at 09:01

## 2021-03-12 RX ADMIN — IPRATROPIUM BROMIDE AND ALBUTEROL SULFATE 3 ML: .5; 3 SOLUTION RESPIRATORY (INHALATION) at 07:56

## 2021-03-12 RX ADMIN — HEPARIN SODIUM 5000 UNITS: 5000 INJECTION INTRAVENOUS; SUBCUTANEOUS at 20:51

## 2021-03-12 RX ADMIN — BUPROPION HYDROCHLORIDE 150 MG: 150 TABLET, EXTENDED RELEASE ORAL at 20:53

## 2021-03-12 RX ADMIN — CARISOPRODOL 350 MG: 350 TABLET ORAL at 13:08

## 2021-03-12 RX ADMIN — GABAPENTIN 200 MG: 100 CAPSULE ORAL at 20:51

## 2021-03-12 RX ADMIN — ALPRAZOLAM 0.5 MG: 0.5 TABLET ORAL at 09:01

## 2021-03-12 RX ADMIN — ASPIRIN 81 MG: 81 TABLET, COATED ORAL at 09:01

## 2021-03-12 RX ADMIN — BISACODYL 10 MG: 5 TABLET, COATED ORAL at 20:51

## 2021-03-12 RX ADMIN — HYDROCODONE BITARTRATE AND ACETAMINOPHEN 1 TABLET: 10; 325 TABLET ORAL at 23:21

## 2021-03-12 RX ADMIN — METOPROLOL TARTRATE 50 MG: 50 TABLET, FILM COATED ORAL at 09:01

## 2021-03-12 RX ADMIN — FLUTICASONE PROPIONATE 2 SPRAY: 50 SPRAY, METERED NASAL at 09:01

## 2021-03-12 RX ADMIN — ALPRAZOLAM 1 MG: 0.5 TABLET ORAL at 20:51

## 2021-03-12 RX ADMIN — PANTOPRAZOLE SODIUM 40 MG: 40 TABLET, DELAYED RELEASE ORAL at 05:04

## 2021-03-12 RX ADMIN — CLOPIDOGREL 75 MG: 75 TABLET, FILM COATED ORAL at 09:01

## 2021-03-12 RX ADMIN — GABAPENTIN 200 MG: 100 CAPSULE ORAL at 05:04

## 2021-03-12 RX ADMIN — Medication 1 PATCH: at 09:01

## 2021-03-12 RX ADMIN — Medication 1 TABLET: at 09:01

## 2021-03-12 RX ADMIN — GABAPENTIN 200 MG: 100 CAPSULE ORAL at 13:08

## 2021-03-12 RX ADMIN — ATORVASTATIN CALCIUM 80 MG: 40 TABLET, FILM COATED ORAL at 20:53

## 2021-03-12 RX ADMIN — POLYETHYLENE GLYCOL (3350) 17 G: 17 POWDER, FOR SOLUTION ORAL at 10:16

## 2021-03-12 RX ADMIN — BUPROPION HYDROCHLORIDE 150 MG: 150 TABLET, EXTENDED RELEASE ORAL at 09:01

## 2021-03-12 RX ADMIN — LOSARTAN POTASSIUM 50 MG: 50 TABLET, FILM COATED ORAL at 09:01

## 2021-03-12 NOTE — SIGNIFICANT NOTE
03/12/21 5513   Plan   Plan Reema from [employer] left a message about their ability to assist with funding a w/c ramp to be built at pt's home.  I called Reema back at [phone] and left her a message to return my call.

## 2021-03-12 NOTE — THERAPY TREATMENT NOTE
Inpatient Rehabilitation - Occupational Therapy Treatment Note    CHEYENNE No     Patient Name: Valerie Moore  : 1955  MRN: [id number]    Today's Date: 3/12/2021                 Admit Date: 2021       No diagnosis found.    Patient Active Problem List   Diagnosis   • Anxiety   • Hypertension   • Hypercholesteremia   • Seasonal allergies   • Sleep disorder   • GERD (gastroesophageal reflux disease)   • Arthritis   • Airway hyperreactivity   • Gout   • Weakness   • CAP (community acquired pneumonia)   • Hypokalemia   • Hyponatremia   • Hyperglycemia   • Normocytic anemia   • Abdominal aortic aneurysm (AAA) 3.0 cm to 5.5 cm in diameter in male (CMS/HCC)   • Tobacco abuse   • Chest pain, atypical   • Cerebrovascular accident (CVA) (CMS/HCC)   • CVA (cerebral vascular accident) (CMS/HCC)       Past Medical History:   Diagnosis Date   • Anxiety    • GERD (gastroesophageal reflux disease) 2017   • Hypercholesteremia    • Hypertension    • Seasonal allergies    • Sleep disorder    • Stroke (CMS/HCC)        Past Surgical History:   Procedure Laterality Date   • HERNIA REPAIR     • HIP SURGERY Right     plate and 8 screws in right hip                 IRF OT ASSESSMENT FLOWSHEET (last 12 hours)      IRF OT Evaluation and Treatment     Row Name 21 1434          OT Time and Intention    Document Type  daily treatment  -BF     Mode of Treatment  occupational therapy  -BF     Patient Effort  excellent  -BF     Row Name 21 1436          General Information    Patient/Family/Caregiver Comments/Observations  Roommate present for selfcare/BUE education  -BF     Existing Precautions/Restrictions  fall R HP, O2 as needed  -BF     Row Name 21 1434          Cognition/Psychosocial    Orientation Status (Cognition)  oriented x 3  -BF     Follows Commands (Cognition)  follows one-step commands;verbal cues/prompting required;repetition of directions required  -BF     Row Name 21 1439           Transfers    Bed-Chair [place] (Transfers)  contact guard;verbal cues;nonverbal cues (demo/gesture)  -BF     Chair-Bed [place] (Transfers)  contact guard;verbal cues;nonverbal cues (demo/gesture)  -BF     Assistive Device (Bed-Chair Transfers)  wheelchair  -BF     Row Name 03/12/21 1434          Chair-Bed Transfer    Assistive Device (Chair-Bed Transfers)  wheelchair  -BF     Row Name 03/12/21 1434          Motor Skills    Neuromuscular Function  right;upper extremity  -BF     Motor Control/Coordination Interventions  bilateral/bimanual training;neuro-muscular re-education;therapeutic exercise/ROM;gross motor coordination activities RUE NDT, AAROM; BUE arm bike w/ RUE supported  -BF     Gross Motor Skill, Impairments Detail  PATE buffy 12 lbs X20X2  -BF     Row Name 03/12/21 1434          Neuromuscular Re-education    Interventions (Neuromuscular Re-education)  facilitation/inhibition;massage;tapping RUE  -BF     Row Name 03/12/21 1434          Upper Body Dressing    [place] Level (Upper Body Dressing)  minimum assist (75% or more patient effort);verbal cues;nonverbal cues (demo/gesture)  -BF     Position (Upper Body Dressing)  supported sitting  -BF     Comment (Upper Body Dressing)  Min A  -BF     Row Name 03/12/21 1434          Lower Body Dressing    [place] Level (Lower Body Dressing)  minimum assist (75% patient effort);moderate assist (50% patient effort);verbal cues;nonverbal cues (demo/gesture)  -BF     Position (Lower Body Dressing)  supine  -BF     Comment (Lower Body Dressing)  Min/Mod A  -BF     Row Name 03/12/21 1434          Positioning and Restraints    In Bed  supine;call light within reach;encouraged to call for assist;RUE elevated after both sessions  -BF       User Key  (r) = Recorded By, (t) = Taken By, (c) = Cosigned By    Initials Name Effective Dates    Meghan Atkins OT 07/05/20 -            Occupational Therapy Education                 Title: PT OT SLP  Therapies (Done)     Topic: Occupational Therapy (Done)     Point: ADL training (Done)     Description:   Instruct learner(s) on proper safety adaptation and remediation techniques during self care or transfers.   Instruct in proper use of assistive devices.              Learning Progress Summary           Patient Acceptance, E,D, VU by BF at 3/12/2021 1433    Comment: Pt and roommate educated in self-care    Acceptance, E, VU,NR by BF at 3/11/2021 1215    Acceptance, E,D, VU,DU,NR by AB at 3/10/2021 1042    Acceptance, E, VU,NR by BF at 3/9/2021 1500    Acceptance, E, VU,NR by BF at 3/8/2021 1414    Acceptance, E, VU,NR by BF at 3/5/2021 1231    Acceptance, E, VU,NR by BF at 3/4/2021 1237    Acceptance, E, VU,NR by BF at 3/2/2021 1445    Acceptance, E, VU,NR by BF at 3/1/2021 1432    Acceptance, E, VU,NR by BF at 2/27/2021 1110    Acceptance, E, VU,NR by BF at 2/25/2021 1425    Acceptance, E, VU,NR by BF at 2/23/2021 1548   Other Acceptance, E,D, VU by BF at 3/12/2021 1433    Comment: Pt and roommate educated in self-care                   Point: Precautions (Done)     Description:   Instruct learner(s) on prescribed precautions during self-care and functional transfers.              Learning Progress Summary           Patient Acceptance, E,D, VU by BF at 3/12/2021 1433    Comment: Pt and roommate educated in self-care    Acceptance, E, VU,NR by BF at 3/11/2021 1215    Acceptance, E,D, VU,DU,NR by AB at 3/10/2021 1042    Acceptance, E, VU,NR by BF at 3/9/2021 1500    Acceptance, E, VU,NR by BF at 3/8/2021 1414    Acceptance, E, VU,NR by BF at 3/5/2021 1231    Acceptance, E, VU,NR by BF at 3/4/2021 1237    Acceptance, E, VU,NR by BF at 3/2/2021 1445    Acceptance, E, VU,NR by BF at 3/1/2021 1432    Acceptance, E, VU,NR by BF at 2/27/2021 1110    Acceptance, E, VU,NR by BF at 2/25/2021 1425    Acceptance, E, VU,NR by BF at 2/23/2021 1548   Other Acceptance, E,D, VU by BF at 3/12/2021 1433    Comment: Pt and roommate  educated in self-care                               User Key     Initials Effective Dates Name Provider Type Discipline    AB 04/03/18 -  Patricia Cifuentes OT Occupational Therapist OT    BF 07/05/20 -  Meghan Carbajal OT Occupational Therapist OT                    OT Recommendation and Plan    Planned Therapy Interventions (OT): activity tolerance training, adaptive equipment training, BADL retraining, neuromuscular control/coordination retraining, passive ROM/stretching, ROM/therapeutic exercise, strengthening exercise, transfer/mobility retraining                    Time Calculation:     Time Calculation- OT     Row Name 03/12/21 1441 03/12/21 1000          Time Calculation- OT    OT Start Time  1330  -BF  1000  -BF     OT Stop Time  1415  -BF  1045  -BF     OT Time Calculation (min)  45 min  -BF  45 min  -BF     Total Timed Code Minutes- OT  45 minute(s)  -BF  45 minute(s)  -BF     OT Non-Billable Time (min)  --  20 min  -BF       User Key  (r) = Recorded By, (t) = Taken By, (c) = Cosigned By    Initials Name Provider Type    BF Meghan Carbajal, OT Occupational Therapist        Therapy Charges for Today     Code Description Service Date Service Provider Modifiers Qty    77975451305 HC OT SELF CARE/MGMT/TRAIN EA 15 MIN 3/11/2021 CarbajalMeghan szymanski, OT GO, KX 1    91791169493 HC OT NEUROMUSC RE EDUCATION EA 15 MIN 3/11/2021 Solomon Meghan Renata, OT GO, KX 3    71159193121 HC OT THER PROC EA 15 MIN 3/11/2021 CarbajalMeghan szymanski, OT GO, KX 2    88737452167 HC OT SELF CARE/MGMT/TRAIN EA 15 MIN 3/12/2021 SolomonMeghan, OT GO, KX 2    10351435133 HC OT NEUROMUSC RE EDUCATION EA 15 MIN 3/12/2021 CarbajalMeghan szymanski, OT GO, KX 3    01088218619 HC OT THER PROC EA 15 MIN 3/12/2021 CarbajalMeghan, OT GO, KX 1                   Meghan Carbajal, OT  3/12/2021

## 2021-03-12 NOTE — THERAPY TREATMENT NOTE
Inpatient Rehabilitation - Physical Therapy Treatment Note       CHEYENNE No     Patient Name: Valerie Moore  : 1955  MRN: [id number]    Today's Date: 3/12/2021                    Admit Date: 2021      Visit Dx: No diagnosis found.    Patient Active Problem List   Diagnosis   • Anxiety   • Hypertension   • Hypercholesteremia   • Seasonal allergies   • Sleep disorder   • GERD (gastroesophageal reflux disease)   • Arthritis   • Airway hyperreactivity   • Gout   • Weakness   • CAP (community acquired pneumonia)   • Hypokalemia   • Hyponatremia   • Hyperglycemia   • Normocytic anemia   • Abdominal aortic aneurysm (AAA) 3.0 cm to 5.5 cm in diameter in male (CMS/HCC)   • Tobacco abuse   • Chest pain, atypical   • Cerebrovascular accident (CVA) (CMS/HCC)   • CVA (cerebral vascular accident) (CMS/HCC)       Past Medical History:   Diagnosis Date   • Anxiety    • GERD (gastroesophageal reflux disease) 2017   • Hypercholesteremia    • Hypertension    • Seasonal allergies    • Sleep disorder    • Stroke (CMS/HCC)        Past Surgical History:   Procedure Laterality Date   • HERNIA REPAIR     • HIP SURGERY Right     plate and 8 screws in right hip           PT ASSESSMENT (last 12 hours)      IRF PT Evaluation and Treatment     Row Name 21 1524          PT Time and Intention    Document Type  daily treatment  -RF     Mode of Treatment  individual therapy;physical therapy  -RF     Patient/Family/Caregiver Comments/Observations  No significant c/o noted.   -RF     Row Name 21 1524          General Information    Existing Precautions/Restrictions  fall;oxygen therapy device and L/min R hemiplegia  -RF     Row Name 21 1524          Cognition/Psychosocial    Affect/Mental Status (Cognitive)  WFL  -RF     Orientation Status (Cognition)  oriented x 3  -RF     Follows Commands (Cognition)  follows one-step commands;verbal cues/prompting required  -RF     Row Name 21 1524          Pain Scale:  FACES Pre/Post-Treatment    Pain: FACES Scale, Pretreatment  0-->no hurt  -RF     Posttreatment Pain Rating  2-->hurts little bit  -     Row Name 03/12/21 1524          Mobility    Left Lower Extremity (Weight-bearing Status)  full weight-bearing (FWB)  -RF     Right Lower Extremity (Weight-bearing Status)  full weight-bearing (FWB)  -     Row Name 03/12/21 1524          Bed Mobility    Scooting/Bridging Walton (Bed Mobility)  verbal cues;nonverbal cues (demo/gesture);contact guard  -     Supine-Sit Walton (Bed Mobility)  standby assist;verbal cues  -     Bed Mobility, Safety Issues  decreased use of arms for pushing/pulling;decreased use of legs for bridging/pushing  -     Assistive Device (Bed Mobility)  bed rails;draw sheet;head of bed elevated  -     Row Name 03/12/21 1524          Transfer Assessment/Treatment    Transfers  bed-chair transfer;chair-bed transfer;sit-stand transfer;stand-sit transfer;stand pivot/stand step transfer;car transfer  -     Row Name 03/12/21 1524          Transfers    Bed-Chair Walton (Transfers)  contact guard  -     Assistive Device (Bed-Chair Transfers)  wheelchair bed rails  -     Sit-Stand Walton (Transfers)  verbal cues;nonverbal cues (demo/gesture);contact guard;minimum assist (75% patient effort)  -     Stand-Sit Walton (Transfers)  verbal cues;nonverbal cues (demo/gesture);contact guard;minimum assist (75% patient effort)  -     Row Name 03/12/21 1524          Sit-Stand Transfer    Assistive Device (Sit-Stand Transfers)  wheelchair;other (see comments) parallel bars  -     Row Name 03/12/21 1524          Stand-Sit Transfer    Assistive Device (Stand-Sit Transfers)  wheelchair;other (see comments) parallel bars  -     Row Name 03/12/21 1524          Stand Pivot/Stand Step Transfer    Stand Pivot/Stand Step Walton (Transfers)  verbal cues;nonverbal cues (demo/gesture);contact guard;minimum assist (75% patient effort)   -RF     Assistive Device (Stand Pivot Stand Step Transfer)  wheelchair  -RF     Row Name 03/12/21 1524          Car Transfer    Type (Car Transfer)  stand pivot/stand step  -RF     Mason Level (Car Transfer)  contact guard;minimum assist (75% patient effort)  -RF     Assistive Device (Car Transfer)  wheelchair  -RF     Row Name 03/12/21 1524          Safety Issues, Functional Mobility    Impairments Affecting Function (Mobility)  balance;coordination;endurance/activity tolerance;grasp;motor control;muscle tone abnormal;pain;postural/trunk control;range of motion (ROM);shortness of breath;strength  -RF     Row Name 03/12/21 1524          Balance    Static Standing Balance  moderate impairment;supported;unsupported;standing;other (see comments) mirror used for feedback  -RF     Dynamic Standing Balance  moderate impairment;supported;unsupported;standing;other (see comments) parallel bars with mirror; resisted pertubations; W/Sparalle  -RF     Comment, Balance  Patient performed static standing balance in unlateral stance on LLE, and with COG aligned in center. MIrror used for feedback with cues for upright posture provided.   -RF     Row Name 03/12/21 1524          Hip (Therapeutic Exercise)    Hip AAROM (Therapeutic Exercise)  right;flexion;extension;aBduction;aDduction;sitting;2 sets;10 repetitions  -RF     Hip Strengthening (Therapeutic Exercise)  bilateral;flexion;extension;aBduction;aDduction;marching while seated;sitting;resistance band;yellow;2 sets;10 repetitions  -RF     Row Name 03/12/21 1524          Knee (Therapeutic Exercise)    Knee AAROM (Therapeutic Exercise)  right;flexion;extension;sitting;2 sets;10 repetitions  -RF     Knee Strengthening (Therapeutic Exercise)  bilateral;flexion;extension;marching while seated;LAQ (long arc quad);hamstring curls;sitting;resistance band;yellow;2 sets;10 repetitions  -RF     Row Name 03/12/21 1524          Ankle (Therapeutic Exercise)    Ankle AAROM  (Therapeutic Exercise)  right;dorsiflexion;plantarflexion;sitting;2 sets;10 repetitions  -RF     Ankle Strengthening (Therapeutic Exercise)  bilateral;dorsiflexion;plantarflexion;sitting;2 sets;10 repetitions  -RF     Row Name 03/12/21 1524          Bed Mobility Goal 1 (PT-IRF)    Progress/Outcomes (Bed Mobility Goal 1, PT-IRF)  goal met  -RF     Row Name 03/12/21 1524          Bed Mobility Goal 2 (PT-IRF)    Progress/Outcomes (Bed Mobility Goal 2, PT-IRF)  goal partially met goal partially met (inconsistent)  -RF     Row Name 03/12/21 1524          Transfer Goal 1 (PT-IRF)    Progress/Outcomes (Transfer Goal 1, PT-IRF)  goal met  -RF     Row Name 03/12/21 1524          Transfer Goal 2 (PT-IRF)    Progress/Outcomes (Transfer Goal 2, PT-IRF)  goal ongoing  -RF     Row Name 03/12/21 1524          Gait/Walking Locomotion Goal 1 (PT-IRF)    Progress/Outcomes (Gait/Walking Locomotion Goal 1, PT-IRF)  goal ongoing  -RF     Row Name 03/12/21 1524          Gait/Walking Locomotion Goal 2 (PT-IRF)    Progress/Outcomes (Gait/Walking Locomotion Goal 2, PT-IRF)  goal ongoing  -RF     Row Name 03/12/21 1524          Positioning and Restraints    Pre-Treatment Position  in bed  -RF     Post Treatment Position  wheelchair  -RF     In Wheelchair  sitting;with OT  -RF     Row Name 03/12/21 1524          Therapy Assessment/Plan (PT)    Patient's Goals For Discharge  return home  -RF     Row Name 03/12/21 1524          Therapy Assessment/Plan (PT)    Comment, Therapy Assessment/Plan (PT)  Pt's friend Jacobo present for education this date. Education provided on transfer training, home safety, and techniques for HEP. Pt demonstrates improvements in functional mobility this date as minimal assist is required. Significant join instability continues to hinder ambulation progress at this time. Conitnued skilled care required for further strengtheing and stabilization.   -RF     Row Name 03/12/21 1524          Daily Progress Summary (PT)     Impairments Still Limiting Function (PT)  muscle tone abnormal;ROM decreased;strength decreased;impaired balance;coordination impaired;impaired functional activity tolerance;motor control impaired;postural control impaired;pain  -RF     Row Name 03/12/21 1524          Therapy Plan Review/Discharge Plan (PT)    Expected Discharge Disposition (PT Eval)  home with caregiver;home with home health care  -RF       User Key  (r) = Recorded By, (t) = Taken By, (c) = Cosigned By    Initials Name Provider Type    RF Pooja Mcqueen PTA Physical Therapy Assistant           Physical Therapy Education                 Title: PT OT SLP Therapies (Done)     Topic: Physical Therapy (Done)     Point: Mobility training (Done)     Learning Progress Summary           Patient Acceptance, E,TB, VU by RF at 3/12/2021 1537    Acceptance, E,D, VU by RG at 3/11/2021 1501    Acceptance, E,D, VU,NR by LL at 3/11/2021 1424    Acceptance, E,D, VU,NR by RG at 3/10/2021 1509    Acceptance, E,D, VU,NR by AG at 3/10/2021 1453    Acceptance, E,D, VU,NR by AG at 3/9/2021 1144    Acceptance, E,TB, VU by RF at 3/8/2021 1613    Acceptance, E,TB, VU by RF at 3/5/2021 1605    Acceptance, E, VU by CW at 3/4/2021 1416    Acceptance, E,D, VU,NR by LL at 3/3/2021 1101    Acceptance, E,D, VU,NR by LL at 3/2/2021 1517    Acceptance, E,D, VU,NR by LL at 3/1/2021 1016    Acceptance, E,TB, VU by CT at 2/27/2021 1228    Acceptance, E,D, VU,NR by LL at 2/26/2021 1829    Acceptance, E,D, VU,NR by LL at 2/25/2021 1541    Acceptance, E, VU,NR by LB at 2/23/2021 1604                   Point: Home exercise program (Done)     Learning Progress Summary           Patient Acceptance, E,TB, VU by RF at 3/12/2021 1537    Acceptance, E,D, VU by RG at 3/11/2021 1501    Acceptance, E,D, VU,NR by LL at 3/11/2021 1424    Acceptance, E,D, VU,NR by RG at 3/10/2021 1509    Acceptance, E,D, VU,NR by AG at 3/10/2021 1453    Acceptance, E,D, VU,NR by AG at 3/9/2021 1144    Acceptance,  E,TB, VU by RF at 3/8/2021 1613    Acceptance, E,TB, VU by RF at 3/5/2021 1605    Acceptance, E, VU by CW at 3/4/2021 1416    Acceptance, E,D, VU,NR by LL at 3/3/2021 1101    Acceptance, E,D, VU,NR by LL at 3/2/2021 1517    Acceptance, E,D, VU,NR by LL at 3/1/2021 1016    Acceptance, E,TB, VU by CT at 2/27/2021 1228    Acceptance, E,D, VU,NR by LL at 2/26/2021 1829    Acceptance, E,D, VU,NR by LL at 2/25/2021 1541    Acceptance, E, VU,NR by LB at 2/23/2021 1604                   Point: Body mechanics (Done)     Learning Progress Summary           Patient Acceptance, E,TB, VU by RF at 3/12/2021 1537    Acceptance, E,D, VU by RG at 3/11/2021 1501    Acceptance, E,D, VU,NR by LL at 3/11/2021 1424    Acceptance, E,D, VU,NR by RG at 3/10/2021 1509    Acceptance, E,D, VU,NR by AG at 3/10/2021 1453    Acceptance, E,D, VU,NR by AG at 3/9/2021 1144    Acceptance, E,TB, VU by RF at 3/8/2021 1613    Acceptance, E,TB, VU by RF at 3/5/2021 1605    Acceptance, E, VU by CW at 3/4/2021 1416    Acceptance, E,D, VU,NR by LL at 3/3/2021 1101    Acceptance, E,D, VU,NR by LL at 3/2/2021 1517    Acceptance, E,D, VU,NR by LL at 3/1/2021 1016    Acceptance, E,TB, VU by CT at 2/27/2021 1228    Acceptance, E,D, VU,NR by LL at 2/26/2021 1829    Acceptance, E,D, VU,NR by LL at 2/25/2021 1541    Acceptance, E, VU,NR by LB at 2/23/2021 1604                   Point: Precautions (Done)     Learning Progress Summary           Patient Acceptance, E,TB, VU by RF at 3/12/2021 1537    Acceptance, E,D, VU by RG at 3/11/2021 1501    Acceptance, E,D, VU,NR by LL at 3/11/2021 1424    Acceptance, E,D, VU,NR by RG at 3/10/2021 1509    Acceptance, E,D, VU,NR by AG at 3/10/2021 1453    Acceptance, E,D, VU,NR by AG at 3/9/2021 1144    Acceptance, E,TB, VU by RF at 3/8/2021 1613    Acceptance, E,TB, VU by RF at 3/5/2021 1605    Acceptance, E, VU by CW at 3/4/2021 1416    Acceptance, E,D, VU,NR by LL at 3/3/2021 1101    Acceptance, E,D, VU,NR by LL at 3/2/2021  1517    Acceptance, E,D, VU,NR by LL at 3/1/2021 1016    Acceptance, E,TB, VU by CT at 2/27/2021 1228    Acceptance, E,D, VU,NR by LL at 2/26/2021 1829    Acceptance, E,D, VU,NR by LL at 2/25/2021 1541    Acceptance, E, VU,NR by LB at 2/23/2021 1604                               User Key     Initials Effective Dates Name Provider Type Discipline    LB 03/14/16 -  Cathleen Carnes, PT Physical Therapist PT    AG 04/03/18 -  Zoila Morse, PT Physical Therapist PT    CT 04/03/18 -  Nilsa Bermudez, PT Physical Therapist PT    LL 05/02/16 -  Alise Handy, PTA Physical Therapy Assistant PT    RF 03/07/18 -  Pooja Mcqueen PTA Physical Therapy Assistant PT    RG 10/31/19 -  Jevon Silver, PTA Physical Therapy Assistant PT    CW 09/11/20 -  Lucas Baker, PT Physical Therapist PT                PT Recommendation and Plan          Daily Progress Summary (PT)  Impairments Still Limiting Function (PT): muscle tone abnormal, ROM decreased, strength decreased, impaired balance, coordination impaired, impaired functional activity tolerance, motor control impaired, postural control impaired, pain               Time Calculation:     PT Charges     Row Name 03/12/21 1537             Time Calculation    Start Time  0830  -RF      Stop Time  1000  -RF      Time Calculation (min)  90 min  -RF      PT Received On  03/12/21  -RF      PT - Next Appointment  03/15/21  -RF      PT Goal Re-Cert Due Date  03/16/21  -RF         Time Calculation- PT    Total Timed Code Minutes- PT  90 minute(s)  -RF        User Key  (r) = Recorded By, (t) = Taken By, (c) = Cosigned By    Initials Name Provider Type    RF Pooja Mcqueen, PTA Physical Therapy Assistant          Therapy Charges for Today     Code Description Service Date Service Provider Modifiers Qty    50430749059 HC PT NEUROMUSC RE EDUCATION EA 15 MIN 3/12/2021 Pooja Mcqueen, PTA GP, [place] 2    43750081104 HC PT THER PROC EA 15 MIN 3/12/2021 Pooja Mcqueen, PTA GP, [place] 2     [id number]  PT THERAPEUTIC ACT EA 15 MIN 3/12/2021 Pooja Mcqueen, WAGNER GP, [place] 2                   Pooja Mcqueen PTA  3/12/2021

## 2021-03-12 NOTE — PROGRESS NOTES
[facility] REHAB PROGRESS NOTE     Patient Identification:  Name:  Valerie Moore  Age:  65 y.o.  Sex:  male  :  1955  MRN:  [id number]  Visit Number:  [id number]  ROOM: [facility]     Primary Care Provider:  Darwin Alvarez MD    Length of stay in inpatient status:  18    Subjective     Chief Compliant:  No chief complaint on file.      History of Presenting Illness: 65-year-old gentleman with history of CVA substantial right-sided hemiparesis.  Patient is making improvements and is doing much better with transfers.  Patient denies any new difficulties at this time.  Does state that his anxiety has been difficult and states that he typically been on Xanax 1 mg tablets instead of 0.5 prior to this admission.    Objective     Current Hospital Meds:aspirin, 81 mg, Oral, Daily  atorvastatin, 80 mg, Oral, Nightly  buPROPion SR, 150 mg, Oral, Q12H  clopidogrel, 75 mg, Oral, Daily  fluticasone, 2 spray, Each Nare, Daily  heparin (porcine), 5,000 Units, Subcutaneous, Q12H  losartan, 50 mg, Oral, Q24H  metoprolol tartrate, 50 mg, Oral, Q12H  mirtazapine, 15 mg, Oral, Nightly  multivitamin, 1 tablet, Oral, Daily  nicotine, 1 patch, Transdermal, Q24H  pantoprazole, 40 mg, Oral, Q AM  polyethylene glycol, 17 g, Oral, Daily       ----------------------------------------------------------------------------------------------------------------------  Vital Signs:  Temp:  [97.7 °F (36.5 °C)] 97.7 °F (36.5 °C)  Heart Rate:  [68-94] 68  Resp:  [18-20] 20  BP: (155)/(72) 155/72  SpO2:  [96 %-99 %] 96 %  on  Flow (L/min):  [2] 2;   Device (Oxygen Therapy): nasal cannula  Body mass index is 27.99 kg/m².    Wt Readings from Last 3 Encounters:   21 80.9 kg (178 lb 5.6 oz)   21 80.9 kg (178 lb 6.4 oz)   19 82.2 kg (181 lb 5 oz)     Intake & Output (last 3 days)       701 - 03/10 0700 03/10 0701 -  0700  07 -  07 -  0700    P.O. 1320 1200 960 240    Total  Intake(mL/kg) 1320 (16.3) 1200 (14.8) 960 (11.9) 240 (3)    Net +1320 +1200 +960 +240            Urine Unmeasured Occurrence 6 x 6 x 5 x 2 x        Diet Regular; Cardiac, Daily Fluid Restriction; 1500 mL Fluid Per Day  ----------------------------------------------------------------------------------------------------------------------  Physical exam:  Constitutional:   No acute distress  HEENT: Normocephalic atraumatic  Neck: Supple   Cardiovascular: Regular rate and rhythm  Pulmonary/Chest: Clear to auscultation  Abdominal: Positive bowel sounds soft.   Musculoskeletal: No arthropathy  Neurological: 2-3 out of 5 strength in the right side.  Skin: No rash  Peripheral vascular:  Genitourinary:  ----------------------------------------------------------------------------------------------------------------------    Last echocardiogram:  Results for orders placed during the hospital encounter of 02/16/21    Adult Transthoracic Echo Complete w/ Color, Spectral and Contrast if necessary per protocol    Interpretation Summary  · Estimated left ventricular EF = 65% Left ventricular systolic function is normal.  · Mild aortic valve stenosis is present.  · Estimated right ventricular systolic pressure from tricuspid regurgitation is normal (<35 mmHg).  · Mild Aortic regurgitation is present  · Trivial to mild MR is noted  · No previous echo    ----------------------------------------------------------------------------------------------------------------------  Results from last 7 days   Lab Units 03/06/21  0012   WBC 10*3/mm3 7.57   HEMOGLOBIN g/dL 11.4*   HEMATOCRIT % 36.2*   MCV fL 91.6   MCHC g/dL 31.5   PLATELETS 10*3/mm3 301         Results from last 7 days   Lab Units 03/06/21  0012   SODIUM mmol/L 133*   POTASSIUM mmol/L 4.2   MAGNESIUM mg/dL 1.9   CHLORIDE mmol/L 101   CO2 mmol/L 23.6   BUN mg/dL 29*   CREATININE mg/dL 0.87   EGFR IF NONAFRICN AM mL/min/1.73 88   CALCIUM mg/dL 9.5   GLUCOSE mg/dL 102*   ALBUMIN  g/dL 3.55   BILIRUBIN mg/dL 0.2   ALK PHOS U/L 105   AST (SGOT) U/L 16   ALT (SGPT) U/L 19   Estimated Creatinine Clearance: 83.8 mL/min (by C-G formula based on SCr of 0.87 mg/dL).  No results found for: AMMONIA              No results found for: HGBA1C, POCGLU  Lab Results   Component Value Date    TSH 0.698 02/16/2021    FREET4 1.40 02/16/2021     No results found for: PREGTESTUR, PREGSERUM, HCG, HCGQUANT  Pain Management Panel     Pain Management Panel Latest Ref Rng & Units 2/16/2021 11/24/2019    AMPHETAMINES SCREEN, URINE Negative Negative Negative    BARBITURATES SCREEN Negative Negative Negative    BENZODIAZEPINE SCREEN, URINE Negative Negative Negative    BUPRENORPHINEUR Negative Negative Negative    COCAINE SCREEN, URINE Negative Negative Negative    METHADONE SCREEN, URINE Negative Negative Negative        Brief Urine Lab Results  (Last result in the past 365 days)      Color   Clarity   Blood   Leuk Est   Nitrite   Protein   CREAT   Urine HCG        02/16/21 1847 Yellow Clear Negative Negative Negative Negative             No results found for: BLOODCX      No results found for: URINECX  No results found for: WOUNDCX  No results found for: STOOLCX        I have personally looked at the labs and they are summarized above.  ----------------------------------------------------------------------------------------------------------------------  Detailed radiology reports for the last 24 hours:    Imaging Results (Last 24 Hours)     ** No results found for the last 24 hours. **        Final impressions for the last 30 days of radiology reports:    CT Angiogram Neck    Result Date: 2/16/2021  1.  Occlusion of the right vertebral artery. 2.  Mild to moderate plaque right greater than left carotid systems but without hemodynamically significant stenosis or occlusion identified. 3.  Tortuosity of the bilateral extracranial ICA segments. 4.  No evidence of dissection. 5.  Degenerative changes cervical spine. 6.   Emphysema of the partially imaged upper lungs. 7.  Other nonacute findings above.  This report was finalized on 2/16/2021 6:10 PM by Dr. Johnathon Clark MD.      CT Cervical Spine Without Contrast    Result Date: 2/16/2021  1.  Limited exam due to patient positioning and cervical spine rotation without convincing evidence of acute fracture. 2.  No traumatic malalignment is identified. 3.  Small right mastoid effusion. 4.  Advanced degenerative changes with multilevel stenosis. 5.  If persistent concern for cervical spine injury, repeat exam may be indicated.  This report was finalized on 2/16/2021 6:01 PM by Dr. Johnathon Clark MD.      MRI Brain Without Contrast    Result Date: 2/16/2021  1.  Focal acute infarcts in the left basal ganglionic, left external capsule, and left pericallosal regions which follow along the distribution of the lenticulostriate perforating arteries of the left MCA and the anterior choroidal artery distribution. 2.  No MRI evidence of hemorrhage. 3.  No midline shift, focal mass effect, or hydrocephalus. 4.  Advanced cerebral atrophy and chronic small vessel ischemic disease.  This report was finalized on 2/16/2021 8:56 PM by Dr. Johnathon Clark MD.      MRI Cervical Spine Without Contrast    Result Date: 2/16/2021  1. No acute fracture or traumatic malalignment. 2. Advanced multilevel degenerative disc disease with facet arthropathy with variable central canal and neural foraminal stenosis detailed above. Stenosis most severe at the C3/4 level. 3. Degenerative listhesis posteriorly of C3 on C4.  This report was finalized on 2/16/2021 8:59 PM by Dr. Johnathon Clark MD.      XR Chest 1 View    Result Date: 2/16/2021    Stable chest. No acute changes identified.  This report was finalized on 2/16/2021 6:36 PM by Dr. Johnathon Clark MD.      CT Angiogram Head    Result Date: 2/16/2021  1.  No large vessel occlusion identified involving the major intracranial arterial segments. 2.  Occlusion of the  right vertebral artery with reconstitution of flow just prior to basilar artery confluence likely due to collateralization. 3.  Diffuse cerebral atrophy and advanced chronic small vessel ischemic disease. 4.  Other nonacute findings above.  This report was finalized on 2/16/2021 6:14 PM by Dr. Johnathon Clark MD.      CT Head Without Contrast Stroke Protocol    Result Date: 2/16/2021  1.  No CT evidence of acute intracranial abnormality. 2.  Generalized atrophy and advanced chronic small vessel ischemic disease noted.  This report was finalized on 2/16/2021 5:20 PM by Dr. Johnathon Clark MD.      CT Lower Extremity Right Without Contrast    Result Date: 2/20/2021  Markedly abnormal appearance of the right hip. There is no identifiable ball-and-socket joint identified due to the marked irregularity. The expected articulation is superiorly subluxed and there is a large joint effusion but sterility cannot be determined by imaging. Orthopedic consultation is advised. Signer Name: BROOKS ESQUIVEL MD  Signed: 2/20/2021 8:49 PM  Workstation Name: [email or web address]  [facility]    CT CEREBRAL PERFUSION W WO CONTRAST W AI ANALYSIS OF LVO    Result Date: 2/16/2021   1. No core infarct identified. 2. Some diminished perfusion of the left frontal region compatible with variable ischemia which may be chronic in nature.  This report was finalized on 2/16/2021 6:17 PM by Dr. Johnathon Clark MD.      XR Hip With or Without Pelvis 2 - 3 View Right    Result Date: 2/19/2021  1.  Probable osteotomy changes of the right femoral head with superior migration of the right femur. 2.  No acute appearing fracture. 3.  Right joint effusion. 4.  Otherwise stable exam.  This report was finalized on 2/19/2021 7:38 PM by Dr. Johnathon Clark MD.      I have personally looked at the radiology images and read the final radiology report.    Assessment & Plan    Status post CVA of the left external capsule, left basal ganglia, and left  pericallosal regions.  Patient has significant right-sided weakness.  Patient continues to work diligently with PT and OT and is made great strides.  Continue to push forward with improving functional mobility    Anxiety neurosis--we will increase Xanax to 1 mg twice daily as needed    Chronic pain secondary to osteoarthritis involving the right hip--pain is reasonably well controlled    Depression--continue antidepressant therapy.  Patient is notes improvement    Hypertension controlled.  Continue angiotensin receptor blocker metoprolol    GERD--Protonix    Tobacco abuse--recommend cessation    VTE Prophylaxis:   Mechanical Order History:     None      Pharmalogical Order History:      Ordered     Dose Route Frequency Stop    02/22/21 1624  heparin (porcine) 5000 UNIT/ML injection 5,000 Units      5,000 Units SC Every 12 Hours Scheduled --                    Rickie Haney MD  [facility]  03/12/21  13:42 EST

## 2021-03-13 PROCEDURE — 94799 UNLISTED PULMONARY SVC/PX: CPT

## 2021-03-13 PROCEDURE — 25010000002 HEPARIN (PORCINE) PER 1000 UNITS: Performed by: FAMILY MEDICINE

## 2021-03-13 PROCEDURE — 99231 SBSQ HOSP IP/OBS SF/LOW 25: CPT | Performed by: FAMILY MEDICINE

## 2021-03-13 RX ADMIN — BUPROPION HYDROCHLORIDE 150 MG: 150 TABLET, EXTENDED RELEASE ORAL at 08:54

## 2021-03-13 RX ADMIN — HYDROCODONE BITARTRATE AND ACETAMINOPHEN 1 TABLET: 10; 325 TABLET ORAL at 05:06

## 2021-03-13 RX ADMIN — IPRATROPIUM BROMIDE AND ALBUTEROL SULFATE 3 ML: .5; 3 SOLUTION RESPIRATORY (INHALATION) at 06:43

## 2021-03-13 RX ADMIN — IPRATROPIUM BROMIDE AND ALBUTEROL SULFATE 3 ML: .5; 3 SOLUTION RESPIRATORY (INHALATION) at 19:19

## 2021-03-13 RX ADMIN — HEPARIN SODIUM 5000 UNITS: 5000 INJECTION INTRAVENOUS; SUBCUTANEOUS at 20:45

## 2021-03-13 RX ADMIN — LOSARTAN POTASSIUM 50 MG: 50 TABLET, FILM COATED ORAL at 08:54

## 2021-03-13 RX ADMIN — ALPRAZOLAM 1 MG: 0.5 TABLET ORAL at 08:54

## 2021-03-13 RX ADMIN — METOPROLOL TARTRATE 50 MG: 50 TABLET, FILM COATED ORAL at 20:45

## 2021-03-13 RX ADMIN — CARISOPRODOL 350 MG: 350 TABLET ORAL at 15:30

## 2021-03-13 RX ADMIN — ATORVASTATIN CALCIUM 80 MG: 40 TABLET, FILM COATED ORAL at 20:45

## 2021-03-13 RX ADMIN — GABAPENTIN 200 MG: 100 CAPSULE ORAL at 08:55

## 2021-03-13 RX ADMIN — ASPIRIN 81 MG: 81 TABLET, COATED ORAL at 08:54

## 2021-03-13 RX ADMIN — ALPRAZOLAM 1 MG: 0.5 TABLET ORAL at 20:53

## 2021-03-13 RX ADMIN — Medication 1 TABLET: at 08:54

## 2021-03-13 RX ADMIN — POLYETHYLENE GLYCOL (3350) 17 G: 17 POWDER, FOR SOLUTION ORAL at 08:54

## 2021-03-13 RX ADMIN — CLOPIDOGREL 75 MG: 75 TABLET, FILM COATED ORAL at 08:54

## 2021-03-13 RX ADMIN — PANTOPRAZOLE SODIUM 40 MG: 40 TABLET, DELAYED RELEASE ORAL at 05:07

## 2021-03-13 RX ADMIN — FLUTICASONE PROPIONATE 2 SPRAY: 50 SPRAY, METERED NASAL at 08:54

## 2021-03-13 RX ADMIN — BUPROPION HYDROCHLORIDE 150 MG: 150 TABLET, EXTENDED RELEASE ORAL at 20:45

## 2021-03-13 RX ADMIN — MIRTAZAPINE 15 MG: 15 TABLET, FILM COATED ORAL at 20:45

## 2021-03-13 RX ADMIN — METOPROLOL TARTRATE 50 MG: 50 TABLET, FILM COATED ORAL at 08:54

## 2021-03-13 RX ADMIN — Medication 1 PATCH: at 08:56

## 2021-03-13 RX ADMIN — HEPARIN SODIUM 5000 UNITS: 5000 INJECTION INTRAVENOUS; SUBCUTANEOUS at 08:55

## 2021-03-13 RX ADMIN — HYDROCODONE BITARTRATE AND ACETAMINOPHEN 1 TABLET: 10; 325 TABLET ORAL at 15:30

## 2021-03-13 NOTE — PROGRESS NOTES
[facility]     Patient Identification:  Name:  Valerie Moore  Age:  65 y.o.  Sex:  male  :  1955  MRN:  [id number]  Visit Number:  [id number]  ROOM: [facility]     Primary Care Provider:  Darwin Alvarez MD    Length of stay in inpatient status:  19    Subjective     Chief Compliant:  No chief complaint on file.      History of Presenting Illness: 65-year-old gentleman with history of CVA and right-sided hemiparesis.  Patient states that he is doing reasonably well and has improved with his PT and OT.  No new complaints    Objective     Current Hospital Meds:aspirin, 81 mg, Oral, Daily  atorvastatin, 80 mg, Oral, Nightly  buPROPion SR, 150 mg, Oral, Q12H  clopidogrel, 75 mg, Oral, Daily  fluticasone, 2 spray, Each Nare, Daily  heparin (porcine), 5,000 Units, Subcutaneous, Q12H  losartan, 50 mg, Oral, Q24H  metoprolol tartrate, 50 mg, Oral, Q12H  mirtazapine, 15 mg, Oral, Nightly  multivitamin, 1 tablet, Oral, Daily  nicotine, 1 patch, Transdermal, Q24H  pantoprazole, 40 mg, Oral, Q AM  polyethylene glycol, 17 g, Oral, Daily       ----------------------------------------------------------------------------------------------------------------------  Vital Signs:  Temp:  [97.7 °F (36.5 °C)] 97.7 °F (36.5 °C)  Heart Rate:  [47-51] 47  Resp:  [18-20] 18  BP: (133)/(61) 133/61  SpO2:  [94 %-97 %] 94 %  on  Flow (L/min):  [2] 2;   Device (Oxygen Therapy): nasal cannula  Body mass index is 27.99 kg/m².    Wt Readings from Last 3 Encounters:   02/22/21 80.9 kg (178 lb 5.6 oz)   21 80.9 kg (178 lb 6.4 oz)   19 82.2 kg (181 lb 5 oz)     Intake & Output (last 3 days)       03/10 07 -  0700  07 -  07 -  07 -  0700    P.O. 0260 501 0674     Total Intake(mL/kg) 1200 (14.8) 960 (11.9) 1200 (14.8)     Urine (mL/kg/hr)   725 (0.4)     Total Output   725     Net +1200 +960 +475             Urine Unmeasured Occurrence 6 x 5 x 6 x          Diet Regular; Cardiac, Daily Fluid Restriction; 1500 mL Fluid Per Day  ----------------------------------------------------------------------------------------------------------------------  Physical exam:  Constitutional:   No acute distress  HEENT: Normocephalic atraumatic  Neck: Supple   Cardiovascular: Regular rate and rhythm  Pulmonary/Chest: Clear to auscultation  Abdominal:  .  Positive bowel sounds soft  Musculoskeletal: No arthropathy  Neurological: No focal deficits  Skin: No rash  Peripheral vascular:  Genitourinary:  ----------------------------------------------------------------------------------------------------------------------    Last echocardiogram:  Results for orders placed during the hospital encounter of 02/16/21    Adult Transthoracic Echo Complete w/ Color, Spectral and Contrast if necessary per protocol    Interpretation Summary  · Estimated left ventricular EF = 65% Left ventricular systolic function is normal.  · Mild aortic valve stenosis is present.  · Estimated right ventricular systolic pressure from tricuspid regurgitation is normal (<35 mmHg).  · Mild Aortic regurgitation is present  · Trivial to mild MR is noted  · No previous echo    ----------------------------------------------------------------------------------------------------------------------                Invalid input(s): PROTEstimated Creatinine Clearance: 83.8 mL/min (by C-G formula based on SCr of 0.87 mg/dL).  No results found for: AMMONIA              No results found for: HGBA1C, POCGLU  Lab Results   Component Value Date    TSH 0.698 02/16/2021    FREET4 1.40 02/16/2021     No results found for: PREGTESTUR, PREGSERUM, HCG, HCGQUANT  Pain Management Panel     Pain Management Panel Latest Ref Rng & Units 2/16/2021 11/24/2019    AMPHETAMINES SCREEN, URINE Negative Negative Negative    BARBITURATES SCREEN Negative Negative Negative    BENZODIAZEPINE SCREEN, URINE Negative Negative Negative    BUPRENORPHINEUR  Negative Negative Negative    COCAINE SCREEN, URINE Negative Negative Negative    METHADONE SCREEN, URINE Negative Negative Negative        Brief Urine Lab Results  (Last result in the past 365 days)      Color   Clarity   Blood   Leuk Est   Nitrite   Protein   CREAT   Urine HCG        02/16/21 1847 Yellow Clear Negative Negative Negative Negative             No results found for: BLOODCX      No results found for: URINECX  No results found for: WOUNDCX  No results found for: STOOLCX        I have personally looked at the labs and they are summarized above.  ----------------------------------------------------------------------------------------------------------------------  Detailed radiology reports for the last 24 hours:    Imaging Results (Last 24 Hours)     ** No results found for the last 24 hours. **        Final impressions for the last 30 days of radiology reports:    CT Angiogram Neck    Result Date: 2/16/2021  1.  Occlusion of the right vertebral artery. 2.  Mild to moderate plaque right greater than left carotid systems but without hemodynamically significant stenosis or occlusion identified. 3.  Tortuosity of the bilateral extracranial ICA segments. 4.  No evidence of dissection. 5.  Degenerative changes cervical spine. 6.  Emphysema of the partially imaged upper lungs. 7.  Other nonacute findings above.  This report was finalized on 2/16/2021 6:10 PM by Dr. Johnathon Clark MD.      CT Cervical Spine Without Contrast    Result Date: 2/16/2021  1.  Limited exam due to patient positioning and cervical spine rotation without convincing evidence of acute fracture. 2.  No traumatic malalignment is identified. 3.  Small right mastoid effusion. 4.  Advanced degenerative changes with multilevel stenosis. 5.  If persistent concern for cervical spine injury, repeat exam may be indicated.  This report was finalized on 2/16/2021 6:01 PM by Dr. Johnathon Clark MD.      MRI Brain Without Contrast    Result Date:  2/16/2021  1.  Focal acute infarcts in the left basal ganglionic, left external capsule, and left pericallosal regions which follow along the distribution of the lenticulostriate perforating arteries of the left MCA and the anterior choroidal artery distribution. 2.  No MRI evidence of hemorrhage. 3.  No midline shift, focal mass effect, or hydrocephalus. 4.  Advanced cerebral atrophy and chronic small vessel ischemic disease.  This report was finalized on 2/16/2021 8:56 PM by Dr. Johnathon Clark MD.      MRI Cervical Spine Without Contrast    Result Date: 2/16/2021  1. No acute fracture or traumatic malalignment. 2. Advanced multilevel degenerative disc disease with facet arthropathy with variable central canal and neural foraminal stenosis detailed above. Stenosis most severe at the C3/4 level. 3. Degenerative listhesis posteriorly of C3 on C4.  This report was finalized on 2/16/2021 8:59 PM by Dr. Johnathon Clark MD.      XR Chest 1 View    Result Date: 2/16/2021    Stable chest. No acute changes identified.  This report was finalized on 2/16/2021 6:36 PM by Dr. Johnathon Clark MD.      CT Angiogram Head    Result Date: 2/16/2021  1.  No large vessel occlusion identified involving the major intracranial arterial segments. 2.  Occlusion of the right vertebral artery with reconstitution of flow just prior to basilar artery confluence likely due to collateralization. 3.  Diffuse cerebral atrophy and advanced chronic small vessel ischemic disease. 4.  Other nonacute findings above.  This report was finalized on 2/16/2021 6:14 PM by Dr. Johnathon Clark MD.      CT Head Without Contrast Stroke Protocol    Result Date: 2/16/2021  1.  No CT evidence of acute intracranial abnormality. 2.  Generalized atrophy and advanced chronic small vessel ischemic disease noted.  This report was finalized on 2/16/2021 5:20 PM by Dr. Johnathon Clark MD.      CT Lower Extremity Right Without Contrast    Result Date: 2/20/2021  Markedly  abnormal appearance of the right hip. There is no identifiable ball-and-socket joint identified due to the marked irregularity. The expected articulation is superiorly subluxed and there is a large joint effusion but sterility cannot be determined by imaging. Orthopedic consultation is advised. Signer Name: BROOKS ESQUIVEL MD  Signed: 2/20/2021 8:49 PM  Workstation Name: [id number]  [facility]    CT CEREBRAL PERFUSION W WO CONTRAST W AI ANALYSIS OF LVO    Result Date: 2/16/2021   1. No core infarct identified. 2. Some diminished perfusion of the left frontal region compatible with variable ischemia which may be chronic in nature.  This report was finalized on 2/16/2021 6:17 PM by Dr. Johnathon Clark MD.      XR Hip With or Without Pelvis 2 - 3 View Right    Result Date: 2/19/2021  1.  Probable osteotomy changes of the right femoral head with superior migration of the right femur. 2.  No acute appearing fracture. 3.  Right joint effusion. 4.  Otherwise stable exam.  This report was finalized on 2/19/2021 7:38 PM by Dr. Johnathon Clark MD.      I have personally looked at the radiology images and read the final radiology report.    Assessment & Plan    Status post CVA of left external capsule, left basal ganglia, and left pericallosal region.  Patient continues work diligent with PT and OT and is improving his functional mobility.    Osteoarthritis causing chronic pain in the right hip--reasonably well-controlled    Depression continue antidepressant therapy.  Patient states it has been helpful    Anxiety neurosis Xanax 1 mg twice daily as needed    Hypertension is controlled    GERD--Protonix    Tobacco abuse recommend cessation    VTE Prophylaxis:   Mechanical Order History:     None      Pharmalogical Order History:      Ordered     Dose Route Frequency Stop    02/22/21 6574  heparin (porcine) 5000 UNIT/ML injection 5,000 Units      5,000 Units SC Every 12 Hours Scheduled --                     Rickie Haney MD  [facility]  03/13/21  08:26 EST

## 2021-03-13 NOTE — PROGRESS NOTES
Rehabilitation Nursing  Inpatient Rehabilitation Plan of Care Note    Plan of Care  Copy from POCBody Function Structure    Skin Integrity (Active)  Current Status (2/23/2021 12:00:00 AM): free of skin breakdown this shift  Weekly Goal: free of skin breakdown this stay  Discharge Goal: home free of skin breakdown    Safety    Potential for Injury (Active)  Current Status (2/23/2021 12:00:00 AM): free from injury this shift  Weekly Goal: free from injury this stay  Discharge Goal: home free from injury    Signed by: Alejandrina Vela, Nurse

## 2021-03-14 PROCEDURE — 25010000002 HEPARIN (PORCINE) PER 1000 UNITS: Performed by: FAMILY MEDICINE

## 2021-03-14 PROCEDURE — 94799 UNLISTED PULMONARY SVC/PX: CPT

## 2021-03-14 RX ORDER — ALPRAZOLAM 0.5 MG/1
1 TABLET ORAL 2 TIMES DAILY PRN
Status: DISCONTINUED | OUTPATIENT
Start: 2021-03-14 | End: 2021-03-16 | Stop reason: HOSPADM

## 2021-03-14 RX ORDER — CARISOPRODOL 350 MG/1
350 TABLET ORAL 2 TIMES DAILY PRN
Status: DISCONTINUED | OUTPATIENT
Start: 2021-03-14 | End: 2021-03-16 | Stop reason: HOSPADM

## 2021-03-14 RX ADMIN — FLUTICASONE PROPIONATE 2 SPRAY: 50 SPRAY, METERED NASAL at 09:04

## 2021-03-14 RX ADMIN — POLYETHYLENE GLYCOL (3350) 17 G: 17 POWDER, FOR SOLUTION ORAL at 09:04

## 2021-03-14 RX ADMIN — BUPROPION HYDROCHLORIDE 150 MG: 150 TABLET, EXTENDED RELEASE ORAL at 20:55

## 2021-03-14 RX ADMIN — HYDROCODONE BITARTRATE AND ACETAMINOPHEN 1 TABLET: 10; 325 TABLET ORAL at 05:50

## 2021-03-14 RX ADMIN — MIRTAZAPINE 15 MG: 15 TABLET, FILM COATED ORAL at 20:55

## 2021-03-14 RX ADMIN — GABAPENTIN 200 MG: 100 CAPSULE ORAL at 09:15

## 2021-03-14 RX ADMIN — ASPIRIN 81 MG: 81 TABLET, COATED ORAL at 09:02

## 2021-03-14 RX ADMIN — IPRATROPIUM BROMIDE AND ALBUTEROL SULFATE 3 ML: .5; 3 SOLUTION RESPIRATORY (INHALATION) at 06:26

## 2021-03-14 RX ADMIN — CLOPIDOGREL 75 MG: 75 TABLET, FILM COATED ORAL at 09:03

## 2021-03-14 RX ADMIN — HYDROCODONE BITARTRATE AND ACETAMINOPHEN 1 TABLET: 10; 325 TABLET ORAL at 20:54

## 2021-03-14 RX ADMIN — ALPRAZOLAM 1 MG: 0.5 TABLET ORAL at 21:22

## 2021-03-14 RX ADMIN — HYDROCODONE BITARTRATE AND ACETAMINOPHEN 1 TABLET: 10; 325 TABLET ORAL at 12:25

## 2021-03-14 RX ADMIN — BUPROPION HYDROCHLORIDE 150 MG: 150 TABLET, EXTENDED RELEASE ORAL at 09:02

## 2021-03-14 RX ADMIN — Medication 1 PATCH: at 09:04

## 2021-03-14 RX ADMIN — ATORVASTATIN CALCIUM 80 MG: 40 TABLET, FILM COATED ORAL at 20:55

## 2021-03-14 RX ADMIN — PANTOPRAZOLE SODIUM 40 MG: 40 TABLET, DELAYED RELEASE ORAL at 05:39

## 2021-03-14 RX ADMIN — CARISOPRODOL 350 MG: 350 TABLET ORAL at 21:22

## 2021-03-14 RX ADMIN — HEPARIN SODIUM 5000 UNITS: 5000 INJECTION INTRAVENOUS; SUBCUTANEOUS at 09:04

## 2021-03-14 RX ADMIN — IPRATROPIUM BROMIDE AND ALBUTEROL SULFATE 3 ML: .5; 3 SOLUTION RESPIRATORY (INHALATION) at 19:05

## 2021-03-14 RX ADMIN — LOSARTAN POTASSIUM 50 MG: 50 TABLET, FILM COATED ORAL at 09:03

## 2021-03-14 RX ADMIN — METOPROLOL TARTRATE 50 MG: 50 TABLET, FILM COATED ORAL at 20:55

## 2021-03-14 RX ADMIN — CARISOPRODOL 350 MG: 350 TABLET ORAL at 05:50

## 2021-03-14 RX ADMIN — GABAPENTIN 200 MG: 100 CAPSULE ORAL at 20:54

## 2021-03-14 RX ADMIN — HEPARIN SODIUM 5000 UNITS: 5000 INJECTION INTRAVENOUS; SUBCUTANEOUS at 20:55

## 2021-03-14 RX ADMIN — Medication 1 TABLET: at 09:03

## 2021-03-15 PROCEDURE — 94799 UNLISTED PULMONARY SVC/PX: CPT

## 2021-03-15 PROCEDURE — 97110 THERAPEUTIC EXERCISES: CPT | Performed by: OCCUPATIONAL THERAPIST

## 2021-03-15 PROCEDURE — 97110 THERAPEUTIC EXERCISES: CPT

## 2021-03-15 PROCEDURE — 97535 SELF CARE MNGMENT TRAINING: CPT | Performed by: OCCUPATIONAL THERAPIST

## 2021-03-15 PROCEDURE — 25010000002 HEPARIN (PORCINE) PER 1000 UNITS: Performed by: FAMILY MEDICINE

## 2021-03-15 PROCEDURE — 97530 THERAPEUTIC ACTIVITIES: CPT

## 2021-03-15 PROCEDURE — 97112 NEUROMUSCULAR REEDUCATION: CPT

## 2021-03-15 PROCEDURE — 97112 NEUROMUSCULAR REEDUCATION: CPT | Performed by: OCCUPATIONAL THERAPIST

## 2021-03-15 RX ORDER — LOSARTAN POTASSIUM 50 MG/1
100 TABLET ORAL
Status: DISCONTINUED | OUTPATIENT
Start: 2021-03-15 | End: 2021-03-16 | Stop reason: HOSPADM

## 2021-03-15 RX ADMIN — ATORVASTATIN CALCIUM 80 MG: 40 TABLET, FILM COATED ORAL at 21:12

## 2021-03-15 RX ADMIN — HYDROCODONE BITARTRATE AND ACETAMINOPHEN 1 TABLET: 10; 325 TABLET ORAL at 16:31

## 2021-03-15 RX ADMIN — FLUTICASONE PROPIONATE 2 SPRAY: 50 SPRAY, METERED NASAL at 09:26

## 2021-03-15 RX ADMIN — BUPROPION HYDROCHLORIDE 150 MG: 150 TABLET, EXTENDED RELEASE ORAL at 09:24

## 2021-03-15 RX ADMIN — IPRATROPIUM BROMIDE AND ALBUTEROL SULFATE 3 ML: .5; 3 SOLUTION RESPIRATORY (INHALATION) at 19:16

## 2021-03-15 RX ADMIN — CARISOPRODOL 350 MG: 350 TABLET ORAL at 14:49

## 2021-03-15 RX ADMIN — PANTOPRAZOLE SODIUM 40 MG: 40 TABLET, DELAYED RELEASE ORAL at 05:29

## 2021-03-15 RX ADMIN — Medication 1 PATCH: at 09:24

## 2021-03-15 RX ADMIN — BUPROPION HYDROCHLORIDE 150 MG: 150 TABLET, EXTENDED RELEASE ORAL at 21:12

## 2021-03-15 RX ADMIN — ALPRAZOLAM 1 MG: 0.5 TABLET ORAL at 21:12

## 2021-03-15 RX ADMIN — ASPIRIN 81 MG: 81 TABLET, COATED ORAL at 09:26

## 2021-03-15 RX ADMIN — MIRTAZAPINE 15 MG: 15 TABLET, FILM COATED ORAL at 21:12

## 2021-03-15 RX ADMIN — HYDROCODONE BITARTRATE AND ACETAMINOPHEN 1 TABLET: 10; 325 TABLET ORAL at 09:24

## 2021-03-15 RX ADMIN — HEPARIN SODIUM 5000 UNITS: 5000 INJECTION INTRAVENOUS; SUBCUTANEOUS at 21:12

## 2021-03-15 RX ADMIN — IPRATROPIUM BROMIDE AND ALBUTEROL SULFATE 3 ML: .5; 3 SOLUTION RESPIRATORY (INHALATION) at 07:54

## 2021-03-15 RX ADMIN — GABAPENTIN 200 MG: 100 CAPSULE ORAL at 14:49

## 2021-03-15 RX ADMIN — METOPROLOL TARTRATE 50 MG: 50 TABLET, FILM COATED ORAL at 21:12

## 2021-03-15 RX ADMIN — Medication 1 TABLET: at 09:26

## 2021-03-15 RX ADMIN — HEPARIN SODIUM 5000 UNITS: 5000 INJECTION INTRAVENOUS; SUBCUTANEOUS at 09:24

## 2021-03-15 RX ADMIN — METOPROLOL TARTRATE 50 MG: 50 TABLET, FILM COATED ORAL at 09:26

## 2021-03-15 RX ADMIN — CLOPIDOGREL 75 MG: 75 TABLET, FILM COATED ORAL at 09:26

## 2021-03-15 NOTE — SIGNIFICANT NOTE
03/15/21 0945   Plan   Plan Contacted [facility] [phone] per preference per Shanelle who states they accept [employer] Medicare.  Informed Shanelle about discharge on 3-16-21 and orders for 18 inch lightweight wheelchair with anti-tippers, swing away leg rest, gel cushion, bedside commode, and hospital bed with half rails.  Faxed DWO, face sheet, H&P, PT/OT notes, & MD progress note to [phone].  Friend Jerry to be contacted about delivery of DME of today if possible.  SS reviewed this information with friend Jerry [phone].  Friend does not have preference for home health agency.  Friend plans to come to rehab on 3-16-21 around 10:00 am for pt's discharge and transportation home.  Informed him SS will follow-up with resources about assistance with W/C ramp.   Patient/Family in Agreement with Plan yes

## 2021-03-15 NOTE — DISCHARGE INSTR - OTHER ORDERS
[facility] [phone] for PT/OT 2-3 x wk x 8 wks.    [facility] [phone] for hospital bed with half rails, 18 inch lightweight wheelchair with swing away leg rests, anti-tippers, gel cushion and bedside commode.

## 2021-03-15 NOTE — SIGNIFICANT NOTE
03/15/21 1523   Plan   Plan SS received voicemail from Reema with [employer] about [employer] to assist up to $2000 if pt is eligible.  Left message for Reema at [employer] [phone].

## 2021-03-15 NOTE — PROGRESS NOTES
PROGRESS NOTE         Patient Identification:  Name:  Valerie Moore  Age:  65 y.o.  Sex:  male  :  1955  MRN:  [id number]  Visit Number:  [id number]  Primary Care Provider:  Darwin Alvarez MD         LOS: 21 days       ----------------------------------------------------------------------------------------------------------------------  Subjective       Chief Complaints:    Acute CVA  Depression improved with Remeron  Right hip osteoarthritis  Hypertension      Interval History:      Depression has significantly improved with Wellbutrin and patient had an uneventful night with no issues reported, vitals are stable with no fever documented overnight.  Patient denies any chest pain, shortness of breath, nausea, vomiting, diarrhea or headache.  Has been participating with physical therapy without any difficulty.  Seems to be fairly comfortable this morning with no evidence of acute distress.          Review of Systems:    Constitutional: no fever, chills and night sweats.  Generalized fatigue.  Eyes: no eye drainage, itching or redness.  HEENT: no mouth sores, dysphagia or nose bleed.  Respiratory: no for shortness of breath, cough or production of sputum.  Cardiovascular: no chest pain, no palpitations, no orthopnea.  Gastrointestinal: no nausea, vomiting or diarrhea. No abdominal pain, hematemesis or rectal bleeding.  Genitourinary: no dysuria or polyuria.  Hematologic/lymphatic: no lymph node abnormalities, no easy bruising or easy bleeding.  Musculoskeletal: no muscle or joint pain.  Skin: No rash and no itching.  Neurological: CVA  Behavioral/Psych: Mild depression improving  Endocrine: no hot flashes.  Immunologic: negative.    ----------------------------------------------------------------------------------------------------------------------      Objective       [facility] Meds:  aspirin, 81 mg, Oral, Daily  atorvastatin, 80 mg, Oral, Nightly  buPROPion SR, 150 mg, Oral,  Q12H  clopidogrel, 75 mg, Oral, Daily  fluticasone, 2 spray, Each Nare, Daily  heparin (porcine), 5,000 Units, Subcutaneous, Q12H  losartan, 50 mg, Oral, Q24H  metoprolol tartrate, 50 mg, Oral, Q12H  mirtazapine, 15 mg, Oral, Nightly  multivitamin, 1 tablet, Oral, Daily  nicotine, 1 patch, Transdermal, Q24H  pantoprazole, 40 mg, Oral, Q AM  polyethylene glycol, 17 g, Oral, Daily         ----------------------------------------------------------------------------------------------------------------------    Vital Signs:  Temp:  [97.8 °F (36.6 °C)] 97.8 °F (36.6 °C)  Heart Rate:  [57-61] 60  Resp:  [18-20] 18  BP: (140)/(68) 140/68  No data found.  SpO2 Percentage    03/14/21 1905 03/15/21 0754 03/15/21 0802   SpO2: 98% 95% 97%     SpO2:  [95 %-98 %] 97 %  on  Flow (L/min):  [2] 2;   Device (Oxygen Therapy): room air    Body mass index is 27.99 kg/m².  Wt Readings from Last 3 Encounters:   02/22/21 80.9 kg (178 lb 5.6 oz)   02/22/21 80.9 kg (178 lb 6.4 oz)   11/24/19 82.2 kg (181 lb 5 oz)        Intake/Output Summary (Last 24 hours) at 3/15/2021 0885  Last data filed at 3/15/2021 0441  Gross per 24 hour   Intake 1560 ml   Output 450 ml   Net 1110 ml     Diet Regular; Cardiac, Daily Fluid Restriction; 1500 mL Fluid Per Day  ----------------------------------------------------------------------------------------------------------------------      Physical Exam:    General Appearance: alert, pleasant, appears stated age, interactive and  Cooperative.  Patient still in bed and has no complaints this morning and overall seems to be improving from depression standpoint.    Head: normocephalic, without obvious abnormality and atraumatic    Eyes: lids and lashes normal, conjunctivae and sclerae normal, no icterus, no  pallor, corneas clear and PERRLA    Ears: ears appear intact with no abnormalities noted    Nose: nares normal, septum midline, mucosa normal and no drainage     Lungs: clear to auscultation, respirations  regular, respirations even and  respirations unlabored. No accessory muscle use.     Heart:: regular rhythm & normal rate, normal S1, S2, no murmur, no gallop, no  rub and no click.  Chest wall with no abnormalities observed. PMI nondisplaced    Abdomen: normal bowel sounds in all quadrants, no masses, no hepatomegaly,  no splenomegaly, soft non-tender, no guarding and no rebound tenderness    Extremities: no edema, no cyanosis, no redness, no tenderness, no clubbing    Musculoskeletal: joints with no effusion nor erythema nor warmth.  Pedal pulses palpable and equal bilaterally    Skin: no bleeding, bruising or rash and no lesions noted    Lymph Nodes: no palpable adenopathy    Neurologic:    Mental Status: Patient is awake alert and oriented x3.  Appropriate.   Sensory: Sensory overall seems to be intact in BLE and BUE.   Motor strength: Right-sided hemiparesis      ----------------------------------------------------------------------------------------------------------------------                      Invalid input(s): PROTEstimated Creatinine Clearance: 83.8 mL/min (by C-G formula based on SCr of 0.87 mg/dL).  No results found for: AMMONIA    No results found for: HGBA1C, POCGLU  Lab Results   Component Value Date    HGBA1C 5.40 11/24/2019     Lab Results   Component Value Date    TSH 0.698 02/16/2021    FREET4 1.40 02/16/2021       No results found for: BLOODCX  No results found for: URINECX  No results found for: WOUNDCX  No results found for: STOOLCX  No results found for: RESPCX  Pain Management Panel     Pain Management Panel Latest Ref Rng & Units 2/16/2021 11/24/2019    AMPHETAMINES SCREEN, URINE Negative Negative Negative    BARBITURATES SCREEN Negative Negative Negative    BENZODIAZEPINE SCREEN, URINE Negative Negative Negative    BUPRENORPHINEUR Negative Negative Negative    COCAINE SCREEN, URINE Negative Negative Negative    METHADONE SCREEN, URINE Negative Negative Negative             ----------------------------------------------------------------------------------------------------------------------  Imaging Results (Last 24 Hours)     ** No results found for the last 24 hours. **          ----------------------------------------------------------------------------------------------------------------------    Assessment/Plan       Assessment/Plan     ASSESSMENT:    Acute infarct of the left external capsule basal left ganglia and pericallosal regions  Right sided hemiparesis  Right-sided hip osteoarthritis  Depression  Anxiety neurosis  Hypertension  Tobacco use  GERD      PLAN:    I have reviewed overnight vitals and patient seems to be stable from medical standpoint without any fever overnight with well-controlled blood pressure and without any evidence of clinical sepsis.  Patient looks comfortable without evidence of acute distress and overall slept well last night.  Patient continues to participate with therapy without any major issues or difficulties.  Willing to participate today as well and making steady progress.  Patient denies any headache, dizziness, vertigo, loss of consciousness, nausea, vomiting, diarrhea or abdominal pain.  Patient denies chest pain or shortness of breath as well.    Blood pressure is slightly on the high side today I increase his Cozaar back to 100 mg daily and will continue to monitor very closely and adjust appropriately.    Status post CVA of left external capsule, left basal ganglia, and left pericallosal region.  Patient continues work diligent with PT and OT and is improving his functional mobility.     Osteoarthritis causing chronic pain in the right hip--reasonably well-controlled     Depression continue antidepressant therapy.  Patient states it has been helpful     Anxiety neurosis Xanax 1 mg twice daily as needed     GERD--Protonix     Tobacco abuse recommend cessation        Code Status:   Code Status and Medical Interventions:   Ordered at:  02/22/21 1624     Level Of Support Discussed With:    Patient     Code Status:    CPR     Medical Interventions (Level of Support Prior to Arrest):    Full       Thea Wolfe MD  03/15/21  08:43 EDT

## 2021-03-15 NOTE — THERAPY TREATMENT NOTE
Inpatient Rehabilitation - Physical Therapy Treatment Note       CEHYENNE No     Patient Name: Valerie Moore  : 1955  MRN: [id number]    Today's Date: 3/15/2021                    Admit Date: 2021      Visit Dx: No diagnosis found.    Patient Active Problem List   Diagnosis   • Anxiety   • Hypertension   • Hypercholesteremia   • Seasonal allergies   • Sleep disorder   • GERD (gastroesophageal reflux disease)   • Arthritis   • Airway hyperreactivity   • Gout   • Weakness   • CAP (community acquired pneumonia)   • Hypokalemia   • Hyponatremia   • Hyperglycemia   • Normocytic anemia   • Abdominal aortic aneurysm (AAA) 3.0 cm to 5.5 cm in diameter in male (CMS/HCC)   • Tobacco abuse   • Chest pain, atypical   • Cerebrovascular accident (CVA) (CMS/HCC)   • CVA (cerebral vascular accident) (CMS/HCC)       Past Medical History:   Diagnosis Date   • Anxiety    • GERD (gastroesophageal reflux disease) 2017   • Hypercholesteremia    • Hypertension    • Seasonal allergies    • Sleep disorder    • Stroke (CMS/HCC)        Past Surgical History:   Procedure Laterality Date   • HERNIA REPAIR     • HIP SURGERY Right     plate and 8 screws in right hip           PT ASSESSMENT (last 12 hours)      IRF PT Evaluation and Treatment     Row Name 03/15/21 1146          PT Time and Intention    Document Type  daily treatment  -RF     Mode of Treatment  individual therapy;physical therapy  -RF     Patient/Family/Caregiver Comments/Observations  No significant c/o noted.   -RF     Row Name 03/15/21 1146          General Information    Existing Precautions/Restrictions  fall;oxygen therapy device and L/min R hemiplegia  -RF     Row Name 03/15/21 1146          Cognition/Psychosocial    Affect/Mental Status (Cognitive)  WFL  -RF     Orientation Status (Cognition)  oriented x 3  -RF     Follows Commands (Cognition)  follows one-step commands;verbal cues/prompting required  -RF     Row Name 03/15/21 1146          Pain Scale:  FACES Pre/Post-Treatment    Pain: FACES Scale, Pretreatment  0-->no hurt  -RF     Posttreatment Pain Rating  2-->hurts little bit  -RF     Row Name 03/15/21 1146          Mobility    Left Lower Extremity (Weight-bearing Status)  full weight-bearing (FWB)  -RF     Right Lower Extremity (Weight-bearing Status)  full weight-bearing (FWB)  -RF     Row Name 03/15/21 1146          Bed Mobility    Scooting/Bridging [place] (Bed Mobility)  verbal cues;nonverbal cues (demo/gesture);contact guard  -RF     Supine-Sit [place] (Bed Mobility)  minimum assist (75% patient effort) mat table  -RF     Sit-Supine [place] (Bed Mobility)  minimum assist (75% patient effort)  -RF     Bed Mobility, Safety Issues  decreased use of arms for pushing/pulling;decreased use of legs for bridging/pushing  -RF     Row Name 03/15/21 1146          Transfer Assessment/Treatment    Transfers  bed-chair transfer;chair-bed transfer;sit-stand transfer;stand-sit transfer;stand pivot/stand step transfer;car transfer  -RF     Comment (Transfers)  Transfer training performed with teodora walker; patient practiced turning to L side. Assistance needed for balance and advancing LE's   -RF     Row Name 03/15/21 1146          Transfers    Bed-Chair [place] (Transfers)  minimum assist (75% patient effort);moderate assist (50% patient effort)  -RF     Chair-Bed [place] (Transfers)  minimum assist (75% patient effort);moderate assist (50% patient effort)  -RF     Assistive Device (Bed-Chair Transfers)  wheelchair;walker, teodora  -RF     Sit-Stand [place] (Transfers)  verbal cues;nonverbal cues (demo/gesture);contact guard;minimum assist (75% patient effort)  -RF     Stand-Sit [place] (Transfers)  verbal cues;nonverbal cues (demo/gesture);contact guard;minimum assist (75% patient effort)  -RF     Row Name 03/15/21 1146          Chair-Bed Transfer    Assistive Device (Chair-Bed Transfers)  wheelchair;walker, teodora  -RF     Row Name  03/15/21 1146          Sit-Stand Transfer    Assistive Device (Sit-Stand Transfers)  wheelchair;other (see comments);walker, teodora  -RF     Row Name 03/15/21 1146          Stand-Sit Transfer    Assistive Device (Stand-Sit Transfers)  wheelchair;other (see comments);walker, teodora  -RF     Row Name 03/15/21 1146          Stand Pivot/Stand Step Transfer    Stand Pivot/Stand Step [place] (Transfers)  verbal cues;nonverbal cues (demo/gesture);minimum assist (75% patient effort);moderate assist (50% patient effort)  -RF     Assistive Device (Stand Pivot Stand Step Transfer)  wheelchair;walker, teodora  -RF     Row Name 03/15/21 1146          Safety Issues, Functional Mobility    Impairments Affecting Function (Mobility)  balance;coordination;endurance/activity tolerance;grasp;motor control;muscle tone abnormal;pain;postural/trunk control;range of motion (ROM);shortness of breath;strength  -RF     Row Name 03/15/21 1146          Balance    Comment, Balance  Pre gait activities performed with HW; static standing balance, standing dynamic balance with W/S and stepping RLW frwd, tandem WS with HW. Patient demonstrates [place] ab[place] with [place] [facility] avoidance noted.   -RF     Row Name 03/15/21 1146          Hip (Therapeutic Exercise)    Hip AAROM (Therapeutic Exercise)  right;flexion;extension;aBduction;aDduction;sitting;2 sets;10 repetitions  -RF     Hip Strengthening (Therapeutic Exercise)  bilateral;flexion;extension;aBduction;aDduction;heel slides;marching while seated;standing;resistance band;yellow;2 sets;10 repetitions  -RF     Row Name 03/15/21 1146          Knee (Therapeutic Exercise)    Knee AAROM (Therapeutic Exercise)  right;flexion;extension;sitting;2 sets;10 repetitions  -RF     Knee Strengthening (Therapeutic Exercise)  bilateral;flexion;extension;heel slides;marching while seated;hamstring curls;LAQ (long arc quad);sitting;yellow;2 sets;10 repetitions  -RF     Row Name 03/15/21 1146          Ankle  (Therapeutic Exercise)    Ankle AAROM (Therapeutic Exercise)  right;dorsiflexion;plantarflexion;2 sets;10 repetitions;sitting  -RF     Ankle Strengthening (Therapeutic Exercise)  bilateral;dorsiflexion;plantarflexion;sitting;2 sets;10 repetitions  -RF     Row Name 03/15/21 1146          Neuromuscular Re-education    Interventions (Neuromuscular Re-education)  facilitation/inhibition;tapping  -RF     Row Name 03/15/21 1146          Modality Treatment    Treatment Location 1 (Modality)  R quad/hip flexors  -RF     Modality Performed  NMES (neuromuscular electrical stimulation)  -RF     Modality Treatment Results  Enhanced contraction  -RF     Comment, Modality Treatment  15 mins; 31 mA Pt cued for hip flexion in supine with on cycle  -RF     Row Name 03/15/21 1146          Bed Mobility Goal 1 (PT-IRF)    Progress/Outcomes (Bed Mobility Goal 1, PT-IRF)  goal met  -RF     Row Name 03/15/21 1146          Bed Mobility Goal 2 (PT-IRF)    Progress/Outcomes (Bed Mobility Goal 2, PT-IRF)  goal partially met goal partially met (inconsistent)  -RF     Row Name 03/15/21 1146          Transfer Goal 1 (PT-IRF)    Progress/Outcomes (Transfer Goal 1, PT-IRF)  goal met  -RF     Row Name 03/15/21 1146          Transfer Goal 2 (PT-IRF)    Progress/Outcomes (Transfer Goal 2, PT-IRF)  goal ongoing  -RF     Row Name 03/15/21 1146          Gait/Walking Locomotion Goal 1 (PT-IRF)    Progress/Outcomes (Gait/Walking Locomotion Goal 1, PT-IRF)  goal ongoing  -RF     Row Name 03/15/21 1146          Gait/Walking Locomotion Goal 2 (PT-IRF)    Progress/Outcomes (Gait/Walking Locomotion Goal 2, PT-IRF)  goal ongoing  -RF     Row Name 03/15/21 1146          Positioning and Restraints    Pre-Treatment Position  sitting in chair/recliner  -RF     Post Treatment Position  wheelchair  -RF     In Wheelchair  sitting;with other staff  -     Row Name 03/15/21 1146          Therapy Assessment/Plan (PT)    Patient's Goals For Discharge  return home  -RF      Row Name 03/15/21 1146          Therapy Assessment/Plan (PT)    Comment, Therapy Assessment/Plan (PT)  Pt continues to demonstrate improving functional mobility with transfer training this date. Assistance still required for safety. COnitnued skilled care required for further improvements and strengtheing.   -RF     Row Name 03/15/21 1146          Daily Progress Summary (PT)    Impairments Still Limiting Function (PT)  muscle tone abnormal;ROM decreased;strength decreased;impaired balance;coordination impaired;impaired functional activity tolerance;motor control impaired;postural control impaired;pain  -RF     Row Name 03/15/21 1146          Therapy Plan Review/Discharge Plan (PT)    Expected Discharge Disposition (PT Eval)  home with caregiver;home with home health care  -RF       User Key  (r) = Recorded By, (t) = Taken By, (c) = Cosigned By    Initials Name Provider Type    Pooja Vela, WAGNER Physical Therapy Assistant           Physical Therapy Education                 Title: PT OT SLP Therapies (Done)     Topic: Physical Therapy (Done)     Point: Mobility training (Done)     Learning Progress Summary           Patient Acceptance, E,TB, VU by RF at 3/15/2021 1156    Acceptance, E,TB, VU by RF at 3/12/2021 1537    Acceptance, E,D, VU by RG at 3/11/2021 1501    Acceptance, E,D, VU,NR by LL at 3/11/2021 1424    Acceptance, E,D, VU,NR by RG at 3/10/2021 1509    Acceptance, E,D, VU,NR by AG at 3/10/2021 1453    Acceptance, E,D, VU,NR by AG at 3/9/2021 1144    Acceptance, E,TB, VU by RF at 3/8/2021 1613    Acceptance, E,TB, VU by RF at 3/5/2021 1605    Acceptance, E, VU by CW at 3/4/2021 1416    Acceptance, E,D, VU,NR by LL at 3/3/2021 1101    Acceptance, E,D, VU,NR by LL at 3/2/2021 1517    Acceptance, E,D, VU,NR by LL at 3/1/2021 1016    Acceptance, E,TB, VU by CT at 2/27/2021 1228    Acceptance, E,D, VU,NR by LL at 2/26/2021 1829    Acceptance, E,D, VU,NR by LL at 2/25/2021 1541    Acceptance, E, VU,NR  by LB at 2/23/2021 1604                   Point: Home exercise program (Done)     Learning Progress Summary           Patient Acceptance, E,TB, VU by RF at 3/15/2021 1156    Acceptance, E,TB, VU by RF at 3/12/2021 1537    Acceptance, E,D, VU by RG at 3/11/2021 1501    Acceptance, E,D, VU,NR by LL at 3/11/2021 1424    Acceptance, E,D, VU,NR by RG at 3/10/2021 1509    Acceptance, E,D, VU,NR by AG at 3/10/2021 1453    Acceptance, E,D, VU,NR by AG at 3/9/2021 1144    Acceptance, E,TB, VU by RF at 3/8/2021 1613    Acceptance, E,TB, VU by RF at 3/5/2021 1605    Acceptance, E, VU by CW at 3/4/2021 1416    Acceptance, E,D, VU,NR by LL at 3/3/2021 1101    Acceptance, E,D, VU,NR by LL at 3/2/2021 1517    Acceptance, E,D, VU,NR by LL at 3/1/2021 1016    Acceptance, E,TB, VU by CT at 2/27/2021 1228    Acceptance, E,D, VU,NR by LL at 2/26/2021 1829    Acceptance, E,D, VU,NR by LL at 2/25/2021 1541    Acceptance, E, VU,NR by LB at 2/23/2021 1604                   Point: Body mechanics (Done)     Learning Progress Summary           Patient Acceptance, E,TB, VU by RF at 3/15/2021 1156    Acceptance, E,TB, VU by RF at 3/12/2021 1537    Acceptance, E,D, VU by RG at 3/11/2021 1501    Acceptance, E,D, VU,NR by LL at 3/11/2021 1424    Acceptance, E,D, VU,NR by RG at 3/10/2021 1509    Acceptance, E,D, VU,NR by AG at 3/10/2021 1453    Acceptance, E,D, VU,NR by AG at 3/9/2021 1144    Acceptance, E,TB, VU by RF at 3/8/2021 1613    Acceptance, E,TB, VU by RF at 3/5/2021 1605    Acceptance, E, VU by CW at 3/4/2021 1416    Acceptance, E,D, VU,NR by LL at 3/3/2021 1101    Acceptance, E,D, VU,NR by LL at 3/2/2021 1517    Acceptance, E,D, VU,NR by LL at 3/1/2021 1016    Acceptance, E,TB, VU by CT at 2/27/2021 1228    Acceptance, E,D, VU,NR by LL at 2/26/2021 1829    Acceptance, E,D, VU,NR by LL at 2/25/2021 1541    Acceptance, E, VU,NR by LB at 2/23/2021 1604                   Point: Precautions (Done)     Learning Progress Summary            Patient Acceptance, E,TB, VU by RF at 3/15/2021 1156    Acceptance, E,TB, VU by RF at 3/12/2021 1537    Acceptance, E,D, VU by RG at 3/11/2021 1501    Acceptance, E,D, VU,NR by LL at 3/11/2021 1424    Acceptance, E,D, VU,NR by RG at 3/10/2021 1509    Acceptance, E,D, VU,NR by AG at 3/10/2021 1453    Acceptance, E,D, VU,NR by AG at 3/9/2021 1144    Acceptance, E,TB, VU by RF at 3/8/2021 1613    Acceptance, E,TB, VU by RF at 3/5/2021 1605    Acceptance, E, VU by CW at 3/4/2021 1416    Acceptance, E,D, VU,NR by LL at 3/3/2021 1101    Acceptance, E,D, VU,NR by LL at 3/2/2021 1517    Acceptance, E,D, VU,NR by LL at 3/1/2021 1016    Acceptance, E,TB, VU by CT at 2/27/2021 1228    Acceptance, E,D, VU,NR by LL at 2/26/2021 1829    Acceptance, E,D, VU,NR by LL at 2/25/2021 1541    Acceptance, E, VU,NR by LB at 2/23/2021 1604                               User Key     Initials Effective Dates Name Provider Type Discipline    LB 03/14/16 -  Cathleen Carnes, PT Physical Therapist PT    AG 04/03/18 -  Zoila Morse, PT Physical Therapist PT    CT 04/03/18 -  Nilsa Bermudez, PT Physical Therapist PT    LL 05/02/16 -  Alise Handy, PTA Physical Therapy Assistant PT    RF 03/07/18 -  Pooja Mcqueen PTA Physical Therapy Assistant PT    RG 10/31/19 -  Jevon Silver, PTA Physical Therapy Assistant PT    CW 09/11/20 -  Lucas Baker, PT Physical Therapist PT                PT Recommendation and Plan          Daily Progress Summary (PT)  Impairments Still Limiting Function (PT): muscle tone abnormal, ROM decreased, strength decreased, impaired balance, coordination impaired, impaired functional activity tolerance, motor control impaired, postural control impaired, pain               Time Calculation:     PT Charges     Row Name 03/15/21 1156             Time Calculation    Start Time  0915  -RF      Stop Time  1055  -RF      Time Calculation (min)  100 min  -RF      PT Received On  03/15/21  -RF      PT - Next  Appointment  03/16/21  -RF      PT Goal Re-Cert Due Date  03/16/21  -RF         Time Calculation- PT    Total Timed Code Minutes- PT  100 minute(s)  -RF        User Key  (r) = Recorded By, (t) = Taken By, (c) = Cosigned By    Initials Name Provider Type    RF Pooja Mcqueen PTA Physical Therapy Assistant          Therapy Charges for Today     Code Description Service Date Service Provider Modifiers Qty    29614897100 HC PT NEUROMUSC RE EDUCATION EA 15 MIN 3/15/2021 Pooja Mcqueen PTA GP, KX 3    99927552336 HC PT THER PROC EA 15 MIN 3/15/2021 Pooja Mcqueen PTA GP, KX 2    40776523262 HC PT THERAPEUTIC ACT EA 15 MIN 3/15/2021 Pooja Mcqueen PTA GP, KX 2                   Pooja Mcqueen PTA  3/15/2021

## 2021-03-15 NOTE — SIGNIFICANT NOTE
03/15/21 09   Plan   Plan SS spoke to pt about recommendations for home health PT, OT, hospital bed with half rails, 18 inch lightweight wheelchair with swing away leg rests, anti-tippers, cushion, and bedside commode.  Pt does not have preference for home health and prefers [facility] for DME.  Pt's friend/caregiver came to rehab on 3-12-21 to receive education and observe pt in therapy.   Provided Post Acute Provider List? Yes   Post Acute Provider List DME Supplier;Home Health   Provided Post Acute Provider Quality & Resource List? Yes   Post Acute Provider Quality and Resource List Home Health   Delivered To Patient   Method of Delivery In person   Patient/Family in Agreement with Plan yes

## 2021-03-15 NOTE — SIGNIFICANT NOTE
03/15/21 1020   Plan   Plan Contacted [facility] [phone] per rotation per Borjas who states they can accept [employer].  Provided referral, discharge on 3-16-21 and orders for PT 2-3 x wk x 8 wks for strengthening, gait training, transfer training, balance, therapeutic exercise, bed mobility, neuro re-education, home safety, coordination and OT 2-3 x wk x 8 wks for ADL re-training, home safety, functional mobility, strengthening, ROM, coordination, neuro re-education, and endurance.  Faxed  referral with orders, face sheet, H&P, PT/OT notes and MD progress note to  [phone].   to be contacted at discharge.

## 2021-03-15 NOTE — THERAPY TREATMENT NOTE
Inpatient Rehabilitation - Occupational Therapy Treatment Note    CHEYENNE No     Patient Name: Valerie Moore  : 1955  MRN: [id number]    Today's Date: 3/15/2021                 Admit Date: 2021       No diagnosis found.    Patient Active Problem List   Diagnosis   • Anxiety   • Hypertension   • Hypercholesteremia   • Seasonal allergies   • Sleep disorder   • GERD (gastroesophageal reflux disease)   • Arthritis   • Airway hyperreactivity   • Gout   • Weakness   • CAP (community acquired pneumonia)   • Hypokalemia   • Hyponatremia   • Hyperglycemia   • Normocytic anemia   • Abdominal aortic aneurysm (AAA) 3.0 cm to 5.5 cm in diameter in male (CMS/HCC)   • Tobacco abuse   • Chest pain, atypical   • Cerebrovascular accident (CVA) (CMS/HCC)   • CVA (cerebral vascular accident) (CMS/HCC)       Past Medical History:   Diagnosis Date   • Anxiety    • GERD (gastroesophageal reflux disease) 2017   • Hypercholesteremia    • Hypertension    • Seasonal allergies    • Sleep disorder    • Stroke (CMS/HCC)        Past Surgical History:   Procedure Laterality Date   • HERNIA REPAIR     • HIP SURGERY Right     plate and 8 screws in right hip                 IRF OT ASSESSMENT FLOWSHEET (last 12 hours)      IRF OT Evaluation and Treatment     Row Name 03/15/21 1513          OT Time and Intention    Document Type  daily treatment  -BF     Mode of Treatment  occupational therapy  -BF     Patient Effort  excellent  -BF     Row Name 03/15/21 1513          General Information    Existing Precautions/Restrictions  fall;oxygen therapy device and L/min R hemiplegia; O2 as needed  -BF     Row Name 03/15/21 1513          Cognition/Psychosocial    Orientation Status (Cognition)  oriented x 3  -BF     Follows Commands (Cognition)  follows one-step commands;verbal cues/prompting required  -BF     Row Name 03/15/21 1513          Transfers    Bed-Chair [place] (Transfers)  contact guard;verbal cues;nonverbal cues  (demo/gesture)  -BF     Chair-Bed [place] (Transfers)  contact guard;verbal cues;nonverbal cues (demo/gesture)  -BF     Assistive Device (Bed-Chair Transfers)  wheelchair  -BF     Row Name 03/15/21 1513          Chair-Bed Transfer    Assistive Device (Chair-Bed Transfers)  wheelchair bed rails  -BF     Row Name 03/15/21 1513          Motor Skills    Motor Control/Coordination Interventions  bilateral/bimanual training;gross motor coordination activities;therapeutic exercise/ROM;neuro-muscular re-education RUE NDT, AAROM; BUE arm bike w/ RUE supported  -BF     Row Name 03/15/21 1513          Neuromuscular Re-education    Interventions (Neuromuscular Re-education)  facilitation/inhibition;massage;tapping RUE  -BF     Row Name 03/15/21 1513          Basic Activities of Daily Living (BADLs)    Basic Activities of Daily Living  bathing;upper body dressing;lower body dressing;grooming  -BF     Row Name 03/15/21 1513          Bathing    [place] Level (Bathing)  minimum assist (75% patient effort);verbal cues  -BF     Position (Bathing)  supine;edge of bed sitting  -BF     Comment (Bathing)  Min A  -BF     Row Name 03/15/21 1513          Upper Body Dressing    Position (Upper Body Dressing)  edge of bed sitting  -BF     Comment (Upper Body Dressing)  Set-up w/ t-shirt  -BF     Row Name 03/15/21 1513          Lower Body Dressing    [place] Level (Lower Body Dressing)  minimum assist (75% patient effort);moderate assist (50% patient effort);nonverbal cues (demo/gesture);verbal cues  -BF     Position (Lower Body Dressing)  supine  -BF     Comment (Lower Body Dressing)  Min/Mod A  -BF     Row Name 03/15/21 1513          Grooming    [place] Level (Grooming)  minimum assist (75% patient effort);verbal cues  -BF     Position (Grooming)  sink side;supported sitting  -BF     Comment (Grooming)  Min A  -BF     Row Name 03/15/21 1513          Positioning and Restraints    In Bed  supine;call light within  reach;encouraged to call for assist;RUE elevated In PM  -BF     In Wheelchair  sitting;with PT In AM  -BF       User Key  (r) = Recorded By, (t) = Taken By, (c) = Cosigned By    Initials Name Effective Dates    BF Monica Carbajaltsasha Yanez OT 07/05/20 -            Occupational Therapy Education                 Title: PT OT SLP Therapies (Done)     Topic: Occupational Therapy (Done)     Point: ADL training (Done)     Description:   Instruct learner(s) on proper safety adaptation and remediation techniques during self care or transfers.   Instruct in proper use of assistive devices.              Learning Progress Summary           Patient Acceptance, E, VU,NR by BF at 3/15/2021 1513    Acceptance, E,D, VU by BF at 3/12/2021 1433    Comment: Pt and roommate educated in self-care    Acceptance, E, VU,NR by BF at 3/11/2021 1215    Acceptance, E,D, VU,DU,NR by AB at 3/10/2021 1042    Acceptance, E, VU,NR by BF at 3/9/2021 1500    Acceptance, E, VU,NR by BF at 3/8/2021 1414    Acceptance, E, VU,NR by BF at 3/5/2021 1231    Acceptance, E, VU,NR by BF at 3/4/2021 1237    Acceptance, E, VU,NR by BF at 3/2/2021 1445    Acceptance, E, VU,NR by BF at 3/1/2021 1432    Acceptance, E, VU,NR by BF at 2/27/2021 1110    Acceptance, E, VU,NR by BF at 2/25/2021 1425    Acceptance, E, VU,NR by BF at 2/23/2021 1548   Other Acceptance, E,D, VU by BF at 3/12/2021 1433    Comment: Pt and roommate educated in self-care                   Point: Precautions (Done)     Description:   Instruct learner(s) on prescribed precautions during self-care and functional transfers.              Learning Progress Summary           Patient Acceptance, E, VU,NR by BF at 3/15/2021 1513    Acceptance, E,D, VU by BF at 3/12/2021 1433    Comment: Pt and roommate educated in self-care    Acceptance, E, VU,NR by BF at 3/11/2021 1215    Acceptance, E,D, VU,DU,NR by AB at 3/10/2021 1042    Acceptance, E, VU,NR by BF at 3/9/2021 1500    Acceptance, E, VU,NR by BF at  3/8/2021 1414    Acceptance, E, VU,NR by BF at 3/5/2021 1231    Acceptance, E, VU,NR by BF at 3/4/2021 1237    Acceptance, E, VU,NR by BF at 3/2/2021 1445    Acceptance, E, VU,NR by BF at 3/1/2021 1432    Acceptance, E, VU,NR by BF at 2/27/2021 1110    Acceptance, E, VU,NR by BF at 2/25/2021 1425    Acceptance, E, VU,NR by BF at 2/23/2021 1548   Other Acceptance, E,D, VU by BF at 3/12/2021 1433    Comment: Pt and roommate educated in self-care                               User Key     Initials Effective Dates Name Provider Type Discipline    AB 04/03/18 -  Patricia Cifuentes, OT Occupational Therapist OT     07/05/20 -  Meghan Carbajal OT Occupational Therapist OT                    OT Recommendation and Plan    Planned Therapy Interventions (OT): activity tolerance training, adaptive equipment training, BADL retraining, neuromuscular control/coordination retraining, passive ROM/stretching, ROM/therapeutic exercise, strengthening exercise, transfer/mobility retraining                    Time Calculation:     Time Calculation- OT     Row Name 03/15/21 1519 03/15/21 0835          Time Calculation- OT    OT Start Time  1405  -BF  0835  -BF     OT Stop Time  1455  -BF  0915  -BF     OT Time Calculation (min)  50 min  -BF  40 min  -BF     Total Timed Code Minutes- OT  50 minute(s)  -BF  40 minute(s)  -BF     OT Non-Billable Time (min)  --  20 min  -BF       User Key  (r) = Recorded By, (t) = Taken By, (c) = Cosigned By    Initials Name Provider Type    BF Meghan Carbajal OT Occupational Therapist        Therapy Charges for Today     Code Description Service Date Service Provider Modifiers Qty    16786859317 HC OT SELF CARE/MGMT/TRAIN EA 15 MIN 3/15/2021 Meghan Carbajal OT GO, KX 3    39510078929 HC OT NEUROMUSC RE EDUCATION EA 15 MIN 3/15/2021 Meghan Carbajal OT GO, KX 2    88736760404 HC OT THER PROC EA 15 MIN 3/15/2021 Meghan Carbajal OT GO, KX 1                    Meghna Carbajal, OT  3/15/2021

## 2021-03-15 NOTE — SIGNIFICANT NOTE
03/15/21 1100   Plan   Plan Contacted [employer] [phone] and left message for Reema.  Contacted [employer] [phone] per Elise who states pt lives outside of their service area and suggested SS contact [employer] [facility] [email or web address] in [place] or [facility] in [place].  Contacted [employer] [phone] ext. [phone] per Yeyo who says if pt is eligible for assistance to build W/C ramp it could take up to 6 months to receive [place]; he transferred SS to [facility]; left message.  Contacted [facility] [phone] per Kalyani who states they do not have a program at this time to assist with W/C ramp and she suggested SS contact [facility].  Left message at [facility] [phone].

## 2021-03-16 VITALS
RESPIRATION RATE: 16 BRPM | HEIGHT: 67 IN | HEART RATE: 64 BPM | OXYGEN SATURATION: 92 % | TEMPERATURE: 97.8 F | WEIGHT: 178.35 LBS | SYSTOLIC BLOOD PRESSURE: 144 MMHG | DIASTOLIC BLOOD PRESSURE: 72 MMHG | BODY MASS INDEX: 27.99 KG/M2

## 2021-03-16 PROCEDURE — 97530 THERAPEUTIC ACTIVITIES: CPT

## 2021-03-16 PROCEDURE — 25010000002 HEPARIN (PORCINE) PER 1000 UNITS: Performed by: FAMILY MEDICINE

## 2021-03-16 PROCEDURE — 97535 SELF CARE MNGMENT TRAINING: CPT | Performed by: OCCUPATIONAL THERAPIST

## 2021-03-16 PROCEDURE — 94799 UNLISTED PULMONARY SVC/PX: CPT

## 2021-03-16 RX ORDER — ASPIRIN 81 MG/1
81 TABLET ORAL DAILY
Qty: 30 TABLET | Refills: 0 | Status: SHIPPED | OUTPATIENT
Start: 2021-03-16

## 2021-03-16 RX ORDER — PANTOPRAZOLE SODIUM 40 MG/1
40 TABLET, DELAYED RELEASE ORAL DAILY
Qty: 30 TABLET | Refills: 0 | Status: SHIPPED | OUTPATIENT
Start: 2021-03-16

## 2021-03-16 RX ORDER — METOPROLOL TARTRATE 50 MG/1
50 TABLET, FILM COATED ORAL EVERY 12 HOURS SCHEDULED
Qty: 60 TABLET | Refills: 0 | Status: SHIPPED | OUTPATIENT
Start: 2021-03-16 | End: 2021-03-16

## 2021-03-16 RX ORDER — MIRTAZAPINE 15 MG/1
15 TABLET, FILM COATED ORAL NIGHTLY
Qty: 30 TABLET | Refills: 0 | Status: SHIPPED | OUTPATIENT
Start: 2021-03-16 | End: 2021-03-16

## 2021-03-16 RX ORDER — BUPROPION HYDROCHLORIDE 150 MG/1
150 TABLET, EXTENDED RELEASE ORAL EVERY 12 HOURS SCHEDULED
Qty: 60 TABLET | Refills: 0 | Status: SHIPPED | OUTPATIENT
Start: 2021-03-16 | End: 2021-03-16

## 2021-03-16 RX ORDER — MIRTAZAPINE 15 MG/1
15 TABLET, FILM COATED ORAL NIGHTLY
Qty: 30 TABLET | Refills: 0 | Status: SHIPPED | OUTPATIENT
Start: 2021-03-16

## 2021-03-16 RX ORDER — METOPROLOL TARTRATE 50 MG/1
50 TABLET, FILM COATED ORAL EVERY 12 HOURS SCHEDULED
Qty: 60 TABLET | Refills: 0 | Status: SHIPPED | OUTPATIENT
Start: 2021-03-16

## 2021-03-16 RX ORDER — CLOPIDOGREL BISULFATE 75 MG/1
75 TABLET ORAL DAILY
Qty: 30 TABLET | Refills: 0 | Status: SHIPPED | OUTPATIENT
Start: 2021-03-16

## 2021-03-16 RX ORDER — NITROGLYCERIN 0.4 MG/1
0.4 TABLET SUBLINGUAL
Qty: 25 TABLET | Refills: 0 | Status: SHIPPED | OUTPATIENT
Start: 2021-03-16 | End: 2021-03-16

## 2021-03-16 RX ORDER — CLOPIDOGREL BISULFATE 75 MG/1
75 TABLET ORAL DAILY
Qty: 30 TABLET | Refills: 0 | Status: SHIPPED | OUTPATIENT
Start: 2021-03-16 | End: 2021-03-16

## 2021-03-16 RX ORDER — POLYETHYLENE GLYCOL 3350 17 G/17G
17 POWDER, FOR SOLUTION ORAL DAILY
Qty: 510 G | Refills: 0 | Status: SHIPPED | OUTPATIENT
Start: 2021-03-16 | End: 2021-04-15

## 2021-03-16 RX ORDER — PANTOPRAZOLE SODIUM 40 MG/1
40 TABLET, DELAYED RELEASE ORAL DAILY
Qty: 30 TABLET | Refills: 0 | Status: SHIPPED | OUTPATIENT
Start: 2021-03-16 | End: 2021-03-16

## 2021-03-16 RX ORDER — LOSARTAN POTASSIUM 100 MG/1
100 TABLET ORAL
Qty: 30 TABLET | Refills: 0 | Status: SHIPPED | OUTPATIENT
Start: 2021-03-16

## 2021-03-16 RX ORDER — LOSARTAN POTASSIUM 100 MG/1
100 TABLET ORAL
Qty: 30 TABLET | Refills: 0 | Status: SHIPPED | OUTPATIENT
Start: 2021-03-16 | End: 2021-03-16

## 2021-03-16 RX ORDER — BUPROPION HYDROCHLORIDE 150 MG/1
150 TABLET, EXTENDED RELEASE ORAL EVERY 12 HOURS SCHEDULED
Qty: 60 TABLET | Refills: 0 | Status: SHIPPED | OUTPATIENT
Start: 2021-03-16

## 2021-03-16 RX ORDER — ATORVASTATIN CALCIUM 80 MG/1
80 TABLET, FILM COATED ORAL NIGHTLY
Qty: 30 TABLET | Refills: 0 | Status: SHIPPED | OUTPATIENT
Start: 2021-03-16

## 2021-03-16 RX ORDER — NICOTINE 21 MG/24HR
1 PATCH, TRANSDERMAL 24 HOURS TRANSDERMAL
Qty: 30 PATCH | Refills: 0 | Status: SHIPPED | OUTPATIENT
Start: 2021-03-16 | End: 2021-04-15

## 2021-03-16 RX ORDER — NICOTINE 21 MG/24HR
1 PATCH, TRANSDERMAL 24 HOURS TRANSDERMAL
Qty: 30 PATCH | Refills: 0 | Status: SHIPPED | OUTPATIENT
Start: 2021-03-16 | End: 2021-03-16

## 2021-03-16 RX ORDER — NITROGLYCERIN 0.4 MG/1
0.4 TABLET SUBLINGUAL
Qty: 25 TABLET | Refills: 0 | Status: SHIPPED | OUTPATIENT
Start: 2021-03-16

## 2021-03-16 RX ORDER — POLYETHYLENE GLYCOL 3350 17 G/17G
17 POWDER, FOR SOLUTION ORAL DAILY
Qty: 510 G | Refills: 0 | Status: SHIPPED | OUTPATIENT
Start: 2021-03-16 | End: 2021-03-16

## 2021-03-16 RX ORDER — ATORVASTATIN CALCIUM 80 MG/1
80 TABLET, FILM COATED ORAL NIGHTLY
Qty: 30 TABLET | Refills: 0 | Status: SHIPPED | OUTPATIENT
Start: 2021-03-16 | End: 2021-03-16

## 2021-03-16 RX ORDER — ASPIRIN 81 MG/1
81 TABLET ORAL DAILY
Qty: 30 TABLET | Refills: 0 | Status: SHIPPED | OUTPATIENT
Start: 2021-03-16 | End: 2021-03-16

## 2021-03-16 RX ORDER — GABAPENTIN 100 MG/1
200 CAPSULE ORAL 3 TIMES DAILY PRN
Qty: 12 CAPSULE | Refills: 0 | Status: SHIPPED | OUTPATIENT
Start: 2021-03-16

## 2021-03-16 RX ADMIN — POLYETHYLENE GLYCOL (3350) 17 G: 17 POWDER, FOR SOLUTION ORAL at 08:32

## 2021-03-16 RX ADMIN — HYDROCODONE BITARTRATE AND ACETAMINOPHEN 1 TABLET: 10; 325 TABLET ORAL at 10:38

## 2021-03-16 RX ADMIN — IPRATROPIUM BROMIDE AND ALBUTEROL SULFATE 3 ML: .5; 3 SOLUTION RESPIRATORY (INHALATION) at 06:57

## 2021-03-16 RX ADMIN — PANTOPRAZOLE SODIUM 40 MG: 40 TABLET, DELAYED RELEASE ORAL at 05:37

## 2021-03-16 RX ADMIN — FLUTICASONE PROPIONATE 2 SPRAY: 50 SPRAY, METERED NASAL at 08:31

## 2021-03-16 RX ADMIN — METOPROLOL TARTRATE 50 MG: 50 TABLET, FILM COATED ORAL at 08:31

## 2021-03-16 RX ADMIN — Medication 1 TABLET: at 08:31

## 2021-03-16 RX ADMIN — BUPROPION HYDROCHLORIDE 150 MG: 150 TABLET, EXTENDED RELEASE ORAL at 08:31

## 2021-03-16 RX ADMIN — ASPIRIN 81 MG: 81 TABLET, COATED ORAL at 08:31

## 2021-03-16 RX ADMIN — Medication 1 PATCH: at 08:31

## 2021-03-16 RX ADMIN — CLOPIDOGREL 75 MG: 75 TABLET, FILM COATED ORAL at 08:31

## 2021-03-16 RX ADMIN — LOSARTAN POTASSIUM 100 MG: 50 TABLET, FILM COATED ORAL at 08:31

## 2021-03-16 RX ADMIN — HEPARIN SODIUM 5000 UNITS: 5000 INJECTION INTRAVENOUS; SUBCUTANEOUS at 08:31
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
## 2021-03-16 NOTE — SIGNIFICANT NOTE
03/16/21 1130   Plan   Plan Spoke to Elise at [facility] [phone] about discharge.  HH to start care on 3-17-21.  Discharge summary with face to face to be faxed to HH when completed.  Pt's friend Jerry is not here yet.   Final Discharge Disposition Code 06 - home with home health care

## 2021-03-16 NOTE — PROGRESS NOTES
Patient Assessment Instrument  Quality Indicators - Discharge    Section GG. Self-Care Performance     Eating: [place] sets up or cleans up; patient completes activity. [place] assists  only prior to or following the activity.   Oral Hygiene: [place] provides verbal cues and/or touching/steadying and/or  contact guard assistance as patient completes activity.   Toileting Hygiene: : [place] does less than half the effort. [place] lifts, holds  or supports trunk or limbs but provides less than half the effort.   Shower/Bathe Self: [place] does less than half the effort. [place] lifts, holds  or supports trunk or limbs but provides less than half the effort.   Upper Body Dressing: [place] does less than half the effort. [place] lifts, holds  or supports trunk or limbs but provides less than half the effort.   Lower Body Dressing: [place] does less than half the effort. [place] lifts, holds  or supports trunk or limbs but provides less than half the effort.   Putting On/Taking Off Footwear: [place] does less than half the effort. [place]  lifts, holds or supports trunk or limbs but provides less than half the effort.    Section GG. Mobility Performance      Section J. Health Conditions Discharge      Section M. Skin Conditions Discharge      . Current Number of Unhealed Pressure Ulcers      Section N. Medication    Signed by: Meghan Carbajal, OT

## 2021-03-16 NOTE — THERAPY DISCHARGE NOTE
Inpatient Rehabilitation - IRF Occupational Therapy Progress Note/Discharge   [place]     Patient Name: Valerie Moore  : 1955  MRN: [id number]  Today's Date: 3/16/2021               Admit Date: 2021       ICD-10-CM ICD-9-CM   1. Cerebrovascular accident (CVA) due to other mechanism (CMS/[facility])  I63.89 434.91   2. Community acquired pneumonia of left lower lobe of lung  J18.9 486   3. Weakness  R53.1 780.79   4. Sleep disorder  G47.9 780.50   5. Anxiety  F41.9 300.00   6. Abdominal aortic aneurysm (AAA) 3.0 cm to 5.5 cm in diameter in male (CMS/[facility])  I71.4 441.4   7. Tobacco abuse  Z72.0 305.1     Patient Active Problem List   Diagnosis   • Anxiety   • Hypertension   • Hypercholesteremia   • Seasonal allergies   • Sleep disorder   • GERD (gastroesophageal reflux disease)   • Arthritis   • Airway hyperreactivity   • Gout   • Weakness   • CAP (community acquired pneumonia)   • Hypokalemia   • Hyponatremia   • Hyperglycemia   • Normocytic anemia   • Abdominal aortic aneurysm (AAA) 3.0 cm to 5.5 cm in diameter in male (CMS/[facility])   • Tobacco abuse   • Chest pain, atypical   • Cerebrovascular accident (CVA) (CMS/[facility])   • CVA (cerebral vascular accident) (CMS/[facility])     Past Medical History:   Diagnosis Date   • Anxiety    • GERD (gastroesophageal reflux disease) 2017   • Hypercholesteremia    • Hypertension    • Seasonal allergies    • Sleep disorder    • Stroke (CMS/[facility])      Past Surgical History:   Procedure Laterality Date   • HERNIA REPAIR     • HIP SURGERY Right     plate and 8 screws in right hip           IRF OT ASSESSMENT FLOWSHEET (last 12 hours)      IRF OT Evaluation and Treatment     Row Name 21 1239          OT Time and Intention    Document Type  discharge evaluation  -BF     Mode of Treatment  occupational therapy  -BF     Row Name 21 1239          General Information    General Observations of Patient  Pt educated in self-care/HEP/assistance needed at home; no questions/concerns  expressed at this time; roommate has also been educated in self-care.   -BF     Existing Precautions/Restrictions  fall;oxygen therapy device and L/min R hemiplegia; O2 as needed  -BF     Row Name 03/16/21 1239          Cognition/Psychosocial    Orientation Status (Cognition)  oriented x 3  -BF     Follows Commands (Cognition)  WFL  -BF     Row Name 03/16/21 1239          General Upper Extremity Assessment (Range of Motion)    Comment: Upper Extremity ROM  RUE shoulder trace, elbow <25%, wrist no AROM, hand 75% flexion, no extension; LUE AROM WFL  -BF     Row Name 03/16/21 1239          Strength Comprehensive (MMT)    Comment, General Manual Muscle Testing (MMT) Assessment  R shoulder 1/5, elbow 2/5, hand 3-/5  -BF     Row Name 03/16/21 1239          Basic Activities of Daily Living (BADLs)    Basic Activities of Daily Living  bathing;upper body dressing;lower body dressing;grooming;toileting;self-feeding  -BF     Row Name 03/16/21 1239          Bathing    Comment (Bathing)  Min A  -BF     Row Name 03/16/21 1239          Upper Body Dressing    Comment (Upper Body Dressing)  Min A; set-up w/ t-shirt  -BF     Row Name 03/16/21 1239          Lower Body Dressing    Comment (Lower Body Dressing)  Min/Mod A  -BF     Row Name 03/16/21 1239          Grooming    Comment (Grooming)  Min A  -BF     Row Name 03/16/21 1239          Toileting    Comment (Toileting)  Min A  -BF     Row Name 03/16/21 1239          Self-Feeding    Comment (Self-Feeding)  Set-up  -BF     Row Name 03/16/21 1239          Positioning and Restraints    In Bed  supine;call light within reach;encouraged to call for assist  -BF       User Key  (r) = Recorded By, (t) = Taken By, (c) = Cosigned By    Initials Name Effective Dates    Meghan Atkins OT 07/05/20 -              Occupational Therapy Education                 Title: PT OT SLP Therapies (Done)     Topic: Occupational Therapy (Resolved)     Point: ADL training (Resolved)     Description:    Instruct learner(s) on proper safety adaptation and remediation techniques during self care or transfers.   Instruct in proper use of assistive devices.              Learning Progress Summary           Patient Acceptance, E, VU by BF at 3/16/2021 1238    Acceptance, E, VU,NR by BF at 3/15/2021 1513    Acceptance, E,D, VU by BF at 3/12/2021 1433    Comment: Pt and roommate educated in self-care    Acceptance, E, VU,NR by BF at 3/11/2021 1215    Acceptance, E,D, VU,DU,NR by AB at 3/10/2021 1042    Acceptance, E, VU,NR by BF at 3/9/2021 1500    Acceptance, E, VU,NR by BF at 3/8/2021 1414    Acceptance, E, VU,NR by BF at 3/5/2021 1231    Acceptance, E, VU,NR by BF at 3/4/2021 1237    Acceptance, E, VU,NR by BF at 3/2/2021 1445    Acceptance, E, VU,NR by BF at 3/1/2021 1432    Acceptance, E, VU,NR by BF at 2/27/2021 1110    Acceptance, E, VU,NR by BF at 2/25/2021 1425    Acceptance, E, VU,NR by BF at 2/23/2021 1548   Other Acceptance, E,D, VU by BF at 3/12/2021 1433    Comment: Pt and roommate educated in self-care                   Point: Precautions (Resolved)     Description:   Instruct learner(s) on prescribed precautions during self-care and functional transfers.              Learning Progress Summary           Patient Acceptance, E, VU by BF at 3/16/2021 1238    Acceptance, E, VU,NR by BF at 3/15/2021 1513    Acceptance, E,D, VU by BF at 3/12/2021 1433    Comment: Pt and roommate educated in self-care    Acceptance, E, VU,NR by BF at 3/11/2021 1215    Acceptance, E,D, VU,DU,NR by AB at 3/10/2021 1042    Acceptance, E, VU,NR by BF at 3/9/2021 1500    Acceptance, E, VU,NR by BF at 3/8/2021 1414    Acceptance, E, VU,NR by BF at 3/5/2021 1231    Acceptance, E, VU,NR by BF at 3/4/2021 1237    Acceptance, E, VU,NR by BF at 3/2/2021 1445    Acceptance, E, VU,NR by BF at 3/1/2021 1432    Acceptance, E, VU,NR by BF at 2/27/2021 1110    Acceptance, E, VU,NR by BF at 2/25/2021 1425    Acceptance, E, VU,NR by BF at 2/23/2021  1548   Other Acceptance, E,D, VU by BF at 3/12/2021 1433    Comment: Pt and roommate educated in self-care                               User Key     Initials Effective Dates Name Provider Type Discipline    AB 04/03/18 -  Patricia Cifuentes, OT Occupational Therapist OT    BF 07/05/20 -  Meghan Carbajal, OT Occupational Therapist OT                OT Recommendation and Plan  Planned Therapy Interventions (OT): activity tolerance training, adaptive equipment training, BADL retraining, neuromuscular control/coordination retraining, passive ROM/stretching, ROM/therapeutic exercise, strengthening exercise, transfer/mobility retraining          OT IRF GOALS     Row Name 03/16/21 1200 03/09/21 1514 03/09/21 1500       UB Dressing Goal 1 (OT-IRF)    Activity/Device (UB Dressing Goal 1, OT-IRF)  upper body dressing  -BF  --  upper body dressing  -BF    [place] (UB Dress Goal 1, OT-IRF)  moderate assist (50-74% patient effort)  -BF  --  moderate assist (50-74% patient effort)  -BF    Time Frame (UB Dressing Goal 1, OT-IRF)  short-term goal (STG);1 week  -BF  --  short-term goal (STG);1 week  -BF    Progress/Outcomes (UB Dressing Goal 1, OT-IRF)  goal met  -BF  --  goal met  -BF       UB Dressing Goal 2 (OT-IRF)    Activity/Device (UB Dressing Goal 2, OT-IRF)  upper body dressing  -BF  --  upper body dressing  -BF    [place] (UB Dress Goal 2, OT-IRF)  minimum assist (75% or more patient effort)  -BF  --  minimum assist (75% or more patient effort)  -BF    Time Frame (UB Dressing Goal 2, OT-IRF)  long-term goal (LTG);by discharge  -BF  --  long-term goal (LTG);by discharge  -BF    Progress/Outcomes (UB Dressing Goal 2, OT-IRF)  goal met  -BF  --  --       LB Dressing Goal 1 (OT-IRF)    Activity/Device (LB Dressing Goal 1, OT-IRF)  lower body dressing  -BF  --  lower body dressing  -BF    [place] (LB Dressing Goal 1, OT-IRF)  maximum assist (25-49% patient effort)  -BF  --  maximum assist (25-49%  patient effort)  -BF    Time Frame (LB Dressing Goal 1, OT-IRF)  short-term goal (STG);5 - 7 days  -BF  --  short-term goal (STG);5 - 7 days  -BF    Progress/Outcomes (LB Dressing Goal 1, OT-IRF)  goal met  -BF  --  goal met  -BF       LB Dressing Goal 2 (OT-IRF)    Activity/Device (LB Dressing Goal 2, OT-IRF)  lower body dressing  -BF  lower body dressing  -BF  lower body dressing  -BF    [place] (LB Dressing Goal 2, OT-IRF)  moderate assist (50-74% patient effort)  -BF  minimum assist (75% or more patient effort)  -BF  moderate assist (50-74% patient effort)  -BF    Time Frame (LB Dressing Goal 2, OT-IRF)  long-term goal (LTG);by discharge  -BF  by discharge  -BF  long-term goal (LTG);by discharge  -BF    Progress/Outcomes (LB Dressing Goal 2, OT-IRF)  goal met  -BF  --  goal met  -BF    Row Name 03/04/21 1249             UB Dressing Goal 1 (OT-IRF)    Activity/Device (UB Dressing Goal 1, OT-IRF)  upper body dressing  -BF      [place] (UB Dress Goal 1, OT-IRF)  moderate assist (50-74% patient effort)  -BF      Time Frame (UB Dressing Goal 1, OT-IRF)  short-term goal (STG);1 week  -BF      Progress/Outcomes (UB Dressing Goal 1, OT-IRF)  goal met  -BF         UB Dressing Goal 2 (OT-IRF)    Activity/Device (UB Dressing Goal 2, OT-IRF)  upper body dressing  -BF      [place] (UB Dress Goal 2, OT-IRF)  minimum assist (75% or more patient effort)  -BF      Time Frame (UB Dressing Goal 2, OT-IRF)  long-term goal (LTG);by discharge  -BF         LB Dressing Goal 1 (OT-IRF)    Activity/Device (LB Dressing Goal 1, OT-IRF)  lower body dressing  -BF      [place] (LB Dressing Goal 1, OT-IRF)  maximum assist (25-49% patient effort)  -BF      Time Frame (LB Dressing Goal 1, OT-IRF)  short-term goal (STG);5 - 7 days  -BF      Progress/Outcomes (LB Dressing Goal 1, OT-IRF)  goal met  -BF         LB Dressing Goal 2 (OT-IRF)    Activity/Device (LB Dressing Goal 2, OT-IRF)  lower body dressing  -BF      [place]  (LB Dressing Goal 2, OT-IRF)  moderate assist (50-74% patient effort)  -BF      Time Frame (LB Dressing Goal 2, OT-IRF)  long-term goal (LTG);by discharge  -BF        User Key  (r) = Recorded By, (t) = Taken By, (c) = Cosigned By    Initials Name Provider Type    Meghan Atkins, OT Occupational Therapist              Time Calculation:   Time Calculation- OT     Row Name 03/16/21 1435             Time Calculation- OT    Total Timed Code Minutes- OT  15 minute(s)  -BF      OT Non-Billable Time (min)  30 min  -BF        User Key  (r) = Recorded By, (t) = Taken By, (c) = Cosigned By    Initials Name Provider Type    Meghan Atkins, OT Occupational Therapist          Therapy Charges for Today     Code Description Service Date Service Provider Modifiers Qty    18343613714 HC OT SELF CARE/MGMT/TRAIN EA 15 MIN 3/15/2021 Meghan Carbajal, OT GO, KX 3    45404128416 HC OT NEUROMUSC RE EDUCATION EA 15 MIN 3/15/2021 Meghan Carbajal, OT GO, KX 2    28623959229 HC OT THER PROC EA 15 MIN 3/15/2021 Meghan Carbajal, OT GO, KX 1    45477061280 HC OT SELF CARE/MGMT/TRAIN EA 15 MIN 3/16/2021 Meghan Carbajal OT GO, KX 1               OT Discharge Summary  Reason for Discharge: Discharge from facility  Outcomes Achieved: Able to achieve all goals within established timeline  Discharge Destination: Home with home health, Home with assist    Meghan Carbajal OT  3/16/2021

## 2021-03-16 NOTE — PROGRESS NOTES
Patient Assessment Instrument  Quality Indicators - Discharge    Section GG. Self-Care Performance      Section GG. Mobility Performance      Section J. Health Conditions Discharge      Section M. Skin Conditions Discharge  Unhealed Pressure Ulcer(s)/Injurie(s) at Stage 1 or Higher:  No    . Current Number of Unhealed Pressure Ulcers      Section N. Medication    Signed by: Huong Moore Nurse

## 2021-03-16 NOTE — SIGNIFICANT NOTE
03/16/21 9807   Plan   Plan Spoke to pt and friend/caregiver [facility] about [facility] to start care on 3-17-21 and resources to assist with W/C ramp ([employer] and [employer]); provided phone numbers for friend to follow-up for eligibility.  Friend plans to call neighbor or [facility] to assist with getting pt in their home (pt has 8 steps in the front and 2 steps in the back).  Friend says pathway needs to be cleared to get to back entrance with two steps.  Educated about medical necessity requirements for non-emergent ambulance transport.  Pt and friend decline need for ambulance transport home.  Friend will provide transportation home and will get someone to assist him with getting pt in the home.  Friend will be assisting with pt's care at home.   Patient/Family in Agreement with Plan yes
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
## 2021-03-16 NOTE — PROGRESS NOTES
Patient Assessment Instrument  Quality Indicators - Discharge    Section GG. Self-Care Performance      Section GG. Mobility Performance     Roll Left and Right: [place] provides verbal cues and/or touching/steadying  and/or contact guard assistance as patient completes activity. Assistance may be  provided throughout the activity or intermittently.   Sit to Lying: [place] does less than half the effort. [place] lifts, holds or  supports trunk or limbs but provides less than half the effort.   Lying to Sitting on Side of Bed: [place] provides verbal cues and/or  touching/steadying and/or contact guard assistance as patient completes  activity. Assistance may be provided throughout the activity or intermittently.   Sit to Stand: [place] provides verbal cues and/or touching/steadying and/or  contact guard assistance as patient completes activity. Assistance may be  provided throughout the activity or intermittently.   Chair/Bed to Chair Transfer: [place] provides verbal cues and/or  touching/steadying and/or contact guard assistance as patient completes  activity. Assistance may be provided throughout the activity or intermittently.   Toilet Transfer [place] provides verbal cues and/or touching/steadying and/or  contact guard assistance as patient completes activity. Assistance may be  provided throughout the activity or intermittently.   Car Transfer: [place] provides verbal cues and/or touching/steadying and/or  contact guard assistance as patient completes activity. Assistance may be  provided throughout the activity or intermittently.   Walk 10 Feet:   [place] does all of the effort. Patient does none of the effort  to complete the activity. Or, the assistance of 2 or more helpers is required  for the patient to complete the activity.  Walk 50 Feet with 2 Turns:   [place] does all of the effort. Patient does none of  the effort to complete the activity. Or, the assistance of 2 or more helpers is  required for the patient to  complete the activity.  Walk 150 Feet:   [place] does all of the effort. Patient does none of the effort  to complete the activity. Or, the assistance of 2 or more helpers is required  for the patient to complete the activity.  Walking 10 Feet on Uneven Surfaces:   Not attempted due to medical or safety  concerns.  1 Step Over Curb or Up/Down Stair:   Not attempted due to medical or safety  concerns.  Picking up an Object:   Not attempted due to medical or safety concerns. Uses  Wheelchair and/or Scooter: Yes  Wheel 50 Feet with Two Turns: [place] does less than half the effort. [place]  lifts, holds or supports trunk or limbs but provides less than half the effort.  Type of Wheelchair or Scooter Used: Manual  Wheel 150 Feet: [place] does less than half the effort. [place] lifts, holds or  supports trunk or limbs but provides less than half the effort.  Type of Wheelchair or Scooter Used: Manual    Section J. Health Conditions Discharge      Section M. Skin Conditions Discharge      . Current Number of Unhealed Pressure Ulcers      Section N. Medication    Signed by: Pooja Mcqueen PTA

## 2021-03-16 NOTE — PROGRESS NOTES
Occupational Therapy: Individual: 15 minutes.    Physical Therapy:    Speech Language Pathology:    Signed by: Meghan Carbajal OT

## 2021-03-16 NOTE — SIGNIFICANT NOTE
03/16/21 1257   Plan   Plan Faxed discharge summary with face to face to [facility] [phone].

## 2021-03-16 NOTE — THERAPY DISCHARGE NOTE
Inpatient Rehabilitation - IRF Physical Therapy Discharge Summary   [place]       Patient Name: Valerie Moore  : 1955  MRN: [id number]    Today's Date: 3/16/2021                 Admit Date: 2021      PT Recommendation and Plan    Visit Dx:    ICD-10-CM ICD-9-CM   1. Cerebrovascular accident (CVA) due to other mechanism (CMS/[facility])  I63.89 434.91   2. Community acquired pneumonia of left lower lobe of lung  J18.9 486   3. Weakness  R53.1 780.79   4. Sleep disorder  G47.9 780.50   5. Anxiety  F41.9 300.00   6. Abdominal aortic aneurysm (AAA) 3.0 cm to 5.5 cm in diameter in male (CMS/[facility])  I71.4 441.4   7. Tobacco abuse  Z72.0 305.1           PT Charges     Row Name 21 1507             Time Calculation    PT Received On  21  -RF         Time Calculation- PT    Total Timed Code Minutes- PT  15 minute(s)  -RF        User Key  (r) = Recorded By, (t) = Taken By, (c) = Cosigned By    Initials Name Provider Type    RF Pooja Mcqueen, WAGNER Physical Therapy Assistant            PT IRF GOALS     Row Name 21 1502             Bed Mobility Goal 1 (PT-IRF)    Progress/Outcomes (Bed Mobility Goal 1, PT-IRF)  goal met  -RF         Bed Mobility Goal 2 (PT-IRF)    Progress/Outcomes (Bed Mobility Goal 2, PT-IRF)  goal not met  -RF         Transfer Goal 1 (PT-IRF)    Progress/Outcomes (Transfer Goal 1, PT-IRF)  goal met  -RF         Transfer Goal 2 (PT-IRF)    Progress/Outcomes (Transfer Goal 2, PT-IRF)  goal not met  -RF         Gait/Walking Locomotion Goal 1 (PT-IRF)    Progress/Outcomes (Gait/Walking Locomotion Goal 1, PT-IRF)  goal not met  -RF         Gait/Walking Locomotion Goal 2 (PT-IRF)    Progress/Outcomes (Gait/Walking Locomotion Goal 2, PT-IRF)  goal not met  -RF        User Key  (r) = Recorded By, (t) = Taken By, (c) = Cosigned By    Initials Name Provider Type    Pooja Vela, WAGNER Physical Therapy Assistant              Therapy Charges for Today     Code Description Service Date  Service Provider Modifiers Qty    26562207053  PT NEUROMUSC RE EDUCATION EA 15 MIN 3/15/2021 Pooja Mcqueen, PTA GP, KX 3    66844287567 HC PT THER PROC EA 15 MIN 3/15/2021 Pooja Mcqueen, PTA GP, KX 2    52279939691 HC PT THERAPEUTIC ACT EA 15 MIN 3/15/2021 Pooja Mcqueen, PTA GP, KX 2    13220820148  PT THERAPEUTIC ACT EA 15 MIN 3/16/2021 Pooja Mcqueen, PTA GP, KX 1          PT Discharge Summary  Reason for Discharge: Discharge from facility  Outcomes Achieved: Patient able to partially acheive established goals  Discharge Destination: Home with assist, Home with home health      Pooja Mcqueen PTA   3/16/2021

## 2021-03-17 NOTE — PROGRESS NOTES
Occupational Therapy:    Physical Therapy: Individual: 15 minutes.    Speech Language Pathology:    Signed by: Ondina Monroy, Supervisor

## 2021-03-17 NOTE — PROGRESS NOTES
Patient Assessment Instrument  Quality Indicators - Discharge    Section GG. Self-Care Performance      Section GG. Mobility Performance      Section J. Health Conditions Discharge  Fall(s) Since Admission:  No    Section M. Skin Conditions Discharge      . Current Number of Unhealed Pressure Ulcers      Section N. Medication    Medication Intervention: N/A - There were no potential clinically significant  medication issues identified since admission or patient is not taking any  medications.    Signed by: Ondina Monroy, Supervisor
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
## 2022-01-01 NOTE — ED PROVIDER NOTES
Notes recorded by Bulmaro Charlton MD on 6/4/2020 at 8:01 AM CDT  Lab results are overall quite stable but the Cr is up I suspect d/t fasting    rec jorge luis BMP in a month nonfasting   .  Continue present management otherwise       Pt notified of results and pr Subjective   64-year-old male in the emergency department with increased shortness of breath and a cough that is developed over the last 2 to 3 days.  Reports that his cough has a green-yellow appearance, and that he is short of breath with exertion.  Came to the emergency department for further evaluation.  Denies chest pain, nausea, vomiting, fever, or chills.  Has a significant past medical history of anxiety, hypercholesterolemia, hypertension, and smoking.        History provided by:  Patient   used: No    Shortness of Breath   Severity:  Mild  Onset quality:  Gradual  Timing:  Constant  Progression:  Worsening  Chronicity:  Chronic  Context: URI    Context: not activity, not animal exposure, not emotional upset, not fumes, not known allergens, not occupational exposure, not pollens, not smoke exposure, not strong odors and not weather changes    Relieved by:  Nothing  Worsened by:  Activity, coughing, deep breathing and exertion  Ineffective treatments:  None tried  Associated symptoms: cough    Associated symptoms: no abdominal pain, no chest pain, no claudication, no diaphoresis, no ear pain and no fever        Review of Systems   Constitutional: Negative for diaphoresis and fever.   HENT: Negative for ear pain.    Respiratory: Positive for cough and shortness of breath.    Cardiovascular: Negative for chest pain and claudication.   Gastrointestinal: Negative for abdominal pain.   All other systems reviewed and are negative.      Past Medical History:   Diagnosis Date   • Anxiety    • Hypercholesteremia    • Hypertension    • Left shoulder pain    • Leg pain    • Seasonal allergies    • Sleep disorder        No Known Allergies    Past Surgical History:   Procedure Laterality Date   • HERNIA REPAIR     • HIP SURGERY Right     plate and 8 screws in right hip        Family History   Problem Relation Age of Onset   • Cancer Mother    • No Known Problems Father    • No Known Problems Sister     • No Known Problems Brother    • No Known Problems Son    • No Known Problems Daughter    • No Known Problems Maternal Grandmother    • No Known Problems Maternal Grandfather    • No Known Problems Paternal Grandmother    • No Known Problems Paternal Grandfather    • No Known Problems Cousin    • No Known Problems Other    • Rheum arthritis Neg Hx    • Osteoarthritis Neg Hx    • Asthma Neg Hx    • Diabetes Neg Hx    • Heart failure Neg Hx    • Hyperlipidemia Neg Hx    • Hypertension Neg Hx    • Migraines Neg Hx    • Rashes / Skin problems Neg Hx    • Seizures Neg Hx    • Stroke Neg Hx    • Thyroid disease Neg Hx        Social History     Socioeconomic History   • Marital status:      Spouse name: Not on file   • Number of children: Not on file   • Years of education: Not on file   • Highest education level: Not on file   Tobacco Use   • Smoking status: Current Every Day Smoker     Packs/day: 0.50     Years: 20.00     Pack years: 10.00     Types: Cigarettes   • Smokeless tobacco: Former User     Types: Chew   Substance and Sexual Activity   • Alcohol use: No   • Drug use: No           Objective   Physical Exam   Constitutional: He is oriented to person, place, and time. He appears well-developed and well-nourished.  Non-toxic appearance. No distress.   HENT:   Head: Normocephalic and atraumatic.   Right Ear: External ear normal.   Left Ear: External ear normal.   Nose: Nose normal.   Mouth/Throat: Oropharynx is clear and moist and mucous membranes are normal. No oropharyngeal exudate. No tonsillar exudate.   Eyes: Conjunctivae, EOM and lids are normal. Pupils are equal, round, and reactive to light.   Neck: Normal range of motion and full passive range of motion without pain. Neck supple. No thyromegaly present.   Cardiovascular: Regular rhythm, S1 normal, S2 normal, normal heart sounds, intact distal pulses and normal pulses. Tachycardia present.   Pulmonary/Chest: Effort normal. No tachypnea. No  respiratory distress. He has decreased breath sounds. He has no wheezes. He has no rales. He exhibits no tenderness.   Abdominal: Soft. Normal appearance and bowel sounds are normal. He exhibits no distension. There is no tenderness. There is no rebound and no guarding.   Musculoskeletal: Normal range of motion. He exhibits no edema, tenderness or deformity.   Lymphadenopathy:     He has no cervical adenopathy.   Neurological: He is alert and oriented to person, place, and time. He has normal strength. No cranial nerve deficit or sensory deficit. GCS eye subscore is 4. GCS verbal subscore is 5. GCS motor subscore is 6.   Skin: Skin is warm, dry and intact. No rash noted. He is not diaphoretic. No erythema. No pallor.   Psychiatric: He has a normal mood and affect. His speech is normal and behavior is normal. Judgment and thought content normal. Cognition and memory are normal.   Nursing note and vitals reviewed.      Procedures           ED Course  ED Course as of Nov 23 0528   Sat Nov 23, 2019   0228 Impression    No acute cardiopulmonary findings.     XR Chest 1 View [ES]   0448 Impression    Air space opacity left lower lobe highly concerning for acute infectious pneumonia. Recommend clinical and imaging follow-up to resolution.    No evidence of pulmonary embolus.    Diffuse lung disease with centrilobular emphysema predominantly involving the lung apices.    Aneurysmal dilatation of the descending thoracic aorta at 4.5 cm.     CT Chest Pulmonary Embolism [ES]   0525 Sinus tachycardia  Minimal voltage criteria for LVH, may be normal variant  Borderline ECG  No previous ECGs available  Vent. rate 102 BPM  GA interval 202 ms  QRS duration 86 ms  QT/QTc 340/443 ms  P-R-T axes 32 -15 7 ECG 12 Lead [ES]   0500 64-year-old male in the emergency department diagnosed with left lower lobe pneumonia.  Patient scored a curb 65 of one-point and outpatient treatment will be done at this time.  Discussed case with on-call  hospitalist Dr. Bush, whom recommended outpatient treatment at this time.  Patient has high risk of the back to the emergency department with worsening symptoms, and this was discussed in detail with the patient prior to discharge.  He was directed to immediately return with worsening symptoms.  Again, the hospitalist on-call Dr. Bush did not feel admission was indicated at this time.  [ES]      ED Course User Index  [ES] Saul Fregoso MD                  [facility]    Final diagnoses:   Pneumonia of left lower lobe due to infectious organism (CMS/[facility])              Saul Fregoso MD  11/23/19 0528     no